# Patient Record
Sex: FEMALE | Race: WHITE | NOT HISPANIC OR LATINO
[De-identification: names, ages, dates, MRNs, and addresses within clinical notes are randomized per-mention and may not be internally consistent; named-entity substitution may affect disease eponyms.]

---

## 2017-01-27 ENCOUNTER — MEDICATION RENEWAL (OUTPATIENT)
Age: 62
End: 2017-01-27

## 2017-02-10 ENCOUNTER — OUTPATIENT (OUTPATIENT)
Dept: OUTPATIENT SERVICES | Facility: HOSPITAL | Age: 62
LOS: 1 days | End: 2017-02-10
Payer: COMMERCIAL

## 2017-02-10 PROCEDURE — 77067 SCR MAMMO BI INCL CAD: CPT

## 2017-02-10 PROCEDURE — G0202: CPT | Mod: 26

## 2017-02-15 ENCOUNTER — APPOINTMENT (OUTPATIENT)
Dept: ORTHOPEDIC SURGERY | Facility: CLINIC | Age: 62
End: 2017-02-15

## 2017-02-15 DIAGNOSIS — M16.11 UNILATERAL PRIMARY OSTEOARTHRITIS, RIGHT HIP: ICD-10-CM

## 2017-03-12 ENCOUNTER — FORM ENCOUNTER (OUTPATIENT)
Age: 62
End: 2017-03-12

## 2017-03-13 ENCOUNTER — OUTPATIENT (OUTPATIENT)
Dept: OUTPATIENT SERVICES | Facility: HOSPITAL | Age: 62
LOS: 1 days | End: 2017-03-13
Payer: COMMERCIAL

## 2017-03-13 DIAGNOSIS — Z22.321 CARRIER OR SUSPECTED CARRIER OF METHICILLIN SUSCEPTIBLE STAPHYLOCOCCUS AUREUS: ICD-10-CM

## 2017-03-13 DIAGNOSIS — M16.11 UNILATERAL PRIMARY OSTEOARTHRITIS, RIGHT HIP: ICD-10-CM

## 2017-03-13 LAB
ANION GAP SERPL CALC-SCNC: 7 MMOL/L — LOW (ref 9–16)
APPEARANCE UR: CLEAR — SIGNIFICANT CHANGE UP
APTT BLD: 34.5 SEC — SIGNIFICANT CHANGE UP (ref 27.5–37.4)
BILIRUB UR-MCNC: (no result)
BUN SERPL-MCNC: 17 MG/DL — SIGNIFICANT CHANGE UP (ref 7–23)
CALCIUM SERPL-MCNC: 9.5 MG/DL — SIGNIFICANT CHANGE UP (ref 8.5–10.5)
CHLORIDE SERPL-SCNC: 104 MMOL/L — SIGNIFICANT CHANGE UP (ref 96–108)
CO2 SERPL-SCNC: 30 MMOL/L — SIGNIFICANT CHANGE UP (ref 22–31)
COLOR SPEC: YELLOW — SIGNIFICANT CHANGE UP
CREAT SERPL-MCNC: 0.74 MG/DL — SIGNIFICANT CHANGE UP (ref 0.5–1.3)
DIFF PNL FLD: NEGATIVE — SIGNIFICANT CHANGE UP
GLUCOSE SERPL-MCNC: 96 MG/DL — SIGNIFICANT CHANGE UP (ref 70–99)
GLUCOSE UR QL: NEGATIVE — SIGNIFICANT CHANGE UP
HBA1C BLD-MCNC: 6 % — HIGH (ref 4.8–5.6)
HCT VFR BLD CALC: 45.1 % — HIGH (ref 34.5–45)
HGB BLD-MCNC: 15.8 G/DL — HIGH (ref 11.5–15.5)
INR BLD: 1.01 — SIGNIFICANT CHANGE UP (ref 0.88–1.16)
KETONES UR-MCNC: (no result) MG/DL
LEUKOCYTE ESTERASE UR-ACNC: NEGATIVE — SIGNIFICANT CHANGE UP
MCHC RBC-ENTMCNC: 32.6 PG — SIGNIFICANT CHANGE UP (ref 27–34)
MCHC RBC-ENTMCNC: 35 G/DL — SIGNIFICANT CHANGE UP (ref 32–36)
MCV RBC AUTO: 93.2 FL — SIGNIFICANT CHANGE UP (ref 80–100)
NITRITE UR-MCNC: NEGATIVE — SIGNIFICANT CHANGE UP
PH UR: 5 — SIGNIFICANT CHANGE UP (ref 4–8)
PLATELET # BLD AUTO: 245 K/UL — SIGNIFICANT CHANGE UP (ref 150–400)
POTASSIUM SERPL-MCNC: 4 MMOL/L — SIGNIFICANT CHANGE UP (ref 3.5–5.3)
POTASSIUM SERPL-SCNC: 4 MMOL/L — SIGNIFICANT CHANGE UP (ref 3.5–5.3)
PROT UR-MCNC: NEGATIVE MG/DL — SIGNIFICANT CHANGE UP
PROTHROM AB SERPL-ACNC: 11.2 SEC — SIGNIFICANT CHANGE UP (ref 10–13.1)
RBC # BLD: 4.84 M/UL — SIGNIFICANT CHANGE UP (ref 3.8–5.2)
RBC # FLD: 12.4 % — SIGNIFICANT CHANGE UP (ref 10.3–16.9)
SODIUM SERPL-SCNC: 141 MMOL/L — SIGNIFICANT CHANGE UP (ref 135–145)
SP GR SPEC: >=1.03 — SIGNIFICANT CHANGE UP (ref 1–1.03)
UROBILINOGEN FLD QL: 0.2 E.U./DL — SIGNIFICANT CHANGE UP
WBC # BLD: 7.5 K/UL — SIGNIFICANT CHANGE UP (ref 3.8–10.5)
WBC # FLD AUTO: 7.5 K/UL — SIGNIFICANT CHANGE UP (ref 3.8–10.5)

## 2017-03-13 PROCEDURE — 73700 CT LOWER EXTREMITY W/O DYE: CPT

## 2017-03-13 PROCEDURE — 85730 THROMBOPLASTIN TIME PARTIAL: CPT

## 2017-03-13 PROCEDURE — 73700 CT LOWER EXTREMITY W/O DYE: CPT | Mod: 26,RT

## 2017-03-13 PROCEDURE — 83036 HEMOGLOBIN GLYCOSYLATED A1C: CPT

## 2017-03-13 PROCEDURE — 85027 COMPLETE CBC AUTOMATED: CPT

## 2017-03-13 PROCEDURE — 93005 ELECTROCARDIOGRAM TRACING: CPT

## 2017-03-13 PROCEDURE — 71046 X-RAY EXAM CHEST 2 VIEWS: CPT

## 2017-03-13 PROCEDURE — 71020: CPT | Mod: 26

## 2017-03-13 PROCEDURE — 87641 MR-STAPH DNA AMP PROBE: CPT

## 2017-03-13 PROCEDURE — 81003 URINALYSIS AUTO W/O SCOPE: CPT

## 2017-03-13 PROCEDURE — 80048 BASIC METABOLIC PNL TOTAL CA: CPT

## 2017-03-13 PROCEDURE — 87086 URINE CULTURE/COLONY COUNT: CPT

## 2017-03-13 PROCEDURE — 93010 ELECTROCARDIOGRAM REPORT: CPT

## 2017-03-13 PROCEDURE — 85610 PROTHROMBIN TIME: CPT

## 2017-03-14 LAB
CULTURE RESULTS: SIGNIFICANT CHANGE UP
SPECIMEN SOURCE: SIGNIFICANT CHANGE UP

## 2017-03-15 LAB
MRSA PCR RESULT.: NEGATIVE — SIGNIFICANT CHANGE UP
S AUREUS DNA NOSE QL NAA+PROBE: NEGATIVE — SIGNIFICANT CHANGE UP

## 2017-04-04 ENCOUNTER — MEDICATION RENEWAL (OUTPATIENT)
Age: 62
End: 2017-04-04

## 2017-04-05 ENCOUNTER — RESULT REVIEW (OUTPATIENT)
Age: 62
End: 2017-04-05

## 2017-04-05 VITALS
OXYGEN SATURATION: 95 % | DIASTOLIC BLOOD PRESSURE: 70 MMHG | WEIGHT: 204.81 LBS | HEIGHT: 64 IN | TEMPERATURE: 97 F | RESPIRATION RATE: 16 BRPM | SYSTOLIC BLOOD PRESSURE: 156 MMHG | HEART RATE: 84 BPM

## 2017-04-06 ENCOUNTER — INPATIENT (INPATIENT)
Facility: HOSPITAL | Age: 62
LOS: 1 days | Discharge: HOME | DRG: 470 | End: 2017-04-08
Attending: ORTHOPAEDIC SURGERY | Admitting: ORTHOPAEDIC SURGERY
Payer: COMMERCIAL

## 2017-04-06 ENCOUNTER — APPOINTMENT (OUTPATIENT)
Dept: ORTHOPEDIC SURGERY | Facility: HOSPITAL | Age: 62
End: 2017-04-06

## 2017-04-06 DIAGNOSIS — Z98.890 OTHER SPECIFIED POSTPROCEDURAL STATES: Chronic | ICD-10-CM

## 2017-04-06 DIAGNOSIS — M16.11 UNILATERAL PRIMARY OSTEOARTHRITIS, RIGHT HIP: ICD-10-CM

## 2017-04-06 DIAGNOSIS — Z41.9 ENCOUNTER FOR PROCEDURE FOR PURPOSES OTHER THAN REMEDYING HEALTH STATE, UNSPECIFIED: Chronic | ICD-10-CM

## 2017-04-06 PROCEDURE — 72170 X-RAY EXAM OF PELVIS: CPT | Mod: 26

## 2017-04-06 PROCEDURE — 27130 TOTAL HIP ARTHROPLASTY: CPT | Mod: RT

## 2017-04-06 RX ORDER — CALCIUM CARBONATE 500(1250)
1 TABLET ORAL DAILY
Qty: 0 | Refills: 0 | Status: DISCONTINUED | OUTPATIENT
Start: 2017-04-06 | End: 2017-04-08

## 2017-04-06 RX ORDER — MORPHINE SULFATE 50 MG/1
4 CAPSULE, EXTENDED RELEASE ORAL EVERY 4 HOURS
Qty: 0 | Refills: 0 | Status: DISCONTINUED | OUTPATIENT
Start: 2017-04-06 | End: 2017-04-08

## 2017-04-06 RX ORDER — ACETAMINOPHEN 500 MG
650 TABLET ORAL EVERY 6 HOURS
Qty: 0 | Refills: 0 | Status: DISCONTINUED | OUTPATIENT
Start: 2017-04-06 | End: 2017-04-08

## 2017-04-06 RX ORDER — HYDROMORPHONE HYDROCHLORIDE 2 MG/ML
0.5 INJECTION INTRAMUSCULAR; INTRAVENOUS; SUBCUTANEOUS
Qty: 0 | Refills: 0 | Status: DISCONTINUED | OUTPATIENT
Start: 2017-04-06 | End: 2017-04-08

## 2017-04-06 RX ORDER — ONDANSETRON 8 MG/1
4 TABLET, FILM COATED ORAL EVERY 4 HOURS
Qty: 0 | Refills: 0 | Status: DISCONTINUED | OUTPATIENT
Start: 2017-04-06 | End: 2017-04-08

## 2017-04-06 RX ORDER — SENNA PLUS 8.6 MG/1
2 TABLET ORAL AT BEDTIME
Qty: 0 | Refills: 0 | Status: DISCONTINUED | OUTPATIENT
Start: 2017-04-06 | End: 2017-04-08

## 2017-04-06 RX ORDER — ONDANSETRON 8 MG/1
4 TABLET, FILM COATED ORAL EVERY 6 HOURS
Qty: 0 | Refills: 0 | Status: DISCONTINUED | OUTPATIENT
Start: 2017-04-06 | End: 2017-04-08

## 2017-04-06 RX ORDER — ASPIRIN/CALCIUM CARB/MAGNESIUM 324 MG
325 TABLET ORAL DAILY
Qty: 0 | Refills: 0 | Status: DISCONTINUED | OUTPATIENT
Start: 2017-04-06 | End: 2017-04-08

## 2017-04-06 RX ORDER — POLYETHYLENE GLYCOL 3350 17 G/17G
17 POWDER, FOR SOLUTION ORAL DAILY
Qty: 0 | Refills: 0 | Status: DISCONTINUED | OUTPATIENT
Start: 2017-04-06 | End: 2017-04-08

## 2017-04-06 RX ORDER — CELECOXIB 200 MG/1
200 CAPSULE ORAL ONCE
Qty: 0 | Refills: 0 | Status: COMPLETED | OUTPATIENT
Start: 2017-04-06 | End: 2017-04-06

## 2017-04-06 RX ORDER — OXYCODONE HYDROCHLORIDE 5 MG/1
10 TABLET ORAL EVERY 4 HOURS
Qty: 0 | Refills: 0 | Status: DISCONTINUED | OUTPATIENT
Start: 2017-04-06 | End: 2017-04-08

## 2017-04-06 RX ORDER — OXYCODONE HYDROCHLORIDE 5 MG/1
5 TABLET ORAL EVERY 4 HOURS
Qty: 0 | Refills: 0 | Status: DISCONTINUED | OUTPATIENT
Start: 2017-04-06 | End: 2017-04-08

## 2017-04-06 RX ORDER — BUPIVACAINE 13.3 MG/ML
20 INJECTION, SUSPENSION, LIPOSOMAL INFILTRATION ONCE
Qty: 0 | Refills: 0 | Status: DISCONTINUED | OUTPATIENT
Start: 2017-04-06 | End: 2017-04-06

## 2017-04-06 RX ORDER — OXYCODONE HYDROCHLORIDE 5 MG/1
20 TABLET ORAL ONCE
Qty: 0 | Refills: 0 | Status: DISCONTINUED | OUTPATIENT
Start: 2017-04-06 | End: 2017-04-06

## 2017-04-06 RX ORDER — MAGNESIUM HYDROXIDE 400 MG/1
30 TABLET, CHEWABLE ORAL DAILY
Qty: 0 | Refills: 0 | Status: DISCONTINUED | OUTPATIENT
Start: 2017-04-06 | End: 2017-04-08

## 2017-04-06 RX ORDER — SIMVASTATIN 20 MG/1
20 TABLET, FILM COATED ORAL AT BEDTIME
Qty: 0 | Refills: 0 | Status: DISCONTINUED | OUTPATIENT
Start: 2017-04-06 | End: 2017-04-08

## 2017-04-06 RX ORDER — SODIUM CHLORIDE 9 MG/ML
1000 INJECTION, SOLUTION INTRAVENOUS
Qty: 0 | Refills: 0 | Status: DISCONTINUED | OUTPATIENT
Start: 2017-04-06 | End: 2017-04-08

## 2017-04-06 RX ORDER — CALCIUM CARBONATE 500(1250)
1 TABLET ORAL DAILY
Qty: 0 | Refills: 0 | Status: DISCONTINUED | OUTPATIENT
Start: 2017-04-06 | End: 2017-04-06

## 2017-04-06 RX ORDER — CELECOXIB 200 MG/1
200 CAPSULE ORAL
Qty: 0 | Refills: 0 | Status: DISCONTINUED | OUTPATIENT
Start: 2017-04-06 | End: 2017-04-08

## 2017-04-06 RX ORDER — DOCUSATE SODIUM 100 MG
100 CAPSULE ORAL THREE TIMES A DAY
Qty: 0 | Refills: 0 | Status: DISCONTINUED | OUTPATIENT
Start: 2017-04-06 | End: 2017-04-08

## 2017-04-06 RX ORDER — CEFAZOLIN SODIUM 1 G
2000 VIAL (EA) INJECTION EVERY 8 HOURS
Qty: 0 | Refills: 0 | Status: COMPLETED | OUTPATIENT
Start: 2017-04-06 | End: 2017-04-07

## 2017-04-06 RX ORDER — FERROUS SULFATE 325(65) MG
325 TABLET ORAL
Qty: 0 | Refills: 0 | Status: DISCONTINUED | OUTPATIENT
Start: 2017-04-06 | End: 2017-04-08

## 2017-04-06 RX ORDER — PANTOPRAZOLE SODIUM 20 MG/1
40 TABLET, DELAYED RELEASE ORAL DAILY
Qty: 0 | Refills: 0 | Status: DISCONTINUED | OUTPATIENT
Start: 2017-04-06 | End: 2017-04-08

## 2017-04-06 RX ORDER — ACETAMINOPHEN 500 MG
650 TABLET ORAL EVERY 8 HOURS
Qty: 0 | Refills: 0 | Status: DISCONTINUED | OUTPATIENT
Start: 2017-04-06 | End: 2017-04-08

## 2017-04-06 RX ORDER — KETOROLAC TROMETHAMINE 30 MG/ML
15 SYRINGE (ML) INJECTION EVERY 4 HOURS
Qty: 0 | Refills: 0 | Status: DISCONTINUED | OUTPATIENT
Start: 2017-04-06 | End: 2017-04-08

## 2017-04-06 RX ADMIN — Medication 100 MILLIGRAM(S): at 21:07

## 2017-04-06 RX ADMIN — OXYCODONE HYDROCHLORIDE 5 MILLIGRAM(S): 5 TABLET ORAL at 21:05

## 2017-04-06 RX ADMIN — MORPHINE SULFATE 4 MILLIGRAM(S): 50 CAPSULE, EXTENDED RELEASE ORAL at 18:07

## 2017-04-06 RX ADMIN — OXYCODONE HYDROCHLORIDE 5 MILLIGRAM(S): 5 TABLET ORAL at 21:50

## 2017-04-06 RX ADMIN — ONDANSETRON 4 MILLIGRAM(S): 8 TABLET, FILM COATED ORAL at 19:41

## 2017-04-06 RX ADMIN — Medication 650 MILLIGRAM(S): at 21:50

## 2017-04-06 RX ADMIN — CELECOXIB 200 MILLIGRAM(S): 200 CAPSULE ORAL at 11:45

## 2017-04-06 RX ADMIN — OXYCODONE HYDROCHLORIDE 20 MILLIGRAM(S): 5 TABLET ORAL at 11:45

## 2017-04-06 RX ADMIN — PANTOPRAZOLE SODIUM 40 MILLIGRAM(S): 20 TABLET, DELAYED RELEASE ORAL at 21:07

## 2017-04-06 RX ADMIN — Medication 1 TABLET(S): at 21:07

## 2017-04-06 RX ADMIN — SIMVASTATIN 20 MILLIGRAM(S): 20 TABLET, FILM COATED ORAL at 21:07

## 2017-04-06 RX ADMIN — MORPHINE SULFATE 4 MILLIGRAM(S): 50 CAPSULE, EXTENDED RELEASE ORAL at 18:33

## 2017-04-06 RX ADMIN — Medication 650 MILLIGRAM(S): at 21:05

## 2017-04-06 RX ADMIN — Medication 100 MILLIGRAM(S): at 19:32

## 2017-04-06 RX ADMIN — Medication 325 MILLIGRAM(S): at 19:32

## 2017-04-06 NOTE — PROGRESS NOTE ADULT - SUBJECTIVE AND OBJECTIVE BOX
Ortho Post Op Check    Procedure: R OWEN Sup Rosette  Surgeon: Sherrell  Laterality: R    Pt comfortable without complaints, pain controlled  Denies CP, SOB, N/V, numbness/tingling     Vital Signs Last 24 Hrs  T(C): 35.9, Max: 36.6 (04-06 @ 15:05)  T(F): 96.7, Max: 97.8 (04-06 @ 15:05)  HR: 77 (67 - 80)  BP: 116/63 (109/70 - 125/69)  BP(mean): --  RR: 12 (12 - 15)  SpO2: 98% (93% - 98%)  Wt(kg): --  AVSS    General: Pt Alert and oriented, NAD  DSG C/D/I  Pulses:  Sensation: SILT   Motor: EHL/FHL/TA/GS              Post-op X-Ray: Implant in adequate position    A/P: 61yFemale POD#0 s/p   - Stable  - Pain Control  - DVT ppx: ASA  - Post op abx: Ancef  - PT, WBS: WBAT    Ortho Pager [phone]

## 2017-04-06 NOTE — PHYSICAL THERAPY INITIAL EVALUATION ADULT - BALANCE DISTURBANCE, IDENTIFIED IMPAIRMENT CONTRIBUTE, REHAB EVAL
pain/impaired postural control/decreased ROM/impaired sensory feedback/impaired motor control/decreased strength

## 2017-04-06 NOTE — BRIEF OPERATIVE NOTE - PROCEDURE
Hip arthroplasty  04/06/2017    Active  [id number]
Hip arthroplasty  04/06/2017    Active  Dima More

## 2017-04-06 NOTE — PHYSICAL THERAPY INITIAL EVALUATION ADULT - IMPAIRMENTS CONTRIBUTING TO GAIT DEVIATIONS, PT EVAL
decreased ROM/impaired balance/decreased strength/impaired postural control/pain/impaired motor control/impaired sensory feedback

## 2017-04-06 NOTE — H&P ADULT - HISTORY OF PRESENT ILLNESS
62yo f c/o right hip pain x 5-6 months. Pt. denies any accident or injury. Pt. denies any numbness/tingling of b/l les. Denies any walker assistance but states she is currently limping. Presents today for elective RIGHT THR, ROBOTIC ASSISTED.

## 2017-04-06 NOTE — PHYSICAL THERAPY INITIAL EVALUATION ADULT - IMPAIRED TRANSFERS: SIT/STAND, REHAB EVAL
decreased strength/impaired balance/impaired sensory feedback/decreased ROM/impaired postural control/pain

## 2017-04-06 NOTE — H&P ADULT - NSHPLABSRESULTS_GEN_ALL_CORE
preop cbc/bmp/coags/ua wnl per medical clearance   CXR- wnl per medical clearance  EKG- wnl per medical clearance

## 2017-04-07 ENCOUNTER — TRANSCRIPTION ENCOUNTER (OUTPATIENT)
Age: 62
End: 2017-04-07

## 2017-04-07 LAB
ANION GAP SERPL CALC-SCNC: 10 MMOL/L — SIGNIFICANT CHANGE UP (ref 9–16)
BUN SERPL-MCNC: 11 MG/DL — SIGNIFICANT CHANGE UP (ref 7–23)
CALCIUM SERPL-MCNC: 8.1 MG/DL — LOW (ref 8.5–10.5)
CHLORIDE SERPL-SCNC: 104 MMOL/L — SIGNIFICANT CHANGE UP (ref 96–108)
CO2 SERPL-SCNC: 26 MMOL/L — SIGNIFICANT CHANGE UP (ref 22–31)
CREAT SERPL-MCNC: 0.7 MG/DL — SIGNIFICANT CHANGE UP (ref 0.5–1.3)
GLUCOSE SERPL-MCNC: 123 MG/DL — HIGH (ref 70–99)
HCT VFR BLD CALC: 32.1 % — LOW (ref 34.5–45)
HGB BLD-MCNC: 11.1 G/DL — LOW (ref 11.5–15.5)
MCHC RBC-ENTMCNC: 32.4 PG — SIGNIFICANT CHANGE UP (ref 27–34)
MCHC RBC-ENTMCNC: 34.6 G/DL — SIGNIFICANT CHANGE UP (ref 32–36)
MCV RBC AUTO: 93.6 FL — SIGNIFICANT CHANGE UP (ref 80–100)
PLATELET # BLD AUTO: 186 K/UL — SIGNIFICANT CHANGE UP (ref 150–400)
POTASSIUM SERPL-MCNC: 3.9 MMOL/L — SIGNIFICANT CHANGE UP (ref 3.5–5.3)
POTASSIUM SERPL-SCNC: 3.9 MMOL/L — SIGNIFICANT CHANGE UP (ref 3.5–5.3)
RBC # BLD: 3.43 M/UL — LOW (ref 3.8–5.2)
RBC # FLD: 12.6 % — SIGNIFICANT CHANGE UP (ref 10.3–16.9)
SODIUM SERPL-SCNC: 140 MMOL/L — SIGNIFICANT CHANGE UP (ref 135–145)
WBC # BLD: 13.5 K/UL — HIGH (ref 3.8–10.5)
WBC # FLD AUTO: 13.5 K/UL — HIGH (ref 3.8–10.5)

## 2017-04-07 RX ORDER — ONDANSETRON 8 MG/1
4 TABLET, FILM COATED ORAL EVERY 6 HOURS
Qty: 0 | Refills: 0 | Status: DISCONTINUED | OUTPATIENT
Start: 2017-04-07 | End: 2017-04-08

## 2017-04-07 RX ADMIN — OXYCODONE HYDROCHLORIDE 5 MILLIGRAM(S): 5 TABLET ORAL at 02:20

## 2017-04-07 RX ADMIN — POLYETHYLENE GLYCOL 3350 17 GRAM(S): 17 POWDER, FOR SOLUTION ORAL at 12:59

## 2017-04-07 RX ADMIN — OXYCODONE HYDROCHLORIDE 5 MILLIGRAM(S): 5 TABLET ORAL at 13:28

## 2017-04-07 RX ADMIN — Medication 325 MILLIGRAM(S): at 17:01

## 2017-04-07 RX ADMIN — Medication 650 MILLIGRAM(S): at 13:00

## 2017-04-07 RX ADMIN — Medication 650 MILLIGRAM(S): at 06:15

## 2017-04-07 RX ADMIN — ONDANSETRON 4 MILLIGRAM(S): 8 TABLET, FILM COATED ORAL at 22:44

## 2017-04-07 RX ADMIN — CELECOXIB 200 MILLIGRAM(S): 200 CAPSULE ORAL at 17:03

## 2017-04-07 RX ADMIN — Medication 100 MILLIGRAM(S): at 03:37

## 2017-04-07 RX ADMIN — Medication 325 MILLIGRAM(S): at 07:25

## 2017-04-07 RX ADMIN — OXYCODONE HYDROCHLORIDE 10 MILLIGRAM(S): 5 TABLET ORAL at 17:02

## 2017-04-07 RX ADMIN — Medication 325 MILLIGRAM(S): at 12:58

## 2017-04-07 RX ADMIN — Medication 1 TABLET(S): at 12:58

## 2017-04-07 RX ADMIN — Medication 650 MILLIGRAM(S): at 05:21

## 2017-04-07 RX ADMIN — CELECOXIB 200 MILLIGRAM(S): 200 CAPSULE ORAL at 09:26

## 2017-04-07 RX ADMIN — SIMVASTATIN 20 MILLIGRAM(S): 20 TABLET, FILM COATED ORAL at 20:51

## 2017-04-07 RX ADMIN — OXYCODONE HYDROCHLORIDE 5 MILLIGRAM(S): 5 TABLET ORAL at 01:31

## 2017-04-07 RX ADMIN — OXYCODONE HYDROCHLORIDE 10 MILLIGRAM(S): 5 TABLET ORAL at 17:32

## 2017-04-07 RX ADMIN — ONDANSETRON 4 MILLIGRAM(S): 8 TABLET, FILM COATED ORAL at 09:26

## 2017-04-07 RX ADMIN — Medication 100 MILLIGRAM(S): at 05:28

## 2017-04-07 RX ADMIN — Medication 650 MILLIGRAM(S): at 13:30

## 2017-04-07 RX ADMIN — Medication 100 MILLIGRAM(S): at 13:00

## 2017-04-07 RX ADMIN — CELECOXIB 200 MILLIGRAM(S): 200 CAPSULE ORAL at 09:56

## 2017-04-07 RX ADMIN — Medication 650 MILLIGRAM(S): at 20:51

## 2017-04-07 RX ADMIN — CELECOXIB 200 MILLIGRAM(S): 200 CAPSULE ORAL at 17:33

## 2017-04-07 RX ADMIN — Medication 650 MILLIGRAM(S): at 22:02

## 2017-04-07 RX ADMIN — OXYCODONE HYDROCHLORIDE 10 MILLIGRAM(S): 5 TABLET ORAL at 06:15

## 2017-04-07 RX ADMIN — OXYCODONE HYDROCHLORIDE 5 MILLIGRAM(S): 5 TABLET ORAL at 12:58

## 2017-04-07 RX ADMIN — Medication 100 MILLIGRAM(S): at 20:51

## 2017-04-07 RX ADMIN — OXYCODONE HYDROCHLORIDE 10 MILLIGRAM(S): 5 TABLET ORAL at 05:21

## 2017-04-07 RX ADMIN — PANTOPRAZOLE SODIUM 40 MILLIGRAM(S): 20 TABLET, DELAYED RELEASE ORAL at 12:59

## 2017-04-07 NOTE — DISCHARGE NOTE ADULT - CARE PLAN
Principal Discharge DX:	Osteoarthritis  Goal:	Pain Management, improve mobility  Instructions for follow-up, activity and diet:	see below

## 2017-04-07 NOTE — DISCHARGE NOTE ADULT - PATIENT PORTAL LINK FT
“You can access the [facility] Patient Portal, offered by [facility], by registering with the following website: [email or web address]”

## 2017-04-07 NOTE — OCCUPATIONAL THERAPY INITIAL EVALUATION ADULT - PERTINENT HX OF CURRENT PROBLEM, REHAB EVAL
62yo f c/o right hip pain x 5-6 months. Pt. denies any accident or injury. Pt. denies any numbness/tingling of b/l les. Denies any walker assistance but states she is currently limping. Presents today for elective RIGHT THR, ROBOTIC ASSISTED. Underwent right Hip arthroplasty on 04/06/2017. 62yo f c/o right hip pain x 5-6 months. Pt. denies any accident or injury. Pt. denies any numbness/tingling of b/l les. Denies any walker assistance but states she is currently limping. Presents today for elective RIGHT THR, ROBOTIC ASSISTED. Underwent superior right Hip arthroplasty on 04/06/2017.

## 2017-04-07 NOTE — DISCHARGE NOTE ADULT - MEDICATION SUMMARY - MEDICATIONS TO TAKE
I will START or STAY ON the medications listed below when I get home from the hospital:    aspirin 81 mg oral tablet  -- 1 tab(s) by mouth once a day  -- Indication: For dvt ppx     calcium (as carbonate) 600 mg oral tablet  -- 1  by mouth once a day  -- Indication: For Home    ferrous sulfate 325 mg (65 mg elemental iron) oral tablet  -- 1 tab(s) by mouth 2 times a day  -- Indication: For Home I will START or STAY ON the medications listed below when I get home from the hospital:    aspirin 325 mg oral tablet  -- 2 tab(s) by mouth once a day  -- Indication: For DVT PPX    celecoxib 200 mg oral capsule  -- 1 cap(s) by mouth once a day  -- Indication: For Pain    Percocet 5/325 oral tablet  -- 1-2 tab(s) by mouth every 4-6 hours as needed  -- Indication: For Pain    calcium (as carbonate) 600 mg oral tablet  -- 1  by mouth once a day  -- Indication: For Home    simvastatin 20 mg oral tablet  -- 1 tab(s) by mouth once a day (at bedtime)  -- Indication: For HCL    ferrous sulfate 325 mg (65 mg elemental iron) oral tablet  -- 1 tab(s) by mouth 2 times a day  -- Indication: For Home    docusate sodium 100 mg oral capsule  -- 1 cap(s) by mouth 3 times a day  -- Indication: For Constipation    polyethylene glycol 3350 oral powder for reconstitution  -- 17 gram(s) by mouth once a day  -- Indication: For Constipation    senna oral tablet  -- 2 tab(s) by mouth once a day (at bedtime), As needed, Constipation  -- Indication: For Constipation    omeprazole 20 mg oral delayed release tablet  -- 1 tab(s) by mouth once a day  -- Indication: For PPI I will START or STAY ON the medications listed below when I get home from the hospital:    aspirin 325 mg oral tablet  -- 2 tab(s) by mouth once a day  -- Indication: For DVT PPX    celecoxib 200 mg oral capsule  -- 1 cap(s) by mouth once a day  -- Indication: For Pain    Percocet 5/325 oral tablet  -- 1-2 tab(s) by mouth every 4-6 hours as needed  -- Indication: For Pain    calcium (as carbonate) 600 mg oral tablet  -- 1  by mouth once a day  -- Indication: For Home    Zofran ODT 4 mg oral tablet, disintegrating  -- 1 tab(s) by mouth every 4 hours, As Needed -for nausea MDD:6  -- Indication: For Nausea    simvastatin 20 mg oral tablet  -- 1 tab(s) by mouth once a day (at bedtime)  -- Indication: For HCL    ferrous sulfate 325 mg (65 mg elemental iron) oral tablet  -- 1 tab(s) by mouth 2 times a day  -- Indication: For Home    docusate sodium 100 mg oral capsule  -- 1 cap(s) by mouth 3 times a day  -- Indication: For Constipation    polyethylene glycol 3350 oral powder for reconstitution  -- 17 gram(s) by mouth once a day  -- Indication: For Constipation    senna oral tablet  -- 2 tab(s) by mouth once a day (at bedtime), As needed, Constipation  -- Indication: For Constipation    omeprazole 20 mg oral delayed release tablet  -- 1 tab(s) by mouth once a day  -- Indication: For PPI

## 2017-04-07 NOTE — PROGRESS NOTE ADULT - SUBJECTIVE AND OBJECTIVE BOX
ORTHO NOTE    [x ] Pt seen/examined.9AM  [ ] Pt without any complaints/in NAD.    [x ] Pt complains of: Incisional pain with movements     [ ] ROS  otherwise negative    .    PHYSICAL EXAM:    Vital Signs Last 24 Hrs  T(C): 36.6, Max: 37 (04-06 @ 20:35)  T(F): 97.8, Max: 98.6 (04-06 @ 20:35)  HR: 77 (67 - 93)  BP: 117/68 (94/60 - 125/69)  BP(mean): --  RR: 16 (12 - 21)  SpO2: 98% (91% - 99%)    I&O's Detail  I & Os for 24h ending 07 Apr 2017 07:00  =============================================  IN:    lactated ringers.: 980 ml    Total IN: 980 ml  ---------------------------------------------  OUT:    Voided: 900 ml    Total OUT: 900 ml  ---------------------------------------------  Total NET: 80 ml    I & Os for current day (as of 07 Apr 2017 13:20)  =============================================  IN:    Oral Fluid: 600 ml    Total IN: 600 ml  ---------------------------------------------  OUT:    Voided: 700 ml    Total OUT: 700 ml  ---------------------------------------------  Total NET: -100 ml       CAPILLARY BLOOD GLUCOSE                  Neuro: A&0x3    Lungs: Denies SOB    CV: Denies chest pain    ABD: NON distended positive bowel sounds      Ext: NVID Dressing clean dry and intact.      LABS                        11.1   13.5  )-----------( 186      ( 07 Apr 2017 06:22 )             32.1                                04-07    140  |  104  |  11  ----------------------------<  123<H>  3.9   |  26  |  0.70    Ca    8.1<L>      07 Apr 2017 06:23       ASSESSMENT/PLAN:      STAT  POST:  Right OWEN ( Superior)    CONTINUE:          [x ] PT WBAT    [x ] DVT PPX- ASA    [x ] Pain MgtMEDICATIONS  (PRN):    oxyCODONE IR 5milliGRAM(s) Oral every 4 hours PRN Mild Pain  oxyCODONE IR 10milliGRAM(s) Oral every 4 hours PRN Moderate Pain  morphine  - Injectable 4milliGRAM(s) IV Push every 4 hours PRN Severe Pain  ketorolac   Injectable 15milliGRAM(s) IV Push every 4 hours PRN Moderate Pain (4 - 6)  HYDROmorphone  Injectable 0.5milliGRAM(s) IV Push every 1 hour PRN Severe Pain (7 - 10)    [ x] Dispo plan-Home

## 2017-04-07 NOTE — DISCHARGE NOTE ADULT - HOSPITAL COURSE
Admit 4/6/17  Surgery S/P Right OWEN ( superior)  Pre/post-op Antibiotics  DVT prophylaxis  Physical Therapy  Pain Management

## 2017-04-07 NOTE — DISCHARGE NOTE ADULT - CARE PROVIDER_API CALL
Marcelino Wynne), Orthopaedic Surgery  [address] [place] [address]  Phone: [phone]  Fax: [phone]

## 2017-04-07 NOTE — DISCHARGE NOTE ADULT - CARE PROVIDERS DIRECT ADDRESSES
[email or web address]

## 2017-04-07 NOTE — DISCHARGE NOTE ADULT - MEDICATION SUMMARY - MEDICATIONS TO CHANGE
I will SWITCH the dose or number of times a day I take the medications listed below when I get home from the hospital:  None I will SWITCH the dose or number of times a day I take the medications listed below when I get home from the hospital:    aspirin 81 mg oral tablet  -- 1 tab(s) by mouth once a day

## 2017-04-07 NOTE — DISCHARGE NOTE ADULT - HOME CARE AGENCY
[facility] tel [phone]  start of care day after discharge RN evaluation and Home Phyiscal therapy

## 2017-04-07 NOTE — PROGRESS NOTE ADULT - SUBJECTIVE AND OBJECTIVE BOX
SUBJECTIVE: Patient seen and examined. Pt doing well o/n.  No f/c/n/v/cp/sob.     OBJECTIVE:  Vital Signs Last 24 Hrs  T(C): 36.4, Max: 37 (04-06 @ 20:35)  T(F): 97.6, Max: 98.6 (04-06 @ 20:35)  HR: 77 (67 - 93)  BP: 113/60 (100/63 - 125/69)  BP(mean): --  RR: 16 (12 - 21)  SpO2: 99% (91% - 99%)    Affected extremity:          Dressing: clean/dry/intact            Sensation: SILT         Motor exam: 5/5 TA/GS/EHL         warm well perfused; capillary refill <3 seconds     LABS:                        11.1   13.5  )-----------( 186      ( 07 Apr 2017 06:22 )             32.1     04-07    140  |  104  |  11  ----------------------------<  123<H>  3.9   |  26  |  0.70    Ca    8.1<L>      07 Apr 2017 06:23          MEDICATIONS:lactated ringers. 1000milliLiter(s) IV Continuous <Continuous>  acetaminophen   Tablet 650milliGRAM(s) Oral every 6 hours PRN  oxyCODONE IR 5milliGRAM(s) Oral every 4 hours PRN  oxyCODONE IR 10milliGRAM(s) Oral every 4 hours PRN  morphine  - Injectable 4milliGRAM(s) IV Push every 4 hours PRN  aluminum hydroxide/magnesium hydroxide/simethicone Suspension 30milliLiter(s) Oral four times a day PRN  ondansetron Injectable 4milliGRAM(s) IV Push every 6 hours PRN  pantoprazole    Tablet 40milliGRAM(s) Oral daily  polyethylene glycol 3350 17Gram(s) Oral daily  senna 2Tablet(s) Oral at bedtime PRN  magnesium hydroxide Suspension 30milliLiter(s) Oral daily PRN  docusate sodium 100milliGRAM(s) Oral three times a day  simvastatin 20milliGRAM(s) Oral at bedtime  ferrous    sulfate 325milliGRAM(s) Oral two times a day with meals  acetaminophen   Tablet. 650milliGRAM(s) Oral every 8 hours  ketorolac   Injectable 15milliGRAM(s) IV Push every 4 hours PRN  celecoxib 200milliGRAM(s) Oral two times a day after meals  HYDROmorphone  Injectable 0.5milliGRAM(s) IV Push every 1 hour PRN  ondansetron Injectable 4milliGRAM(s) IV Push every 4 hours PRN  aspirin 325milliGRAM(s) Oral daily  calcium carbonate 1250 mG (OsCal) 1Tablet(s) Oral daily    Anticoagulation:    Antibiotics:     Pain medications:   acetaminophen   Tablet 650milliGRAM(s) Oral every 6 hours PRN  oxyCODONE IR 5milliGRAM(s) Oral every 4 hours PRN  oxyCODONE IR 10milliGRAM(s) Oral every 4 hours PRN  morphine  - Injectable 4milliGRAM(s) IV Push every 4 hours PRN  ondansetron Injectable 4milliGRAM(s) IV Push every 6 hours PRN  acetaminophen   Tablet. 650milliGRAM(s) Oral every 8 hours  ketorolac   Injectable 15milliGRAM(s) IV Push every 4 hours PRN  celecoxib 200milliGRAM(s) Oral two times a day after meals  HYDROmorphone  Injectable 0.5milliGRAM(s) IV Push every 1 hour PRN  ondansetron Injectable 4milliGRAM(s) IV Push every 4 hours PRN  aspirin 325milliGRAM(s) Oral daily    A/P :  Pt is a 60yo Female s/p R OWEN Sup Rosette Jose Ass 4/6/17  POD # 1  -    Pain control  -    DVT ppx: ASA     -    Weight bearing status: WBAT   -    Physical Therapy  -    Dispo: Home

## 2017-04-07 NOTE — DISCHARGE NOTE ADULT - ADDITIONAL INSTRUCTIONS
No strenuous activity, heavy lifting, driving, tub bathing, or returning to work until cleared by MD.  You may shower--dressing is waterproof.  Remove dressing after post op day 7, then leave incision open to air.  Follow up with Dr. Wynne call  to schedule an appt within 10-14 days.  If you don't have a bowel movement by post op day 3, then take Milk of Magnesia (over the counter).  If no bowel movement by at least post op day 5, then use a Dulcolax suppository (over the counter) and/or a Fleets enema--if still no bowel movement, call your MD.  Contact your doctor if you experience: fever greater than 101.5, chills, chest pain, difficulty breathing, bleeding, redness or heat around the incision.   Follow up with your primary care provider.  Please take pain meds as prescribed by Dr Wynne. Celecoxib 200mg 1 tablet twice daily for 6 weeks  Percocet 5mg/325mg take 1-2 tablets by mouth every 4 to 6 hours as needed, maximum 10 tablets per day  Omeprazole 20 mg 1 tablet daily for 4 weeks  Ecotrin (enteric-coated aspirin) 325mg 1 tablet by mouth every 12 hours until 4 weeks after surgery      No strenuous activity, heavy lifting, driving, tub bathing, or returning to work until cleared by MD.  You may shower--dressing is waterproof.  Remove dressing after post op day 7, then leave incision open to air.  Follow up with Dr. Wynne call  to schedule an appt within 10-14 days.  If you don't have a bowel movement by post op day 3, then take Milk of Magnesia (over the counter).  If no bowel movement by at least post op day 5, then use a Dulcolax suppository (over the counter) and/or a Fleets enema--if still no bowel movement, call your MD.  Contact your doctor if you experience: fever greater than 101.5, chills, chest pain, difficulty breathing, bleeding, redness or heat around the incision.   Follow up with your primary care provider.  Please take pain meds as prescribed by Dr Wynne.

## 2017-04-08 VITALS
OXYGEN SATURATION: 97 % | HEART RATE: 90 BPM | DIASTOLIC BLOOD PRESSURE: 73 MMHG | TEMPERATURE: 99 F | SYSTOLIC BLOOD PRESSURE: 124 MMHG | RESPIRATION RATE: 16 BRPM

## 2017-04-08 LAB
ANION GAP SERPL CALC-SCNC: 5 MMOL/L — LOW (ref 9–16)
BUN SERPL-MCNC: 13 MG/DL — SIGNIFICANT CHANGE UP (ref 7–23)
CALCIUM SERPL-MCNC: 8.3 MG/DL — LOW (ref 8.5–10.5)
CHLORIDE SERPL-SCNC: 106 MMOL/L — SIGNIFICANT CHANGE UP (ref 96–108)
CO2 SERPL-SCNC: 30 MMOL/L — SIGNIFICANT CHANGE UP (ref 22–31)
CREAT SERPL-MCNC: 0.73 MG/DL — SIGNIFICANT CHANGE UP (ref 0.5–1.3)
GLUCOSE SERPL-MCNC: 114 MG/DL — HIGH (ref 70–99)
HCT VFR BLD CALC: 31.4 % — LOW (ref 34.5–45)
HGB BLD-MCNC: 10.5 G/DL — LOW (ref 11.5–15.5)
MCHC RBC-ENTMCNC: 32.2 PG — SIGNIFICANT CHANGE UP (ref 27–34)
MCHC RBC-ENTMCNC: 33.4 G/DL — SIGNIFICANT CHANGE UP (ref 32–36)
MCV RBC AUTO: 96.3 FL — SIGNIFICANT CHANGE UP (ref 80–100)
PLATELET # BLD AUTO: 151 K/UL — SIGNIFICANT CHANGE UP (ref 150–400)
POTASSIUM SERPL-MCNC: 4.1 MMOL/L — SIGNIFICANT CHANGE UP (ref 3.5–5.3)
POTASSIUM SERPL-SCNC: 4.1 MMOL/L — SIGNIFICANT CHANGE UP (ref 3.5–5.3)
RBC # BLD: 3.26 M/UL — LOW (ref 3.8–5.2)
RBC # FLD: 13.1 % — SIGNIFICANT CHANGE UP (ref 10.3–16.9)
SODIUM SERPL-SCNC: 141 MMOL/L — SIGNIFICANT CHANGE UP (ref 135–145)
WBC # BLD: 8.8 K/UL — SIGNIFICANT CHANGE UP (ref 3.8–10.5)
WBC # FLD AUTO: 8.8 K/UL — SIGNIFICANT CHANGE UP (ref 3.8–10.5)

## 2017-04-08 PROCEDURE — 72170 X-RAY EXAM OF PELVIS: CPT

## 2017-04-08 PROCEDURE — C1713: CPT

## 2017-04-08 PROCEDURE — 86901 BLOOD TYPING SEROLOGIC RH(D): CPT

## 2017-04-08 PROCEDURE — 80048 BASIC METABOLIC PNL TOTAL CA: CPT

## 2017-04-08 PROCEDURE — 36415 COLL VENOUS BLD VENIPUNCTURE: CPT

## 2017-04-08 PROCEDURE — 86850 RBC ANTIBODY SCREEN: CPT

## 2017-04-08 PROCEDURE — 85027 COMPLETE CBC AUTOMATED: CPT

## 2017-04-08 PROCEDURE — C1889: CPT

## 2017-04-08 PROCEDURE — 86900 BLOOD TYPING SEROLOGIC ABO: CPT

## 2017-04-08 PROCEDURE — 97161 PT EVAL LOW COMPLEX 20 MIN: CPT

## 2017-04-08 PROCEDURE — 88300 SURGICAL PATH GROSS: CPT

## 2017-04-08 PROCEDURE — 97116 GAIT TRAINING THERAPY: CPT

## 2017-04-08 PROCEDURE — C1776: CPT

## 2017-04-08 RX ORDER — SIMVASTATIN 20 MG/1
1 TABLET, FILM COATED ORAL
Qty: 0 | Refills: 0 | COMMUNITY

## 2017-04-08 RX ORDER — SIMVASTATIN 20 MG/1
1 TABLET, FILM COATED ORAL
Qty: 0 | Refills: 0 | COMMUNITY
Start: 2017-04-08

## 2017-04-08 RX ORDER — ASPIRIN/CALCIUM CARB/MAGNESIUM 324 MG
2 TABLET ORAL
Qty: 0 | Refills: 0 | COMMUNITY
Start: 2017-04-08

## 2017-04-08 RX ORDER — POLYETHYLENE GLYCOL 3350 17 G/17G
17 POWDER, FOR SOLUTION ORAL
Qty: 0 | Refills: 0 | COMMUNITY
Start: 2017-04-08

## 2017-04-08 RX ORDER — ONDANSETRON 8 MG/1
1 TABLET, FILM COATED ORAL
Qty: 42 | Refills: 0 | OUTPATIENT
Start: 2017-04-08 | End: 2017-04-15

## 2017-04-08 RX ORDER — ASPIRIN/CALCIUM CARB/MAGNESIUM 324 MG
1 TABLET ORAL
Qty: 0 | Refills: 0 | COMMUNITY

## 2017-04-08 RX ORDER — CELECOXIB 200 MG/1
1 CAPSULE ORAL
Qty: 0 | Refills: 0 | COMMUNITY
Start: 2017-04-08

## 2017-04-08 RX ORDER — DOCUSATE SODIUM 100 MG
1 CAPSULE ORAL
Qty: 0 | Refills: 0 | COMMUNITY
Start: 2017-04-08

## 2017-04-08 RX ORDER — SENNA PLUS 8.6 MG/1
2 TABLET ORAL
Qty: 0 | Refills: 0 | COMMUNITY
Start: 2017-04-08

## 2017-04-08 RX ADMIN — OXYCODONE HYDROCHLORIDE 10 MILLIGRAM(S): 5 TABLET ORAL at 05:13

## 2017-04-08 RX ADMIN — OXYCODONE HYDROCHLORIDE 10 MILLIGRAM(S): 5 TABLET ORAL at 04:15

## 2017-04-08 RX ADMIN — ONDANSETRON 4 MILLIGRAM(S): 8 TABLET, FILM COATED ORAL at 09:08

## 2017-04-08 RX ADMIN — Medication 325 MILLIGRAM(S): at 09:08

## 2017-04-08 RX ADMIN — CELECOXIB 200 MILLIGRAM(S): 200 CAPSULE ORAL at 17:42

## 2017-04-08 RX ADMIN — Medication 650 MILLIGRAM(S): at 17:28

## 2017-04-08 RX ADMIN — OXYCODONE HYDROCHLORIDE 10 MILLIGRAM(S): 5 TABLET ORAL at 17:43

## 2017-04-08 RX ADMIN — Medication 325 MILLIGRAM(S): at 14:49

## 2017-04-08 RX ADMIN — Medication 650 MILLIGRAM(S): at 14:49

## 2017-04-08 RX ADMIN — OXYCODONE HYDROCHLORIDE 10 MILLIGRAM(S): 5 TABLET ORAL at 10:51

## 2017-04-08 RX ADMIN — CELECOXIB 200 MILLIGRAM(S): 200 CAPSULE ORAL at 10:15

## 2017-04-08 RX ADMIN — POLYETHYLENE GLYCOL 3350 17 GRAM(S): 17 POWDER, FOR SOLUTION ORAL at 14:48

## 2017-04-08 RX ADMIN — Medication 325 MILLIGRAM(S): at 17:42

## 2017-04-08 RX ADMIN — Medication 650 MILLIGRAM(S): at 05:45

## 2017-04-08 RX ADMIN — OXYCODONE HYDROCHLORIDE 10 MILLIGRAM(S): 5 TABLET ORAL at 11:30

## 2017-04-08 RX ADMIN — CELECOXIB 200 MILLIGRAM(S): 200 CAPSULE ORAL at 09:08

## 2017-04-08 RX ADMIN — OXYCODONE HYDROCHLORIDE 10 MILLIGRAM(S): 5 TABLET ORAL at 18:11

## 2017-04-08 RX ADMIN — Medication 100 MILLIGRAM(S): at 14:49

## 2017-04-08 RX ADMIN — Medication 650 MILLIGRAM(S): at 05:14

## 2017-04-08 RX ADMIN — PANTOPRAZOLE SODIUM 40 MILLIGRAM(S): 20 TABLET, DELAYED RELEASE ORAL at 14:48

## 2017-04-08 RX ADMIN — Medication 1 TABLET(S): at 14:48

## 2017-04-08 RX ADMIN — Medication 100 MILLIGRAM(S): at 05:13

## 2017-04-08 NOTE — PROGRESS NOTE ADULT - SUBJECTIVE AND OBJECTIVE BOX
SUBJECTIVE: Patient seen and examined.  No issues overnight. Pain well controlled. Denies CP/SOB    OBJECTIVE:     Vital Signs Last 24 Hrs  T(C): 36.8, Max: 36.9 (04-07 @ 15:39)  T(F): 98.3, Max: 98.4 (04-07 @ 15:39)  HR: 91 (77 - 91)  BP: 120/69 (94/60 - 120/69)  BP(mean): --  RR: 15 (15 - 17)  SpO2: 96% (96% - 98%)    RLE           Dressing: clean/dry/intact            Motor exam:  TA 5/5 EHL 5/5 GSC 5/5          Sensation:   T SILT SPH SILT DP SILT         Vascular:  Warm/pink/well perfused             LABS:                        11.1   13.5  )-----------( 186      ( 07 Apr 2017 06:22 )             32.1     04-07    140  |  104  |  11  ----------------------------<  123<H>  3.9   |  26  |  0.70    Ca    8.1<L>      07 Apr 2017 06:23            MEDICATIONS:  MEDICATIONS  (STANDING):  lactated ringers. 1000milliLiter(s) IV Continuous <Continuous>  pantoprazole    Tablet 40milliGRAM(s) Oral daily  polyethylene glycol 3350 17Gram(s) Oral daily  docusate sodium 100milliGRAM(s) Oral three times a day  simvastatin 20milliGRAM(s) Oral at bedtime  ferrous    sulfate 325milliGRAM(s) Oral two times a day with meals  acetaminophen   Tablet. 650milliGRAM(s) Oral every 8 hours  celecoxib 200milliGRAM(s) Oral two times a day after meals  aspirin 325milliGRAM(s) Oral daily  calcium carbonate 1250 mG (OsCal) 1Tablet(s) Oral daily    MEDICATIONS  (PRN):  acetaminophen   Tablet 650milliGRAM(s) Oral every 6 hours PRN For Temp over 38.3 C (100.94 F)  oxyCODONE IR 5milliGRAM(s) Oral every 4 hours PRN Mild Pain  oxyCODONE IR 10milliGRAM(s) Oral every 4 hours PRN Moderate Pain  morphine  - Injectable 4milliGRAM(s) IV Push every 4 hours PRN Severe Pain  aluminum hydroxide/magnesium hydroxide/simethicone Suspension 30milliLiter(s) Oral four times a day PRN Indigestion  ondansetron Injectable 4milliGRAM(s) IV Push every 6 hours PRN Nausea and/or Vomiting  bisacodyl Suppository 10milliGRAM(s) Rectal daily PRN If no bowel movement by POD#2  senna 2Tablet(s) Oral at bedtime PRN Constipation  magnesium hydroxide Suspension 30milliLiter(s) Oral daily PRN Constipation  ketorolac   Injectable 15milliGRAM(s) IV Push every 4 hours PRN Moderate Pain (4 - 6)  HYDROmorphone  Injectable 0.5milliGRAM(s) IV Push every 1 hour PRN Severe Pain (7 - 10)  ondansetron Injectable 4milliGRAM(s) IV Push every 4 hours PRN Nausea and/or Vomiting  ondansetron    Tablet 4milliGRAM(s) Oral every 6 hours PRN Nausea and/or Vomiting - PO option first if can tolerate PO      Anticoagulation:      Antibiotics:       Pain medications:   acetaminophen   Tablet 650milliGRAM(s) Oral every 6 hours PRN  oxyCODONE IR 5milliGRAM(s) Oral every 4 hours PRN  oxyCODONE IR 10milliGRAM(s) Oral every 4 hours PRN  morphine  - Injectable 4milliGRAM(s) IV Push every 4 hours PRN  ondansetron Injectable 4milliGRAM(s) IV Push every 6 hours PRN  acetaminophen   Tablet. 650milliGRAM(s) Oral every 8 hours  ketorolac   Injectable 15milliGRAM(s) IV Push every 4 hours PRN  celecoxib 200milliGRAM(s) Oral two times a day after meals  HYDROmorphone  Injectable 0.5milliGRAM(s) IV Push every 1 hour PRN  ondansetron Injectable 4milliGRAM(s) IV Push every 4 hours PRN  aspirin 325milliGRAM(s) Oral daily  ondansetron    Tablet 4milliGRAM(s) Oral every 6 hours PRN        A/P :   s/p   R OWEN POD #2  -    Pain control  -    DVT ppx: ASA BID  -    Weight bearing status:  WBAT  -    Resume home meds  -    Physical Therapy  -    Dispo: Home today

## 2017-04-11 LAB — SURGICAL PATHOLOGY STUDY: SIGNIFICANT CHANGE UP

## 2017-04-12 DIAGNOSIS — M25.551 PAIN IN RIGHT HIP: ICD-10-CM

## 2017-04-12 DIAGNOSIS — E66.9 OBESITY, UNSPECIFIED: ICD-10-CM

## 2017-04-12 DIAGNOSIS — E78.5 HYPERLIPIDEMIA, UNSPECIFIED: ICD-10-CM

## 2017-04-12 DIAGNOSIS — M16.11 UNILATERAL PRIMARY OSTEOARTHRITIS, RIGHT HIP: ICD-10-CM

## 2017-04-12 DIAGNOSIS — M54.10 RADICULOPATHY, SITE UNSPECIFIED: ICD-10-CM

## 2017-04-19 ENCOUNTER — MEDICATION RENEWAL (OUTPATIENT)
Age: 62
End: 2017-04-19

## 2017-04-30 ENCOUNTER — FORM ENCOUNTER (OUTPATIENT)
Age: 62
End: 2017-04-30

## 2017-05-01 ENCOUNTER — APPOINTMENT (OUTPATIENT)
Dept: ORTHOPEDIC SURGERY | Facility: CLINIC | Age: 62
End: 2017-05-01

## 2017-05-01 ENCOUNTER — OUTPATIENT (OUTPATIENT)
Dept: OUTPATIENT SERVICES | Facility: HOSPITAL | Age: 62
LOS: 1 days | End: 2017-05-01
Payer: COMMERCIAL

## 2017-05-01 DIAGNOSIS — Z41.9 ENCOUNTER FOR PROCEDURE FOR PURPOSES OTHER THAN REMEDYING HEALTH STATE, UNSPECIFIED: Chronic | ICD-10-CM

## 2017-05-01 DIAGNOSIS — Z98.890 OTHER SPECIFIED POSTPROCEDURAL STATES: Chronic | ICD-10-CM

## 2017-05-01 PROBLEM — M19.90 UNSPECIFIED OSTEOARTHRITIS, UNSPECIFIED SITE: Chronic | Status: ACTIVE | Noted: 2017-04-05

## 2017-05-01 PROBLEM — E78.5 HYPERLIPIDEMIA, UNSPECIFIED: Chronic | Status: ACTIVE | Noted: 2017-04-05

## 2017-05-01 PROCEDURE — 73501 X-RAY EXAM HIP UNI 1 VIEW: CPT | Mod: 26,RT

## 2017-05-01 PROCEDURE — 73501 X-RAY EXAM HIP UNI 1 VIEW: CPT

## 2017-05-01 RX ORDER — ETODOLAC 400 MG/1
400 TABLET, FILM COATED ORAL TWICE DAILY
Qty: 60 | Refills: 1 | Status: DISCONTINUED | COMMUNITY
Start: 2017-02-15 | End: 2017-05-01

## 2017-05-26 ENCOUNTER — APPOINTMENT (OUTPATIENT)
Dept: ORTHOPEDIC SURGERY | Facility: CLINIC | Age: 62
End: 2017-05-26

## 2017-05-26 DIAGNOSIS — Z96.641 AFTERCARE FOLLOWING JOINT REPLACEMENT SURGERY: ICD-10-CM

## 2017-05-26 DIAGNOSIS — Z47.1 AFTERCARE FOLLOWING JOINT REPLACEMENT SURGERY: ICD-10-CM

## 2017-05-26 RX ORDER — OXYCODONE AND ACETAMINOPHEN 5; 325 MG/1; MG/1
5-325 TABLET ORAL
Qty: 70 | Refills: 0 | Status: DISCONTINUED | COMMUNITY
Start: 2017-04-04 | End: 2017-05-26

## 2017-05-26 RX ORDER — CELECOXIB 200 MG/1
200 CAPSULE ORAL TWICE DAILY
Qty: 84 | Refills: 0 | Status: DISCONTINUED | COMMUNITY
Start: 2017-04-04 | End: 2017-05-26

## 2017-05-26 RX ORDER — ASPIRIN/ACETAMINOPHEN/CAFFEINE 500-325-65
325 POWDER IN PACKET (EA) ORAL
Qty: 60 | Refills: 0 | Status: DISCONTINUED | COMMUNITY
Start: 2017-04-04 | End: 2017-05-26

## 2017-05-26 RX ORDER — OXYCODONE AND ACETAMINOPHEN 5; 325 MG/1; MG/1
5-325 TABLET ORAL
Qty: 60 | Refills: 0 | Status: DISCONTINUED | COMMUNITY
Start: 2017-04-19 | End: 2017-05-26

## 2017-05-26 RX ORDER — PANTOPRAZOLE 40 MG/1
40 TABLET, DELAYED RELEASE ORAL DAILY
Qty: 30 | Refills: 0 | Status: DISCONTINUED | COMMUNITY
Start: 2017-04-04 | End: 2017-05-26

## 2017-09-27 ENCOUNTER — RESULT REVIEW (OUTPATIENT)
Age: 62
End: 2017-09-27

## 2017-12-01 ENCOUNTER — RESULT REVIEW (OUTPATIENT)
Age: 62
End: 2017-12-01

## 2018-04-12 ENCOUNTER — APPOINTMENT (OUTPATIENT)
Dept: MAMMOGRAPHY | Facility: HOSPITAL | Age: 63
End: 2018-04-12

## 2018-04-12 ENCOUNTER — OUTPATIENT (OUTPATIENT)
Dept: OUTPATIENT SERVICES | Facility: HOSPITAL | Age: 63
LOS: 1 days | End: 2018-04-12
Payer: COMMERCIAL

## 2018-04-12 DIAGNOSIS — Z98.890 OTHER SPECIFIED POSTPROCEDURAL STATES: Chronic | ICD-10-CM

## 2018-04-12 DIAGNOSIS — Z41.9 ENCOUNTER FOR PROCEDURE FOR PURPOSES OTHER THAN REMEDYING HEALTH STATE, UNSPECIFIED: Chronic | ICD-10-CM

## 2018-04-12 PROCEDURE — 76642 ULTRASOUND BREAST LIMITED: CPT | Mod: 26,LT

## 2018-04-12 PROCEDURE — 77065 DX MAMMO INCL CAD UNI: CPT | Mod: 26,GG

## 2018-04-12 PROCEDURE — 77063 BREAST TOMOSYNTHESIS BI: CPT | Mod: 26

## 2018-04-12 PROCEDURE — 77067 SCR MAMMO BI INCL CAD: CPT | Mod: 26,59

## 2018-04-12 PROCEDURE — 77065 DX MAMMO INCL CAD UNI: CPT

## 2018-04-12 PROCEDURE — 76642 ULTRASOUND BREAST LIMITED: CPT

## 2018-04-12 PROCEDURE — 77063 BREAST TOMOSYNTHESIS BI: CPT

## 2018-04-12 PROCEDURE — 77067 SCR MAMMO BI INCL CAD: CPT

## 2018-04-23 ENCOUNTER — RESULT REVIEW (OUTPATIENT)
Age: 63
End: 2018-04-23

## 2018-04-23 ENCOUNTER — APPOINTMENT (OUTPATIENT)
Dept: ULTRASOUND IMAGING | Facility: HOSPITAL | Age: 63
End: 2018-04-23
Payer: COMMERCIAL

## 2018-04-23 ENCOUNTER — OUTPATIENT (OUTPATIENT)
Dept: OUTPATIENT SERVICES | Facility: HOSPITAL | Age: 63
LOS: 1 days | End: 2018-04-23
Payer: COMMERCIAL

## 2018-04-23 DIAGNOSIS — Z98.890 OTHER SPECIFIED POSTPROCEDURAL STATES: Chronic | ICD-10-CM

## 2018-04-23 DIAGNOSIS — Z41.9 ENCOUNTER FOR PROCEDURE FOR PURPOSES OTHER THAN REMEDYING HEALTH STATE, UNSPECIFIED: Chronic | ICD-10-CM

## 2018-04-23 PROCEDURE — 19084 BX BREAST ADD LESION US IMAG: CPT

## 2018-04-23 PROCEDURE — 77065 DX MAMMO INCL CAD UNI: CPT

## 2018-04-23 PROCEDURE — 88305 TISSUE EXAM BY PATHOLOGIST: CPT

## 2018-04-23 PROCEDURE — 19084 BX BREAST ADD LESION US IMAG: CPT | Mod: LT

## 2018-04-23 PROCEDURE — 88360 TUMOR IMMUNOHISTOCHEM/MANUAL: CPT

## 2018-04-23 PROCEDURE — 19083 BX BREAST 1ST LESION US IMAG: CPT

## 2018-04-23 PROCEDURE — 19083 BX BREAST 1ST LESION US IMAG: CPT | Mod: LT

## 2018-04-23 PROCEDURE — A4648: CPT

## 2018-04-23 PROCEDURE — 77065 DX MAMMO INCL CAD UNI: CPT | Mod: 26,LT

## 2018-04-25 LAB — SURGICAL PATHOLOGY STUDY: SIGNIFICANT CHANGE UP

## 2018-04-30 ENCOUNTER — APPOINTMENT (OUTPATIENT)
Dept: BREAST CENTER | Facility: CLINIC | Age: 63
End: 2018-04-30
Payer: COMMERCIAL

## 2018-04-30 VITALS
HEIGHT: 64 IN | SYSTOLIC BLOOD PRESSURE: 140 MMHG | DIASTOLIC BLOOD PRESSURE: 89 MMHG | BODY MASS INDEX: 34.15 KG/M2 | HEART RATE: 78 BPM | WEIGHT: 200 LBS

## 2018-04-30 DIAGNOSIS — R92.8 OTHER ABNORMAL AND INCONCLUSIVE FINDINGS ON DIAGNOSTIC IMAGING OF BREAST: ICD-10-CM

## 2018-04-30 DIAGNOSIS — Z86.39 PERSONAL HISTORY OF OTHER ENDOCRINE, NUTRITIONAL AND METABOLIC DISEASE: ICD-10-CM

## 2018-04-30 DIAGNOSIS — Z87.891 PERSONAL HISTORY OF NICOTINE DEPENDENCE: ICD-10-CM

## 2018-04-30 DIAGNOSIS — Z80.3 FAMILY HISTORY OF MALIGNANT NEOPLASM OF BREAST: ICD-10-CM

## 2018-04-30 PROCEDURE — 99245 OFF/OP CONSLTJ NEW/EST HI 55: CPT

## 2018-04-30 RX ORDER — AMOXICILLIN 500 MG/1
500 TABLET, FILM COATED ORAL
Qty: 20 | Refills: 0 | Status: COMPLETED | COMMUNITY
Start: 2017-02-14

## 2018-04-30 RX ORDER — HYDROCODONE BITARTRATE AND ACETAMINOPHEN 5; 325 MG/1; MG/1
5-325 TABLET ORAL
Qty: 50 | Refills: 0 | Status: COMPLETED | COMMUNITY
Start: 2017-03-21

## 2018-05-10 PROCEDURE — 71046 X-RAY EXAM CHEST 2 VIEWS: CPT | Mod: 26

## 2018-05-12 ENCOUNTER — OUTPATIENT (OUTPATIENT)
Dept: OUTPATIENT SERVICES | Facility: HOSPITAL | Age: 63
LOS: 1 days | End: 2018-05-12
Payer: COMMERCIAL

## 2018-05-12 DIAGNOSIS — Z01.818 ENCOUNTER FOR OTHER PREPROCEDURAL EXAMINATION: ICD-10-CM

## 2018-05-12 DIAGNOSIS — Z98.890 OTHER SPECIFIED POSTPROCEDURAL STATES: Chronic | ICD-10-CM

## 2018-05-12 DIAGNOSIS — Z41.9 ENCOUNTER FOR PROCEDURE FOR PURPOSES OTHER THAN REMEDYING HEALTH STATE, UNSPECIFIED: Chronic | ICD-10-CM

## 2018-05-12 LAB
ALBUMIN SERPL ELPH-MCNC: 4.2 G/DL — SIGNIFICANT CHANGE UP (ref 3.3–5)
ALP SERPL-CCNC: 75 U/L — SIGNIFICANT CHANGE UP (ref 40–120)
ALT FLD-CCNC: 22 U/L — SIGNIFICANT CHANGE UP (ref 10–45)
ANION GAP SERPL CALC-SCNC: 14 MMOL/L — SIGNIFICANT CHANGE UP (ref 5–17)
APTT BLD: 35 SEC — SIGNIFICANT CHANGE UP (ref 27.5–37.4)
AST SERPL-CCNC: 16 U/L — SIGNIFICANT CHANGE UP (ref 10–40)
BILIRUB SERPL-MCNC: 0.5 MG/DL — SIGNIFICANT CHANGE UP (ref 0.2–1.2)
BUN SERPL-MCNC: 14 MG/DL — SIGNIFICANT CHANGE UP (ref 7–23)
CALCIUM SERPL-MCNC: 9.4 MG/DL — SIGNIFICANT CHANGE UP (ref 8.4–10.5)
CHLORIDE SERPL-SCNC: 100 MMOL/L — SIGNIFICANT CHANGE UP (ref 96–108)
CO2 SERPL-SCNC: 26 MMOL/L — SIGNIFICANT CHANGE UP (ref 22–31)
CREAT SERPL-MCNC: 0.79 MG/DL — SIGNIFICANT CHANGE UP (ref 0.5–1.3)
GLUCOSE SERPL-MCNC: 99 MG/DL — SIGNIFICANT CHANGE UP (ref 70–99)
HCT VFR BLD CALC: 45.3 % — HIGH (ref 34.5–45)
HGB BLD-MCNC: 15.5 G/DL — SIGNIFICANT CHANGE UP (ref 11.5–15.5)
INR BLD: 1.01 — SIGNIFICANT CHANGE UP (ref 0.88–1.16)
MCHC RBC-ENTMCNC: 33.3 PG — SIGNIFICANT CHANGE UP (ref 27–34)
MCHC RBC-ENTMCNC: 34.2 G/DL — SIGNIFICANT CHANGE UP (ref 32–36)
MCV RBC AUTO: 97.2 FL — SIGNIFICANT CHANGE UP (ref 80–100)
PLATELET # BLD AUTO: 205 K/UL — SIGNIFICANT CHANGE UP (ref 150–400)
POTASSIUM SERPL-MCNC: 4.3 MMOL/L — SIGNIFICANT CHANGE UP (ref 3.5–5.3)
POTASSIUM SERPL-SCNC: 4.3 MMOL/L — SIGNIFICANT CHANGE UP (ref 3.5–5.3)
PROT SERPL-MCNC: 6.7 G/DL — SIGNIFICANT CHANGE UP (ref 6–8.3)
PROTHROM AB SERPL-ACNC: 11.2 SEC — SIGNIFICANT CHANGE UP (ref 9.8–12.7)
RBC # BLD: 4.66 M/UL — SIGNIFICANT CHANGE UP (ref 3.8–5.2)
RBC # FLD: 12.8 % — SIGNIFICANT CHANGE UP (ref 10.3–16.9)
SODIUM SERPL-SCNC: 140 MMOL/L — SIGNIFICANT CHANGE UP (ref 135–145)
WBC # BLD: 6.1 K/UL — SIGNIFICANT CHANGE UP (ref 3.8–10.5)
WBC # FLD AUTO: 6.1 K/UL — SIGNIFICANT CHANGE UP (ref 3.8–10.5)

## 2018-05-12 PROCEDURE — 85730 THROMBOPLASTIN TIME PARTIAL: CPT

## 2018-05-12 PROCEDURE — 85027 COMPLETE CBC AUTOMATED: CPT

## 2018-05-12 PROCEDURE — 85610 PROTHROMBIN TIME: CPT

## 2018-05-12 PROCEDURE — 80053 COMPREHEN METABOLIC PANEL: CPT

## 2018-05-14 ENCOUNTER — FORM ENCOUNTER (OUTPATIENT)
Age: 63
End: 2018-05-14

## 2018-05-15 ENCOUNTER — OUTPATIENT (OUTPATIENT)
Dept: OUTPATIENT SERVICES | Facility: HOSPITAL | Age: 63
LOS: 1 days | End: 2018-05-15
Payer: COMMERCIAL

## 2018-05-15 ENCOUNTER — APPOINTMENT (OUTPATIENT)
Dept: MRI IMAGING | Facility: HOSPITAL | Age: 63
End: 2018-05-15
Payer: COMMERCIAL

## 2018-05-15 DIAGNOSIS — Z41.9 ENCOUNTER FOR PROCEDURE FOR PURPOSES OTHER THAN REMEDYING HEALTH STATE, UNSPECIFIED: Chronic | ICD-10-CM

## 2018-05-15 DIAGNOSIS — Z98.890 OTHER SPECIFIED POSTPROCEDURAL STATES: Chronic | ICD-10-CM

## 2018-05-15 PROCEDURE — 0159T: CPT | Mod: 26

## 2018-05-15 PROCEDURE — 77059 MRI BREAST BILATERAL: CPT | Mod: 26

## 2018-05-15 PROCEDURE — C8937: CPT

## 2018-05-15 PROCEDURE — 77049 MRI BREAST C-+ W/CAD BI: CPT

## 2018-05-15 PROCEDURE — A9585: CPT

## 2018-05-15 PROCEDURE — 71046 X-RAY EXAM CHEST 2 VIEWS: CPT

## 2018-05-18 ENCOUNTER — APPOINTMENT (OUTPATIENT)
Dept: BREAST CENTER | Facility: CLINIC | Age: 63
End: 2018-05-18
Payer: COMMERCIAL

## 2018-05-18 VITALS
TEMPERATURE: 97.6 F | DIASTOLIC BLOOD PRESSURE: 85 MMHG | HEIGHT: 64 IN | WEIGHT: 200 LBS | BODY MASS INDEX: 34.15 KG/M2 | SYSTOLIC BLOOD PRESSURE: 139 MMHG | HEART RATE: 79 BPM

## 2018-05-18 PROCEDURE — 99215 OFFICE O/P EST HI 40 MIN: CPT

## 2018-05-29 ENCOUNTER — CHART COPY (OUTPATIENT)
Age: 63
End: 2018-05-29

## 2018-06-13 ENCOUNTER — APPOINTMENT (OUTPATIENT)
Dept: BREAST CENTER | Facility: CLINIC | Age: 63
End: 2018-06-13

## 2018-06-20 ENCOUNTER — OUTPATIENT (OUTPATIENT)
Dept: OUTPATIENT SERVICES | Facility: HOSPITAL | Age: 63
LOS: 1 days | End: 2018-06-20
Payer: COMMERCIAL

## 2018-06-20 ENCOUNTER — APPOINTMENT (OUTPATIENT)
Dept: INTERVENTIONAL RADIOLOGY/VASCULAR | Facility: HOSPITAL | Age: 63
End: 2018-06-20
Payer: COMMERCIAL

## 2018-06-20 DIAGNOSIS — C50.919 MALIGNANT NEOPLASM OF UNSPECIFIED SITE OF UNSPECIFIED FEMALE BREAST: ICD-10-CM

## 2018-06-20 DIAGNOSIS — Z98.890 OTHER SPECIFIED POSTPROCEDURAL STATES: Chronic | ICD-10-CM

## 2018-06-20 DIAGNOSIS — Z45.2 ENCOUNTER FOR ADJUSTMENT AND MANAGEMENT OF VASCULAR ACCESS DEVICE: ICD-10-CM

## 2018-06-20 DIAGNOSIS — Z41.9 ENCOUNTER FOR PROCEDURE FOR PURPOSES OTHER THAN REMEDYING HEALTH STATE, UNSPECIFIED: Chronic | ICD-10-CM

## 2018-06-20 PROCEDURE — 99152 MOD SED SAME PHYS/QHP 5/>YRS: CPT

## 2018-06-20 PROCEDURE — 78472 GATED HEART PLANAR SINGLE: CPT | Mod: 26

## 2018-06-20 PROCEDURE — 99153 MOD SED SAME PHYS/QHP EA: CPT

## 2018-06-20 PROCEDURE — 76937 US GUIDE VASCULAR ACCESS: CPT | Mod: 26

## 2018-06-20 PROCEDURE — 76937 US GUIDE VASCULAR ACCESS: CPT

## 2018-06-20 PROCEDURE — C1788: CPT

## 2018-06-20 PROCEDURE — 77001 FLUOROGUIDE FOR VEIN DEVICE: CPT

## 2018-06-20 PROCEDURE — 36561 INSERT TUNNELED CV CATH: CPT

## 2018-06-20 PROCEDURE — A9560: CPT

## 2018-06-20 PROCEDURE — 78472 GATED HEART PLANAR SINGLE: CPT

## 2018-06-20 PROCEDURE — 77001 FLUOROGUIDE FOR VEIN DEVICE: CPT | Mod: 26

## 2018-06-20 PROCEDURE — 36561 INSERT TUNNELED CV CATH: CPT | Mod: RT

## 2018-06-20 PROCEDURE — C1769: CPT

## 2018-06-25 ENCOUNTER — OUTPATIENT (OUTPATIENT)
Dept: OUTPATIENT SERVICES | Facility: HOSPITAL | Age: 63
LOS: 1 days | End: 2018-06-25
Payer: COMMERCIAL

## 2018-06-25 DIAGNOSIS — Z41.9 ENCOUNTER FOR PROCEDURE FOR PURPOSES OTHER THAN REMEDYING HEALTH STATE, UNSPECIFIED: Chronic | ICD-10-CM

## 2018-06-25 DIAGNOSIS — Z98.890 OTHER SPECIFIED POSTPROCEDURAL STATES: Chronic | ICD-10-CM

## 2018-06-25 PROCEDURE — 74177 CT ABD & PELVIS W/CONTRAST: CPT

## 2018-06-25 PROCEDURE — 71260 CT THORAX DX C+: CPT | Mod: 26

## 2018-06-25 PROCEDURE — 74177 CT ABD & PELVIS W/CONTRAST: CPT | Mod: 26

## 2018-06-25 PROCEDURE — 78306 BONE IMAGING WHOLE BODY: CPT | Mod: 26

## 2018-06-25 PROCEDURE — A9503: CPT

## 2018-06-25 PROCEDURE — 78306 BONE IMAGING WHOLE BODY: CPT

## 2018-06-25 PROCEDURE — 71260 CT THORAX DX C+: CPT

## 2018-06-28 ENCOUNTER — APPOINTMENT (OUTPATIENT)
Dept: PLASTIC SURGERY | Facility: CLINIC | Age: 63
End: 2018-06-28
Payer: COMMERCIAL

## 2018-06-28 PROCEDURE — 99244 OFF/OP CNSLTJ NEW/EST MOD 40: CPT

## 2018-07-11 ENCOUNTER — FORM ENCOUNTER (OUTPATIENT)
Age: 63
End: 2018-07-11

## 2018-07-12 ENCOUNTER — OUTPATIENT (OUTPATIENT)
Dept: OUTPATIENT SERVICES | Facility: HOSPITAL | Age: 63
LOS: 1 days | End: 2018-07-12
Payer: COMMERCIAL

## 2018-07-12 ENCOUNTER — APPOINTMENT (OUTPATIENT)
Dept: CT IMAGING | Facility: HOSPITAL | Age: 63
End: 2018-07-12
Payer: COMMERCIAL

## 2018-07-12 DIAGNOSIS — Z98.890 OTHER SPECIFIED POSTPROCEDURAL STATES: Chronic | ICD-10-CM

## 2018-07-12 DIAGNOSIS — Z41.9 ENCOUNTER FOR PROCEDURE FOR PURPOSES OTHER THAN REMEDYING HEALTH STATE, UNSPECIFIED: Chronic | ICD-10-CM

## 2018-07-12 PROCEDURE — 74174 CTA ABD&PLVS W/CONTRAST: CPT

## 2018-07-12 PROCEDURE — 74174 CTA ABD&PLVS W/CONTRAST: CPT | Mod: 26

## 2018-08-03 ENCOUNTER — APPOINTMENT (OUTPATIENT)
Dept: GYNECOLOGIC ONCOLOGY | Facility: CLINIC | Age: 63
End: 2018-08-03
Payer: COMMERCIAL

## 2018-08-03 VITALS
OXYGEN SATURATION: 96 % | DIASTOLIC BLOOD PRESSURE: 78 MMHG | SYSTOLIC BLOOD PRESSURE: 115 MMHG | HEIGHT: 64 IN | HEART RATE: 96 BPM | WEIGHT: 195 LBS | BODY MASS INDEX: 33.29 KG/M2

## 2018-08-03 PROCEDURE — 99205 OFFICE O/P NEW HI 60 MIN: CPT

## 2018-08-31 ENCOUNTER — APPOINTMENT (OUTPATIENT)
Dept: BREAST CENTER | Facility: CLINIC | Age: 63
End: 2018-08-31

## 2018-09-10 ENCOUNTER — APPOINTMENT (OUTPATIENT)
Dept: ULTRASOUND IMAGING | Facility: HOSPITAL | Age: 63
End: 2018-09-10
Payer: COMMERCIAL

## 2018-09-10 ENCOUNTER — OUTPATIENT (OUTPATIENT)
Dept: OUTPATIENT SERVICES | Facility: HOSPITAL | Age: 63
LOS: 1 days | End: 2018-09-10
Payer: COMMERCIAL

## 2018-09-10 DIAGNOSIS — Z98.890 OTHER SPECIFIED POSTPROCEDURAL STATES: Chronic | ICD-10-CM

## 2018-09-10 DIAGNOSIS — Z41.9 ENCOUNTER FOR PROCEDURE FOR PURPOSES OTHER THAN REMEDYING HEALTH STATE, UNSPECIFIED: Chronic | ICD-10-CM

## 2018-09-10 PROCEDURE — 76856 US EXAM PELVIC COMPLETE: CPT | Mod: 26

## 2018-09-10 PROCEDURE — 76856 US EXAM PELVIC COMPLETE: CPT

## 2018-09-10 PROCEDURE — 76830 TRANSVAGINAL US NON-OB: CPT | Mod: 26

## 2018-09-10 PROCEDURE — 76830 TRANSVAGINAL US NON-OB: CPT

## 2018-10-01 ENCOUNTER — OTHER (OUTPATIENT)
Age: 63
End: 2018-10-01

## 2018-10-02 ENCOUNTER — OTHER (OUTPATIENT)
Age: 63
End: 2018-10-02

## 2018-10-10 ENCOUNTER — APPOINTMENT (OUTPATIENT)
Dept: BREAST CENTER | Facility: CLINIC | Age: 63
End: 2018-10-10
Payer: COMMERCIAL

## 2018-10-10 PROCEDURE — 99214 OFFICE O/P EST MOD 30 MIN: CPT

## 2018-10-11 ENCOUNTER — APPOINTMENT (OUTPATIENT)
Dept: PLASTIC SURGERY | Facility: CLINIC | Age: 63
End: 2018-10-11
Payer: COMMERCIAL

## 2018-10-11 VITALS — WEIGHT: 195 LBS | HEIGHT: 64 IN | BODY MASS INDEX: 33.29 KG/M2

## 2018-10-11 PROCEDURE — 99214 OFFICE O/P EST MOD 30 MIN: CPT

## 2018-10-16 ENCOUNTER — OTHER (OUTPATIENT)
Age: 63
End: 2018-10-16

## 2018-11-20 ENCOUNTER — CHART COPY (OUTPATIENT)
Age: 63
End: 2018-11-20

## 2018-11-20 DIAGNOSIS — Z01.818 ENCOUNTER FOR OTHER PREPROCEDURAL EXAMINATION: ICD-10-CM

## 2018-12-07 ENCOUNTER — OUTPATIENT (OUTPATIENT)
Dept: OUTPATIENT SERVICES | Facility: HOSPITAL | Age: 63
LOS: 1 days | End: 2018-12-07
Payer: COMMERCIAL

## 2018-12-07 ENCOUNTER — APPOINTMENT (OUTPATIENT)
Dept: CT IMAGING | Facility: HOSPITAL | Age: 63
End: 2018-12-07
Payer: COMMERCIAL

## 2018-12-07 DIAGNOSIS — Z98.890 OTHER SPECIFIED POSTPROCEDURAL STATES: Chronic | ICD-10-CM

## 2018-12-07 DIAGNOSIS — Z41.9 ENCOUNTER FOR PROCEDURE FOR PURPOSES OTHER THAN REMEDYING HEALTH STATE, UNSPECIFIED: Chronic | ICD-10-CM

## 2018-12-07 PROCEDURE — 71275 CT ANGIOGRAPHY CHEST: CPT | Mod: 26

## 2018-12-07 PROCEDURE — 71275 CT ANGIOGRAPHY CHEST: CPT

## 2018-12-11 ENCOUNTER — FORM ENCOUNTER (OUTPATIENT)
Age: 63
End: 2018-12-11

## 2018-12-12 ENCOUNTER — APPOINTMENT (OUTPATIENT)
Dept: ULTRASOUND IMAGING | Facility: HOSPITAL | Age: 63
End: 2018-12-12
Payer: COMMERCIAL

## 2018-12-12 ENCOUNTER — OUTPATIENT (OUTPATIENT)
Dept: OUTPATIENT SERVICES | Facility: HOSPITAL | Age: 63
LOS: 1 days | End: 2018-12-12
Payer: COMMERCIAL

## 2018-12-12 DIAGNOSIS — Z41.9 ENCOUNTER FOR PROCEDURE FOR PURPOSES OTHER THAN REMEDYING HEALTH STATE, UNSPECIFIED: Chronic | ICD-10-CM

## 2018-12-12 DIAGNOSIS — Z98.890 OTHER SPECIFIED POSTPROCEDURAL STATES: Chronic | ICD-10-CM

## 2018-12-12 DIAGNOSIS — Z01.818 ENCOUNTER FOR OTHER PREPROCEDURAL EXAMINATION: ICD-10-CM

## 2018-12-12 LAB
ALBUMIN SERPL ELPH-MCNC: 4.2 G/DL — SIGNIFICANT CHANGE UP (ref 3.3–5)
ALP SERPL-CCNC: 76 U/L — SIGNIFICANT CHANGE UP (ref 40–120)
ALT FLD-CCNC: 17 U/L — SIGNIFICANT CHANGE UP (ref 10–45)
ANION GAP SERPL CALC-SCNC: 11 MMOL/L — SIGNIFICANT CHANGE UP (ref 5–17)
AST SERPL-CCNC: 15 U/L — SIGNIFICANT CHANGE UP (ref 10–40)
BASOPHILS NFR BLD AUTO: 0.4 % — SIGNIFICANT CHANGE UP (ref 0–2)
BILIRUB SERPL-MCNC: 0.4 MG/DL — SIGNIFICANT CHANGE UP (ref 0.2–1.2)
BUN SERPL-MCNC: 15 MG/DL — SIGNIFICANT CHANGE UP (ref 7–23)
CALCIUM SERPL-MCNC: 9.2 MG/DL — SIGNIFICANT CHANGE UP (ref 8.4–10.5)
CHLORIDE SERPL-SCNC: 104 MMOL/L — SIGNIFICANT CHANGE UP (ref 96–108)
CO2 SERPL-SCNC: 27 MMOL/L — SIGNIFICANT CHANGE UP (ref 22–31)
CREAT SERPL-MCNC: 0.86 MG/DL — SIGNIFICANT CHANGE UP (ref 0.5–1.3)
EOSINOPHIL NFR BLD AUTO: 1.1 % — SIGNIFICANT CHANGE UP (ref 0–6)
GLUCOSE SERPL-MCNC: 103 MG/DL — HIGH (ref 70–99)
HCT VFR BLD CALC: 35 % — SIGNIFICANT CHANGE UP (ref 34.5–45)
HGB BLD-MCNC: 11.4 G/DL — LOW (ref 11.5–15.5)
INR BLD: 1.05 — SIGNIFICANT CHANGE UP (ref 0.88–1.16)
LYMPHOCYTES # BLD AUTO: 14.9 % — SIGNIFICANT CHANGE UP (ref 13–44)
MCHC RBC-ENTMCNC: 32.6 G/DL — SIGNIFICANT CHANGE UP (ref 32–36)
MCHC RBC-ENTMCNC: 33.1 PG — SIGNIFICANT CHANGE UP (ref 27–34)
MCV RBC AUTO: 101.7 FL — HIGH (ref 80–100)
MONOCYTES NFR BLD AUTO: 16 % — HIGH (ref 2–14)
NEUTROPHILS NFR BLD AUTO: 67.6 % — SIGNIFICANT CHANGE UP (ref 43–77)
PLATELET # BLD AUTO: 287 K/UL — SIGNIFICANT CHANGE UP (ref 150–400)
POTASSIUM SERPL-MCNC: 4.4 MMOL/L — SIGNIFICANT CHANGE UP (ref 3.5–5.3)
POTASSIUM SERPL-SCNC: 4.4 MMOL/L — SIGNIFICANT CHANGE UP (ref 3.5–5.3)
PROT SERPL-MCNC: 6.1 G/DL — SIGNIFICANT CHANGE UP (ref 6–8.3)
PROTHROM AB SERPL-ACNC: 11.9 SEC — SIGNIFICANT CHANGE UP (ref 10–12.9)
RBC # BLD: 3.44 M/UL — LOW (ref 3.8–5.2)
RBC # FLD: 16.2 % — SIGNIFICANT CHANGE UP (ref 10.3–16.9)
SODIUM SERPL-SCNC: 142 MMOL/L — SIGNIFICANT CHANGE UP (ref 135–145)
WBC # BLD: 5.4 K/UL — SIGNIFICANT CHANGE UP (ref 3.8–10.5)
WBC # FLD AUTO: 5.4 K/UL — SIGNIFICANT CHANGE UP (ref 3.8–10.5)

## 2018-12-12 PROCEDURE — 85025 COMPLETE CBC W/AUTO DIFF WBC: CPT

## 2018-12-12 PROCEDURE — 93005 ELECTROCARDIOGRAM TRACING: CPT

## 2018-12-12 PROCEDURE — 76830 TRANSVAGINAL US NON-OB: CPT | Mod: 26

## 2018-12-12 PROCEDURE — 71046 X-RAY EXAM CHEST 2 VIEWS: CPT | Mod: 26

## 2018-12-12 PROCEDURE — 80053 COMPREHEN METABOLIC PANEL: CPT

## 2018-12-12 PROCEDURE — 76642 ULTRASOUND BREAST LIMITED: CPT | Mod: 26,LT

## 2018-12-12 PROCEDURE — 93010 ELECTROCARDIOGRAM REPORT: CPT

## 2018-12-12 PROCEDURE — 76642 ULTRASOUND BREAST LIMITED: CPT

## 2018-12-12 PROCEDURE — 85610 PROTHROMBIN TIME: CPT

## 2018-12-12 PROCEDURE — 76856 US EXAM PELVIC COMPLETE: CPT | Mod: 26

## 2018-12-12 PROCEDURE — 76856 US EXAM PELVIC COMPLETE: CPT

## 2018-12-12 PROCEDURE — 76830 TRANSVAGINAL US NON-OB: CPT

## 2018-12-12 PROCEDURE — 71046 X-RAY EXAM CHEST 2 VIEWS: CPT

## 2018-12-17 ENCOUNTER — APPOINTMENT (OUTPATIENT)
Dept: GYNECOLOGIC ONCOLOGY | Facility: CLINIC | Age: 63
End: 2018-12-17
Payer: COMMERCIAL

## 2018-12-17 VITALS — BODY MASS INDEX: 33.29 KG/M2 | HEIGHT: 64 IN | WEIGHT: 195 LBS

## 2018-12-17 DIAGNOSIS — R19.00 INTRA-ABDOMINAL AND PELVIC SWELLING, MASS AND LUMP, UNSPECIFIED SITE: ICD-10-CM

## 2018-12-17 DIAGNOSIS — Z14.8 GENETIC CARRIER OF OTHER DISEASE: ICD-10-CM

## 2018-12-17 DIAGNOSIS — N94.9 UNSPECIFIED CONDITION ASSOCIATED WITH FEMALE GENITAL ORGANS AND MENSTRUAL CYCLE: ICD-10-CM

## 2018-12-17 PROCEDURE — 99214 OFFICE O/P EST MOD 30 MIN: CPT

## 2018-12-18 ENCOUNTER — APPOINTMENT (OUTPATIENT)
Dept: PLASTIC SURGERY | Facility: CLINIC | Age: 63
End: 2018-12-18
Payer: COMMERCIAL

## 2018-12-18 VITALS — HEIGHT: 64 IN | WEIGHT: 195 LBS | BODY MASS INDEX: 33.29 KG/M2

## 2018-12-18 PROCEDURE — 99213 OFFICE O/P EST LOW 20 MIN: CPT

## 2018-12-20 ENCOUNTER — OUTPATIENT (OUTPATIENT)
Dept: OUTPATIENT SERVICES | Facility: HOSPITAL | Age: 63
LOS: 1 days | End: 2018-12-20
Payer: COMMERCIAL

## 2018-12-20 DIAGNOSIS — Z41.9 ENCOUNTER FOR PROCEDURE FOR PURPOSES OTHER THAN REMEDYING HEALTH STATE, UNSPECIFIED: Chronic | ICD-10-CM

## 2018-12-20 DIAGNOSIS — Z98.890 OTHER SPECIFIED POSTPROCEDURAL STATES: Chronic | ICD-10-CM

## 2018-12-20 DIAGNOSIS — R06.09 OTHER FORMS OF DYSPNEA: ICD-10-CM

## 2018-12-20 PROCEDURE — 93306 TTE W/DOPPLER COMPLETE: CPT

## 2018-12-20 PROCEDURE — 93306 TTE W/DOPPLER COMPLETE: CPT | Mod: 26

## 2018-12-22 PROBLEM — N94.9 ADNEXAL MASS: Status: ACTIVE | Noted: 2018-07-16

## 2018-12-22 PROBLEM — Z14.8 GENETIC CARRIER: Status: ACTIVE | Noted: 2018-05-18

## 2019-01-03 ENCOUNTER — APPOINTMENT (OUTPATIENT)
Dept: GYNECOLOGIC ONCOLOGY | Facility: HOSPITAL | Age: 64
End: 2019-01-03

## 2019-01-07 ENCOUNTER — FORM ENCOUNTER (OUTPATIENT)
Age: 64
End: 2019-01-07

## 2019-01-08 ENCOUNTER — OUTPATIENT (OUTPATIENT)
Dept: OUTPATIENT SERVICES | Facility: HOSPITAL | Age: 64
LOS: 1 days | End: 2019-01-08
Payer: COMMERCIAL

## 2019-01-08 VITALS
TEMPERATURE: 97 F | HEIGHT: 64 IN | WEIGHT: 195.99 LBS | OXYGEN SATURATION: 100 % | RESPIRATION RATE: 18 BRPM | SYSTOLIC BLOOD PRESSURE: 130 MMHG | HEART RATE: 99 BPM | DIASTOLIC BLOOD PRESSURE: 64 MMHG

## 2019-01-08 DIAGNOSIS — Z98.890 OTHER SPECIFIED POSTPROCEDURAL STATES: Chronic | ICD-10-CM

## 2019-01-08 DIAGNOSIS — Z41.9 ENCOUNTER FOR PROCEDURE FOR PURPOSES OTHER THAN REMEDYING HEALTH STATE, UNSPECIFIED: Chronic | ICD-10-CM

## 2019-01-08 DIAGNOSIS — Z96.641 PRESENCE OF RIGHT ARTIFICIAL HIP JOINT: Chronic | ICD-10-CM

## 2019-01-08 PROCEDURE — 78195 LYMPH SYSTEM IMAGING: CPT | Mod: 26

## 2019-01-08 PROCEDURE — A9541: CPT

## 2019-01-08 PROCEDURE — 78195 LYMPH SYSTEM IMAGING: CPT

## 2019-01-08 RX ORDER — FERROUS SULFATE 325(65) MG
1 TABLET ORAL
Qty: 0 | Refills: 0 | COMMUNITY

## 2019-01-08 RX ORDER — OMEPRAZOLE 10 MG/1
1 CAPSULE, DELAYED RELEASE ORAL
Qty: 0 | Refills: 0 | COMMUNITY

## 2019-01-08 RX ORDER — CALCIUM CARBONATE 500(1250)
1 TABLET ORAL
Qty: 0 | Refills: 0 | COMMUNITY

## 2019-01-08 NOTE — PATIENT PROFILE ADULT - STATED REASON FOR ADMISSION
Mastectomy simple (Bilateral)  Breast Reconstruction NUBIA flaps   (bilateral) Mastectomy simple (Bilateral)  Breast Reconstruction DEEP flaps   (bilateral)

## 2019-01-09 ENCOUNTER — APPOINTMENT (OUTPATIENT)
Dept: ULTRASOUND IMAGING | Facility: HOSPITAL | Age: 64
End: 2019-01-09

## 2019-01-09 ENCOUNTER — RESULT REVIEW (OUTPATIENT)
Age: 64
End: 2019-01-09

## 2019-01-09 ENCOUNTER — INPATIENT (INPATIENT)
Facility: HOSPITAL | Age: 64
LOS: 3 days | Discharge: HOME HEALTH | DRG: 571 | End: 2019-01-13
Attending: PLASTIC SURGERY | Admitting: PLASTIC SURGERY
Payer: COMMERCIAL

## 2019-01-09 ENCOUNTER — APPOINTMENT (OUTPATIENT)
Dept: BREAST CENTER | Facility: HOSPITAL | Age: 64
End: 2019-01-09

## 2019-01-09 ENCOUNTER — APPOINTMENT (OUTPATIENT)
Dept: PLASTIC SURGERY | Facility: HOSPITAL | Age: 64
End: 2019-01-09

## 2019-01-09 DIAGNOSIS — Z98.890 OTHER SPECIFIED POSTPROCEDURAL STATES: Chronic | ICD-10-CM

## 2019-01-09 DIAGNOSIS — Z41.9 ENCOUNTER FOR PROCEDURE FOR PURPOSES OTHER THAN REMEDYING HEALTH STATE, UNSPECIFIED: Chronic | ICD-10-CM

## 2019-01-09 DIAGNOSIS — C77.9 SECONDARY AND UNSPECIFIED MALIGNANT NEOPLASM OF LYMPH NODE, UNSPECIFIED: ICD-10-CM

## 2019-01-09 DIAGNOSIS — Z96.641 PRESENCE OF RIGHT ARTIFICIAL HIP JOINT: Chronic | ICD-10-CM

## 2019-01-09 LAB
BASE EXCESS BLDA CALC-SCNC: -2.7 MMOL/L — LOW (ref -2–3)
BASE EXCESS BLDA CALC-SCNC: -3.9 MMOL/L — LOW (ref -2–3)
CA-I BLDA-SCNC: 0.91 MMOL/L — LOW (ref 1.12–1.3)
CA-I BLDA-SCNC: 1.04 MMOL/L — LOW (ref 1.12–1.3)
COHGB MFR BLDA: 0.2 % — SIGNIFICANT CHANGE UP
COHGB MFR BLDA: 1 % — SIGNIFICANT CHANGE UP
GLUCOSE BLDC GLUCOMTR-MCNC: 103 MG/DL — HIGH (ref 70–99)
GLUCOSE BLDC GLUCOMTR-MCNC: 148 MG/DL — HIGH (ref 70–99)
HCO3 BLDA-SCNC: 21 MMOL/L — SIGNIFICANT CHANGE UP (ref 21–28)
HCO3 BLDA-SCNC: 21 MMOL/L — SIGNIFICANT CHANGE UP (ref 21–28)
HGB BLDA-MCNC: 11.5 G/DL — SIGNIFICANT CHANGE UP (ref 11.5–15.5)
HGB BLDA-MCNC: 12.9 G/DL — SIGNIFICANT CHANGE UP (ref 11.5–15.5)
METHGB MFR BLDA: 0 % — SIGNIFICANT CHANGE UP
METHGB MFR BLDA: 0.6 % — SIGNIFICANT CHANGE UP
O2 CT VFR BLDA CALC: 17.2 ML/DL — SIGNIFICANT CHANGE UP (ref 15–23)
O2 CT VFR BLDA CALC: SIGNIFICANT CHANGE UP (ref 15–23)
OXYHGB MFR BLDA: 94 % — SIGNIFICANT CHANGE UP (ref 94–100)
OXYHGB MFR BLDA: 97 % — SIGNIFICANT CHANGE UP (ref 94–100)
PCO2 BLDA: 32 MMHG — SIGNIFICANT CHANGE UP (ref 32–45)
PCO2 BLDA: 40 MMHG — SIGNIFICANT CHANGE UP (ref 32–45)
PH BLDA: 7.35 — SIGNIFICANT CHANGE UP (ref 7.35–7.45)
PH BLDA: 7.43 — SIGNIFICANT CHANGE UP (ref 7.35–7.45)
PO2 BLDA: 89 MMHG — SIGNIFICANT CHANGE UP (ref 83–108)
PO2 BLDA: 92 MMHG — SIGNIFICANT CHANGE UP (ref 83–108)
POTASSIUM BLDA-SCNC: 3 MMOL/L — LOW (ref 3.5–4.9)
POTASSIUM BLDA-SCNC: 3.9 MMOL/L — SIGNIFICANT CHANGE UP (ref 3.5–4.9)
SAO2 % BLDA: 96 % — SIGNIFICANT CHANGE UP (ref 95–100)
SAO2 % BLDA: 97 % — SIGNIFICANT CHANGE UP (ref 95–100)
SODIUM BLDA-SCNC: 138 MMOL/L — SIGNIFICANT CHANGE UP (ref 138–146)
SODIUM BLDA-SCNC: 140 MMOL/L — SIGNIFICANT CHANGE UP (ref 138–146)

## 2019-01-09 PROCEDURE — 15777 ACELLULAR DERM MATRIX IMPLT: CPT | Mod: 50,59

## 2019-01-09 PROCEDURE — 64913 NRV RPR W/NRV ALGRFT EA ADDL: CPT

## 2019-01-09 PROCEDURE — 19303 MAST SIMPLE COMPLETE: CPT | Mod: 50,GC

## 2019-01-09 PROCEDURE — S2068: CPT | Mod: LT

## 2019-01-09 PROCEDURE — 38745 REMOVE ARMPIT LYMPH NODES: CPT | Mod: GC,LT

## 2019-01-09 PROCEDURE — 64912 NRV RPR W/NRV ALGRFT 1ST: CPT

## 2019-01-09 PROCEDURE — 38530 BIOPSY/REMOVAL LYMPH NODES: CPT | Mod: 50

## 2019-01-09 RX ORDER — CEFAZOLIN SODIUM 1 G
2000 VIAL (EA) INJECTION EVERY 8 HOURS
Qty: 0 | Refills: 0 | Status: DISCONTINUED | OUTPATIENT
Start: 2019-01-09 | End: 2019-01-09

## 2019-01-09 RX ORDER — SIMVASTATIN 20 MG/1
20 TABLET, FILM COATED ORAL AT BEDTIME
Qty: 0 | Refills: 0 | Status: DISCONTINUED | OUTPATIENT
Start: 2019-01-09 | End: 2019-01-13

## 2019-01-09 RX ORDER — ENOXAPARIN SODIUM 100 MG/ML
40 INJECTION SUBCUTANEOUS ONCE
Qty: 0 | Refills: 0 | Status: DISCONTINUED | OUTPATIENT
Start: 2019-01-09 | End: 2019-01-09

## 2019-01-09 RX ORDER — CEFAZOLIN SODIUM 1 G
2000 VIAL (EA) INJECTION EVERY 8 HOURS
Qty: 0 | Refills: 0 | Status: COMPLETED | OUTPATIENT
Start: 2019-01-09 | End: 2019-01-11

## 2019-01-09 RX ORDER — ONDANSETRON 8 MG/1
4 TABLET, FILM COATED ORAL EVERY 4 HOURS
Qty: 0 | Refills: 0 | Status: DISCONTINUED | OUTPATIENT
Start: 2019-01-09 | End: 2019-01-13

## 2019-01-09 RX ORDER — SENNA PLUS 8.6 MG/1
2 TABLET ORAL AT BEDTIME
Qty: 0 | Refills: 0 | Status: DISCONTINUED | OUTPATIENT
Start: 2019-01-09 | End: 2019-01-13

## 2019-01-09 RX ORDER — KETOROLAC TROMETHAMINE 30 MG/ML
30 SYRINGE (ML) INJECTION EVERY 6 HOURS
Qty: 0 | Refills: 0 | Status: DISCONTINUED | OUTPATIENT
Start: 2019-01-09 | End: 2019-01-12

## 2019-01-09 RX ORDER — SODIUM CHLORIDE 9 MG/ML
1000 INJECTION, SOLUTION INTRAVENOUS
Qty: 0 | Refills: 0 | Status: DISCONTINUED | OUTPATIENT
Start: 2019-01-09 | End: 2019-01-10

## 2019-01-09 RX ORDER — OXYCODONE HYDROCHLORIDE 5 MG/1
5 TABLET ORAL EVERY 4 HOURS
Qty: 0 | Refills: 0 | Status: DISCONTINUED | OUTPATIENT
Start: 2019-01-09 | End: 2019-01-13

## 2019-01-09 RX ORDER — ENOXAPARIN SODIUM 100 MG/ML
40 INJECTION SUBCUTANEOUS EVERY 24 HOURS
Qty: 0 | Refills: 0 | Status: DISCONTINUED | OUTPATIENT
Start: 2019-01-09 | End: 2019-01-13

## 2019-01-09 RX ORDER — DOCUSATE SODIUM 100 MG
100 CAPSULE ORAL THREE TIMES A DAY
Qty: 0 | Refills: 0 | Status: DISCONTINUED | OUTPATIENT
Start: 2019-01-09 | End: 2019-01-13

## 2019-01-09 RX ORDER — HYDROMORPHONE HYDROCHLORIDE 2 MG/ML
0.5 INJECTION INTRAMUSCULAR; INTRAVENOUS; SUBCUTANEOUS
Qty: 0 | Refills: 0 | Status: DISCONTINUED | OUTPATIENT
Start: 2019-01-09 | End: 2019-01-13

## 2019-01-09 RX ORDER — OXYCODONE HYDROCHLORIDE 5 MG/1
10 TABLET ORAL EVERY 4 HOURS
Qty: 0 | Refills: 0 | Status: DISCONTINUED | OUTPATIENT
Start: 2019-01-09 | End: 2019-01-13

## 2019-01-09 RX ORDER — METOCLOPRAMIDE HCL 10 MG
5 TABLET ORAL EVERY 6 HOURS
Qty: 0 | Refills: 0 | Status: DISCONTINUED | OUTPATIENT
Start: 2019-01-09 | End: 2019-01-13

## 2019-01-09 RX ORDER — BUPIVACAINE 13.3 MG/ML
20 INJECTION, SUSPENSION, LIPOSOMAL INFILTRATION ONCE
Qty: 0 | Refills: 0 | Status: DISCONTINUED | OUTPATIENT
Start: 2019-01-09 | End: 2019-01-13

## 2019-01-09 RX ORDER — ACETAMINOPHEN 500 MG
975 TABLET ORAL EVERY 6 HOURS
Qty: 0 | Refills: 0 | Status: DISCONTINUED | OUTPATIENT
Start: 2019-01-09 | End: 2019-01-13

## 2019-01-09 RX ORDER — BUDESONIDE AND FORMOTEROL FUMARATE DIHYDRATE 160; 4.5 UG/1; UG/1
2 AEROSOL RESPIRATORY (INHALATION)
Qty: 0 | Refills: 0 | COMMUNITY

## 2019-01-09 RX ORDER — DIAZEPAM 5 MG
5 TABLET ORAL EVERY 6 HOURS
Qty: 0 | Refills: 0 | Status: DISCONTINUED | OUTPATIENT
Start: 2019-01-09 | End: 2019-01-13

## 2019-01-09 RX ADMIN — OXYCODONE HYDROCHLORIDE 5 MILLIGRAM(S): 5 TABLET ORAL at 22:59

## 2019-01-09 RX ADMIN — HYDROMORPHONE HYDROCHLORIDE 0.5 MILLIGRAM(S): 2 INJECTION INTRAMUSCULAR; INTRAVENOUS; SUBCUTANEOUS at 20:00

## 2019-01-09 RX ADMIN — HYDROMORPHONE HYDROCHLORIDE 0.5 MILLIGRAM(S): 2 INJECTION INTRAMUSCULAR; INTRAVENOUS; SUBCUTANEOUS at 21:03

## 2019-01-09 RX ADMIN — HYDROMORPHONE HYDROCHLORIDE 0.5 MILLIGRAM(S): 2 INJECTION INTRAMUSCULAR; INTRAVENOUS; SUBCUTANEOUS at 22:21

## 2019-01-09 RX ADMIN — Medication 30 MILLIGRAM(S): at 23:42

## 2019-01-09 RX ADMIN — SENNA PLUS 2 TABLET(S): 8.6 TABLET ORAL at 22:59

## 2019-01-09 RX ADMIN — HYDROMORPHONE HYDROCHLORIDE 0.5 MILLIGRAM(S): 2 INJECTION INTRAMUSCULAR; INTRAVENOUS; SUBCUTANEOUS at 20:22

## 2019-01-09 RX ADMIN — Medication 975 MILLIGRAM(S): at 23:42

## 2019-01-09 RX ADMIN — SIMVASTATIN 20 MILLIGRAM(S): 20 TABLET, FILM COATED ORAL at 22:59

## 2019-01-09 RX ADMIN — Medication 2000 MILLIGRAM(S): at 22:03

## 2019-01-09 RX ADMIN — HYDROMORPHONE HYDROCHLORIDE 0.5 MILLIGRAM(S): 2 INJECTION INTRAMUSCULAR; INTRAVENOUS; SUBCUTANEOUS at 21:45

## 2019-01-09 RX ADMIN — Medication 100 MILLIGRAM(S): at 23:00

## 2019-01-09 RX ADMIN — HYDROMORPHONE HYDROCHLORIDE 0.5 MILLIGRAM(S): 2 INJECTION INTRAMUSCULAR; INTRAVENOUS; SUBCUTANEOUS at 21:47

## 2019-01-09 NOTE — BRIEF OPERATIVE NOTE - POST-OP DX
Adnexal mass  01/09/2019    Active  Sundar Nicole  BRCA gene mutation positive  01/09/2019    Active  Sundar Nicole

## 2019-01-09 NOTE — BRIEF OPERATIVE NOTE - PROCEDURE
<<-----Click on this checkbox to enter Procedure Bilateral salpingoophorectomy  01/09/2019    Active  [id number]

## 2019-01-09 NOTE — BRIEF OPERATIVE NOTE - OPERATION/FINDINGS
immediate b/l breast reconstruction with NUBIA flaps with b/l intercostal nerve neurotization
right complex ovarian adnexal mass with solid components, 4cm  normal left ovary and normal tubes bilaterally  8-10cm fibroid uterus

## 2019-01-10 ENCOUNTER — TRANSCRIPTION ENCOUNTER (OUTPATIENT)
Age: 64
End: 2019-01-10

## 2019-01-10 LAB
ANION GAP SERPL CALC-SCNC: 8 MMOL/L — SIGNIFICANT CHANGE UP (ref 5–17)
BUN SERPL-MCNC: 14 MG/DL — SIGNIFICANT CHANGE UP (ref 7–23)
CALCIUM SERPL-MCNC: 8.6 MG/DL — SIGNIFICANT CHANGE UP (ref 8.4–10.5)
CHLORIDE SERPL-SCNC: 105 MMOL/L — SIGNIFICANT CHANGE UP (ref 96–108)
CO2 SERPL-SCNC: 29 MMOL/L — SIGNIFICANT CHANGE UP (ref 22–31)
CREAT SERPL-MCNC: 0.61 MG/DL — SIGNIFICANT CHANGE UP (ref 0.5–1.3)
GLUCOSE SERPL-MCNC: 123 MG/DL — HIGH (ref 70–99)
HCT VFR BLD CALC: 31.5 % — LOW (ref 34.5–45)
HGB BLD-MCNC: 10.2 G/DL — LOW (ref 11.5–15.5)
MCHC RBC-ENTMCNC: 32.4 G/DL — SIGNIFICANT CHANGE UP (ref 32–36)
MCHC RBC-ENTMCNC: 33.4 PG — SIGNIFICANT CHANGE UP (ref 27–34)
MCV RBC AUTO: 103.3 FL — HIGH (ref 80–100)
PLATELET # BLD AUTO: 197 K/UL — SIGNIFICANT CHANGE UP (ref 150–400)
POTASSIUM SERPL-MCNC: 4.2 MMOL/L — SIGNIFICANT CHANGE UP (ref 3.5–5.3)
POTASSIUM SERPL-SCNC: 4.2 MMOL/L — SIGNIFICANT CHANGE UP (ref 3.5–5.3)
RBC # BLD: 3.05 M/UL — LOW (ref 3.8–5.2)
RBC # FLD: 13.7 % — SIGNIFICANT CHANGE UP (ref 10.3–16.9)
SODIUM SERPL-SCNC: 142 MMOL/L — SIGNIFICANT CHANGE UP (ref 135–145)
WBC # BLD: 9.9 K/UL — SIGNIFICANT CHANGE UP (ref 3.8–10.5)
WBC # FLD AUTO: 9.9 K/UL — SIGNIFICANT CHANGE UP (ref 3.8–10.5)

## 2019-01-10 RX ORDER — SODIUM CHLORIDE 9 MG/ML
1000 INJECTION, SOLUTION INTRAVENOUS
Qty: 0 | Refills: 0 | Status: DISCONTINUED | OUTPATIENT
Start: 2019-01-10 | End: 2019-01-11

## 2019-01-10 RX ORDER — DOCUSATE SODIUM 100 MG
1 CAPSULE ORAL
Qty: 0 | Refills: 0 | DISCHARGE
Start: 2019-01-10

## 2019-01-10 RX ORDER — SENNA PLUS 8.6 MG/1
2 TABLET ORAL
Qty: 0 | Refills: 0 | DISCHARGE
Start: 2019-01-10

## 2019-01-10 RX ORDER — SODIUM CHLORIDE 9 MG/ML
1000 INJECTION, SOLUTION INTRAVENOUS ONCE
Qty: 0 | Refills: 0 | Status: COMPLETED | OUTPATIENT
Start: 2019-01-10 | End: 2019-01-10

## 2019-01-10 RX ORDER — DIAZEPAM 5 MG
1 TABLET ORAL
Qty: 20 | Refills: 0 | OUTPATIENT
Start: 2019-01-10

## 2019-01-10 RX ADMIN — Medication 5 MILLIGRAM(S): at 22:19

## 2019-01-10 RX ADMIN — Medication 100 MILLIGRAM(S): at 06:12

## 2019-01-10 RX ADMIN — ENOXAPARIN SODIUM 40 MILLIGRAM(S): 100 INJECTION SUBCUTANEOUS at 06:12

## 2019-01-10 RX ADMIN — Medication 2000 MILLIGRAM(S): at 06:11

## 2019-01-10 RX ADMIN — Medication 975 MILLIGRAM(S): at 11:49

## 2019-01-10 RX ADMIN — Medication 975 MILLIGRAM(S): at 00:00

## 2019-01-10 RX ADMIN — Medication 2000 MILLIGRAM(S): at 15:41

## 2019-01-10 RX ADMIN — OXYCODONE HYDROCHLORIDE 5 MILLIGRAM(S): 5 TABLET ORAL at 01:16

## 2019-01-10 RX ADMIN — OXYCODONE HYDROCHLORIDE 10 MILLIGRAM(S): 5 TABLET ORAL at 01:20

## 2019-01-10 RX ADMIN — SODIUM CHLORIDE 1000 MILLILITER(S): 9 INJECTION, SOLUTION INTRAVENOUS at 20:51

## 2019-01-10 RX ADMIN — Medication 975 MILLIGRAM(S): at 19:25

## 2019-01-10 RX ADMIN — Medication 30 MILLIGRAM(S): at 00:02

## 2019-01-10 RX ADMIN — Medication 30 MILLIGRAM(S): at 11:56

## 2019-01-10 RX ADMIN — Medication 975 MILLIGRAM(S): at 06:12

## 2019-01-10 RX ADMIN — Medication 975 MILLIGRAM(S): at 07:44

## 2019-01-10 RX ADMIN — OXYCODONE HYDROCHLORIDE 10 MILLIGRAM(S): 5 TABLET ORAL at 07:04

## 2019-01-10 RX ADMIN — Medication 30 MILLIGRAM(S): at 19:25

## 2019-01-10 RX ADMIN — Medication 30 MILLIGRAM(S): at 11:49

## 2019-01-10 RX ADMIN — Medication 975 MILLIGRAM(S): at 12:56

## 2019-01-10 RX ADMIN — Medication 30 MILLILITER(S): at 22:19

## 2019-01-10 RX ADMIN — OXYCODONE HYDROCHLORIDE 10 MILLIGRAM(S): 5 TABLET ORAL at 11:49

## 2019-01-10 RX ADMIN — Medication 100 MILLIGRAM(S): at 15:41

## 2019-01-10 RX ADMIN — Medication 100 MILLIGRAM(S): at 22:18

## 2019-01-10 RX ADMIN — SENNA PLUS 2 TABLET(S): 8.6 TABLET ORAL at 22:19

## 2019-01-10 RX ADMIN — Medication 30 MILLIGRAM(S): at 06:12

## 2019-01-10 RX ADMIN — OXYCODONE HYDROCHLORIDE 10 MILLIGRAM(S): 5 TABLET ORAL at 05:16

## 2019-01-10 RX ADMIN — ONDANSETRON 4 MILLIGRAM(S): 8 TABLET, FILM COATED ORAL at 06:39

## 2019-01-10 RX ADMIN — Medication 30 MILLIGRAM(S): at 18:16

## 2019-01-10 RX ADMIN — Medication 5 MILLIGRAM(S): at 00:43

## 2019-01-10 RX ADMIN — Medication 2000 MILLIGRAM(S): at 22:34

## 2019-01-10 RX ADMIN — SIMVASTATIN 20 MILLIGRAM(S): 20 TABLET, FILM COATED ORAL at 22:19

## 2019-01-10 RX ADMIN — OXYCODONE HYDROCHLORIDE 10 MILLIGRAM(S): 5 TABLET ORAL at 02:30

## 2019-01-10 RX ADMIN — Medication 30 MILLIGRAM(S): at 07:44

## 2019-01-10 RX ADMIN — Medication 5 MILLIGRAM(S): at 06:40

## 2019-01-10 RX ADMIN — Medication 30 MILLILITER(S): at 06:31

## 2019-01-10 RX ADMIN — Medication 975 MILLIGRAM(S): at 18:16

## 2019-01-10 RX ADMIN — OXYCODONE HYDROCHLORIDE 10 MILLIGRAM(S): 5 TABLET ORAL at 12:15

## 2019-01-10 NOTE — DISCHARGE NOTE ADULT - PLAN OF CARE
Promotion of healing *Please refer to the post-operative care instruction provided from Dr. Lerman’s office.  -Follow up with Plastic & Reconstructive Surgeon, Dr. Lerman in 1 week in the office.   -Follow up with Breast Surgeon, in 1-2 weeks in the office.  -Continue SOSA drain care as instructed. (Empty and record the SOSA drainage twice daily after discharge. Also, strip/milk the drain tubing each time to minimize clogging. Bring the recorded drain amounts to the office so that it can be reviewed by the physician.)  -Take Aspirin 325mg once daily for 10days.   -Take Percocet & Valium as prescribed for pain control.  -Apply Bacitracin ointment to the belly button twice daily after discharge.  -Wear abdominal binder when out of bed, walking around.  -Diet: no restrictions.   -Showers are permitted the day of discharge. The drains and the incision lines can get wet in the shower. Do not take a bath. Pin the drains to a bathrobe belt or string or a small towel draped over your neck in order that the drains do not dangle from your skin while in the shower. Keep incision sites and SOSA drain sites clean & dry after showering.  -No heavy lifting >20 pounds or strenuous exercises.

## 2019-01-10 NOTE — DISCHARGE NOTE ADULT - MEDICATION SUMMARY - MEDICATIONS TO TAKE
I will START or STAY ON the medications listed below when I get home from the hospital:    aspirin 325 mg oral tablet  -- 1 tab(s) by mouth once a day  -- Indication: For Antiplatelet    oxyCODONE-acetaminophen 5 mg-325 mg oral tablet  -- 1-2 tab(s) by mouth every 4-6 hours, As Needed for moderate pain MDD:8  -- Caution federal law prohibits the transfer of this drug to any person other  than the person for whom it was prescribed.  May cause drowsiness.  Alcohol may intensify this effect.  Use care when operating dangerous machinery.  This prescription cannot be refilled.  This product contains acetaminophen.  Do not use  with any other product containing acetaminophen to prevent possible liver damage.  Using more of this medication than prescribed may cause serious breathing problems.    -- Indication: For Pain    diazePAM 5 mg oral tablet  -- 1 tab(s) by mouth every 6 hours, As needed, muscle spasms, anxiety MDD:4  -- Indication: For Muscle spasm    simvastatin 20 mg oral tablet  -- 1 tab(s) by mouth once a day (at bedtime)  -- Indication: For Home med    docusate sodium 100 mg oral capsule  -- 1 cap(s) by mouth 3 times a day  -- Indication: For Constipation    senna oral tablet  -- 2 tab(s) by mouth once a day (at bedtime)  -- Indication: For Constipation

## 2019-01-10 NOTE — DISCHARGE NOTE ADULT - CARE PLAN
Principal Discharge DX:	Breast cancer  Goal:	Promotion of healing  Assessment and plan of treatment:	*Please refer to the post-operative care instruction provided from Dr. Lerman’s office.  -Follow up with Plastic & Reconstructive Surgeon, Dr. Lerman in 1 week in the office.   -Follow up with Breast Surgeon, in 1-2 weeks in the office.  -Continue SOSA drain care as instructed. (Empty and record the SOSA drainage twice daily after discharge. Also, strip/milk the drain tubing each time to minimize clogging. Bring the recorded drain amounts to the office so that it can be reviewed by the physician.)  -Take Aspirin 325mg once daily for 10days.   -Take Percocet & Valium as prescribed for pain control.  -Apply Bacitracin ointment to the belly button twice daily after discharge.  -Wear abdominal binder when out of bed, walking around.  -Diet: no restrictions.   -Showers are permitted the day of discharge. The drains and the incision lines can get wet in the shower. Do not take a bath. Pin the drains to a bathrobe belt or string or a small towel draped over your neck in order that the drains do not dangle from your skin while in the shower. Keep incision sites and SOSA drain sites clean & dry after showering.  -No heavy lifting >20 pounds or strenuous exercises.

## 2019-01-10 NOTE — DISCHARGE NOTE ADULT - CARE PROVIDER_API CALL
Lerman, Oren Z (MD), Plastic Surgery  [address]  [address]  [place], [place] [address]  Phone: [phone]  Fax: [phone]

## 2019-01-10 NOTE — DISCHARGE NOTE ADULT - PATIENT PORTAL LINK FT
You can access the [facility] Patient Portal, offered by [facility], by registering with the following website: [email or web address]

## 2019-01-10 NOTE — DISCHARGE NOTE ADULT - HOSPITAL COURSE
63y Female underwent bilateral simple mastectomies and bilateral breast reconstruction with NUBIA flaps in the OR. The patient tolerated the procedure well. Postoperatively the patient was sent to the PACU. The patient's flaps were monitored by doppler and by clinical examination. On POD 1, the patient was hemodynamically stable; was advanced to a regular diet; was placed on her home medications; and was out of bed to a chair, esteban was removed and patient ambulated in the evening. On POD 2 the patient ambulated. During the patient's hospital course, the patient's pain was controlled by IV pain medications and then by PO pain medications.    At the time of discharge, the patient was hemodynamically stable, was tolerating PO diet, was voiding urine, was ambulating, and was comfortable with adequate pain control. The patient was instructed to follow up with Drs O. Lerman and  within x1 week(s) after discharge from the hospital. The patient/family felt comfortable with discharge.  The patient was discharged with a prescription for oral pain medication. The patient had no other issues.

## 2019-01-11 RX ORDER — BACITRACIN ZINC 500 UNIT/G
1 OINTMENT IN PACKET (EA) TOPICAL
Qty: 0 | Refills: 0 | Status: DISCONTINUED | OUTPATIENT
Start: 2019-01-11 | End: 2019-01-13

## 2019-01-11 RX ADMIN — ENOXAPARIN SODIUM 40 MILLIGRAM(S): 100 INJECTION SUBCUTANEOUS at 06:49

## 2019-01-11 RX ADMIN — Medication 30 MILLIGRAM(S): at 20:21

## 2019-01-11 RX ADMIN — Medication 975 MILLIGRAM(S): at 01:28

## 2019-01-11 RX ADMIN — Medication 975 MILLIGRAM(S): at 14:43

## 2019-01-11 RX ADMIN — Medication 30 MILLIGRAM(S): at 01:27

## 2019-01-11 RX ADMIN — Medication 30 MILLIGRAM(S): at 14:43

## 2019-01-11 RX ADMIN — Medication 975 MILLIGRAM(S): at 07:19

## 2019-01-11 RX ADMIN — Medication 30 MILLIGRAM(S): at 07:19

## 2019-01-11 RX ADMIN — Medication 975 MILLIGRAM(S): at 08:19

## 2019-01-11 RX ADMIN — Medication 975 MILLIGRAM(S): at 20:55

## 2019-01-11 RX ADMIN — Medication 100 MILLIGRAM(S): at 20:57

## 2019-01-11 RX ADMIN — Medication 30 MILLIGRAM(S): at 20:06

## 2019-01-11 RX ADMIN — Medication 30 MILLIGRAM(S): at 02:28

## 2019-01-11 RX ADMIN — Medication 2000 MILLIGRAM(S): at 14:43

## 2019-01-11 RX ADMIN — SIMVASTATIN 20 MILLIGRAM(S): 20 TABLET, FILM COATED ORAL at 20:56

## 2019-01-11 RX ADMIN — Medication 30 MILLIGRAM(S): at 07:50

## 2019-01-11 RX ADMIN — Medication 975 MILLIGRAM(S): at 21:50

## 2019-01-11 RX ADMIN — Medication 5 MILLIGRAM(S): at 20:56

## 2019-01-11 RX ADMIN — SENNA PLUS 2 TABLET(S): 8.6 TABLET ORAL at 20:57

## 2019-01-11 RX ADMIN — Medication 100 MILLIGRAM(S): at 06:49

## 2019-01-11 RX ADMIN — Medication 975 MILLIGRAM(S): at 06:15

## 2019-01-11 RX ADMIN — Medication 2000 MILLIGRAM(S): at 06:48

## 2019-01-11 RX ADMIN — Medication 100 MILLIGRAM(S): at 14:43

## 2019-01-12 RX ADMIN — Medication 30 MILLIGRAM(S): at 01:20

## 2019-01-12 RX ADMIN — Medication 975 MILLIGRAM(S): at 21:05

## 2019-01-12 RX ADMIN — Medication 975 MILLIGRAM(S): at 18:00

## 2019-01-12 RX ADMIN — Medication 975 MILLIGRAM(S): at 01:56

## 2019-01-12 RX ADMIN — SIMVASTATIN 20 MILLIGRAM(S): 20 TABLET, FILM COATED ORAL at 21:05

## 2019-01-12 RX ADMIN — Medication 30 MILLIGRAM(S): at 18:53

## 2019-01-12 RX ADMIN — Medication 30 MILLIGRAM(S): at 01:00

## 2019-01-12 RX ADMIN — Medication 1 APPLICATION(S): at 01:03

## 2019-01-12 RX ADMIN — SENNA PLUS 2 TABLET(S): 8.6 TABLET ORAL at 21:05

## 2019-01-12 RX ADMIN — Medication 100 MILLIGRAM(S): at 21:05

## 2019-01-12 RX ADMIN — Medication 100 MILLIGRAM(S): at 05:54

## 2019-01-12 RX ADMIN — Medication 100 MILLIGRAM(S): at 13:04

## 2019-01-12 RX ADMIN — Medication 975 MILLIGRAM(S): at 10:20

## 2019-01-12 RX ADMIN — Medication 30 MILLIGRAM(S): at 13:30

## 2019-01-12 RX ADMIN — ENOXAPARIN SODIUM 40 MILLIGRAM(S): 100 INJECTION SUBCUTANEOUS at 05:54

## 2019-01-12 RX ADMIN — Medication 975 MILLIGRAM(S): at 01:01

## 2019-01-12 RX ADMIN — Medication 975 MILLIGRAM(S): at 10:07

## 2019-01-12 RX ADMIN — Medication 975 MILLIGRAM(S): at 22:05

## 2019-01-12 RX ADMIN — Medication 975 MILLIGRAM(S): at 17:19

## 2019-01-12 RX ADMIN — OXYCODONE HYDROCHLORIDE 10 MILLIGRAM(S): 5 TABLET ORAL at 01:56

## 2019-01-12 RX ADMIN — Medication 1 APPLICATION(S): at 21:06

## 2019-01-12 RX ADMIN — Medication 1 APPLICATION(S): at 10:07

## 2019-01-12 RX ADMIN — Medication 30 MILLIGRAM(S): at 13:04

## 2019-01-12 RX ADMIN — Medication 30 MILLIGRAM(S): at 07:26

## 2019-01-12 RX ADMIN — OXYCODONE HYDROCHLORIDE 10 MILLIGRAM(S): 5 TABLET ORAL at 01:01

## 2019-01-12 RX ADMIN — Medication 30 MILLIGRAM(S): at 07:56

## 2019-01-12 RX ADMIN — Medication 30 MILLIGRAM(S): at 19:04

## 2019-01-13 VITALS
DIASTOLIC BLOOD PRESSURE: 77 MMHG | TEMPERATURE: 98 F | SYSTOLIC BLOOD PRESSURE: 125 MMHG | OXYGEN SATURATION: 93 % | HEART RATE: 103 BPM | RESPIRATION RATE: 16 BRPM

## 2019-01-13 PROCEDURE — 88311 DECALCIFY TISSUE: CPT

## 2019-01-13 PROCEDURE — 88360 TUMOR IMMUNOHISTOCHEM/MANUAL: CPT

## 2019-01-13 PROCEDURE — 88341 IMHCHEM/IMCYTCHM EA ADD ANTB: CPT

## 2019-01-13 PROCEDURE — C1889: CPT

## 2019-01-13 PROCEDURE — 88313 SPECIAL STAINS GROUP 2: CPT

## 2019-01-13 PROCEDURE — 82330 ASSAY OF CALCIUM: CPT

## 2019-01-13 PROCEDURE — 86900 BLOOD TYPING SEROLOGIC ABO: CPT

## 2019-01-13 PROCEDURE — 84132 ASSAY OF SERUM POTASSIUM: CPT

## 2019-01-13 PROCEDURE — 88331 PATH CONSLTJ SURG 1 BLK 1SPC: CPT

## 2019-01-13 PROCEDURE — 82962 GLUCOSE BLOOD TEST: CPT

## 2019-01-13 PROCEDURE — 88305 TISSUE EXAM BY PATHOLOGIST: CPT

## 2019-01-13 PROCEDURE — 36415 COLL VENOUS BLD VENIPUNCTURE: CPT

## 2019-01-13 PROCEDURE — C1781: CPT

## 2019-01-13 PROCEDURE — 80048 BASIC METABOLIC PNL TOTAL CA: CPT

## 2019-01-13 PROCEDURE — 86850 RBC ANTIBODY SCREEN: CPT

## 2019-01-13 PROCEDURE — 88307 TISSUE EXAM BY PATHOLOGIST: CPT

## 2019-01-13 PROCEDURE — 84295 ASSAY OF SERUM SODIUM: CPT

## 2019-01-13 PROCEDURE — 85018 HEMOGLOBIN: CPT

## 2019-01-13 PROCEDURE — 85027 COMPLETE CBC AUTOMATED: CPT

## 2019-01-13 PROCEDURE — 86901 BLOOD TYPING SEROLOGIC RH(D): CPT

## 2019-01-13 PROCEDURE — 88332 PATH CONSLTJ SURG EA ADD BLK: CPT

## 2019-01-13 RX ADMIN — Medication 975 MILLIGRAM(S): at 10:50

## 2019-01-13 RX ADMIN — OXYCODONE HYDROCHLORIDE 10 MILLIGRAM(S): 5 TABLET ORAL at 09:14

## 2019-01-13 RX ADMIN — Medication 975 MILLIGRAM(S): at 09:52

## 2019-01-13 RX ADMIN — Medication 1 APPLICATION(S): at 09:52

## 2019-01-13 RX ADMIN — Medication 975 MILLIGRAM(S): at 04:20

## 2019-01-13 RX ADMIN — Medication 100 MILLIGRAM(S): at 14:45

## 2019-01-13 RX ADMIN — OXYCODONE HYDROCHLORIDE 10 MILLIGRAM(S): 5 TABLET ORAL at 15:10

## 2019-01-13 RX ADMIN — Medication 975 MILLIGRAM(S): at 17:15

## 2019-01-13 RX ADMIN — Medication 100 MILLIGRAM(S): at 04:20

## 2019-01-13 RX ADMIN — Medication 5 MILLIGRAM(S): at 03:29

## 2019-01-13 RX ADMIN — ENOXAPARIN SODIUM 40 MILLIGRAM(S): 100 INJECTION SUBCUTANEOUS at 05:00

## 2019-01-13 RX ADMIN — Medication 975 MILLIGRAM(S): at 16:32

## 2019-01-13 RX ADMIN — Medication 975 MILLIGRAM(S): at 05:20

## 2019-01-13 RX ADMIN — OXYCODONE HYDROCHLORIDE 10 MILLIGRAM(S): 5 TABLET ORAL at 16:00

## 2019-01-13 RX ADMIN — OXYCODONE HYDROCHLORIDE 10 MILLIGRAM(S): 5 TABLET ORAL at 08:18

## 2019-01-13 NOTE — PROGRESS NOTE ADULT - ASSESSMENT
63F with invasive left breast cancer, s/p bilateral mastectomy with L axillary node dissection, NUBIA flap reconstruction and BSO.    care per plastics primary  Team 1C will continue to follow
63 P2 with invasive left breast cancer, POD1 s/p bilateral mastectomy with L axillary node dissection, NUBIA flap reconstruction, BSO, stable  - management per primary team  - intraop findings regarding right adnexal mass discussed with patient, will f/u pathology  - please call if any additional questions  d/w Dr. Cisneros
63 YR f S/P BILATERAL DIEPS POD 1    - cont q1 doppler checks  -drain care  - reg diet  - pain control- may need iv pain meds  - ambulate  - lovenox, scds
63F with invasive left breast cancer, s/p bilateral mastectomy with L axillary node dissection, NUBIA flap reconstruction and BSO.    care per plastics primary  Team 1C will continue to follow
Assessment/Plan:  Patient is a 63y old  Female who presents with a chief complaint of dieps (10 Darci 2019 08:49). POD 2 from bilateral breast reconstruction with NUBIA flaps. Doing well  - Encourage ambulation  - Continue tylenol/toradol pain control  - Dispo planning  - SCDs  - Transfer to 9 uris in PM  - Continue flap checks

## 2019-01-13 NOTE — PROGRESS NOTE ADULT - PROVIDER SPECIALTY LIST ADULT
Gyn Onc
Plastic Surgery
Surgery

## 2019-01-13 NOTE — PROGRESS NOTE ADULT - SUBJECTIVE AND OBJECTIVE BOX
SUBJECTIVE: Patient seen and examined bedside. pt states that pain is well controlled. tolerating diet, passing flatus, had a bowel movement this afternoon. is going home today    enoxaparin Injectable 40 milliGRAM(s) SubCutaneous every 24 hours      Vital Signs Last 24 Hrs  T(C): 36.8 (13 Jan 2019 14:41), Max: 37.3 (12 Jan 2019 16:51)  T(F): 98.2 (13 Jan 2019 14:41), Max: 99.1 (12 Jan 2019 16:51)  HR: 87 (13 Jan 2019 14:41) (87 - 98)  BP: 120/70 (13 Jan 2019 14:41) (118/78 - 144/83)  BP(mean): --  RR: 16 (13 Jan 2019 14:41) (16 - 17)  SpO2: 95% (13 Jan 2019 14:41) (95% - 99%)  I&O's Detail    12 Jan 2019 07:01  -  13 Jan 2019 07:00  --------------------------------------------------------  IN:    Oral Fluid: 1860 mL  Total IN: 1860 mL    OUT:    Bulb: 70 mL    Bulb: 170 mL    Bulb: 25 mL    Bulb: 50 mL    Bulb: 45 mL    Voided: 1350 mL  Total OUT: 1710 mL    Total NET: 150 mL      13 Jan 2019 07:01  -  13 Jan 2019 15:02  --------------------------------------------------------  IN:    Oral Fluid: 360 mL  Total IN: 360 mL    OUT:    Voided: 450 mL  Total OUT: 450 mL    Total NET: -90 mL          General: NAD, resting comfortably in bed  C/V: NSR  Pulm: Nonlabored breathing, no respiratory distress  breast: Incisions c/d/i, minimal bruising, no induration, no erythema, no exudates, no signs of bleeding. PING drains in place b/l, draining to suction with SS fluid  Abd: soft, NT/ND. ping drains in place to suction with serosang fluid  Extrem: WWP, no edema, SCDs in place        LABS:                RADIOLOGY & ADDITIONAL STUDIES:
POST-OPERATIVE NOTE    Procedure: Bilateral mastectomy, left sentinel node biopsy    Diagnosis/Indication:  BRCA gene mutation positive     Surgeon: Dr. Hooper    S: Pt has no complaints. Denies CP, SOB, SHRESTHA, calf tenderness. Pain controlled with medication. Denies nausea, vomiting.    O:  T(C): 35.9 (01-09-19 @ 19:30), Max: 35.9 (01-09-19 @ 19:30)  T(F): 96.7 (01-09-19 @ 19:30), Max: 96.7 (01-09-19 @ 19:30)  HR: 92 (01-09-19 @ 22:00) (90 - 112)  BP: 113/69 (01-09-19 @ 22:00) (93/69 - 122/66)  RR: 10 (01-09-19 @ 22:00) (10 - 15)  SpO2: 97% (01-09-19 @ 22:00) (96% - 97%)  Wt(kg): --    Gen: NAD, resting comfortably in bed  Breast: incisions are clean, dry and intact with binder and 5 JPs to suction with serosang output  C/V: NSR  Pulm: Nonlabored breathing, no respiratory distress  Abd: soft, NT/ND  Extrem: WWP, no calf edema or tenderness, SCDs in place    A/P: 63y Female s/p above procedure  Diet: NPO  IVF: LR @ 125  Pain/nausea control  SQH/SCDs/OOBA/IS  Dispo pending pain control, PO tolerance, clinical improvement
Progress Note:    Pt was seen & examined.  Ambulating and voiding w/o issues.  Donny PO.  Pain is well-controlled.    AVSS  Awake, Alert  No resp distress  B/l breasts: flaps soft, warm, viable, + doppler signal b/l (R arterial, L venous), inferior mastectomy skin ecchymosis b/l, no collection  Abd: soft, NT, ND, closure intact, umbo viable  JPs serosang    	  64yo F s/p b/l mastectomies, b/l NUBIA flaps    - doing well  - cont flap checks q4hrs  - reg diet  - pain control  - OOB/ambulation  - D/C home tomorrow with VNS
Progress Note:    Pt was seen & examined.  Ambulating and voiding w/o issues.  Donny PO.  Pain is well-controlled.    AVSS  Awake, Alert  No resp distress  B/l breasts: flaps soft, warm, viable, + doppler signal b/l (R arterial, L venous), inferior mastectomy skin ecchymosis b/l, no collection  Abd: soft, NT, ND, closure intact, umbo viable  JPs serosang    	  64yo F s/p b/l mastectomies, b/l NUBIA flaps now POD#4    - doing well  - cont flap checks q4hrs  - reg diet  - pain control  - OOB/ambulation  - D/C home today with VNS
Pt evaluated at bedside. She reports pain is well controlled w/ tylenol, toradol, oxycodone. She has ambulated out of bed to bathroom and has not passed gas. She denies fever/chills, HA, dizziness, CP, palpitations, SOB, n/v.    T(C): 36.4 (01-10-19 @ 18:53), Max: 37.6 (01-10-19 @ 09:57)  HR: 88 (01-10-19 @ 15:52) (88 - 89)  BP: 105/56 (01-10-19 @ 15:52) (102/57 - 105/56)  RR: 17 (01-10-19 @ 15:52) (16 - 17)  SpO2: 94% (01-10-19 @ 15:52) (94% - 94%)  GA: NAD, A+Ox3  CV: RRR, no MRG  Pulm: CTAB  Abd: +BS, soft, mild tenderness on diffuse palpation, nondistended, no rebound or guarding, dressing applied c/d/i, 5x SOSA drains w/ sanguinous fluid  Extrem: SCDs in place, no calf tenderness    01-10 @ 07:01  -  01-10 @ 20:21  --------------------------------------------------------  IN: 0 mL / OUT: 485 mL / NET: -485 mL                 10.2   9.9   )-----------( 197      ( 10 Darci 2019 06:17 )             31.5     01-10    142  |  105  |  14  ----------------------------<  123<H>  4.2   |  29  |  0.61    Ca    8.6      10 Darci 2019 06:17      MEDICATIONS  (STANDING):  acetaminophen   Tablet .. 975 milliGRAM(s) Oral every 6 hours  BUpivacaine liposome 1.3% Injectable (no eMAR) 20 milliLiter(s) Local Injection once  ceFAZolin  Injectable. 2000 milliGRAM(s) IV Push every 8 hours  dextrose 5% + sodium chloride 0.45%. 1000 milliLiter(s) (100 mL/Hr) IV Continuous <Continuous>  docusate sodium 100 milliGRAM(s) Oral three times a day  enoxaparin Injectable 40 milliGRAM(s) SubCutaneous every 24 hours  ketorolac   Injectable 30 milliGRAM(s) IV Push every 6 hours  lactated ringers Bolus 1000 milliLiter(s) IV Bolus once  senna 2 Tablet(s) Oral at bedtime  simvastatin 20 milliGRAM(s) Oral at bedtime    MEDICATIONS  (PRN):  aluminum hydroxide/magnesium hydroxide/simethicone Suspension 30 milliLiter(s) Oral every 4 hours PRN Dyspepsia  diazepam    Tablet 5 milliGRAM(s) Oral every 6 hours PRN muscle spasms, anxiety  HYDROmorphone  Injectable 0.5 milliGRAM(s) IV Push every 10 minutes PRN Moderate Pain (4 - 6)  metoclopramide 5 milliGRAM(s) Oral every 6 hours PRN nausea/vomiting  ondansetron Injectable 4 milliGRAM(s) IV Push every 4 hours PRN Nausea and/or Vomiting  oxyCODONE    IR 5 milliGRAM(s) Oral every 4 hours PRN Moderate Pain (4 - 6)  oxyCODONE    IR 10 milliGRAM(s) Oral every 4 hours PRN Severe Pain (7 - 10)
RADHA CRISTO  [id number]    Subjective:  Patient is a 63y old  Female who presents with a chief complaint of dieps (10 Darci 2019 08:49). No acute events overnight. Pain controlled       Objective:  T(C): 36.2 (01-11-19 @ 05:09), Max: 37.6 (01-10-19 @ 09:57)  HR: 95 (01-11-19 @ 05:58) (88 - 99)  BP: 136/68 (01-11-19 @ 05:58) (102/57 - 136/68)  RR: 18 (01-11-19 @ 05:58) (16 - 18)  SpO2: 94% (01-11-19 @ 05:58) (92% - 95%)  Wt(kg): --   01-10    142  |  105  |  14  ----------------------------<  123<H>  4.2   |  29  |  0.61    Ca    8.6      10 Darci 2019 06:17                          10.2   9.9   )-----------( 197      ( 10 Darci 2019 06:17 )             31.5       01-09 @ 07:01  -  01-10 @ 07:00  --------------------------------------------------------  IN: 1125 mL / OUT: 700 mL / NET: 425 mL    01-10 @ 07:01  -  01-11 @ 06:33  --------------------------------------------------------  IN: 1500 mL / OUT: 1660 mL / NET: -160 mL      PHYSICAL EXAM:  -- CONSTITUTIONAL: AOx3. NAD.   -- RIGHT BREAST / FLAP: No collections. Flap soft and pink with 2-3 second capillary refill. Doppler signal present Drain(s) serosanguinous.  -- LEFT BREAST / FLAP: No collections. Flap soft and pink with 2-3 second capillary refill. Doppler signal present Drain(s) serosanguinous.  -- CARDIOVASCULAR: Regular rate and rhythm. S1, S2.  -- RESPIRATORY: Bilateral breath sounds.   -- ABDOMEN: Soft. No collections. Incision intact. Umbilicus viable. Drains serosanguinous.          MEDICATIONS  (STANDING):  acetaminophen   Tablet .. 975 milliGRAM(s) Oral every 6 hours  BUpivacaine liposome 1.3% Injectable (no eMAR) 20 milliLiter(s) Local Injection once  ceFAZolin  Injectable. 2000 milliGRAM(s) IV Push every 8 hours  dextrose 5% + sodium chloride 0.45%. 1000 milliLiter(s) (100 mL/Hr) IV Continuous <Continuous>  docusate sodium 100 milliGRAM(s) Oral three times a day  enoxaparin Injectable 40 milliGRAM(s) SubCutaneous every 24 hours  ketorolac   Injectable 30 milliGRAM(s) IV Push every 6 hours  senna 2 Tablet(s) Oral at bedtime  simvastatin 20 milliGRAM(s) Oral at bedtime    MEDICATIONS  (PRN):  aluminum hydroxide/magnesium hydroxide/simethicone Suspension 30 milliLiter(s) Oral every 4 hours PRN Dyspepsia  diazepam    Tablet 5 milliGRAM(s) Oral every 6 hours PRN muscle spasms, anxiety  HYDROmorphone  Injectable 0.5 milliGRAM(s) IV Push every 10 minutes PRN Moderate Pain (4 - 6)  metoclopramide 5 milliGRAM(s) Oral every 6 hours PRN nausea/vomiting  ondansetron Injectable 4 milliGRAM(s) IV Push every 4 hours PRN Nausea and/or Vomiting  oxyCODONE    IR 5 milliGRAM(s) Oral every 4 hours PRN Moderate Pain (4 - 6)  oxyCODONE    IR 10 milliGRAM(s) Oral every 4 hours PRN Severe Pain (7 - 10)
STATUS POST:  1/9 bilateral mastectomy with L axillary node dissection, NUBIA flap reconstruction and BSO     SUBJECTIVE: This afternoon, she feels well; her pain is well-controlled. Has been out of bed.    enoxaparin Injectable 40 milliGRAM(s) SubCutaneous every 24 hours      Vital Signs Last 24 Hrs  T(C): 36.2 (11 Jan 2019 17:33), Max: 37.1 (11 Jan 2019 14:00)  T(F): 97.1 (11 Jan 2019 17:33), Max: 98.7 (11 Jan 2019 14:00)  HR: 103 (11 Jan 2019 17:33) (76 - 103)  BP: 116/60 (11 Jan 2019 17:33) (116/60 - 136/68)  BP(mean): 82 (11 Jan 2019 16:42) (81 - 94)  RR: 17 (11 Jan 2019 17:33) (16 - 18)  SpO2: 96% (11 Jan 2019 17:33) (92% - 96%)  I&O's Detail    10 Darci 2019 07:01  -  11 Jan 2019 07:00  --------------------------------------------------------  IN:    dextrose 5% + sodium chloride 0.45%.: 1000 mL    Lactated Ringers IV Bolus: 1000 mL  Total IN: 2000 mL    OUT:    Bulb: 160 mL    Bulb: 70 mL    Bulb: 125 mL    Bulb: 175 mL    Bulb: 30 mL    Voided: 1400 mL  Total OUT: 1960 mL    Total NET: 40 mL      11 Jan 2019 07:01  -  11 Jan 2019 18:37  --------------------------------------------------------  IN:    Oral Fluid: 360 mL  Total IN: 360 mL    OUT:    Bulb: 65 mL    Bulb: 60 mL    Bulb: 30 mL    Bulb: 40 mL    Bulb: 30 mL    Voided: 500 mL  Total OUT: 725 mL    Total NET: -365 mL          General: NAD, resting comfortably in bed  Chest: bilateral incisions soft, CDI, JPs serosanguinous  Pulm: Nonlabored breathing, no respiratory distress  Extrem: WWP, no edema        LABS:                        10.2   9.9   )-----------( 197      ( 10 Darci 2019 06:17 )             31.5     01-10    142  |  105  |  14  ----------------------------<  123<H>  4.2   |  29  |  0.61    Ca    8.6      10 Darci 2019 06:17
STATUS POST:  bilateral mastectomy, L axillary node dissection, NUBIA flap reconstruction, BSO     SUBJECTIVE: This morning, she feels well; her pain is well-controlled. No nausea or vomiting. No flatus or BMs. No acute complaints.    ceFAZolin  Injectable. 2000 milliGRAM(s) IV Push every 8 hours  enoxaparin Injectable 40 milliGRAM(s) SubCutaneous every 24 hours      Vital Signs Last 24 Hrs  T(C): 37.6 (10 Darci 2019 09:57), Max: 37.6 (10 Darci 2019 09:57)  T(F): 99.7 (10 Darci 2019 09:57), Max: 99.7 (10 Darci 2019 09:57)  HR: 89 (10 Darci 2019 12:00) (82 - 112)  BP: 102/57 (10 Darci 2019 12:00) (93/69 - 130/65)  BP(mean): 75 (10 Darci 2019 08:15) (74 - 97)  RR: 16 (10 Darci 2019 12:00) (10 - 18)  SpO2: 94% (10 Darci 2019 12:00) (94% - 97%)  I&O's Detail    09 Jan 2019 07:01  -  10 Darci 2019 07:00  --------------------------------------------------------  IN:    lactated ringers.: 1125 mL  Total IN: 1125 mL    OUT:    Bulb: 75 mL    Bulb: 40 mL    Bulb: 35 mL    Bulb: 35 mL    Bulb: 140 mL    Indwelling Catheter - Urethral: 375 mL  Total OUT: 700 mL    Total NET: 425 mL          General: NAD, resting comfortably in bed  Chest: bilateral incisions CDI, soft, JPs serosanguinous  Pulm: Nonlabored breathing, no respiratory distress  Abd: soft, NT/ND.  Extrem: WWP, no edema        LABS:                        10.2   9.9   )-----------( 197      ( 10 Darci 2019 06:17 )             31.5     01-10    142  |  105  |  14  ----------------------------<  123<H>  4.2   |  29  |  0.61    Ca    8.6      10 Darci 2019 06:17            RADIOLOGY & ADDITIONAL STUDIES:
SUBJECTIVE: Pt appears comfortable. Tolerating diet but states she doesn't have much appetite, denies N/V. Passing flatus, denies BM. Making appropriate UO. Ambulating well without issues.    enoxaparin Injectable 40 milliGRAM(s) SubCutaneous every 24 hours      Vital Signs Last 24 Hrs  T(C): 36.3 (12 Jan 2019 09:20), Max: 37.1 (11 Jan 2019 14:00)  T(F): 97.4 (12 Jan 2019 09:20), Max: 98.7 (11 Jan 2019 14:00)  HR: 101 (12 Jan 2019 09:20) (76 - 103)  BP: 124/82 (12 Jan 2019 09:20) (116/60 - 134/81)  BP(mean): 82 (11 Jan 2019 16:42) (82 - 85)  RR: 16 (12 Jan 2019 09:20) (16 - 18)  SpO2: 97% (12 Jan 2019 09:20) (94% - 97%)    General: NAD, resting comfortably in bed  Pulm: Nonlabored breathing, no respiratory distress  Chest; Incisions c/d/i, minimal bruising, no induration, no erythema, no exudates, no signs of bleeding. SOSA drains in place b/l, draining SS.  Abd: soft, appropriately tender, ND. Incisions C/D/I.   Extrem: WWP, no edema, SCDs in place
doing well, pain in abdomen    afvss    on exam    Bilateral NUBIA flaps warm well perfused, viable. 2+ signal on right 1+ on left. good cap refill    abdomen incision cdi    drains with SS output  left abdomen- 35cc  left axilla- 140cc  left breast 40cc  right abdomen 35cc  right breast-75cc

## 2019-01-16 LAB — SURGICAL PATHOLOGY STUDY: SIGNIFICANT CHANGE UP

## 2019-01-17 ENCOUNTER — CHART COPY (OUTPATIENT)
Age: 64
End: 2019-01-17

## 2019-01-17 ENCOUNTER — RX RENEWAL (OUTPATIENT)
Age: 64
End: 2019-01-17

## 2019-01-17 VITALS
TEMPERATURE: 100 F | RESPIRATION RATE: 24 BRPM | HEART RATE: 126 BPM | WEIGHT: 190.04 LBS | DIASTOLIC BLOOD PRESSURE: 63 MMHG | OXYGEN SATURATION: 97 % | SYSTOLIC BLOOD PRESSURE: 111 MMHG

## 2019-01-17 DIAGNOSIS — C50.919 MALIGNANT NEOPLASM OF UNSPECIFIED SITE OF UNSPECIFIED FEMALE BREAST: ICD-10-CM

## 2019-01-17 PROBLEM — R06.02 SHORTNESS OF BREATH: Chronic | Status: ACTIVE | Noted: 2019-01-08

## 2019-01-17 PROBLEM — G62.9 POLYNEUROPATHY, UNSPECIFIED: Chronic | Status: ACTIVE | Noted: 2019-01-09

## 2019-01-17 RX ORDER — SODIUM CHLORIDE 9 MG/ML
1000 INJECTION INTRAMUSCULAR; INTRAVENOUS; SUBCUTANEOUS ONCE
Qty: 0 | Refills: 0 | Status: COMPLETED | OUTPATIENT
Start: 2019-01-17 | End: 2019-01-17

## 2019-01-17 RX ORDER — ACETAMINOPHEN 500 MG
975 TABLET ORAL ONCE
Qty: 0 | Refills: 0 | Status: COMPLETED | OUTPATIENT
Start: 2019-01-17 | End: 2019-01-17

## 2019-01-17 RX ORDER — PIPERACILLIN AND TAZOBACTAM 4; .5 G/20ML; G/20ML
3.38 INJECTION, POWDER, LYOPHILIZED, FOR SOLUTION INTRAVENOUS ONCE
Qty: 0 | Refills: 0 | Status: COMPLETED | OUTPATIENT
Start: 2019-01-17 | End: 2019-01-17

## 2019-01-17 RX ORDER — VANCOMYCIN HCL 1 G
1000 VIAL (EA) INTRAVENOUS ONCE
Qty: 0 | Refills: 0 | Status: COMPLETED | OUTPATIENT
Start: 2019-01-17 | End: 2019-01-17

## 2019-01-17 RX ORDER — SODIUM CHLORIDE 9 MG/ML
600 INJECTION INTRAMUSCULAR; INTRAVENOUS; SUBCUTANEOUS ONCE
Qty: 0 | Refills: 0 | Status: COMPLETED | OUTPATIENT
Start: 2019-01-17 | End: 2019-01-17

## 2019-01-17 RX ADMIN — SODIUM CHLORIDE 2000 MILLILITER(S): 9 INJECTION INTRAMUSCULAR; INTRAVENOUS; SUBCUTANEOUS at 22:31

## 2019-01-17 RX ADMIN — Medication 250 MILLIGRAM(S): at 23:31

## 2019-01-17 RX ADMIN — PIPERACILLIN AND TAZOBACTAM 200 GRAM(S): 4; .5 INJECTION, POWDER, LYOPHILIZED, FOR SOLUTION INTRAVENOUS at 23:06

## 2019-01-17 RX ADMIN — SODIUM CHLORIDE 2000 MILLILITER(S): 9 INJECTION INTRAMUSCULAR; INTRAVENOUS; SUBCUTANEOUS at 23:12

## 2019-01-17 RX ADMIN — Medication 975 MILLIGRAM(S): at 23:04

## 2019-01-17 NOTE — ED ADULT NURSE NOTE - NSIMPLEMENTINTERV_GEN_ALL_ED
Implemented All Universal Safety Interventions:  [employer] to call system. Call bell, personal items and telephone within reach. Instruct patient to call for assistance. Room bathroom lighting operational. Non-slip footwear when patient is off stretcher. Physically safe environment: no spills, clutter or unnecessary equipment. Stretcher in lowest position, wheels locked, appropriate side rails in place.

## 2019-01-17 NOTE — ED ADULT TRIAGE NOTE - CHIEF COMPLAINT QUOTE
fever/chills  since 3-4 hours ago ( (103 this afternoon)  ; was D/C'd  Sunday  S/P lymph node removal - left breast CA

## 2019-01-17 NOTE — ED ADULT NURSE NOTE - PMH
Breast cancer  left  s/p chemotherapy  Hyperlipidemia    Neuropathy  both feet  Osteoarthritis    SOB (shortness of breath)

## 2019-01-17 NOTE — ED ADULT NURSE NOTE - OBJECTIVE STATEMENT
Patient presents to the ED with c/o fever s/p drain removal from right breast. patient had recent breastectomy for breast CA. patient is tachypneac, tachycardic and skin is flushed. States last tylenol was given at 2000. Work up in process, awaiting MD collado. Will continue to monitor. Patient presents to the ED with c/o fever s/p drain removal from right breast. Left breast drains remain intact to SOSA drainage with sanguinous drainage noted.  Patient had breast surgery on sunday for breast CA. Patient is tachypneac, tachycardic and skin is flushed. States last Tylenol was given at 2000. Work up in process, awaiting MD collado. Will continue to monitor.

## 2019-01-17 NOTE — ED ADULT NURSE NOTE - PSH
Elective surgery  left shoulder surgery  History of elbow surgery  left  S/P hip replacement, right    Surgery, elective  right chest port

## 2019-01-18 ENCOUNTER — INPATIENT (INPATIENT)
Facility: HOSPITAL | Age: 64
LOS: 3 days | Discharge: HOME | DRG: 862 | End: 2019-01-22
Attending: PLASTIC SURGERY | Admitting: PLASTIC SURGERY
Payer: COMMERCIAL

## 2019-01-18 DIAGNOSIS — Z98.890 OTHER SPECIFIED POSTPROCEDURAL STATES: Chronic | ICD-10-CM

## 2019-01-18 DIAGNOSIS — Z41.9 ENCOUNTER FOR PROCEDURE FOR PURPOSES OTHER THAN REMEDYING HEALTH STATE, UNSPECIFIED: Chronic | ICD-10-CM

## 2019-01-18 DIAGNOSIS — Z96.641 PRESENCE OF RIGHT ARTIFICIAL HIP JOINT: Chronic | ICD-10-CM

## 2019-01-18 LAB
ANION GAP SERPL CALC-SCNC: 14 MMOL/L — SIGNIFICANT CHANGE UP (ref 5–17)
BUN SERPL-MCNC: 8 MG/DL — SIGNIFICANT CHANGE UP (ref 7–23)
CALCIUM SERPL-MCNC: 8.1 MG/DL — LOW (ref 8.4–10.5)
CHLORIDE SERPL-SCNC: 98 MMOL/L — SIGNIFICANT CHANGE UP (ref 96–108)
CO2 SERPL-SCNC: 24 MMOL/L — SIGNIFICANT CHANGE UP (ref 22–31)
CREAT SERPL-MCNC: 0.7 MG/DL — SIGNIFICANT CHANGE UP (ref 0.5–1.3)
GLUCOSE SERPL-MCNC: 118 MG/DL — HIGH (ref 70–99)
HCT VFR BLD CALC: 29.6 % — LOW (ref 34.5–45)
HGB BLD-MCNC: 9.5 G/DL — LOW (ref 11.5–15.5)
LACTATE SERPL-SCNC: 1.7 MMOL/L — SIGNIFICANT CHANGE UP (ref 0.5–2)
MCHC RBC-ENTMCNC: 32.1 G/DL — SIGNIFICANT CHANGE UP (ref 32–36)
MCHC RBC-ENTMCNC: 32.3 PG — SIGNIFICANT CHANGE UP (ref 27–34)
MCV RBC AUTO: 100.7 FL — HIGH (ref 80–100)
PLATELET # BLD AUTO: 297 K/UL — SIGNIFICANT CHANGE UP (ref 150–400)
POTASSIUM SERPL-MCNC: 3.6 MMOL/L — SIGNIFICANT CHANGE UP (ref 3.5–5.3)
POTASSIUM SERPL-SCNC: 3.6 MMOL/L — SIGNIFICANT CHANGE UP (ref 3.5–5.3)
RAPID RVP RESULT: SIGNIFICANT CHANGE UP
RBC # BLD: 2.94 M/UL — LOW (ref 3.8–5.2)
RBC # FLD: 13.4 % — SIGNIFICANT CHANGE UP (ref 10.3–16.9)
SODIUM SERPL-SCNC: 136 MMOL/L — SIGNIFICANT CHANGE UP (ref 135–145)
WBC # BLD: 22.8 K/UL — HIGH (ref 3.8–10.5)
WBC # FLD AUTO: 22.8 K/UL — HIGH (ref 3.8–10.5)

## 2019-01-18 PROCEDURE — 99221 1ST HOSP IP/OBS SF/LOW 40: CPT

## 2019-01-18 PROCEDURE — 99291 CRITICAL CARE FIRST HOUR: CPT | Mod: 25

## 2019-01-18 PROCEDURE — 71260 CT THORAX DX C+: CPT | Mod: 26

## 2019-01-18 PROCEDURE — 99223 1ST HOSP IP/OBS HIGH 75: CPT | Mod: GC

## 2019-01-18 PROCEDURE — 74177 CT ABD & PELVIS W/CONTRAST: CPT | Mod: 26

## 2019-01-18 PROCEDURE — 93010 ELECTROCARDIOGRAM REPORT: CPT | Mod: 59

## 2019-01-18 RX ORDER — OXYCODONE HYDROCHLORIDE 5 MG/1
5 TABLET ORAL EVERY 4 HOURS
Qty: 0 | Refills: 0 | Status: DISCONTINUED | OUTPATIENT
Start: 2019-01-18 | End: 2019-01-22

## 2019-01-18 RX ORDER — PIPERACILLIN AND TAZOBACTAM 4; .5 G/20ML; G/20ML
3.38 INJECTION, POWDER, LYOPHILIZED, FOR SOLUTION INTRAVENOUS EVERY 6 HOURS
Qty: 0 | Refills: 0 | Status: DISCONTINUED | OUTPATIENT
Start: 2019-01-18 | End: 2019-01-21

## 2019-01-18 RX ORDER — SENNA PLUS 8.6 MG/1
2 TABLET ORAL AT BEDTIME
Qty: 0 | Refills: 0 | Status: DISCONTINUED | OUTPATIENT
Start: 2019-01-18 | End: 2019-01-22

## 2019-01-18 RX ORDER — DOCUSATE SODIUM 100 MG
100 CAPSULE ORAL THREE TIMES A DAY
Qty: 0 | Refills: 0 | Status: DISCONTINUED | OUTPATIENT
Start: 2019-01-18 | End: 2019-01-22

## 2019-01-18 RX ORDER — SODIUM CHLORIDE 9 MG/ML
1000 INJECTION INTRAMUSCULAR; INTRAVENOUS; SUBCUTANEOUS
Qty: 0 | Refills: 0 | Status: DISCONTINUED | OUTPATIENT
Start: 2019-01-18 | End: 2019-01-19

## 2019-01-18 RX ORDER — OXYCODONE AND ACETAMINOPHEN 5; 325 MG/1; MG/1
1 TABLET ORAL ONCE
Qty: 0 | Refills: 0 | Status: DISCONTINUED | OUTPATIENT
Start: 2019-01-18 | End: 2019-01-18

## 2019-01-18 RX ORDER — ENOXAPARIN SODIUM 100 MG/ML
40 INJECTION SUBCUTANEOUS DAILY
Qty: 0 | Refills: 0 | Status: DISCONTINUED | OUTPATIENT
Start: 2019-01-18 | End: 2019-01-22

## 2019-01-18 RX ORDER — ACETAMINOPHEN 500 MG
650 TABLET ORAL EVERY 6 HOURS
Qty: 0 | Refills: 0 | Status: DISCONTINUED | OUTPATIENT
Start: 2019-01-18 | End: 2019-01-22

## 2019-01-18 RX ORDER — VANCOMYCIN HCL 1 G
1000 VIAL (EA) INTRAVENOUS EVERY 12 HOURS
Qty: 0 | Refills: 0 | Status: DISCONTINUED | OUTPATIENT
Start: 2019-01-18 | End: 2019-01-19

## 2019-01-18 RX ORDER — SODIUM CHLORIDE 9 MG/ML
500 INJECTION INTRAMUSCULAR; INTRAVENOUS; SUBCUTANEOUS ONCE
Qty: 0 | Refills: 0 | Status: COMPLETED | OUTPATIENT
Start: 2019-01-18 | End: 2019-01-18

## 2019-01-18 RX ORDER — ASPIRIN/CALCIUM CARB/MAGNESIUM 324 MG
325 TABLET ORAL DAILY
Qty: 0 | Refills: 0 | Status: DISCONTINUED | OUTPATIENT
Start: 2019-01-18 | End: 2019-01-22

## 2019-01-18 RX ORDER — DIAZEPAM 5 MG
5 TABLET ORAL EVERY 6 HOURS
Qty: 0 | Refills: 0 | Status: DISCONTINUED | OUTPATIENT
Start: 2019-01-18 | End: 2019-01-22

## 2019-01-18 RX ORDER — SIMVASTATIN 20 MG/1
20 TABLET, FILM COATED ORAL AT BEDTIME
Qty: 0 | Refills: 0 | Status: DISCONTINUED | OUTPATIENT
Start: 2019-01-18 | End: 2019-01-22

## 2019-01-18 RX ADMIN — PIPERACILLIN AND TAZOBACTAM 200 GRAM(S): 4; .5 INJECTION, POWDER, LYOPHILIZED, FOR SOLUTION INTRAVENOUS at 16:08

## 2019-01-18 RX ADMIN — Medication 100 MILLIGRAM(S): at 11:13

## 2019-01-18 RX ADMIN — OXYCODONE HYDROCHLORIDE 5 MILLIGRAM(S): 5 TABLET ORAL at 23:00

## 2019-01-18 RX ADMIN — OXYCODONE HYDROCHLORIDE 5 MILLIGRAM(S): 5 TABLET ORAL at 12:54

## 2019-01-18 RX ADMIN — PIPERACILLIN AND TAZOBACTAM 3.38 GRAM(S): 4; .5 INJECTION, POWDER, LYOPHILIZED, FOR SOLUTION INTRAVENOUS at 01:18

## 2019-01-18 RX ADMIN — SODIUM CHLORIDE 1000 MILLILITER(S): 9 INJECTION INTRAMUSCULAR; INTRAVENOUS; SUBCUTANEOUS at 01:24

## 2019-01-18 RX ADMIN — PIPERACILLIN AND TAZOBACTAM 25 GRAM(S): 4; .5 INJECTION, POWDER, LYOPHILIZED, FOR SOLUTION INTRAVENOUS at 07:36

## 2019-01-18 RX ADMIN — Medication 100 MILLIGRAM(S): at 18:15

## 2019-01-18 RX ADMIN — Medication 250 MILLIGRAM(S): at 23:32

## 2019-01-18 RX ADMIN — ENOXAPARIN SODIUM 40 MILLIGRAM(S): 100 INJECTION SUBCUTANEOUS at 22:24

## 2019-01-18 RX ADMIN — OXYCODONE HYDROCHLORIDE 5 MILLIGRAM(S): 5 TABLET ORAL at 22:23

## 2019-01-18 RX ADMIN — Medication 250 MILLIGRAM(S): at 11:16

## 2019-01-18 RX ADMIN — SODIUM CHLORIDE 1200 MILLILITER(S): 9 INJECTION INTRAMUSCULAR; INTRAVENOUS; SUBCUTANEOUS at 02:52

## 2019-01-18 RX ADMIN — OXYCODONE HYDROCHLORIDE 5 MILLIGRAM(S): 5 TABLET ORAL at 12:30

## 2019-01-18 RX ADMIN — SODIUM CHLORIDE 100 MILLILITER(S): 9 INJECTION INTRAMUSCULAR; INTRAVENOUS; SUBCUTANEOUS at 06:39

## 2019-01-18 RX ADMIN — OXYCODONE AND ACETAMINOPHEN 1 TABLET(S): 5; 325 TABLET ORAL at 02:52

## 2019-01-18 RX ADMIN — PIPERACILLIN AND TAZOBACTAM 200 GRAM(S): 4; .5 INJECTION, POWDER, LYOPHILIZED, FOR SOLUTION INTRAVENOUS at 22:24

## 2019-01-18 RX ADMIN — Medication 650 MILLIGRAM(S): at 22:23

## 2019-01-18 RX ADMIN — OXYCODONE HYDROCHLORIDE 5 MILLIGRAM(S): 5 TABLET ORAL at 17:09

## 2019-01-18 RX ADMIN — SIMVASTATIN 20 MILLIGRAM(S): 20 TABLET, FILM COATED ORAL at 22:23

## 2019-01-18 RX ADMIN — OXYCODONE HYDROCHLORIDE 5 MILLIGRAM(S): 5 TABLET ORAL at 16:36

## 2019-01-18 RX ADMIN — OXYCODONE AND ACETAMINOPHEN 1 TABLET(S): 5; 325 TABLET ORAL at 02:51

## 2019-01-18 RX ADMIN — SODIUM CHLORIDE 750 MILLILITER(S): 9 INJECTION INTRAMUSCULAR; INTRAVENOUS; SUBCUTANEOUS at 02:33

## 2019-01-18 RX ADMIN — Medication 975 MILLIGRAM(S): at 01:18

## 2019-01-18 RX ADMIN — Medication 100 MILLIGRAM(S): at 22:23

## 2019-01-18 RX ADMIN — Medication 1000 MILLIGRAM(S): at 01:18

## 2019-01-18 NOTE — H&P ADULT - ASSESSMENT
63 yr F s/p B liv now with FUO      - if no IAA, then admit to Dr. Park  - VALDO abx  - IV fluids  - reg diet  - repeat labs    dw dr dec

## 2019-01-18 NOTE — ED ADULT NURSE REASSESSMENT NOTE - NS ED NURSE REASSESS COMMENT FT1
Tylenol 975 ordered per MD Henderson, notified MD Henderson Tylenol was administered at 2000. Md Henderson verbalized understanding and states 4000mg is the max in 24 hour period. Advised ibuprofen administration until 6 hours past 2000, MD Henderson insisted on Tylenol administration.
covering for RN on break. Antibiotics administered as per MD orders. In no acute distress.
patient at rest in stretcher, denies complaints at this time. Awaiting bed placement IVF infusing. NAD Noted

## 2019-01-18 NOTE — CONSULT NOTE ADULT - ASSESSMENT
62 yo F with BRCA gene positive s/p bilateral salpingooophorectomy for right ovarian mass, bilateral mastectomy with left axillary LN dissection and right sentinel LN biopsy and NUBIA flap reconstruction POD 11, readmitted for sepsis with +febrile to 101, +leukocytosis of 17 and lactate of 3.6, CT suggestive of 3.6cm right breast phlegmon collection.     Recommend: 62 yo F with BRCA gene positive s/p bilateral salpingooophorectomy for right ovarian mass, bilateral mastectomy with left axillary LN dissection and right sentinel LN biopsy and NUBIA flap reconstruction POD 11, readmitted for sepsis with +febrile to 101, +leukocytosis of 17 and lactate of 3.6, CT suggestive of 3.6cm right breast phlegmon collection.     Recommend:  -Continue zosyn 3.375g q6  -Continue vancomycin 1000mg q12 and obtain trough prior to dose this evening  -Follow up blood cultures  -Patient needs incision and drainage for both therapeutic and diagnostic purposes  -ID team 1 will follow 62 yo F with BRCA gene positive s/p bilateral salpingooophorectomy for right ovarian mass, bilateral mastectomy with left axillary LN dissection and right sentinel LN biopsy and NUBIA flap reconstruction POD 11, readmitted for sepsis with +febrile to 101, +leukocytosis of 17 and lactate of 3.6, CT suggestive of 3.6cm right breast abscess.     Recommend:  -Continue zosyn 3.375g q6  -Continue vancomycin 1000mg q12 and obtain trough prior to dose this evening  -Follow up blood cultures  -Patient needs incision and drainage for both therapeutic and diagnostic purposes  -ID team 1 will follow

## 2019-01-18 NOTE — ED PROVIDER NOTE - CARE PLAN
Principal Discharge DX:	Severe sepsis Principal Discharge DX:	Severe sepsis  Secondary Diagnosis:	Abscess

## 2019-01-18 NOTE — CONSULT NOTE ADULT - ASSESSMENT
62 yo F with BRCA gene positive s/p bilateral salpingooophorectomy for right ovarian mass, bilateral mastectomy with left axillary LN dissection and right sentinel LN biopsy and NUBIA flap reconstruction POD 11, readmitted for sepsis with +febrile to 101, +leukocytosis of 17 and lactate of 3.6, CT suggestive of 3.6cm right breast phlegmon collection.     Recommendations :  IV.Antibiotics, IV.Fluid  Not for any urgent surgical intervention tonight  Remaining care as per plastic team   Team 1C will follow     Plan d/w chief oncall

## 2019-01-18 NOTE — CONSULT NOTE ADULT - SUBJECTIVE AND OBJECTIVE BOX
HPI: 63 year old female s/p bilateral mastectomy L axillary node dissection, NUBIA flap reconstruction and BSO on 1/9 who presented with 1 day of fever up to 103.  On the day of presentation, she had her right SOSA drains removed and afterwards she felt fevers, chills and myalgias so she came to the ED.  Upon presentation she was febrile to 101.3 and WBC 17.6.  Blood cultures were sent.  CT scan with R sided post op phlegmon vs abscess as well as R sided cellulitis.    REVIEW OF SYSTEMS:    CONSTITUTIONAL: Endorses fevers and chills  EYES/ENT: No visual changes;  No vertigo or throat pain   NECK: No pain or stiffness  RESPIRATORY: Endorses dry cough, denies wheezing, hemoptysis; No shortness of breath  CARDIOVASCULAR: No chest pain or palpitations  GASTROINTESTINAL: No abdominal or epigastric pain. No nausea, vomiting, or hematemesis; No diarrhea or constipation. No melena or hematochezia.  GENITOURINARY: No dysuria, frequency or hematuria  NEUROLOGICAL: No numbness or weakness  SKIN: No itching, burning, rashes, or lesions   MSK: no joint pain, no joint swelling  All other review of systems is negative unless indicated above.      Allergies    dust (Sneezing)  No Known Drug Allergies    Intolerances        ANTIBIOTICS/RELEVANT:  antimicrobials  piperacillin/tazobactam IVPB. 3.375 Gram(s) IV Intermittent every 8 hours  vancomycin  IVPB 1000 milliGRAM(s) IV Intermittent every 12 hours    immunologic:    OTHER:  guaiFENesin    Syrup 100 milliGRAM(s) Oral every 6 hours PRN  oxyCODONE    IR 5 milliGRAM(s) Oral every 4 hours PRN  sodium chloride 0.9%. 1000 milliLiter(s) IV Continuous <Continuous>      Objective:  Vital Signs Last 24 Hrs  T(C): 37.6 (18 Jan 2019 10:54), Max: 38.5 (17 Jan 2019 22:32)  T(F): 99.7 (18 Jan 2019 10:54), Max: 101.3 (17 Jan 2019 22:32)  HR: 107 (18 Jan 2019 10:54) (96 - 126)  BP: 131/79 (18 Jan 2019 10:54) (102/58 - 135/63)  BP(mean): --  RR: 17 (18 Jan 2019 10:54) (16 - 24)  SpO2: 100% (18 Jan 2019 10:54) (95% - 100%)      PHYSICAL EXAM:    Constitutional: WDWN resting comfortably in bed; NAD  Head: NC/AT  Eyes: PERRLA, EOMI, clear conjunctiva  ENT: no nasal discharge; no oropharyngeal erythema or exudates; dry oral mucosa  Neck: supple; no JVD or thyromegaly  Respiratory: CTA B/L; no W/R/R, no retractions  Cardiac: +S1/S2; RRR; no M/R/G; PMI non-displaced  Gastrointestinal: soft, NT/ND; no rebound or guarding; +BS, no hepatosplenomegaly  Extremities: WWP, no clubbing or cyanosis; no peripheral edema  Vascular: 2+ radial, DP/PT pulses B/L  Skin: Chemo port in place in right upper chest, Postop changes of bilateral breasts, left breast incision site c/d/i with SOSA drains in place draining serosanguinous fluid, right breast with tenderness in upper portion, no fluctuance, Site of previous drain at R hip erythematous  Neurologic: AAOx3        LABS:                        9.5    22.8  )-----------( 297      ( 18 Jan 2019 07:02 )             29.6     01-18    136  |  98  |  8   ----------------------------<  118<H>  3.6   |  24  |  0.70    Ca    8.1<L>      18 Jan 2019 07:02    TPro  6.5  /  Alb  3.3  /  TBili  0.3  /  DBili  x   /  AST  21  /  ALT  16  /  AlkPhos  93  01-17    PT/INR - ( 17 Jan 2019 22:21 )   PT: 13.7 sec;   INR: 1.21          PTT - ( 17 Jan 2019 22:21 )  PTT:34.4 sec  Urinalysis Basic - ( 18 Jan 2019 01:52 )    Color: Yellow / Appearance: Clear / SG: <=1.005 / pH: x  Gluc: x / Ketone: NEGATIVE  / Bili: Negative / Urobili: 0.2 E.U./dL   Blood: x / Protein: NEGATIVE mg/dL / Nitrite: NEGATIVE   Leuk Esterase: NEGATIVE / RBC: x / WBC x   Sq Epi: x / Non Sq Epi: x / Bacteria: x        MICROBIOLOGY:            RECENT CULTURES:      RADIOLOGY & ADDITIONAL STUDIES:

## 2019-01-18 NOTE — PROGRESS NOTE ADULT - SUBJECTIVE AND OBJECTIVE BOX
GYN Progress   Patient seen at bedside this afternoon. Reports she is feeling less fevers and chills than on admission, but she is still bothered by her cough. States she has been taking Robitussin but will minimal relief and when she coughs it worsens pain in her left breast and abdomen. States her pain is mostly in her left lateral breast and on her lateral abdomen where the drains were placed bilaterally.    Tolerating regular diet.  Ambulating without difficulty and urinating without pain.       Vital Signs Last 24 Hrs  T(C): 37.4 (18 Jan 2019 14:24), Max: 38.5 (17 Jan 2019 22:32)  T(F): 99.4 (18 Jan 2019 14:24), Max: 101.3 (17 Jan 2019 22:32)  HR: 101 (18 Jan 2019 14:24) (96 - 126)  BP: 110/66 (18 Jan 2019 14:24) (102/58 - 135/63)  BP(mean): --  RR: 17 (18 Jan 2019 14:24) (16 - 24)  SpO2: 95% (18 Jan 2019 14:24) (95% - 100%)    Physical Exam:  Gen: No Acute Distress, comfortable in bed  Pulm: Clear to auscultation bilaterally  GI: skin taut, soft abdomen, appropriately tender, +distended, +BS, no rebound, no guarding.  incisions clean and healing, no erythema or drainage from abdominal incisions, SOSA drains in left lateral abdomen with sanguinous output  Ext:  no edema/erythema/tenderness    I&O's Summary    18 Jan 2019 07:01  -  18 Jan 2019 14:41  --------------------------------------------------------  IN: 850 mL / OUT: 367 mL / NET: 483 mL      MEDICATIONS  (STANDING):  piperacillin/tazobactam IVPB. 3.375 Gram(s) IV Intermittent every 8 hours  sodium chloride 0.9%. 1000 milliLiter(s) (100 mL/Hr) IV Continuous <Continuous>  vancomycin  IVPB 1000 milliGRAM(s) IV Intermittent every 12 hours    MEDICATIONS  (PRN):  guaiFENesin    Syrup 100 milliGRAM(s) Oral every 6 hours PRN Cough  oxyCODONE    IR 5 milliGRAM(s) Oral every 4 hours PRN Moderate Pain (4 - 6)    Allergies    dust (Sneezing)  No Known Drug Allergies    Intolerances        LABS:                        9.5    22.8  )-----------( 297      ( 18 Jan 2019 07:02 )             29.6     01-18    136  |  98  |  8   ----------------------------<  118<H>  3.6   |  24  |  0.70    Ca    8.1<L>      18 Jan 2019 07:02    TPro  6.5  /  Alb  3.3  /  TBili  0.3  /  DBili  x   /  AST  21  /  ALT  16  /  AlkPhos  93  01-17    PT/INR - ( 17 Jan 2019 22:21 )   PT: 13.7 sec;   INR: 1.21          PTT - ( 17 Jan 2019 22:21 )  PTT:34.4 sec  Urinalysis Basic - ( 18 Jan 2019 01:52 )    Color: Yellow / Appearance: Clear / SG: <=1.005 / pH: x  Gluc: x / Ketone: NEGATIVE  / Bili: Negative / Urobili: 0.2 E.U./dL   Blood: x / Protein: NEGATIVE mg/dL / Nitrite: NEGATIVE   Leuk Esterase: NEGATIVE / RBC: x / WBC x   Sq Epi: x / Non Sq Epi: x / Bacteria: x GYN Progress   Patient seen at bedside this afternoon. Reports she is feeling less fevers and chills than on admission, but she is still bothered by her cough. States she has been taking Robitussin but will minimal relief and when she coughs it worsens the pain in her left breast and abdomen. States she had a cough prior to the procedure, was cleared by Pulmonologist, but reports cough has persisted.  States her pain is mostly in her left lateral breast and on her lateral abdomen where the drains were placed bilaterally.    Tolerating regular diet.  Ambulating without difficulty and urinating without pain.       Vital Signs Last 24 Hrs  T(C): 37.4 (18 Jan 2019 14:24), Max: 38.5 (17 Jan 2019 22:32)  T(F): 99.4 (18 Jan 2019 14:24), Max: 101.3 (17 Jan 2019 22:32)  HR: 101 (18 Jan 2019 14:24) (96 - 126)  BP: 110/66 (18 Jan 2019 14:24) (102/58 - 135/63)  BP(mean): --  RR: 17 (18 Jan 2019 14:24) (16 - 24)  SpO2: 95% (18 Jan 2019 14:24) (95% - 100%)    Physical Exam:  Gen: No Acute Distress, comfortable in bed  Pulm: Clear to auscultation bilaterally  GI: skin taut, soft abdomen, appropriately tender, +distended, +BS, no rebound, no guarding.  incisions clean and healing, no erythema or drainage from abdominal incisions, SOSA drains in left lateral abdomen with sanguinous output, right lateral hip/lower abdomen warm with erythema, right SOSA drain site clean with no drainage  Ext:  no edema/erythema/tenderness    I&O's Summary    18 Jan 2019 07:01  -  18 Jan 2019 14:41  --------------------------------------------------------  IN: 850 mL / OUT: 367 mL / NET: 483 mL      MEDICATIONS  (STANDING):  piperacillin/tazobactam IVPB. 3.375 Gram(s) IV Intermittent every 8 hours  sodium chloride 0.9%. 1000 milliLiter(s) (100 mL/Hr) IV Continuous <Continuous>  vancomycin  IVPB 1000 milliGRAM(s) IV Intermittent every 12 hours    MEDICATIONS  (PRN):  guaiFENesin    Syrup 100 milliGRAM(s) Oral every 6 hours PRN Cough  oxyCODONE    IR 5 milliGRAM(s) Oral every 4 hours PRN Moderate Pain (4 - 6)    Allergies    dust (Sneezing)  No Known Drug Allergies    Intolerances        LABS:                        9.5    22.8  )-----------( 297      ( 18 Jan 2019 07:02 )             29.6     01-18    136  |  98  |  8   ----------------------------<  118<H>  3.6   |  24  |  0.70    Ca    8.1<L>      18 Jan 2019 07:02    TPro  6.5  /  Alb  3.3  /  TBili  0.3  /  DBili  x   /  AST  21  /  ALT  16  /  AlkPhos  93  01-17    PT/INR - ( 17 Jan 2019 22:21 )   PT: 13.7 sec;   INR: 1.21          PTT - ( 17 Jan 2019 22:21 )  PTT:34.4 sec  Urinalysis Basic - ( 18 Jan 2019 01:52 )    Color: Yellow / Appearance: Clear / SG: <=1.005 / pH: x  Gluc: x / Ketone: NEGATIVE  / Bili: Negative / Urobili: 0.2 E.U./dL   Blood: x / Protein: NEGATIVE mg/dL / Nitrite: NEGATIVE   Leuk Esterase: NEGATIVE / RBC: x / WBC x   Sq Epi: x / Non Sq Epi: x / Bacteria: x GYN Progress   Patient seen at bedside this afternoon. Reports she is feeling less fevers and chills than on admission, but she is still bothered by her cough. States she has been taking Robitussin but will minimal relief and when she coughs it worsens the pain in her left breast and abdomen. States she had a cough prior to the procedure, was cleared by Pulmonologist, but reports cough has persisted.  States her pain is mostly in her right breast and on her lateral abdomen where the drains were placed bilaterally.    Tolerating regular diet.  Ambulating without difficulty and urinating without pain.       Vital Signs Last 24 Hrs  T(C): 37.4 (18 Jan 2019 14:24), Max: 38.5 (17 Jan 2019 22:32)  T(F): 99.4 (18 Jan 2019 14:24), Max: 101.3 (17 Jan 2019 22:32)  HR: 101 (18 Jan 2019 14:24) (96 - 126)  BP: 110/66 (18 Jan 2019 14:24) (102/58 - 135/63)  BP(mean): --  RR: 17 (18 Jan 2019 14:24) (16 - 24)  SpO2: 95% (18 Jan 2019 14:24) (95% - 100%)    Physical Exam:  Gen: No Acute Distress, comfortable in bed  Pulm: Clear to auscultation bilaterally  GI: skin taut, soft abdomen, appropriately tender, +distended, +BS, no rebound, no guarding.  incisions clean and healing, no erythema or drainage from abdominal incisions, SOSA drains in left lateral abdomen with sanguinous output, right lateral hip/lower abdomen warm with erythema, right SOSA drain site clean with no drainage  Ext:  no edema/erythema/tenderness    I&O's Summary    18 Jan 2019 07:01  -  18 Jan 2019 14:41  --------------------------------------------------------  IN: 850 mL / OUT: 367 mL / NET: 483 mL      MEDICATIONS  (STANDING):  piperacillin/tazobactam IVPB. 3.375 Gram(s) IV Intermittent every 8 hours  sodium chloride 0.9%. 1000 milliLiter(s) (100 mL/Hr) IV Continuous <Continuous>  vancomycin  IVPB 1000 milliGRAM(s) IV Intermittent every 12 hours    MEDICATIONS  (PRN):  guaiFENesin    Syrup 100 milliGRAM(s) Oral every 6 hours PRN Cough  oxyCODONE    IR 5 milliGRAM(s) Oral every 4 hours PRN Moderate Pain (4 - 6)    Allergies    dust (Sneezing)  No Known Drug Allergies    Intolerances        LABS:                        9.5    22.8  )-----------( 297      ( 18 Jan 2019 07:02 )             29.6     01-18    136  |  98  |  8   ----------------------------<  118<H>  3.6   |  24  |  0.70    Ca    8.1<L>      18 Jan 2019 07:02    TPro  6.5  /  Alb  3.3  /  TBili  0.3  /  DBili  x   /  AST  21  /  ALT  16  /  AlkPhos  93  01-17    PT/INR - ( 17 Jan 2019 22:21 )   PT: 13.7 sec;   INR: 1.21          PTT - ( 17 Jan 2019 22:21 )  PTT:34.4 sec  Urinalysis Basic - ( 18 Jan 2019 01:52 )    Color: Yellow / Appearance: Clear / SG: <=1.005 / pH: x  Gluc: x / Ketone: NEGATIVE  / Bili: Negative / Urobili: 0.2 E.U./dL   Blood: x / Protein: NEGATIVE mg/dL / Nitrite: NEGATIVE   Leuk Esterase: NEGATIVE / RBC: x / WBC x   Sq Epi: x / Non Sq Epi: x / Bacteria: x

## 2019-01-18 NOTE — ED PROVIDER NOTE - PHYSICAL EXAMINATION
CONSTITUTIONAL: Well appearing, well nourished, awake, alert and in no apparent distress.  HEENT: Head is atraumatic. Eyes clear bilaterally, normal EOMI. Airway patent.  CARDIAC: Normal rate, regular rhythm.  Heart sounds S1, S2.   RESPIRATORY: Breath sounds clear and equal bilaterally. no tachypnea, respiratory distress.   GASTROINTESTINAL: Abdomen soft, non-tender, no guarding, distension. incision c/d/i. L axilla and L abdomen drain in place, drain serosanguinous material  MUSCULOSKELETAL: Spine appears normal, no midline spinal tenderness, range of motion is not limited, no muscle or joint tenderness. no bony tenderness.   NEUROLOGICAL: Alert and oriented, no focal deficits, no motor or sensory deficits.  SKIN: Skin normal color for race, warm, dry and intact. No evidence of rash.  PSYCHIATRIC: Alert and oriented to person, place, time/situation. normal mood and affect. no apparent risk to self or others.

## 2019-01-18 NOTE — H&P ADULT - NSHPPHYSICALEXAM_GEN_ALL_CORE
gen: nad  breasts: superficial epidermolysis of mastectomy flaps l>R  no palpable fluid. left drains serous. incisions cdi    abdomen: bloated/distended. incisions cdi. left drains serous

## 2019-01-18 NOTE — ED PROVIDER NOTE - CRITICAL CARE PROVIDED
documentation/consultation with other physicians/interpretation of diagnostic studies/direct patient care (not related to procedure)/additional history taking

## 2019-01-18 NOTE — CONSULT NOTE ADULT - ATTENDING COMMENTS
Deep SSI/R chest wall abscess. Does not appear to involve her chemo port. Needs I&D. Vancomycin and Zosyn as above.

## 2019-01-18 NOTE — PROGRESS NOTE ADULT - ASSESSMENT
63 yr F POD9 s/p b NUBIA, b/l mastectomy w/ L axillary LND, R sentinel LN biopsy, and BSO on 1/9/19 presented with fevers (103 F) and general malaise. Patient has been tolerating PO, passing flatus and having normal bowel movements, voiding spontaneously, and ambulating. Denies vaginal bleeding. Was seen in clinic today where she had two drains removed.   Patient tachycardic and febrile on admission, CT w/ R breast phlegmon, admitted to plastics for sepsis.   Gyn to follow. 63 yr F POD9 s/p b NUBIA, b/l mastectomy w/ L axillary LND, R sentinel LN biopsy, and BSO on 1/9/19 presented 1/18 w/ fevers and malaise after two SOSA drains were removed in the office.    Patient admitted to Plastic Surgery team for sepsis with CT showing  R sided post-operative phlegmon vs abscess and right sided possible cellulitis.  Management Per primary team   Gyn following.  - ID consulted   - d/w Surgery Resident plan to continue with IV antibiotics if patient does not responded will consider consulting IR for possible drainage. 63 yr F POD9 s/p b NUBIA, b/l mastectomy w/ L axillary LND, R sentinel LN biopsy, and BSO on 1/9/19 presented 1/18 w/ fevers and malaise after two SOSA drains were removed in the office.    Patient admitted to Plastic Surgery team for sepsis with CT showing  R breast post-operative phlegmon vs abscess and right sided possible cellulitis.  Management Per primary team   Gyn following.  - ID consulted   - d/w Surgery Resident plan to continue with IV antibiotics if patient does not responded will consider consulting IR for possible drainage.

## 2019-01-18 NOTE — ED PROVIDER NOTE - PROGRESS NOTE DETAILS
Discussed case with Dr. Park, will wait on CT reads prior to admission. Disc case with gyn and general surgery team to evaluate pt also. Signed out to Dr. Ramirez and ALEXIA Savage

## 2019-01-18 NOTE — ED PROVIDER NOTE - MEDICAL DECISION MAKING DETAILS
s/p  bilateral mastectomy, L axillary node dissection, NUBIA flap reconstruction, BSO here for fever for one day,  ivf, abx administered, will obtain imaging of c/a/p to eval post op fluid collection/?abscess vs other post op complication. s/p  bilateral mastectomy, L axillary node dissection, NUBIA flap reconstruction, BSO here for fever for one day,  ivf, abx administered, will obtain imaging of c/a/p to eval post op fluid collection/?abscess vs other post op complication.  Patient admitted to plastics. Gyn and surgery to follow as inpatient.

## 2019-01-18 NOTE — ED PROVIDER NOTE - OBJECTIVE STATEMENT
64 yo s/p  bilateral mastectomy, L axillary node dissection, NUBIA flap reconstruction, BSO on Jan 9 with complaints of fever for one day T max 103 this afternoon, took tylenol prior to arrival, currently on bactrim, stated had three drains in place, one removed today at [facility]'s office. no drainage from incisions, drains have been draining adequately per pt, no assoc headache, sore throat, URI sx, vomiting. Last passed flatus and BM this morning.* 64 yo s/p  bilateral mastectomy, L axillary node dissection, NUBIA flap reconstruction, BSO on Jan 9 with complaints of fever for one day T max 103 this afternoon, took tylenol prior to arrival, currently on bactrim, stated had three drains in place, one removed today at [facility]'s office. no drainage from incisions, drains have been draining adequately per pt, no assoc headache, sore throat, URI sx, vomiting.

## 2019-01-18 NOTE — CONSULT NOTE ADULT - SUBJECTIVE AND OBJECTIVE BOX
63 yr F POD9 s/p b NUBIA, b/l mastectomy w/ L axillary LND, R sentinel LN biopsy, and BSO on 1/9/19 presents to the ER for fevers (103 F at home) and general malaise. Patient has been tolerating PO, passing flatus and having normal bowel movements, voiding spontaneously, and ambulating. Denies vaginal bleeding. Was seen in clinic today where she had two drains removed.   Patient tachycardic and febrile on admission, CT w/ R breast phlegmon, admitted to plastics for sepsis.     Patient receiving IV fluids and antibiotics in the ED.         PAST MEDICAL & SURGICAL HISTORY:  Neuropathy: both feet  SOB (shortness of breath)  Breast cancer: left  s/p chemotherapy  Osteoarthritis  Hyperlipidemia  Surgery, elective: right chest port  S/P hip replacement, right  Elective surgery: left shoulder surgery  History of elbow surgery: left  s/p BSO 1/9      REVIEW OF SYSTEMS  neg except HPI    MEDICATIONS  (STANDING):    MEDICATIONS  (PRN):      Allergies    dust (Sneezing)  No Known Drug Allergies    Intolerances        FAMILY HISTORY:  No pertinent family history in first degree relatives      Vital Signs Last 24 Hrs  T(C): 36.6 (18 Jan 2019 03:09), Max: 38.5 (17 Jan 2019 22:32)  T(F): 97.8 (18 Jan 2019 03:09), Max: 101.3 (17 Jan 2019 22:32)  HR: 96 (18 Jan 2019 03:09) (96 - 126)  BP: 102/58 (18 Jan 2019 03:09) (102/58 - 124/68)  BP(mean): --  RR: 16 (18 Jan 2019 03:09) (16 - 24)  SpO2: 97% (18 Jan 2019 03:09) (96% - 97%)    Gen: NAD, AOx3  Chest: normal work of breathing  Abdomen: skin taut but abdomen soft, nontender, incisions well approximated, no drainage  Pelvic: deferred  Extremities: WWP, no calf tenderness or edema    LABS:                        10.6   17.6  )-----------( 378      ( 17 Jan 2019 22:21 )             32.0     01-17    135  |  93<L>  |  7   ----------------------------<  145<H>  3.2<L>   |  22  |  0.67    Ca    8.5      17 Jan 2019 22:21    TPro  6.5  /  Alb  3.3  /  TBili  0.3  /  DBili  x   /  AST  21  /  ALT  16  /  AlkPhos  93  01-17    PT/INR - ( 17 Jan 2019 22:21 )   PT: 13.7 sec;   INR: 1.21          PTT - ( 17 Jan 2019 22:21 )  PTT:34.4 sec  Urinalysis Basic - ( 18 Jan 2019 01:52 )    Color: Yellow / Appearance: Clear / SG: <=1.005 / pH: x  Gluc: x / Ketone: NEGATIVE  / Bili: Negative / Urobili: 0.2 E.U./dL   Blood: x / Protein: NEGATIVE mg/dL / Nitrite: NEGATIVE   Leuk Esterase: NEGATIVE / RBC: x / WBC x   Sq Epi: x / Non Sq Epi: x / Bacteria: x        RADIOLOGY & ADDITIONAL STUDIES:    < from: CT Abdomen and Pelvis w/ IV Cont (01.18.19 @ 00:13) >  EXAM:  CT ABDOMEN AND PELVIS IC                          EXAM:  CT CHEST IC                          PROCEDURE DATE:  01/18/2019          INTERPRETATION:  CT of the chest, abdomen and pelvis with contrast dated   1/18/2019 12:13 AM    INDICATION: Fever status post bilateral mastectomy, left axillary lymph   node dissection, NUBIA flap reconstruction, and bilateral   salpingo-oophorectomy on 1/9/2019.    TECHNIQUE: CT of the chest, abdomen and pelvis was performed using 100 cc   of intravenous Optiray 350.  Axial and coronal images were produced and   reviewed.    PRIOR STUDIES: Comparison is made to CTA chest, 12/7/2018 and CT of the   chest, abdomen, and pelvis from 6/25/2018.    FINDINGS:     Evaluation of the pulmonary parenchyma demonstrates a diffuse mosaic   attenuation pattern, decreased compared to 12/7/2018, probably   representing mild air trapping. There are a few scattered areas of   tree-in-bud nodularity consistent with mild small airways inflammation.   There is no focal airspace consolidation. There is no pleural effusion.   The central airways are patent.     The heart size is within normal limits. There is no pericardial effusion.   There are minimal calcifications of the thoracic aorta. There are   moderate coronary artery calcifications. There is a right chest wall   chemotherapy port with tip located in the right atrium.    There is no mediastinal, hilar, or axillary lymphadenopathy seen. There   have been bilateral axillary lymph node dissections.    There is again fatty infiltration near the ligamentum teres. The hepatic   and portal veins are patent. No radiopaque stones are seen in the   gallbladder, which is mildly distended without wall thickening or   surrounding inflammatory change.  The pancreas is normal in appearance.    No splenic abnormalities are seen.    The adrenal glands are unremarkable. The right kidney is unchanged in   ptotic lie. The left kidney is unremarkable. There is no hydronephrosis   or perinephric stranding. No abdominal aortic aneurysm is seen. No   lymphadenopathy is seen.     Evaluation of the bowel is somewhat limited without use of enteric   contrast. There is no evidence of obstruction. No areas of bowel wall   thickening are identified. There is no intraperitoneal free air. There is   no ascites.    Images of the pelvis are somewhat obscured by metallic streak artifact   from total right hip arthroplasty. There has been interval bilateral   salpingooophorectomy. There is no residual adnexal/pelvic mass. The  urinary bladder is collapsed.    Evaluation of the osseous structures demonstrates total hip arthroplasty.     Evaluation of the body wall demonstrates postsurgical changes consistent   with bilateral mastectomy with NUBIA flap reconstruction there are   scattered areas of mild subcutaneous soft tissue infiltration and gas,   within normal limits for the recent postoperative state. Surgical drains   are present with tips located in the subcutaneous soft tissues of the   ventral right hemipelvis,within the left axilla, and within the left   breast reconstruction.    Within the right breast reconstruction approximately 3.0 cm to the right   of midline, there is a 6.6 x 2.1 x 3.6 cm fluid and gas containing   collection without significant surrounding inflammatory change, probably   representing a seroma. Immediately superior to this collection is a 3.6 x   2.3 x 3.6 cm soft tissue mass/collection centered within and eroding the   costosternal cartilage of the right third rib, with mild surrounding   inflammatory stranding, likely representing a phlegmon or evolving   abscess. There is probably mild involvement of the distal osseous rib.   There is an ill-defined 6.1 x 3.4 cm area of somewhat more pronounced   soft tissue infiltration anterior to the right iliac crest which probably   represents mild cellulitis.      IMPRESSION:  Status post bilateral mastectomy with NUBIA flap reconstruction and   bilateral salpingo-oophorectomy.    A 3.6 x 2.3 x 3.6 cm soft tissue mass/dense fluidcollection centered   within and the eroding the costosternal cartilage of the right third rib.   Findings may represent a postoperative phlegmon vs evolving abscess,   other etiologies are not excluded. Anterior to this is a subadjacent 6.6   cm seroma. Recommend further clinical correlation.    Probable mild cellulitis anterior to the right iliac crest.              "Thank you for the opportunity to participate in the care of this   patient."    DK WATERS M.D., RADIOLOGY RESIDENT  This document has been electronically signed.  ESTEFANY SHELTON M.D., ATTENDING RADIOLOGIST  This document has been electronically signed. Jan 18 2019  1:34AM        < end of copied text > 63 yr F POD9 s/p b NUBIA, b/l mastectomy w/ L axillary LND, R sentinel LN biopsy, and BSO on 1/9/19 presents to the ER for fevers (89 F at home) and general malaise. Patient reports mild pain in R breast and axilla, as well as where her drains were removed today (RLQ). Patient has been tolerating PO, passing flatus and having normal bowel movements, voiding spontaneously, and ambulating. Denies vaginal bleeding. Was seen in clinic today where she had two drains removed.   Patient tachycardic and febrile on admission, CT w/ R breast phlegmon, admitted to plastics for sepsis.     Patient receiving IV fluids and antibiotics in the ED.         PAST MEDICAL & SURGICAL HISTORY:  Neuropathy: both feet  SOB (shortness of breath)  Breast cancer: left  s/p chemotherapy  Osteoarthritis  Hyperlipidemia  Surgery, elective: right chest port  S/P hip replacement, right  Elective surgery: left shoulder surgery  History of elbow surgery: left  s/p BSO 1/9      REVIEW OF SYSTEMS  neg except HPI    MEDICATIONS  (STANDING):    MEDICATIONS  (PRN):      Allergies    dust (Sneezing)  No Known Drug Allergies    Intolerances        FAMILY HISTORY:  No pertinent family history in first degree relatives      Vital Signs Last 24 Hrs  T(C): 36.6 (18 Jan 2019 03:09), Max: 38.5 (17 Jan 2019 22:32)  T(F): 97.8 (18 Jan 2019 03:09), Max: 101.3 (17 Jan 2019 22:32)  HR: 96 (18 Jan 2019 03:09) (96 - 126)  BP: 102/58 (18 Jan 2019 03:09) (102/58 - 124/68)  BP(mean): --  RR: 16 (18 Jan 2019 03:09) (16 - 24)  SpO2: 97% (18 Jan 2019 03:09) (96% - 97%)    Gen: NAD, AOx3  Chest: normal work of breathing  Abdomen: skin taut but abdomen soft, nontender, incisions well approximated, no drainage  Pelvic: deferred  Extremities: WWP, no calf tenderness or edema    LABS:                        10.6   17.6  )-----------( 378      ( 17 Jan 2019 22:21 )             32.0     01-17    135  |  93<L>  |  7   ----------------------------<  145<H>  3.2<L>   |  22  |  0.67    Ca    8.5      17 Jan 2019 22:21    TPro  6.5  /  Alb  3.3  /  TBili  0.3  /  DBili  x   /  AST  21  /  ALT  16  /  AlkPhos  93  01-17    PT/INR - ( 17 Jan 2019 22:21 )   PT: 13.7 sec;   INR: 1.21          PTT - ( 17 Jan 2019 22:21 )  PTT:34.4 sec  Urinalysis Basic - ( 18 Jan 2019 01:52 )    Color: Yellow / Appearance: Clear / SG: <=1.005 / pH: x  Gluc: x / Ketone: NEGATIVE  / Bili: Negative / Urobili: 0.2 E.U./dL   Blood: x / Protein: NEGATIVE mg/dL / Nitrite: NEGATIVE   Leuk Esterase: NEGATIVE / RBC: x / WBC x   Sq Epi: x / Non Sq Epi: x / Bacteria: x        RADIOLOGY & ADDITIONAL STUDIES:    < from: CT Abdomen and Pelvis w/ IV Cont (01.18.19 @ 00:13) >  EXAM:  CT ABDOMEN AND PELVIS IC                          EXAM:  CT CHEST IC                          PROCEDURE DATE:  01/18/2019          INTERPRETATION:  CT of the chest, abdomen and pelvis with contrast dated   1/18/2019 12:13 AM    INDICATION: Fever status post bilateral mastectomy, left axillary lymph   node dissection, NUBIA flap reconstruction, and bilateral   salpingo-oophorectomy on 1/9/2019.    TECHNIQUE: CT of the chest, abdomen and pelvis was performed using 100 cc   of intravenous Optiray 350.  Axial and coronal images were produced and   reviewed.    PRIOR STUDIES: Comparison is made to CTA chest, 12/7/2018 and CT of the   chest, abdomen, and pelvis from 6/25/2018.    FINDINGS:     Evaluation of the pulmonary parenchyma demonstrates a diffuse mosaic   attenuation pattern, decreased compared to 12/7/2018, probably   representing mild air trapping. There are a few scattered areas of   tree-in-bud nodularity consistent with mild small airways inflammation.   There is no focal airspace consolidation. There is no pleural effusion.   The central airways are patent.     The heart size is within normal limits. There is no pericardial effusion.   There are minimal calcifications of the thoracic aorta. There are   moderate coronary artery calcifications. There is a right chest wall   chemotherapy port with tip located in the right atrium.    There is no mediastinal, hilar, or axillary lymphadenopathy seen. There   have been bilateral axillary lymph node dissections.    There is again fatty infiltration near the ligamentum teres. The hepatic   and portal veins are patent. No radiopaque stones are seen in the   gallbladder, which is mildly distended without wall thickening or   surrounding inflammatory change.  The pancreas is normal in appearance.    No splenic abnormalities are seen.    The adrenal glands are unremarkable. The right kidney is unchanged in   ptotic lie. The left kidney is unremarkable. There is no hydronephrosis   or perinephric stranding. No abdominal aortic aneurysm is seen. No   lymphadenopathy is seen.     Evaluation of the bowel is somewhat limited without use of enteric   contrast. There is no evidence of obstruction. No areas of bowel wall   thickening are identified. There is no intraperitoneal free air. There is   no ascites.    Images of the pelvis are somewhat obscured by metallic streak artifact   from total right hip arthroplasty. There has been interval bilateral   salpingooophorectomy. There is no residual adnexal/pelvic mass. The  urinary bladder is collapsed.    Evaluation of the osseous structures demonstrates total hip arthroplasty.     Evaluation of the body wall demonstrates postsurgical changes consistent   with bilateral mastectomy with NUBIA flap reconstruction there are   scattered areas of mild subcutaneous soft tissue infiltration and gas,   within normal limits for the recent postoperative state. Surgical drains   are present with tips located in the subcutaneous soft tissues of the   ventral right hemipelvis,within the left axilla, and within the left   breast reconstruction.    Within the right breast reconstruction approximately 3.0 cm to the right   of midline, there is a 6.6 x 2.1 x 3.6 cm fluid and gas containing   collection without significant surrounding inflammatory change, probably   representing a seroma. Immediately superior to this collection is a 3.6 x   2.3 x 3.6 cm soft tissue mass/collection centered within and eroding the   costosternal cartilage of the right third rib, with mild surrounding   inflammatory stranding, likely representing a phlegmon or evolving   abscess. There is probably mild involvement of the distal osseous rib.   There is an ill-defined 6.1 x 3.4 cm area of somewhat more pronounced   soft tissue infiltration anterior to the right iliac crest which probably   represents mild cellulitis.      IMPRESSION:  Status post bilateral mastectomy with NUBIA flap reconstruction and   bilateral salpingo-oophorectomy.    A 3.6 x 2.3 x 3.6 cm soft tissue mass/dense fluidcollection centered   within and the eroding the costosternal cartilage of the right third rib.   Findings may represent a postoperative phlegmon vs evolving abscess,   other etiologies are not excluded. Anterior to this is a subadjacent 6.6   cm seroma. Recommend further clinical correlation.    Probable mild cellulitis anterior to the right iliac crest.              "Thank you for the opportunity to participate in the care of this   patient."    DK WATERS M.D., RADIOLOGY RESIDENT  This document has been electronically signed.  ESTEFANY SHELTON M.D., ATTENDING RADIOLOGIST  This document has been electronically signed. Jan 18 2019  1:34AM        < end of copied text >

## 2019-01-18 NOTE — H&P ADULT - HISTORY OF PRESENT ILLNESS
63 yr F s/p b liv discharged sunday returned to ER for fevers (103 F at home) and general malaise. she was tachycardic here to 115's and fever of 101F.   ct pending. wbc 17, lactate 3.  had right breast and abdomen drains removed in clinic today  3l bolus given in ER

## 2019-01-18 NOTE — CONSULT NOTE ADULT - SUBJECTIVE AND OBJECTIVE BOX
HPI ;    This is a 64 yo F with BRCA gene positive s/p bilateral salpingooophorectomy with [facility]yn group for right ovarian mass and bilateral mastectomy with left LN dissection and NUBIA flap (surgery+plastic) for left breast Ca on 1/9/2019 (POD11). She went to her follow up with her plastic surgeon and right SOSA drains were removed today. However she started feeling feverish, chills and myalgia at home and recorded temp was 103. Due to her high grade fever, she decided to come to ER for further management.     Otherwise, she has been having BMs (4 times yesterday), no nausea/vomiting, passing gas, no dysuria, has some cough but no phlegm. She noticed some pain over her right breast next to her chemo-port. no discharge from her breast surgical site, only mild pain but appropriate after post-op.       Vital Signs Last 24 Hrs  T(C): 38.5 (17 Jan 2019 22:32), Max: 38.5 (17 Jan 2019 22:32)  T(F): 101.3 (17 Jan 2019 22:32), Max: 101.3 (17 Jan 2019 22:32)  HR: 115 (17 Jan 2019 22:32) (115 - 126)  BP: 124/68 (17 Jan 2019 22:32) (111/63 - 124/68)  RR: 22 (17 Jan 2019 22:32) (22 - 24)  SpO2: 96% (17 Jan 2019 22:32) (96% - 97%)  I&O's Detail      General: NAD, resting comfortably in bed  C/V: NSR  Pulm: Nonlabored breathing, no respiratory distress  Abd: soft, mildly distended, abdominal incision C/D/I, no discharge.   Left breast : incision C/D/I, no discharges, SOSA drains serosanguinous, no pus, non tender.  Right breast : tender at 12 o-clock position, unable to appreciate any fluctuant/mass/abscess. surgical incision C/D/I, no discharges.    Extrem: WWP, no edema.        LABS:                        10.6   17.6  )-----------( 378      ( 17 Jan 2019 22:21 )             32.0     01-17    135  |  93<L>  |  7   ----------------------------<  145<H>  3.2<L>   |  22  |  0.67    Ca    8.5      17 Jan 2019 22:21    TPro  6.5  /  Alb  3.3  /  TBili  0.3  /  DBili  x   /  AST  21  /  ALT  16  /  AlkPhos  93  01-17    PT/INR - ( 17 Jan 2019 22:21 )   PT: 13.7 sec;   INR: 1.21          PTT - ( 17 Jan 2019 22:21 )  PTT:34.4 sec  Urinalysis Basic - ( 18 Jan 2019 01:52 )    Color: Yellow / Appearance: Clear / SG: <=1.005 / pH: x  Gluc: x / Ketone: NEGATIVE  / Bili: Negative / Urobili: 0.2 E.U./dL   Blood: x / Protein: NEGATIVE mg/dL / Nitrite: NEGATIVE   Leuk Esterase: NEGATIVE / RBC: x / WBC x   Sq Epi: x / Non Sq Epi: x / Bacteria: x        RADIOLOGY & ADDITIONAL STUDIES:  FINDINGS:     Evaluation of the pulmonary parenchyma demonstrates a diffuse mosaic   attenuation pattern, decreased compared to 12/7/2018, probably   representing mild air trapping. There are a few scattered areas of   tree-in-bud nodularity consistent with mild small airways inflammation.   There is no focal airspace consolidation. There is no pleural effusion.   The central airways are patent.     The heart size is within normal limits. There is no pericardial effusion.   There are minimal calcifications of the thoracic aorta. There are   moderate coronary artery calcifications. There is a right chest wall   chemotherapy port with tip located in the right atrium.    There is no mediastinal, hilar, or axillary lymphadenopathy seen. There   have been bilateral axillary lymph node dissections.    There is again fatty infiltration near the ligamentum teres. The hepatic   and portal veins are patent. No radiopaque stones are seen in the   gallbladder, which is mildly distended without wall thickening or   surrounding inflammatory change.  The pancreas is normal in appearance.    No splenic abnormalities are seen.    The adrenal glands are unremarkable. The right kidney is unchanged in   ptotic lie. The left kidney is unremarkable. There is no hydronephrosis   or perinephric stranding. No abdominal aortic aneurysm is seen. No   lymphadenopathy is seen.     Evaluation of the bowel is somewhat limited without use of enteric   contrast. There is no evidence of obstruction. No areas of bowel wall   thickening are identified. There is no intraperitoneal free air. There is   no ascites.    Images of the pelvis are somewhat obscured by metallic streak artifact   from total right hip arthroplasty. There has been interval bilateral   salpingooophorectomy. There is no residual adnexal/pelvic mass. The   urinary bladder is collapsed.    Evaluation of the osseous structures demonstrates total hip arthroplasty.     Evaluation of the body wall demonstrates postsurgical changes consistent   with bilateral mastectomy with NUBIA flap reconstruction there are   scattered areas of mild subcutaneous soft tissue infiltration and gas,   within normal limits for the recent postoperative state. Surgical drains   are present with tips located in the subcutaneous soft tissues of the   ventral right hemipelvis, within the left axilla, and within the left   breast reconstruction.    Within the right breast reconstruction approximately 3.0 cm to the right   of midline, there is a 6.6 x 2.1 x 3.6 cm fluid and gas containing   collection without significant surrounding inflammatory change, probably   representing a seroma. Immediately superior to this collection is a 3.6 x   2.3 x 3.6 cm soft tissue mass/collection centered within and eroding the   costosternal cartilage of the right third rib, with mild surrounding   inflammatory stranding, likely representing a phlegmon or evolving   abscess. There is probably mild involvement of the distal osseous rib.   There is an ill-defined 6.1 x 3.4 cm area of somewhat more pronounced   soft tissue infiltration anterior to the right iliac crest which probably   represents mild cellulitis.      IMPRESSION:  Status post bilateral mastectomy with NUBIA flap reconstruction and   bilateral salpingo-oophorectomy.    A 3.6 x 2.3 x 3.6 cm soft tissue mass/dense fluid collection centered   within and the eroding the costosternal cartilage of the right third rib.   Findings may represent a postoperative phlegmon vs evolving abscess,   other etiologies are not excluded. Anterior to this is a subadjacent 6.6   cm seroma. Recommend further clinical correlation.    Probable mild cellulitis anterior to the right iliac crest.

## 2019-01-18 NOTE — CONSULT NOTE ADULT - ASSESSMENT
63 yr F POD9 s/p b NUBIA, b/l mastectomy w/ L axillary LND, R sentinel LN biopsy, and BSO on 1/9/19 presents to the ER for fevers (103 F at home) and general malaise. Patient has been tolerating PO, passing flatus and having normal bowel movements, voiding spontaneously, and ambulating. Denies vaginal bleeding. Was seen in clinic today where she had two drains removed.   Patient tachycardic and febrile on admission, CT w/ R breast phlegmon, admitted to plastics for sepsis.   Gyn to follow.    Discussed with Sundar Nicole PGY4.

## 2019-01-19 LAB
ANION GAP SERPL CALC-SCNC: 11 MMOL/L — SIGNIFICANT CHANGE UP (ref 5–17)
BUN SERPL-MCNC: 8 MG/DL — SIGNIFICANT CHANGE UP (ref 7–23)
CALCIUM SERPL-MCNC: 7.8 MG/DL — LOW (ref 8.4–10.5)
CHLORIDE SERPL-SCNC: 102 MMOL/L — SIGNIFICANT CHANGE UP (ref 96–108)
CO2 SERPL-SCNC: 21 MMOL/L — LOW (ref 22–31)
CREAT SERPL-MCNC: 0.56 MG/DL — SIGNIFICANT CHANGE UP (ref 0.5–1.3)
GLUCOSE SERPL-MCNC: 121 MG/DL — HIGH (ref 70–99)
HCT VFR BLD CALC: 25 % — LOW (ref 34.5–45)
HGB BLD-MCNC: 8 G/DL — LOW (ref 11.5–15.5)
MCHC RBC-ENTMCNC: 32 G/DL — SIGNIFICANT CHANGE UP (ref 32–36)
MCHC RBC-ENTMCNC: 32.3 PG — SIGNIFICANT CHANGE UP (ref 27–34)
MCV RBC AUTO: 100.8 FL — HIGH (ref 80–100)
PLATELET # BLD AUTO: 248 K/UL — SIGNIFICANT CHANGE UP (ref 150–400)
POTASSIUM SERPL-MCNC: 3 MMOL/L — LOW (ref 3.5–5.3)
POTASSIUM SERPL-SCNC: 3 MMOL/L — LOW (ref 3.5–5.3)
RBC # BLD: 2.48 M/UL — LOW (ref 3.8–5.2)
RBC # FLD: 13.3 % — SIGNIFICANT CHANGE UP (ref 10.3–16.9)
SODIUM SERPL-SCNC: 134 MMOL/L — LOW (ref 135–145)
VANCOMYCIN TROUGH SERPL-MCNC: 10 UG/ML — SIGNIFICANT CHANGE UP (ref 10–20)
WBC # BLD: 17.6 K/UL — HIGH (ref 3.8–10.5)
WBC # FLD AUTO: 17.6 K/UL — HIGH (ref 3.8–10.5)

## 2019-01-19 PROCEDURE — 99232 SBSQ HOSP IP/OBS MODERATE 35: CPT

## 2019-01-19 RX ORDER — POTASSIUM CHLORIDE 20 MEQ
40 PACKET (EA) ORAL ONCE
Qty: 0 | Refills: 0 | Status: COMPLETED | OUTPATIENT
Start: 2019-01-19 | End: 2019-01-19

## 2019-01-19 RX ORDER — SODIUM CHLORIDE 9 MG/ML
1000 INJECTION, SOLUTION INTRAVENOUS
Qty: 0 | Refills: 0 | Status: DISCONTINUED | OUTPATIENT
Start: 2019-01-19 | End: 2019-01-20

## 2019-01-19 RX ORDER — VANCOMYCIN HCL 1 G
1250 VIAL (EA) INTRAVENOUS EVERY 12 HOURS
Qty: 0 | Refills: 0 | Status: DISCONTINUED | OUTPATIENT
Start: 2019-01-19 | End: 2019-01-21

## 2019-01-19 RX ADMIN — SIMVASTATIN 20 MILLIGRAM(S): 20 TABLET, FILM COATED ORAL at 21:59

## 2019-01-19 RX ADMIN — Medication 100 MILLIGRAM(S): at 21:59

## 2019-01-19 RX ADMIN — Medication 100 MILLIGRAM(S): at 11:28

## 2019-01-19 RX ADMIN — PIPERACILLIN AND TAZOBACTAM 200 GRAM(S): 4; .5 INJECTION, POWDER, LYOPHILIZED, FOR SOLUTION INTRAVENOUS at 05:19

## 2019-01-19 RX ADMIN — PIPERACILLIN AND TAZOBACTAM 200 GRAM(S): 4; .5 INJECTION, POWDER, LYOPHILIZED, FOR SOLUTION INTRAVENOUS at 15:50

## 2019-01-19 RX ADMIN — Medication 100 MILLIGRAM(S): at 14:37

## 2019-01-19 RX ADMIN — Medication 5 MILLIGRAM(S): at 21:59

## 2019-01-19 RX ADMIN — Medication 5 MILLIGRAM(S): at 12:52

## 2019-01-19 RX ADMIN — Medication 166.67 MILLIGRAM(S): at 13:38

## 2019-01-19 RX ADMIN — Medication 40 MILLIEQUIVALENT(S): at 14:37

## 2019-01-19 RX ADMIN — OXYCODONE HYDROCHLORIDE 5 MILLIGRAM(S): 5 TABLET ORAL at 09:55

## 2019-01-19 RX ADMIN — OXYCODONE HYDROCHLORIDE 5 MILLIGRAM(S): 5 TABLET ORAL at 10:57

## 2019-01-19 RX ADMIN — ENOXAPARIN SODIUM 40 MILLIGRAM(S): 100 INJECTION SUBCUTANEOUS at 12:45

## 2019-01-19 RX ADMIN — Medication 325 MILLIGRAM(S): at 12:46

## 2019-01-19 RX ADMIN — Medication 100 MILLIGRAM(S): at 02:18

## 2019-01-19 RX ADMIN — Medication 100 MILLIGRAM(S): at 05:19

## 2019-01-19 RX ADMIN — PIPERACILLIN AND TAZOBACTAM 200 GRAM(S): 4; .5 INJECTION, POWDER, LYOPHILIZED, FOR SOLUTION INTRAVENOUS at 21:59

## 2019-01-19 RX ADMIN — PIPERACILLIN AND TAZOBACTAM 200 GRAM(S): 4; .5 INJECTION, POWDER, LYOPHILIZED, FOR SOLUTION INTRAVENOUS at 10:04

## 2019-01-19 NOTE — PROGRESS NOTE ADULT - ASSESSMENT
63 yr F POD9 s/p b NUBIA, b/l mastectomy w/ L axillary LND, R sentinel LN biopsy, and BSO on 1/9/19 presented 1/18 w/ fevers and malaise after two SOSA drains were removed in the office.    Patient admitted to Plastic Surgery team for sepsis with CT showing R breast post-operative phlegmon vs abscess and right sided possible cellulitis.  Management Per primary team. Afebrile for more than 12hrs. Downtrending WBC.   Gyn following.  - ID consulted   - per Plastics Team and ID plan to continue with IV antibiotics, surgery team has no plans for intervention at this time

## 2019-01-19 NOTE — PROGRESS NOTE ADULT - SUBJECTIVE AND OBJECTIVE BOX
SUBJECTIVE: This afternoon, she feels well; her pain is well-controlled. No nausea or vomiting. No acute complaints.    enoxaparin Injectable 40 milliGRAM(s) SubCutaneous daily  piperacillin/tazobactam IVPB. 3.375 Gram(s) IV Intermittent every 6 hours  vancomycin  IVPB 1250 milliGRAM(s) IV Intermittent every 12 hours      Vital Signs Last 24 Hrs  T(C): 37.2 (19 Jan 2019 13:53), Max: 38.5 (18 Jan 2019 20:30)  T(F): 98.9 (19 Jan 2019 13:53), Max: 101.3 (18 Jan 2019 20:30)  HR: 103 (19 Jan 2019 13:53) (94 - 109)  BP: 121/66 (19 Jan 2019 13:53) (106/67 - 125/78)  BP(mean): --  RR: 17 (19 Jan 2019 13:53) (16 - 17)  SpO2: 96% (19 Jan 2019 13:53) (95% - 97%)  I&O's Detail    18 Jan 2019 07:01  -  19 Jan 2019 07:00  --------------------------------------------------------  IN:    Oral Fluid: 200 mL    sodium chloride 0.9%: 2100 mL    Solution: 250 mL    Solution: 100 mL  Total IN: 2650 mL    OUT:    Bulb: 77 mL    Bulb: 140 mL    Bulb: 47 mL    Voided: 2210 mL  Total OUT: 2474 mL    Total NET: 176 mL      19 Jan 2019 07:01  -  19 Jan 2019 14:16  --------------------------------------------------------  IN:    lactated ringers.: 120 mL    Oral Fluid: 780 mL    sodium chloride 0.9%: 400 mL    Solution: 100 mL    Solution: 250 mL  Total IN: 1650 mL    OUT:    Bulb: 20 mL    Bulb: 10 mL    Bulb: 20 mL    Stool: 1 mL    Voided: 1350 mL  Total OUT: 1401 mL    Total NET: 249 mL          General: NAD, resting comfortably in chair, seen ambulating in halls earlier  Chest: bilateral breasts soft, nontender, no asymmetry or obvious masses, JPs serosanguinous  Pulm: Nonlabored breathing, no respiratory distress  Extrem: WWP, no edema        LABS:                        8.0    17.6  )-----------( 248      ( 19 Jan 2019 06:22 )             25.0     01-19    134<L>  |  102  |  8   ----------------------------<  121<H>  3.0<L>   |  21<L>  |  0.56    Ca    7.8<L>      19 Jan 2019 06:22    TPro  6.5  /  Alb  3.3  /  TBili  0.3  /  DBili  x   /  AST  21  /  ALT  16  /  AlkPhos  93  01-17    PT/INR - ( 17 Jan 2019 22:21 )   PT: 13.7 sec;   INR: 1.21          PTT - ( 17 Jan 2019 22:21 )  PTT:34.4 sec  Urinalysis Basic - ( 18 Jan 2019 01:52 )    Color: Yellow / Appearance: Clear / SG: <=1.005 / pH: x  Gluc: x / Ketone: NEGATIVE  / Bili: Negative / Urobili: 0.2 E.U./dL   Blood: x / Protein: NEGATIVE mg/dL / Nitrite: NEGATIVE   Leuk Esterase: NEGATIVE / RBC: x / WBC x   Sq Epi: x / Non Sq Epi: x / Bacteria: x

## 2019-01-19 NOTE — PROGRESS NOTE ADULT - SUBJECTIVE AND OBJECTIVE BOX
INTERVAL HPI/OVERNIGHT EVENTS: Fever; cough.     CONSTITUTIONAL:  +fever or chills, feels well, good appetite  EYES:  Negative  blurry vision or double vision  CARDIOVASCULAR:  Negative for chest pain or palpitations  RESPIRATORY:  +cough, wheezing, or SOB   GASTROINTESTINAL:  Negative for nausea, vomiting, diarrhea, constipation, or abdominal pain  GENITOURINARY:  Negative frequency, urgency or dysuria  NEUROLOGIC:  No headache, confusion, dizziness, lightheadedness      ANTIBIOTICS/RELEVANT:    MEDICATIONS  (STANDING):  aspirin 325 milliGRAM(s) Oral daily  docusate sodium 100 milliGRAM(s) Oral three times a day  enoxaparin Injectable 40 milliGRAM(s) SubCutaneous daily  lactated ringers. 1000 milliLiter(s) (30 mL/Hr) IV Continuous <Continuous>  piperacillin/tazobactam IVPB. 3.375 Gram(s) IV Intermittent every 6 hours  senna 2 Tablet(s) Oral at bedtime  simvastatin 20 milliGRAM(s) Oral at bedtime  vancomycin  IVPB 1250 milliGRAM(s) IV Intermittent every 12 hours    MEDICATIONS  (PRN):  acetaminophen    Suspension .. 650 milliGRAM(s) Oral every 6 hours PRN Temp greater or equal to 38C (100.4F)  diazepam    Tablet 5 milliGRAM(s) Oral every 6 hours PRN muscle spasms, anxiety  guaiFENesin    Syrup 100 milliGRAM(s) Oral every 6 hours PRN Cough  oxyCODONE    IR 5 milliGRAM(s) Oral every 4 hours PRN Moderate Pain (4 - 6)        Vital Signs Last 24 Hrs  T(C): 37.2 (19 Jan 2019 13:53), Max: 38.5 (18 Jan 2019 20:30)  T(F): 98.9 (19 Jan 2019 13:53), Max: 101.3 (18 Jan 2019 20:30)  HR: 103 (19 Jan 2019 13:53) (94 - 109)  BP: 121/66 (19 Jan 2019 13:53) (106/67 - 125/78)  BP(mean): --  RR: 17 (19 Jan 2019 13:53) (16 - 17)  SpO2: 96% (19 Jan 2019 13:53) (95% - 97%)    PHYSICAL EXAM:  Constitutional:Well-developed, well nourished  Eyes:PATRICIA, EOMI  Ear/Nose/Throat: no oral lesion, no sinus tenderness on percussion	  Neck:no JVD, no lymphadenopathy, supple  Respiratory: CTA sonido  Cardiovascular: S1S2 RRR, no murmurs  Gastrointestinal:soft, (+) BS, no HSM  Extremities:no e/e/c  Skin: R chest port site c/d/i; R flank erythema improved  Vascular: DP Pulse:	right normal; left normal      LABS:                        8.0    17.6  )-----------( 248      ( 19 Jan 2019 06:22 )             25.0     01-19    134<L>  |  102  |  8   ----------------------------<  121<H>  3.0<L>   |  21<L>  |  0.56    Ca    7.8<L>      19 Jan 2019 06:22    TPro  6.5  /  Alb  3.3  /  TBili  0.3  /  DBili  x   /  AST  21  /  ALT  16  /  AlkPhos  93  01-17    PT/INR - ( 17 Jan 2019 22:21 )   PT: 13.7 sec;   INR: 1.21          PTT - ( 17 Jan 2019 22:21 )  PTT:34.4 sec  Urinalysis Basic - ( 18 Jan 2019 01:52 )    Color: Yellow / Appearance: Clear / SG: <=1.005 / pH: x  Gluc: x / Ketone: NEGATIVE  / Bili: Negative / Urobili: 0.2 E.U./dL   Blood: x / Protein: NEGATIVE mg/dL / Nitrite: NEGATIVE   Leuk Esterase: NEGATIVE / RBC: x / WBC x   Sq Epi: x / Non Sq Epi: x / Bacteria: x        MICROBIOLOGY: Culture - Blood (01.18.19 @ 05:27)    Specimen Source: .Blood Blood-Peripheral    Culture Results:   No growth at 1 day.        RADIOLOGY & ADDITIONAL STUDIES: < from: CT Chest w/ IV Cont (01.18.19 @ 00:13) >  A 3.6 x 2.3 x 3.6 cm soft tissue mass/dense fluidcollection centered   within and the eroding the costosternal cartilage of the right third rib.   Findings may represent a postoperative phlegmon vs evolving abscess,   other etiologies are not excluded. Anterior to this is a subadjacent 6.6   cm seroma. Recommend further clinical correlation.    Probable mild cellulitis anterior to the right iliac crest.    < end of copied text >

## 2019-01-19 NOTE — PROGRESS NOTE ADULT - ASSESSMENT
NUBIA SSI/abscess. Recommend I&D of abscess for diagnosis and therapy. Continue vancomycin--increase to 1.25g IV q12h and repeat trough prior to Sunday AM dose. Continue empiric Zosyn.

## 2019-01-19 NOTE — PROGRESS NOTE ADULT - ASSESSMENT
63 yr F s/p B liv now with post op cellulitis    - cont IV abx  - vanc trough  - replete K  - will follow  appreciate ID recs

## 2019-01-19 NOTE — PROGRESS NOTE ADULT - SUBJECTIVE AND OBJECTIVE BOX
GYN Progress Note    Patient seen at bedside this afternoon.   Pain is well controlled and states she has no had fevers or chills since last night. Overall, feels improved from admission.  Ambulating without difficulty.  Urinating without discomfort.   Denies CP, palpitations, SOB, fever, chills, nausea, vomiting.    Vital Signs Last 24 Hrs  T(C): 37.2 (19 Jan 2019 13:53), Max: 38.5 (18 Jan 2019 20:30)  T(F): 98.9 (19 Jan 2019 13:53), Max: 101.3 (18 Jan 2019 20:30)  HR: 103 (19 Jan 2019 13:53) (94 - 109)  BP: 121/66 (19 Jan 2019 13:53) (106/67 - 125/78)  BP(mean): --  RR: 17 (19 Jan 2019 13:53) (16 - 17)  SpO2: 96% (19 Jan 2019 13:53) (95% - 97%)    Physical Exam:  Gen: No Acute Distress, resting comfortable in bed  Pulm: Normal work of breathing, no wheezes/rhonchi/rales   GI: skin taut, soft abdomen, appropriately tender, +distended, +BS, no rebound, no guarding.  incisions clean and healing, no erythema or drainage from abdominal incisions, SOSA drains in left lateral abdomen with sanguinous output, right lateral hip/lower abdomen warm with erythema  Ext: no edema/erythema/tenderness     I&O's Summary    18 Jan 2019 07:01  -  19 Jan 2019 07:00  --------------------------------------------------------  IN: 2650 mL / OUT: 2474 mL / NET: 176 mL    19 Jan 2019 07:01  -  19 Jan 2019 14:28  --------------------------------------------------------  IN: 1650 mL / OUT: 1401 mL / NET: 249 mL      MEDICATIONS  (STANDING):  aspirin 325 milliGRAM(s) Oral daily  docusate sodium 100 milliGRAM(s) Oral three times a day  enoxaparin Injectable 40 milliGRAM(s) SubCutaneous daily  lactated ringers. 1000 milliLiter(s) (30 mL/Hr) IV Continuous <Continuous>  piperacillin/tazobactam IVPB. 3.375 Gram(s) IV Intermittent every 6 hours  potassium chloride    Tablet ER 40 milliEquivalent(s) Oral once  senna 2 Tablet(s) Oral at bedtime  simvastatin 20 milliGRAM(s) Oral at bedtime  vancomycin  IVPB 1250 milliGRAM(s) IV Intermittent every 12 hours    MEDICATIONS  (PRN):  acetaminophen    Suspension .. 650 milliGRAM(s) Oral every 6 hours PRN Temp greater or equal to 38C (100.4F)  diazepam    Tablet 5 milliGRAM(s) Oral every 6 hours PRN muscle spasms, anxiety  guaiFENesin    Syrup 100 milliGRAM(s) Oral every 6 hours PRN Cough  oxyCODONE    IR 5 milliGRAM(s) Oral every 4 hours PRN Moderate Pain (4 - 6)      LABS:                        8.0    17.6  )-----------( 248      ( 19 Jan 2019 06:22 )             25.0     01-19    134<L>  |  102  |  8   ----------------------------<  121<H>  3.0<L>   |  21<L>  |  0.56    Ca    7.8<L>      19 Jan 2019 06:22    TPro  6.5  /  Alb  3.3  /  TBili  0.3  /  DBili  x   /  AST  21  /  ALT  16  /  AlkPhos  93  01-17    PT/INR - ( 17 Jan 2019 22:21 )   PT: 13.7 sec;   INR: 1.21          PTT - ( 17 Jan 2019 22:21 )  PTT:34.4 sec  Urinalysis Basic - ( 18 Jan 2019 01:52 )    Color: Yellow / Appearance: Clear / SG: <=1.005 / pH: x  Gluc: x / Ketone: NEGATIVE  / Bili: Negative / Urobili: 0.2 E.U./dL   Blood: x / Protein: NEGATIVE mg/dL / Nitrite: NEGATIVE   Leuk Esterase: NEGATIVE / RBC: x / WBC x   Sq Epi: x / Non Sq Epi: x / Bacteria: x GYN Progress Note    Patient seen at bedside this afternoon. Pain is well controlled and states she has no had fevers or chills since last night. Overall, feels improved from admission.  Ambulating without difficulty.  Urinating without discomfort. Reports she continues to have cough, improves slightly with Robitussin,   Denies CP, palpitations, SOB, fever, chills, nausea, vomiting.    Vital Signs Last 24 Hrs  T(C): 37.2 (19 Jan 2019 13:53), Max: 38.5 (18 Jan 2019 20:30)  T(F): 98.9 (19 Jan 2019 13:53), Max: 101.3 (18 Jan 2019 20:30)  HR: 103 (19 Jan 2019 13:53) (94 - 109)  BP: 121/66 (19 Jan 2019 13:53) (106/67 - 125/78)  BP(mean): --  RR: 17 (19 Jan 2019 13:53) (16 - 17)  SpO2: 96% (19 Jan 2019 13:53) (95% - 97%)    Physical Exam:  Gen: No Acute Distress, resting comfortable in bed  Pulm: Normal work of breathing, no wheezes/rhonchi/rales   GI: skin taut, soft abdomen, appropriately tender, +distended, +BS, no rebound, no guarding.  incisions clean and healing, no erythema or drainage from abdominal incisions, SOSA drains in left lateral abdomen with sanguinous output, right lateral hip/lower abdomen warm with erythema  Ext: no edema/erythema/tenderness     I&O's Summary    18 Jan 2019 07:01  -  19 Jan 2019 07:00  --------------------------------------------------------  IN: 2650 mL / OUT: 2474 mL / NET: 176 mL    19 Jan 2019 07:01  -  19 Jan 2019 14:28  --------------------------------------------------------  IN: 1650 mL / OUT: 1401 mL / NET: 249 mL      MEDICATIONS  (STANDING):  aspirin 325 milliGRAM(s) Oral daily  docusate sodium 100 milliGRAM(s) Oral three times a day  enoxaparin Injectable 40 milliGRAM(s) SubCutaneous daily  lactated ringers. 1000 milliLiter(s) (30 mL/Hr) IV Continuous <Continuous>  piperacillin/tazobactam IVPB. 3.375 Gram(s) IV Intermittent every 6 hours  potassium chloride    Tablet ER 40 milliEquivalent(s) Oral once  senna 2 Tablet(s) Oral at bedtime  simvastatin 20 milliGRAM(s) Oral at bedtime  vancomycin  IVPB 1250 milliGRAM(s) IV Intermittent every 12 hours    MEDICATIONS  (PRN):  acetaminophen    Suspension .. 650 milliGRAM(s) Oral every 6 hours PRN Temp greater or equal to 38C (100.4F)  diazepam    Tablet 5 milliGRAM(s) Oral every 6 hours PRN muscle spasms, anxiety  guaiFENesin    Syrup 100 milliGRAM(s) Oral every 6 hours PRN Cough  oxyCODONE    IR 5 milliGRAM(s) Oral every 4 hours PRN Moderate Pain (4 - 6)      LABS:                        8.0    17.6  )-----------( 248      ( 19 Jan 2019 06:22 )             25.0     01-19    134<L>  |  102  |  8   ----------------------------<  121<H>  3.0<L>   |  21<L>  |  0.56    Ca    7.8<L>      19 Jan 2019 06:22    TPro  6.5  /  Alb  3.3  /  TBili  0.3  /  DBili  x   /  AST  21  /  ALT  16  /  AlkPhos  93  01-17    PT/INR - ( 17 Jan 2019 22:21 )   PT: 13.7 sec;   INR: 1.21          PTT - ( 17 Jan 2019 22:21 )  PTT:34.4 sec  Urinalysis Basic - ( 18 Jan 2019 01:52 )    Color: Yellow / Appearance: Clear / SG: <=1.005 / pH: x  Gluc: x / Ketone: NEGATIVE  / Bili: Negative / Urobili: 0.2 E.U./dL   Blood: x / Protein: NEGATIVE mg/dL / Nitrite: NEGATIVE   Leuk Esterase: NEGATIVE / RBC: x / WBC x   Sq Epi: x / Non Sq Epi: x / Bacteria: x

## 2019-01-19 NOTE — PROGRESS NOTE ADULT - SUBJECTIVE AND OBJECTIVE BOX
doing better, but has a chronic dry cough that is bothering her    low grade fevers last night, but now afebrile  VSS    wbc down to 17  k at 3.0      on exam:    incisions of abdomen cdi    thighs bilaterally near incision are lightly erythematous  left breast with lateral erythema  drains with serous output no purulence    superficial epidermolysis of mastectomy flaps on left stable

## 2019-01-19 NOTE — PROGRESS NOTE ADULT - ASSESSMENT
63F BRCA+ s/p bilateral salpingooophorectomy for right ovarian mass, bilateral mastectomy with left axillary LN dissection and right sentinel LN biopsy and NUBIA flap reconstruction, readmitted for right breast phlegmon, improving on antibiotics.     Care per primary team.  No acute general surgery intervention.  Team 1C will continue to follow.

## 2019-01-20 LAB
ANION GAP SERPL CALC-SCNC: 11 MMOL/L — SIGNIFICANT CHANGE UP (ref 5–17)
BUN SERPL-MCNC: 4 MG/DL — LOW (ref 7–23)
CALCIUM SERPL-MCNC: 8.4 MG/DL — SIGNIFICANT CHANGE UP (ref 8.4–10.5)
CHLORIDE SERPL-SCNC: 102 MMOL/L — SIGNIFICANT CHANGE UP (ref 96–108)
CO2 SERPL-SCNC: 25 MMOL/L — SIGNIFICANT CHANGE UP (ref 22–31)
CREAT SERPL-MCNC: 0.58 MG/DL — SIGNIFICANT CHANGE UP (ref 0.5–1.3)
GLUCOSE SERPL-MCNC: 114 MG/DL — HIGH (ref 70–99)
HCT VFR BLD CALC: 28 % — LOW (ref 34.5–45)
HGB BLD-MCNC: 8.9 G/DL — LOW (ref 11.5–15.5)
MCHC RBC-ENTMCNC: 31.8 G/DL — LOW (ref 32–36)
MCHC RBC-ENTMCNC: 31.9 PG — SIGNIFICANT CHANGE UP (ref 27–34)
MCV RBC AUTO: 100.4 FL — HIGH (ref 80–100)
PLATELET # BLD AUTO: 295 K/UL — SIGNIFICANT CHANGE UP (ref 150–400)
POTASSIUM SERPL-MCNC: 3.3 MMOL/L — LOW (ref 3.5–5.3)
POTASSIUM SERPL-SCNC: 3.3 MMOL/L — LOW (ref 3.5–5.3)
RBC # BLD: 2.79 M/UL — LOW (ref 3.8–5.2)
RBC # FLD: 13 % — SIGNIFICANT CHANGE UP (ref 10.3–16.9)
SODIUM SERPL-SCNC: 138 MMOL/L — SIGNIFICANT CHANGE UP (ref 135–145)
WBC # BLD: 11.2 K/UL — HIGH (ref 3.8–10.5)
WBC # FLD AUTO: 11.2 K/UL — HIGH (ref 3.8–10.5)

## 2019-01-20 RX ORDER — SODIUM CHLORIDE 0.65 %
1 AEROSOL, SPRAY (ML) NASAL THREE TIMES A DAY
Qty: 0 | Refills: 0 | Status: DISCONTINUED | OUTPATIENT
Start: 2019-01-20 | End: 2019-01-22

## 2019-01-20 RX ORDER — ALBUTEROL 90 UG/1
2.5 AEROSOL, METERED ORAL EVERY 6 HOURS
Qty: 0 | Refills: 0 | Status: DISCONTINUED | OUTPATIENT
Start: 2019-01-20 | End: 2019-01-22

## 2019-01-20 RX ADMIN — OXYCODONE HYDROCHLORIDE 5 MILLIGRAM(S): 5 TABLET ORAL at 05:23

## 2019-01-20 RX ADMIN — Medication 325 MILLIGRAM(S): at 12:48

## 2019-01-20 RX ADMIN — ENOXAPARIN SODIUM 40 MILLIGRAM(S): 100 INJECTION SUBCUTANEOUS at 12:47

## 2019-01-20 RX ADMIN — Medication 1 SPRAY(S): at 16:32

## 2019-01-20 RX ADMIN — ALBUTEROL 2.5 MILLIGRAM(S): 90 AEROSOL, METERED ORAL at 22:28

## 2019-01-20 RX ADMIN — Medication 100 MILLIGRAM(S): at 22:14

## 2019-01-20 RX ADMIN — Medication 100 MILLIGRAM(S): at 05:00

## 2019-01-20 RX ADMIN — PIPERACILLIN AND TAZOBACTAM 200 GRAM(S): 4; .5 INJECTION, POWDER, LYOPHILIZED, FOR SOLUTION INTRAVENOUS at 10:19

## 2019-01-20 RX ADMIN — OXYCODONE HYDROCHLORIDE 5 MILLIGRAM(S): 5 TABLET ORAL at 01:30

## 2019-01-20 RX ADMIN — Medication 166.67 MILLIGRAM(S): at 01:00

## 2019-01-20 RX ADMIN — PIPERACILLIN AND TAZOBACTAM 200 GRAM(S): 4; .5 INJECTION, POWDER, LYOPHILIZED, FOR SOLUTION INTRAVENOUS at 22:14

## 2019-01-20 RX ADMIN — Medication 5 MILLIGRAM(S): at 13:41

## 2019-01-20 RX ADMIN — SIMVASTATIN 20 MILLIGRAM(S): 20 TABLET, FILM COATED ORAL at 22:14

## 2019-01-20 RX ADMIN — OXYCODONE HYDROCHLORIDE 5 MILLIGRAM(S): 5 TABLET ORAL at 05:35

## 2019-01-20 RX ADMIN — Medication 5 MILLIGRAM(S): at 19:44

## 2019-01-20 RX ADMIN — Medication 166.67 MILLIGRAM(S): at 12:48

## 2019-01-20 RX ADMIN — Medication 100 MILLIGRAM(S): at 22:31

## 2019-01-20 RX ADMIN — PIPERACILLIN AND TAZOBACTAM 200 GRAM(S): 4; .5 INJECTION, POWDER, LYOPHILIZED, FOR SOLUTION INTRAVENOUS at 04:55

## 2019-01-20 RX ADMIN — ALBUTEROL 2.5 MILLIGRAM(S): 90 AEROSOL, METERED ORAL at 16:27

## 2019-01-20 RX ADMIN — SENNA PLUS 2 TABLET(S): 8.6 TABLET ORAL at 22:13

## 2019-01-20 RX ADMIN — OXYCODONE HYDROCHLORIDE 5 MILLIGRAM(S): 5 TABLET ORAL at 01:00

## 2019-01-20 RX ADMIN — Medication 1 SPRAY(S): at 22:15

## 2019-01-20 RX ADMIN — Medication 100 MILLIGRAM(S): at 13:41

## 2019-01-20 RX ADMIN — PIPERACILLIN AND TAZOBACTAM 200 GRAM(S): 4; .5 INJECTION, POWDER, LYOPHILIZED, FOR SOLUTION INTRAVENOUS at 16:27

## 2019-01-20 NOTE — PROGRESS NOTE ADULT - SUBJECTIVE AND OBJECTIVE BOX
GYN Progress Note    Patient seen at bedside.  Pain controlled on tylenol and oxycodone, but does have breakthrough pain between doses. Reports most pain is around her L JPs. She is also complaining of a continued cough with mild SOB  Tolerating regular diet  OOB  Voiding    Denies CP, palpitations, fever, chills, nausea, vomiting.    Vital Signs Last 24 Hrs  T(C): 36.7 (20 Jan 2019 16:13), Max: 37.1 (19 Jan 2019 17:23)  T(F): 98.1 (20 Jan 2019 16:13), Max: 98.7 (19 Jan 2019 17:23)  HR: 89 (20 Jan 2019 16:13) (89 - 99)  BP: 111/75 (20 Jan 2019 16:13) (111/75 - 131/73)  BP(mean): --  RR: 17 (20 Jan 2019 16:13) (16 - 17)  SpO2: 96% (20 Jan 2019 16:13) (93% - 99%)    Physical Exam:  Gen: No Acute Distress  Pulm: Normal work of breathing  GI: soft, appropriately tender, nondistended, +BS, no rebound, no guarding.  Incision C/D/I  Ext: SCDs in place, [facility]    I&O's Summary    19 Jan 2019 07:01  -  20 Jan 2019 07:00  --------------------------------------------------------  IN: 2650 mL / OUT: 3588 mL / NET: -938 mL    20 Jan 2019 07:01  -  20 Jan 2019 16:16  --------------------------------------------------------  IN: 730 mL / OUT: 1730 mL / NET: -1000 mL      MEDICATIONS  (STANDING):  aspirin 325 milliGRAM(s) Oral daily  docusate sodium 100 milliGRAM(s) Oral three times a day  enoxaparin Injectable 40 milliGRAM(s) SubCutaneous daily  piperacillin/tazobactam IVPB. 3.375 Gram(s) IV Intermittent every 6 hours  senna 2 Tablet(s) Oral at bedtime  simvastatin 20 milliGRAM(s) Oral at bedtime  vancomycin  IVPB 1250 milliGRAM(s) IV Intermittent every 12 hours    MEDICATIONS  (PRN):  acetaminophen    Suspension .. 650 milliGRAM(s) Oral every 6 hours PRN Temp greater or equal to 38C (100.4F)  ALBUTerol   0.5% 2.5 milliGRAM(s) Nebulizer every 6 hours PRN Shortness of Breath and/or Wheezing  diazepam    Tablet 5 milliGRAM(s) Oral every 6 hours PRN muscle spasms, anxiety  guaiFENesin    Syrup 100 milliGRAM(s) Oral every 6 hours PRN Cough  oxyCODONE    IR 5 milliGRAM(s) Oral every 4 hours PRN Moderate Pain (4 - 6)  sodium chloride 0.65% Nasal 1 Spray(s) Both Nostrils three times a day PRN Nasal Congestion      LABS:                        8.9    11.2  )-----------( 295      ( 20 Jan 2019 06:24 )             28.0     01-20    138  |  102  |  4<L>  ----------------------------<  114<H>  3.3<L>   |  25  |  0.58    Ca    8.4      20 Jan 2019 06:24

## 2019-01-20 NOTE — PROGRESS NOTE ADULT - SUBJECTIVE AND OBJECTIVE BOX
doing well but + dry cough  also hates her drains    afvss    on exam:    gen: nad    abd: incisions cdi, light erythema along lateral incisions/thigh and now on central abdomen/infraumbilical    breasts: slight left lateral erythema, mastectomy skin flap epidermolysis stable.    all drains serous, but output >30cc so unable to remove

## 2019-01-20 NOTE — PROGRESS NOTE ADULT - ASSESSMENT
63y Female POD#10 s/p B/L mastectomy with L axillary LND, R LN bx, NUBIA flap reconstruction, BSO with fibrothecoma resection, readmitted, now HD#4 for cellulitis of breast incision. Primary management per Plastics.    1. Neuro/Pain:  OPM  2  CV:   VS per routine  3. Pulm: Encourage ISS; albuterol nebs PRN  4. GI: Reg  5. :  Voiding  6. Heme: Stable  7. ID: ID following, continue vanc/zosyn; 3 L JPs with low outputs this shift (30/20/10cc)  8. DVT ppx: SCDs, lovenox  9. Dispo: per primary team    GYN to follow.

## 2019-01-20 NOTE — PROGRESS NOTE ADULT - ASSESSMENT
63 yr f s/ b liv now with post op cellulitis    - cont vanc/zosyn  - vanc trough tonight  - cont drains  - scds, lovenox  - appreciate id recs

## 2019-01-20 NOTE — PROGRESS NOTE ADULT - SUBJECTIVE AND OBJECTIVE BOX
STATUS POST:          SUBJECTIVE:        enoxaparin Injectable 40 milliGRAM(s) SubCutaneous daily  piperacillin/tazobactam IVPB. 3.375 Gram(s) IV Intermittent every 6 hours  vancomycin  IVPB 1250 milliGRAM(s) IV Intermittent every 12 hours      Vital Signs Last 24 Hrs  T(C): 36.7 (20 Jan 2019 16:13), Max: 37.1 (19 Jan 2019 20:32)  T(F): 98.1 (20 Jan 2019 16:13), Max: 98.7 (19 Jan 2019 20:32)  HR: 89 (20 Jan 2019 16:13) (89 - 99)  BP: 111/75 (20 Jan 2019 16:13) (111/75 - 131/73)  BP(mean): --  RR: 17 (20 Jan 2019 16:13) (16 - 17)  SpO2: 96% (20 Jan 2019 16:13) (93% - 99%)  I&O's Detail    19 Jan 2019 07:01  -  20 Jan 2019 07:00  --------------------------------------------------------  IN:    lactated ringers.: 600 mL    Oral Fluid: 1200 mL    sodium chloride 0.9%: 400 mL    Solution: 250 mL    Solution: 200 mL  Total IN: 2650 mL    OUT:    Bulb: 80 mL    Bulb: 110 mL    Bulb: 47 mL    Stool: 1 mL    Voided: 3350 mL  Total OUT: 3588 mL    Total NET: -938 mL      20 Jan 2019 07:01  -  20 Jan 2019 18:48  --------------------------------------------------------  IN:    lactated ringers.: 150 mL    Oral Fluid: 430 mL    Solution: 250 mL    Solution: 200 mL  Total IN: 1030 mL    OUT:    Bulb: 20 mL    Bulb: 30 mL    Bulb: 10 mL    Voided: 1970 mL  Total OUT: 2030 mL    Total NET: -1000 mL          General: NAD, resting comfortably in bed  C/V: NSR  Pulm: Nonlabored breathing, no respiratory distress  Abd: soft, NT/ND.  Extrem: WWP, no edema, SCDs in place  Drains:  Ranjeet:      LABS:                        8.9    11.2  )-----------( 295      ( 20 Jan 2019 06:24 )             28.0     01-20    138  |  102  |  4<L>  ----------------------------<  114<H>  3.3<L>   |  25  |  0.58    Ca    8.4      20 Jan 2019 06:24            RADIOLOGY & ADDITIONAL STUDIES: SUBJECTIVE: Patient examined bedside.  Patient reports that she is having pain at the left SOSA site. She reports the pain is well controlled with medication . No nausea or vomiting. No acute complaints        enoxaparin Injectable 40 milliGRAM(s) SubCutaneous daily  piperacillin/tazobactam IVPB. 3.375 Gram(s) IV Intermittent every 6 hours  vancomycin  IVPB 1250 milliGRAM(s) IV Intermittent every 12 hours      Vital Signs Last 24 Hrs  T(C): 36.7 (20 Jan 2019 16:13), Max: 37.1 (19 Jan 2019 20:32)  T(F): 98.1 (20 Jan 2019 16:13), Max: 98.7 (19 Jan 2019 20:32)  HR: 89 (20 Jan 2019 16:13) (89 - 99)  BP: 111/75 (20 Jan 2019 16:13) (111/75 - 131/73)  BP(mean): --  RR: 17 (20 Jan 2019 16:13) (16 - 17)  SpO2: 96% (20 Jan 2019 16:13) (93% - 99%)  I&O's Detail    19 Jan 2019 07:01  -  20 Jan 2019 07:00  --------------------------------------------------------  IN:    lactated ringers.: 600 mL    Oral Fluid: 1200 mL    sodium chloride 0.9%: 400 mL    Solution: 250 mL    Solution: 200 mL  Total IN: 2650 mL    OUT:    Bulb: 80 mL    Bulb: 110 mL    Bulb: 47 mL    Stool: 1 mL    Voided: 3350 mL  Total OUT: 3588 mL    Total NET: -938 mL      20 Jan 2019 07:01  -  20 Jan 2019 18:48  --------------------------------------------------------  IN:    lactated ringers.: 150 mL    Oral Fluid: 430 mL    Solution: 250 mL    Solution: 200 mL  Total IN: 1030 mL    OUT:    Bulb: 20 mL    Bulb: 30 mL    Bulb: 10 mL    Voided: 1970 mL  Total OUT: 2030 mL    Total NET: -1000 mL            General: NAD, resting comfortably in bed  Chest: bilateral breasts soft, nontender, no asymmetry or obvious masses, JPs serosanguinous  Pulm: Nonlabored breathing, no respiratory distress  Extrem: WWP, no edema    LABS:                        8.9    11.2  )-----------( 295      ( 20 Jan 2019 06:24 )             28.0     01-20    138  |  102  |  4<L>  ----------------------------<  114<H>  3.3<L>   |  25  |  0.58    Ca    8.4      20 Jan 2019 06:24            RADIOLOGY & ADDITIONAL STUDIES:

## 2019-01-21 LAB
ERYTHROCYTE [SEDIMENTATION RATE] IN BLOOD: 111 MM/HR — HIGH
HCT VFR BLD CALC: 26.6 % — LOW (ref 34.5–45)
HGB BLD-MCNC: 8.3 G/DL — LOW (ref 11.5–15.5)
MCHC RBC-ENTMCNC: 30.5 PG — SIGNIFICANT CHANGE UP (ref 27–34)
MCHC RBC-ENTMCNC: 31.2 G/DL — LOW (ref 32–36)
MCV RBC AUTO: 97.8 FL — SIGNIFICANT CHANGE UP (ref 80–100)
PLATELET # BLD AUTO: 344 K/UL — SIGNIFICANT CHANGE UP (ref 150–400)
RBC # BLD: 2.72 M/UL — LOW (ref 3.8–5.2)
RBC # FLD: 13.5 % — SIGNIFICANT CHANGE UP (ref 10.3–16.9)
VANCOMYCIN TROUGH SERPL-MCNC: 11.7 UG/ML — SIGNIFICANT CHANGE UP (ref 10–20)
WBC # BLD: 6.8 K/UL — SIGNIFICANT CHANGE UP (ref 3.8–10.5)
WBC # FLD AUTO: 6.8 K/UL — SIGNIFICANT CHANGE UP (ref 3.8–10.5)

## 2019-01-21 PROCEDURE — 99232 SBSQ HOSP IP/OBS MODERATE 35: CPT

## 2019-01-21 RX ORDER — CEFPODOXIME PROXETIL 100 MG
200 TABLET ORAL EVERY 12 HOURS
Qty: 0 | Refills: 0 | Status: DISCONTINUED | OUTPATIENT
Start: 2019-01-21 | End: 2019-01-22

## 2019-01-21 RX ADMIN — Medication 1 SPRAY(S): at 22:18

## 2019-01-21 RX ADMIN — PIPERACILLIN AND TAZOBACTAM 200 GRAM(S): 4; .5 INJECTION, POWDER, LYOPHILIZED, FOR SOLUTION INTRAVENOUS at 03:09

## 2019-01-21 RX ADMIN — Medication 100 MILLIGRAM(S): at 20:14

## 2019-01-21 RX ADMIN — Medication 100 MILLIGRAM(S): at 05:43

## 2019-01-21 RX ADMIN — Medication 166.67 MILLIGRAM(S): at 00:23

## 2019-01-21 RX ADMIN — SIMVASTATIN 20 MILLIGRAM(S): 20 TABLET, FILM COATED ORAL at 22:17

## 2019-01-21 RX ADMIN — ALBUTEROL 2.5 MILLIGRAM(S): 90 AEROSOL, METERED ORAL at 09:27

## 2019-01-21 RX ADMIN — PIPERACILLIN AND TAZOBACTAM 200 GRAM(S): 4; .5 INJECTION, POWDER, LYOPHILIZED, FOR SOLUTION INTRAVENOUS at 16:25

## 2019-01-21 RX ADMIN — Medication 166.67 MILLIGRAM(S): at 13:48

## 2019-01-21 RX ADMIN — Medication 5 MILLIGRAM(S): at 22:17

## 2019-01-21 RX ADMIN — OXYCODONE HYDROCHLORIDE 5 MILLIGRAM(S): 5 TABLET ORAL at 01:14

## 2019-01-21 RX ADMIN — ALBUTEROL 2.5 MILLIGRAM(S): 90 AEROSOL, METERED ORAL at 20:14

## 2019-01-21 RX ADMIN — PIPERACILLIN AND TAZOBACTAM 200 GRAM(S): 4; .5 INJECTION, POWDER, LYOPHILIZED, FOR SOLUTION INTRAVENOUS at 11:31

## 2019-01-21 RX ADMIN — OXYCODONE HYDROCHLORIDE 5 MILLIGRAM(S): 5 TABLET ORAL at 02:00

## 2019-01-21 RX ADMIN — Medication 325 MILLIGRAM(S): at 11:31

## 2019-01-21 RX ADMIN — ENOXAPARIN SODIUM 40 MILLIGRAM(S): 100 INJECTION SUBCUTANEOUS at 11:32

## 2019-01-21 NOTE — PROGRESS NOTE ADULT - ASSESSMENT
64 yo POD#11 s/p B/L mastectomy with L axillary LND, R LN bx, NUBIA flap reconstruction, BSO with fibrothecoma resection, readmitted, now HD#5 for cellulitis of breast incision, clinically improving and hemodynamically stable  - management per primary team, appreciated  - pathology results discussed w/ patient, benign fibrothecoma  - recommended outpatient follow-up w/ Dr. Cisneros  - please reconsult if needed

## 2019-01-21 NOTE — PROGRESS NOTE ADULT - SUBJECTIVE AND OBJECTIVE BOX
Pt evaluated at bedside, reports feeling well, reports mild cough that started during this hospitalization and minimal abdominal pain, denies fever/chills, HA, dizziness, CP, palpitations, SOB, n/v, vaginal bleeding. She is ambulating w/o assistance, tolerating diet and passing gas.    T(C): 36.9 (01-21-19 @ 14:17), Max: 36.9 (01-21-19 @ 14:17)  HR: 85 (01-21-19 @ 14:17) (85 - 95)  BP: 122/70 (01-21-19 @ 14:17) (103/64 - 122/70)  RR: 16 (01-21-19 @ 14:17) (16 - 18)  SpO2: 96% (01-21-19 @ 14:17) (95% - 98%)  GA: NAD, A+OX3  CV: RRR, no MRG  Pulm: CTAB, no wheezes or crackles  Abd: +BS, soft, NTND, no rebound or guarding, SOSA drains w/ minimal serosanguinous output  Extrem: no calf tenderness    01-20 @ 07:01  -  01-21 @ 07:00  --------------------------------------------------------  IN: 1840 mL / OUT: 3630 mL / NET: -1790 mL    01-21 @ 07:01  -  01-21 @ 14:37  --------------------------------------------------------  IN: 480 mL / OUT: 1400 mL / NET: -920 mL                        8.3    6.8   )-----------( 344      ( 21 Jan 2019 06:34 )             26.6     01-20    138  |  102  |  4<L>  ----------------------------<  114<H>  3.3<L>   |  25  |  0.58    Ca    8.4      20 Jan 2019 06:24    MEDICATIONS  (STANDING):  aspirin 325 milliGRAM(s) Oral daily  docusate sodium 100 milliGRAM(s) Oral three times a day  enoxaparin Injectable 40 milliGRAM(s) SubCutaneous daily  piperacillin/tazobactam IVPB. 3.375 Gram(s) IV Intermittent every 6 hours  senna 2 Tablet(s) Oral at bedtime  simvastatin 20 milliGRAM(s) Oral at bedtime  vancomycin  IVPB 1250 milliGRAM(s) IV Intermittent every 12 hours    MEDICATIONS  (PRN):  acetaminophen    Suspension .. 650 milliGRAM(s) Oral every 6 hours PRN Temp greater or equal to 38C (100.4F)  ALBUTerol   0.5% 2.5 milliGRAM(s) Nebulizer every 6 hours PRN Shortness of Breath and/or Wheezing  diazepam    Tablet 5 milliGRAM(s) Oral every 6 hours PRN muscle spasms, anxiety  guaiFENesin    Syrup 100 milliGRAM(s) Oral every 6 hours PRN Cough  oxyCODONE    IR 5 milliGRAM(s) Oral every 4 hours PRN Moderate Pain (4 - 6)  sodium chloride 0.65% Nasal 1 Spray(s) Both Nostrils three times a day PRN Nasal Congestion

## 2019-01-21 NOTE — PROGRESS NOTE ADULT - SUBJECTIVE AND OBJECTIVE BOX
SUBJECTIVE:  pt seen sitting at bedside, complains of some pain around left SOSA site. tolerating diet.    MEDICATIONS  (STANDING):  aspirin 325 milliGRAM(s) Oral daily  docusate sodium 100 milliGRAM(s) Oral three times a day  enoxaparin Injectable 40 milliGRAM(s) SubCutaneous daily  piperacillin/tazobactam IVPB. 3.375 Gram(s) IV Intermittent every 6 hours  senna 2 Tablet(s) Oral at bedtime  simvastatin 20 milliGRAM(s) Oral at bedtime  vancomycin  IVPB 1250 milliGRAM(s) IV Intermittent every 12 hours    MEDICATIONS  (PRN):  acetaminophen    Suspension .. 650 milliGRAM(s) Oral every 6 hours PRN Temp greater or equal to 38C (100.4F)  ALBUTerol   0.5% 2.5 milliGRAM(s) Nebulizer every 6 hours PRN Shortness of Breath and/or Wheezing  diazepam    Tablet 5 milliGRAM(s) Oral every 6 hours PRN muscle spasms, anxiety  guaiFENesin    Syrup 100 milliGRAM(s) Oral every 6 hours PRN Cough  oxyCODONE    IR 5 milliGRAM(s) Oral every 4 hours PRN Moderate Pain (4 - 6)  sodium chloride 0.65% Nasal 1 Spray(s) Both Nostrils three times a day PRN Nasal Congestion      Vital Signs Last 24 Hrs  T(C): 36.4 (21 Jan 2019 05:40), Max: 37 (20 Jan 2019 14:57)  T(F): 97.5 (21 Jan 2019 05:40), Max: 98.6 (20 Jan 2019 14:57)  HR: 88 (21 Jan 2019 05:40) (88 - 97)  BP: 117/74 (21 Jan 2019 05:40) (111/75 - 128/81)  BP(mean): --  RR: 18 (21 Jan 2019 05:40) (17 - 18)  SpO2: 95% (21 Jan 2019 05:40) (95% - 96%)    PHYSICAL EXAM:      Constitutional: A&Ox3    Breasts: bilateral incisions c/d/i, soft, some mild erythema at left breast incision that appears stable    Respiratory: non labored breathing, no respiratory distress    Cardiovascular: NSR, RRR    Gastrointestinal: soft, nontender, nondistended    Extremities: (-) edema                  I&O's Detail    20 Jan 2019 07:01  -  21 Jan 2019 07:00  --------------------------------------------------------  IN:    lactated ringers.: 150 mL    Oral Fluid: 790 mL    Solution: 500 mL    Solution: 400 mL  Total IN: 1840 mL    OUT:    Bulb: 85 mL    Bulb: 25 mL    Bulb: 50 mL    Voided: 3470 mL  Total OUT: 3630 mL    Total NET: -1790 mL          LABS:                        8.3    6.8   )-----------( 344      ( 21 Jan 2019 06:34 )             26.6     01-20    138  |  102  |  4<L>  ----------------------------<  114<H>  3.3<L>   |  25  |  0.58    Ca    8.4      20 Jan 2019 06:24            RADIOLOGY & ADDITIONAL STUDIES:

## 2019-01-21 NOTE — PROGRESS NOTE ADULT - ASSESSMENT
Deep SSI/R 3.5cm chest wall abscess. Does not appear to involve her chemo port .Abdominal wall SSTI resolved. Significant clinical improvement. Drainage of collection not being pursued given improvement. Patient strongly prefers an oral antibiotic option. May transition to empiric TMP/SMX 1DS q12h + cefpodoxime 200mg PO q12h (anticipated end date 1/31 to complete 2 week course). Patient to notify her plastic surgeon should she develop fever or worsening redness at surgical sites.  Please reconsult with ?  ID Team 1

## 2019-01-21 NOTE — PROGRESS NOTE ADULT - ASSESSMENT
63 yr f s/ b liv now with post op cellulitis    - cont vanc/zosyn  - vanc trough tomorrow PM  - cont drains  - scds, lovenox  - appreciate id recs

## 2019-01-21 NOTE — PROGRESS NOTE ADULT - SUBJECTIVE AND OBJECTIVE BOX
INTERVAL HPI/OVERNIGHT EVENTS: Fevers and abdominal wall redness resolved. Cough improved.     CONSTITUTIONAL:  Negative fever or chills, feels well, good appetite  EYES:  Negative  blurry vision or double vision  CARDIOVASCULAR:  Negative for chest pain or palpitations  RESPIRATORY:  Negative for cough, wheezing, or SOB   GASTROINTESTINAL:  Negative for nausea, vomiting, diarrhea, constipation, or abdominal pain  GENITOURINARY:  Negative frequency, urgency or dysuria  NEUROLOGIC:  No headache, confusion, dizziness, lightheadedness      ANTIBIOTICS/RELEVANT:    MEDICATIONS  (STANDING):  aspirin 325 milliGRAM(s) Oral daily  docusate sodium 100 milliGRAM(s) Oral three times a day  enoxaparin Injectable 40 milliGRAM(s) SubCutaneous daily  piperacillin/tazobactam IVPB. 3.375 Gram(s) IV Intermittent every 6 hours  senna 2 Tablet(s) Oral at bedtime  simvastatin 20 milliGRAM(s) Oral at bedtime  vancomycin  IVPB 1250 milliGRAM(s) IV Intermittent every 12 hours    MEDICATIONS  (PRN):  acetaminophen    Suspension .. 650 milliGRAM(s) Oral every 6 hours PRN Temp greater or equal to 38C (100.4F)  ALBUTerol   0.5% 2.5 milliGRAM(s) Nebulizer every 6 hours PRN Shortness of Breath and/or Wheezing  diazepam    Tablet 5 milliGRAM(s) Oral every 6 hours PRN muscle spasms, anxiety  guaiFENesin    Syrup 100 milliGRAM(s) Oral every 6 hours PRN Cough  oxyCODONE    IR 5 milliGRAM(s) Oral every 4 hours PRN Moderate Pain (4 - 6)  sodium chloride 0.65% Nasal 1 Spray(s) Both Nostrils three times a day PRN Nasal Congestion        Vital Signs Last 24 Hrs  T(C): 36.8 (21 Jan 2019 17:21), Max: 36.9 (21 Jan 2019 14:17)  T(F): 98.2 (21 Jan 2019 17:21), Max: 98.5 (21 Jan 2019 14:17)  HR: 88 (21 Jan 2019 17:21) (85 - 97)  BP: 133/79 (21 Jan 2019 17:21) (103/64 - 133/79)  BP(mean): --  RR: 17 (21 Jan 2019 17:21) (16 - 18)  SpO2: 98% (21 Jan 2019 17:21) (95% - 98%)    PHYSICAL EXAM:  Constitutional:Well-developed, well nourished  Eyes:PATRICIA, EOMI  Ear/Nose/Throat: no oral lesion, no sinus tenderness on percussion	  Neck:no JVD, no lymphadenopathy, supple  Respiratory: CTA sonido  Cardiovascular: S1S2 RRR, no murmurs  Gastrointestinal:soft, (+) BS, no HSM  Extremities:no e/e/c  Skin: R flank erythema resolved   Vascular: DP Pulse:	right normal; left normal      LABS:                        8.3    6.8   )-----------( 344      ( 21 Jan 2019 06:34 )             26.6     01-20    138  |  102  |  4<L>  ----------------------------<  114<H>  3.3<L>   |  25  |  0.58    Ca    8.4      20 Jan 2019 06:24            MICROBIOLOGY: Culture - Blood (01.18.19 @ 05:27)    Specimen Source: .Blood Blood-Peripheral    Culture Results:   No growth at 3 days.        RADIOLOGY & ADDITIONAL STUDIES: reviewed

## 2019-01-21 NOTE — PROGRESS NOTE ADULT - SUBJECTIVE AND OBJECTIVE BOX
doing well but + dry cough, improved with albuterol    afvss    on exam:    gen: nad    abd: incisions cdi, light erythema along lateral incisions/thigh and now on central abdomen/infraumbilical    breasts: slight left lateral erythema, mastectomy skin flap epidermolysis stable.    drains with ss output                            8.3    6.8   )-----------( 344      ( 21 Jan 2019 06:34 )             26.6     I&O's Detail    20 Jan 2019 07:01  -  21 Jan 2019 07:00  --------------------------------------------------------  IN:    lactated ringers.: 150 mL    Oral Fluid: 790 mL    Solution: 500 mL    Solution: 400 mL  Total IN: 1840 mL    OUT:    Bulb: 85 mL    Bulb: 25 mL    Bulb: 50 mL    Voided: 3470 mL  Total OUT: 3630 mL    Total NET: -1790 mL doing well but + dry cough, improved with albuterol  WBC downtrending  afebrile    afvss    on exam:    gen: nad    abd: incisions cdi, light erythema along lateral incisions/thigh and now on central abdomen/infraumbilical    breasts: slight left lateral erythema, mastectomy skin flap epidermolysis stable.    drains with ss output                            8.3    6.8   )-----------( 344      ( 21 Jan 2019 06:34 )             26.6     I&O's Detail    20 Jan 2019 07:01  -  21 Jan 2019 07:00  --------------------------------------------------------  IN:    lactated ringers.: 150 mL    Oral Fluid: 790 mL    Solution: 500 mL    Solution: 400 mL  Total IN: 1840 mL    OUT:    Bulb: 85 mL    Bulb: 25 mL    Bulb: 50 mL    Voided: 3470 mL  Total OUT: 3630 mL    Total NET: -1790 mL

## 2019-01-22 ENCOUNTER — TRANSCRIPTION ENCOUNTER (OUTPATIENT)
Age: 64
End: 2019-01-22

## 2019-01-22 ENCOUNTER — APPOINTMENT (OUTPATIENT)
Dept: PLASTIC SURGERY | Facility: CLINIC | Age: 64
End: 2019-01-22

## 2019-01-22 VITALS
DIASTOLIC BLOOD PRESSURE: 64 MMHG | TEMPERATURE: 98 F | RESPIRATION RATE: 17 BRPM | HEART RATE: 56 BPM | SYSTOLIC BLOOD PRESSURE: 99 MMHG | OXYGEN SATURATION: 98 %

## 2019-01-22 PROCEDURE — 99285 EMERGENCY DEPT VISIT HI MDM: CPT | Mod: 25

## 2019-01-22 PROCEDURE — 85652 RBC SED RATE AUTOMATED: CPT

## 2019-01-22 PROCEDURE — 36415 COLL VENOUS BLD VENIPUNCTURE: CPT

## 2019-01-22 PROCEDURE — 83605 ASSAY OF LACTIC ACID: CPT

## 2019-01-22 PROCEDURE — 85610 PROTHROMBIN TIME: CPT

## 2019-01-22 PROCEDURE — 96366 THER/PROPH/DIAG IV INF ADDON: CPT | Mod: XU

## 2019-01-22 PROCEDURE — 87040 BLOOD CULTURE FOR BACTERIA: CPT

## 2019-01-22 PROCEDURE — 81003 URINALYSIS AUTO W/O SCOPE: CPT

## 2019-01-22 PROCEDURE — 80202 ASSAY OF VANCOMYCIN: CPT

## 2019-01-22 PROCEDURE — 80053 COMPREHEN METABOLIC PANEL: CPT

## 2019-01-22 PROCEDURE — 71260 CT THORAX DX C+: CPT

## 2019-01-22 PROCEDURE — 96368 THER/DIAG CONCURRENT INF: CPT | Mod: XU

## 2019-01-22 PROCEDURE — 80048 BASIC METABOLIC PNL TOTAL CA: CPT

## 2019-01-22 PROCEDURE — 74177 CT ABD & PELVIS W/CONTRAST: CPT

## 2019-01-22 PROCEDURE — 96365 THER/PROPH/DIAG IV INF INIT: CPT | Mod: XU

## 2019-01-22 PROCEDURE — 85027 COMPLETE CBC AUTOMATED: CPT

## 2019-01-22 PROCEDURE — 87581 M.PNEUMON DNA AMP PROBE: CPT

## 2019-01-22 PROCEDURE — 93005 ELECTROCARDIOGRAM TRACING: CPT

## 2019-01-22 PROCEDURE — 94640 AIRWAY INHALATION TREATMENT: CPT

## 2019-01-22 PROCEDURE — 87633 RESP VIRUS 12-25 TARGETS: CPT

## 2019-01-22 PROCEDURE — 85025 COMPLETE CBC W/AUTO DIFF WBC: CPT

## 2019-01-22 PROCEDURE — 87486 CHLMYD PNEUM DNA AMP PROBE: CPT

## 2019-01-22 PROCEDURE — 87086 URINE CULTURE/COLONY COUNT: CPT

## 2019-01-22 PROCEDURE — 85730 THROMBOPLASTIN TIME PARTIAL: CPT

## 2019-01-22 PROCEDURE — 87798 DETECT AGENT NOS DNA AMP: CPT

## 2019-01-22 PROCEDURE — 82803 BLOOD GASES ANY COMBINATION: CPT

## 2019-01-22 PROCEDURE — 96361 HYDRATE IV INFUSION ADD-ON: CPT | Mod: XU

## 2019-01-22 RX ORDER — ASPIRIN/CALCIUM CARB/MAGNESIUM 324 MG
1 TABLET ORAL
Qty: 0 | Refills: 0 | COMMUNITY

## 2019-01-22 RX ORDER — CEFPODOXIME PROXETIL 100 MG
1 TABLET ORAL
Qty: 28 | Refills: 0
Start: 2019-01-22 | End: 2019-02-04

## 2019-01-22 RX ORDER — OXYCODONE AND ACETAMINOPHEN 5; 325 MG/1; MG/1
1 TABLET ORAL
Qty: 10 | Refills: 0
Start: 2019-01-22

## 2019-01-22 RX ADMIN — OXYCODONE HYDROCHLORIDE 5 MILLIGRAM(S): 5 TABLET ORAL at 12:00

## 2019-01-22 RX ADMIN — Medication 1 TABLET(S): at 05:13

## 2019-01-22 RX ADMIN — OXYCODONE HYDROCHLORIDE 5 MILLIGRAM(S): 5 TABLET ORAL at 11:34

## 2019-01-22 RX ADMIN — ENOXAPARIN SODIUM 40 MILLIGRAM(S): 100 INJECTION SUBCUTANEOUS at 11:34

## 2019-01-22 RX ADMIN — Medication 200 MILLIGRAM(S): at 05:13

## 2019-01-22 RX ADMIN — Medication 325 MILLIGRAM(S): at 11:34

## 2019-01-22 NOTE — PROGRESS NOTE ADULT - ASSESSMENT
63 yr f s/ b liv now with post op cellulitis    - cont cefpodoxime/bactrim PO  - cont drains  - scds, lovenox  - appreciate id recs  - discharge home today on PO abx

## 2019-01-22 NOTE — DISCHARGE NOTE ADULT - MEDICATION SUMMARY - MEDICATIONS TO TAKE
I will START or STAY ON the medications listed below when I get home from the hospital:    oxyCODONE-acetaminophen 5 mg-325 mg oral tablet  -- 1-2 tab(s) by mouth every 4-6 hours, As Needed for moderate pain MDD:6  -- Caution federal law prohibits the transfer of this drug to any person other  than the person for whom it was prescribed.  May cause drowsiness.  Alcohol may intensify this effect.  Use care when operating dangerous machinery.  This prescription cannot be refilled.  This product contains acetaminophen.  Do not use  with any other product containing acetaminophen to prevent possible liver damage.  Using more of this medication than prescribed may cause serious breathing problems.    -- Indication: For Pain    cefpodoxime 200 mg oral tablet  -- 1 tab(s) by mouth every 12 hours  -- Indication: For Cellulitis    docusate sodium 100 mg oral capsule  -- 1 cap(s) by mouth 3 times a day  -- Indication: For Constipation    senna oral tablet  -- 2 tab(s) by mouth once a day (at bedtime)  -- Indication: For COnstipation    Bactrim  mg-160 mg oral tablet  -- 1 tab(s) by mouth 2 times a day  -- Indication: For Cellulitis

## 2019-01-22 NOTE — DISCHARGE NOTE ADULT - PLAN OF CARE
Improvement in pain and cellulitis infection Patient was admitted under Plastic Surgery service with elevated WBC and fevers. CT abdomen showed mild cellulitis anterior to the right iliac crest. Patient was placed on IV vancomycin/zosyn for 3 days, after which she began to improve with normalized WBC counts and no febrile episodes. Patient was deemed to be in stable condition and discharged on oral antibiotics consisting of Bactrim/Cefpodoxime per infectious disease recommendations twice daily until 1/31/19.

## 2019-01-22 NOTE — PROGRESS NOTE ADULT - ASSESSMENT
63F BRCA+ s/p bilateral salpingooophorectomy for right ovarian mass, bilateral mastectomy with left axillary LN dissection and right sentinel LN biopsy and NUBIA flap reconstruction, readmitted for right breast phlegmon, improving on antibiotics.     Care per primary team.  No acute general surgery intervention.  On oral abx  Follow up with Dr. Hooper as scheduled  Team 1C will continue to follow.

## 2019-01-22 NOTE — DISCHARGE NOTE ADULT - CARE PROVIDER_API CALL
Lerman, Oren Z (MD), Plastic Surgery  [address]  [address]  [place], [place] [address]  Phone: [phone]  Fax: [phone]

## 2019-01-22 NOTE — DISCHARGE NOTE ADULT - ADDITIONAL INSTRUCTIONS
Continue to take antibiotics as prescribed until 1/31/19.  *Please refer to the post-operative care instruction provided from Dr. Lerman’s office.  -Follow up with Plastic &amp; Reconstructive Surgeon, Dr. Lerman in 1 week in the office.    -Continue SOSA drain care as instructed. (Empty and record the SOSA drainage twice daily after discharge.  Also, strip/milk the drain tubing each time to minimize clogging. Bring the recorded drain amounts to  the office so that it can be reviewed by the physician.)  -Wear abdominal binder when out of bed, walking around.    -Diet: no restrictions.    -Showers are permitted the day of discharge. The drains and the incision lines can get wet in the  shower. Do not take a bath. Pin the drains to a bathrobe belt or string or a small towel draped over your  neck in order that the drains do not dangle from your skin while in the shower. Keep incision sites and SOSA  drain sites clean &amp; dry after showering.    -No heavy lifting &gt;20 pounds or strenuous exercises.    -Call Doctor’s office or return to ER if: fever (temperature &gt;101.4F), chills, chest pain, shortness of  breath, uncontrolled/severe pain, persistent nausea/vomiting.

## 2019-01-22 NOTE — PROGRESS NOTE ADULT - REASON FOR ADMISSION
post op fevers

## 2019-01-22 NOTE — DISCHARGE NOTE ADULT - PATIENT PORTAL LINK FT
You can access the [facility] Portal, offered by [facility], by registering with the following website: [email or web address]

## 2019-01-22 NOTE — PROGRESS NOTE ADULT - SUBJECTIVE AND OBJECTIVE BOX
doing well, transitioned to PO abx yesterday  sitting upright in chair eating breakfast, energetic  states she "wants to go home"  R breast drain discontinued    afvss    on exam:    gen: nad    abd: incisions cdi, light erythema along lateral incisions/thigh and now on central abdomen/infraumbilical improved    breasts: slight left lateral erythema, mastectomy skin flap epidermolysis stable.    drains with ss output    Vital Signs Last 24 Hrs  T(C): 36.3 (22 Jan 2019 08:50), Max: 37.1 (21 Jan 2019 21:15)  T(F): 97.4 (22 Jan 2019 08:50), Max: 98.7 (21 Jan 2019 21:15)  HR: 94 (22 Jan 2019 08:50) (85 - 94)  BP: 109/65 (22 Jan 2019 08:50) (109/65 - 148/79)  BP(mean): --  RR: 16 (22 Jan 2019 08:50) (16 - 17)  SpO2: 96% (22 Jan 2019 08:50) (95% - 98%)      I&O's Detail    21 Jan 2019 07:01  -  22 Jan 2019 07:00  --------------------------------------------------------  IN:    Oral Fluid: 920 mL  Total IN: 920 mL    OUT:    Bulb: 27.5 mL    Bulb: 60 mL    Bulb: 25 mL    Voided: 3500 mL  Total OUT: 3612.5 mL    Total NET: -2692.5 mL      22 Jan 2019 07:01  -  22 Jan 2019 10:14  --------------------------------------------------------  IN:    Oral Fluid: 420 mL  Total IN: 420 mL    OUT:  Total OUT: 0 mL    Total NET: 420 mL

## 2019-01-22 NOTE — PROGRESS NOTE ADULT - SUBJECTIVE AND OBJECTIVE BOX
SUBJECTIVE:  pt seen sitting at bedside, without complaints at this time.    MEDICATIONS  (STANDING):  aspirin 325 milliGRAM(s) Oral daily  cefpodoxime 200 milliGRAM(s) Oral every 12 hours  docusate sodium 100 milliGRAM(s) Oral three times a day  enoxaparin Injectable 40 milliGRAM(s) SubCutaneous daily  senna 2 Tablet(s) Oral at bedtime  simvastatin 20 milliGRAM(s) Oral at bedtime  trimethoprim  160 mG/sulfamethoxazole 800 mG 1 Tablet(s) Oral two times a day    MEDICATIONS  (PRN):  acetaminophen    Suspension .. 650 milliGRAM(s) Oral every 6 hours PRN Temp greater or equal to 38C (100.4F)  ALBUTerol   0.5% 2.5 milliGRAM(s) Nebulizer every 6 hours PRN Shortness of Breath and/or Wheezing  diazepam    Tablet 5 milliGRAM(s) Oral every 6 hours PRN muscle spasms, anxiety  guaiFENesin    Syrup 100 milliGRAM(s) Oral every 6 hours PRN Cough  oxyCODONE    IR 5 milliGRAM(s) Oral every 4 hours PRN Moderate Pain (4 - 6)  sodium chloride 0.65% Nasal 1 Spray(s) Both Nostrils three times a day PRN Nasal Congestion      Vital Signs Last 24 Hrs  T(C): 36.6 (22 Jan 2019 05:32), Max: 37.1 (21 Jan 2019 21:15)  T(F): 97.9 (22 Jan 2019 05:32), Max: 98.7 (21 Jan 2019 21:15)  HR: 91 (22 Jan 2019 05:32) (85 - 91)  BP: 133/83 (22 Jan 2019 05:32) (122/70 - 148/79)  BP(mean): --  RR: 16 (22 Jan 2019 05:32) (16 - 17)  SpO2: 95% (22 Jan 2019 05:32) (95% - 98%)    PHYSICAL EXAM:      Constitutional: A&Ox3    Breasts: incisions c/d/i, wound at left incision with minimal serous discharge, erythema at left breast incision improving    Respiratory: non labored breathing, no respiratory distress    Cardiovascular: NSR, RRR    Gastrointestinal: soft, nontender, nondistended    Extremities: (-) edema                  I&O's Detail    21 Jan 2019 07:01  -  22 Jan 2019 07:00  --------------------------------------------------------  IN:    Oral Fluid: 920 mL  Total IN: 920 mL    OUT:    Bulb: 27.5 mL    Bulb: 60 mL    Bulb: 25 mL    Voided: 3500 mL  Total OUT: 3612.5 mL    Total NET: -2692.5 mL      22 Jan 2019 07:01  -  22 Jan 2019 09:25  --------------------------------------------------------  IN:    Oral Fluid: 420 mL  Total IN: 420 mL    OUT:  Total OUT: 0 mL    Total NET: 420 mL          LABS:                        8.3    6.8   )-----------( 344      ( 21 Jan 2019 06:34 )             26.6                 RADIOLOGY & ADDITIONAL STUDIES:

## 2019-01-22 NOTE — DISCHARGE NOTE ADULT - CARE PLAN
Principal Discharge DX:	Cellulitis of abdominal wall  Goal:	Improvement in pain and cellulitis infection  Assessment and plan of treatment:	Patient was admitted under Plastic Surgery service with elevated WBC and fevers. CT abdomen showed mild cellulitis anterior to the right iliac crest. Patient was placed on IV vancomycin/zosyn for 3 days, after which she began to improve with normalized WBC counts and no febrile episodes. Patient was deemed to be in stable condition and discharged on oral antibiotics consisting of Bactrim/Cefpodoxime per infectious disease recommendations twice daily until 1/31/19.

## 2019-01-24 DIAGNOSIS — Z92.21 PERSONAL HISTORY OF ANTINEOPLASTIC CHEMOTHERAPY: ICD-10-CM

## 2019-01-24 DIAGNOSIS — E78.5 HYPERLIPIDEMIA, UNSPECIFIED: ICD-10-CM

## 2019-01-24 DIAGNOSIS — Z85.3 PERSONAL HISTORY OF MALIGNANT NEOPLASM OF BREAST: ICD-10-CM

## 2019-01-24 DIAGNOSIS — Z96.641 PRESENCE OF RIGHT ARTIFICIAL HIP JOINT: ICD-10-CM

## 2019-01-24 DIAGNOSIS — T81.44XA SEPSIS FOLLOWING A PROCEDURE, INITIAL ENCOUNTER: ICD-10-CM

## 2019-01-24 DIAGNOSIS — Z79.82 LONG TERM (CURRENT) USE OF ASPIRIN: ICD-10-CM

## 2019-01-24 DIAGNOSIS — L03.313 CELLULITIS OF CHEST WALL: ICD-10-CM

## 2019-01-24 DIAGNOSIS — T81.42XA INFECTION FOLLOWING A PROCEDURE, DEEP INCISIONAL SURGICAL SITE, INITIAL ENCOUNTER: ICD-10-CM

## 2019-01-24 DIAGNOSIS — L02.213 CUTANEOUS ABSCESS OF CHEST WALL: ICD-10-CM

## 2019-01-24 DIAGNOSIS — L03.311 CELLULITIS OF ABDOMINAL WALL: ICD-10-CM

## 2019-01-24 DIAGNOSIS — A41.9 SEPSIS, UNSPECIFIED ORGANISM: ICD-10-CM

## 2019-01-25 ENCOUNTER — APPOINTMENT (OUTPATIENT)
Dept: BREAST CENTER | Facility: CLINIC | Age: 64
End: 2019-01-25
Payer: COMMERCIAL

## 2019-01-25 VITALS
WEIGHT: 195 LBS | HEIGHT: 64 IN | HEART RATE: 97 BPM | SYSTOLIC BLOOD PRESSURE: 136 MMHG | BODY MASS INDEX: 33.29 KG/M2 | DIASTOLIC BLOOD PRESSURE: 89 MMHG

## 2019-01-25 PROCEDURE — 99024 POSTOP FOLLOW-UP VISIT: CPT

## 2019-01-25 NOTE — HISTORY OF PRESENT ILLNESS
[FreeTextEntry1] : Patient presents for post op she is BRCA 1+ s/p B/L mastectomy, Left ALND (1/5 sentinels positive for macromets) with NUBIA flap reconstruction on 1/9/18 for invasive left breast cancer with lymphovascular involvement. Final surgical pathology revealed 1/5 SLN positive for macrometastatic disease, 1.0cm tumor,  posterior margin with close 1mm single focus, lymphovascular invasion not seen, surrounding breast tissue with flat epithelial atypia, mucocele like lesion and fibrocystic changes, benign nipple and skin. Left axillary content 22 LN negative for disease. Right mastectomy, benign breast, nipple and skin. \par \par She completed neoadjuvant chemotherapy (with Dr. Carlin) on 11/30/18 (withheld one tx d/t side effects) with Dr. Carlin. \par

## 2019-01-25 NOTE — CONSULT LETTER
[Dear  ___] : Dear  [unfilled], [Courtesy Letter:] : I had the pleasure of seeing your patient, [unfilled], in my office today. [Please see my note below.] : Please see my note below. [Sincerely,] : Sincerely, [FreeTextEntry2] : Qasim Pepper MD [FreeTextEntry3] : Emely Hooper MD FACS [DrLesa  ___] : Dr. RODRIGUEZ

## 2019-01-25 NOTE — ASSESSMENT
[FreeTextEntry1] : s/p b/l mastectomy liv recon for left breast cancer and brca  with persistent single left axillary metastasis after neoadjuvant chemo\par  1/26 nodes positive\par close DCIS margin, but negative\par \par RTC 3months\par  follow up with oncologist re adjuvant therapy\par radiation consultation\par \par f/u with Dr. Lerman re; wound care and nipple reconstruction \par PT once able\par continue antibiotic course for now\par \par \par

## 2019-01-29 ENCOUNTER — APPOINTMENT (OUTPATIENT)
Dept: PLASTIC SURGERY | Facility: CLINIC | Age: 64
End: 2019-01-29
Payer: COMMERCIAL

## 2019-01-29 PROCEDURE — 99024 POSTOP FOLLOW-UP VISIT: CPT

## 2019-01-29 NOTE — ASSESSMENT
[FreeTextEntry1] : The patient is significantly improved there is no sign of any active cellulitis we removed a J-P drains I reviewed postoperative wound care structures with her. She does have some mastectomy skin edge necrosis that will likely need to be debrided she should continue to use bacitracin and Aquaphor moisturizer and keep it covered with dry gauze and shower regularly with soap and water and follow up in one week so that we can reevaluate this area.

## 2019-01-29 NOTE — HISTORY OF PRESENT ILLNESS
[FreeTextEntry1] : 62 yo female presents for postop. Hx of invasive left breast cancer with lymphovascular involvement, subsequently underwent genetic testing and found to be BRCA positive. She completed neoadjuvant chemotherapy (with dr. Carlin) on 11/30/18 (withheld one tx d/t side effects) with Dr. Carlin. and is now s/p BL mastectomy with NUBIA reconstruction as well as BSO with Adolph on 1/7/19.\par \par Operatively she presented with erythema and fevers and was admitted to the hospital with cellulitis of the left breast and right abdomen she was treated with IV antibiotics and sent home with p.o. antibiotics she now presents for followup she has one drain remaining in the left breast and one in the left abdominal donor sites she denies any fevers chills nausea or vomiting she says she is feeling much better she continues to take the antibiotics as per the infectious disease doctor\par

## 2019-01-29 NOTE — PHYSICAL EXAM
[NI] : Normal [Bra Size: _______] : Bra Size: [unfilled] [de-identified] : The reconstructed breast wounds are soft the skin islands of the flaps are pink and warm there is no evidence of any ischemia there is no evidence of any collection the incision lines are closed however there is some skin edge necrosis of the mastectomy flaps left greater than right on the left side there is a 2 cm x 2 cm of ischemic demarcation along the vertical axis incision on the medial side. On the right there is 2-3 mm skin edge necrosis along the vertical axis incision. There is no sign of any infection there is no sign of any cellulitis. SOSA drain removed [de-identified] : Soft, NT, ND, no masses, no hernias. Umbilicus viable, no cellulitis, SOSA drain removed

## 2019-02-05 ENCOUNTER — APPOINTMENT (OUTPATIENT)
Dept: PLASTIC SURGERY | Facility: CLINIC | Age: 64
End: 2019-02-05
Payer: COMMERCIAL

## 2019-02-05 VITALS
HEART RATE: 106 BPM | DIASTOLIC BLOOD PRESSURE: 79 MMHG | HEIGHT: 64 IN | SYSTOLIC BLOOD PRESSURE: 120 MMHG | BODY MASS INDEX: 33.29 KG/M2 | WEIGHT: 195 LBS

## 2019-02-05 PROCEDURE — 11042 DBRDMT SUBQ TIS 1ST 20SQCM/<: CPT | Mod: 58

## 2019-02-05 PROCEDURE — 99024 POSTOP FOLLOW-UP VISIT: CPT

## 2019-02-07 ENCOUNTER — APPOINTMENT (OUTPATIENT)
Dept: RADIATION ONCOLOGY | Facility: CLINIC | Age: 64
End: 2019-02-07
Payer: COMMERCIAL

## 2019-02-07 VITALS
HEART RATE: 90 BPM | DIASTOLIC BLOOD PRESSURE: 89 MMHG | BODY MASS INDEX: 31.24 KG/M2 | SYSTOLIC BLOOD PRESSURE: 133 MMHG | OXYGEN SATURATION: 96 % | WEIGHT: 182 LBS | RESPIRATION RATE: 15 BRPM

## 2019-02-07 DIAGNOSIS — H91.91 UNSPECIFIED HEARING LOSS, RIGHT EAR: ICD-10-CM

## 2019-02-07 PROCEDURE — 99205 OFFICE O/P NEW HI 60 MIN: CPT | Mod: 25

## 2019-02-07 RX ORDER — BENZONATATE 100 MG/1
100 CAPSULE ORAL
Qty: 30 | Refills: 0 | Status: DISCONTINUED | COMMUNITY
Start: 2017-02-14 | End: 2019-02-07

## 2019-02-07 RX ORDER — LORAZEPAM 0.5 MG/1
0.5 TABLET ORAL
Qty: 2 | Refills: 0 | Status: COMPLETED | COMMUNITY
Start: 2018-04-30 | End: 2019-02-07

## 2019-02-07 RX ORDER — ONDANSETRON 8 MG/1
8 TABLET ORAL
Refills: 0 | Status: DISCONTINUED | COMMUNITY
End: 2019-02-07

## 2019-02-08 ENCOUNTER — CHART COPY (OUTPATIENT)
Age: 64
End: 2019-02-08

## 2019-02-08 NOTE — PROCEDURE
[Nl] : None [FreeTextEntry1] : mastectomy flap necrosis [FreeTextEntry2] : excisional debridement [FreeTextEntry6] : after prepping with betadine I used a #15 blade and excised the necrotic skin full thickness 2.5cm x 2cm on the left and 3-4mm on the right down to bleeding healthy tissue. the underlying flap was pink and viable. a wet--> dry dressing was then placed.

## 2019-02-08 NOTE — PHYSICAL EXAM
[Bra Size: _______] : Bra Size: [unfilled] [NI] : Normal [de-identified] : The reconstructed breast wounds are soft the skin islands of the flaps are pink and warm and viable. no evidence of any collection. there is full thickness skin edge necrosis of the mastectomy flaps left = 2.5cm x 2cm, right - periincisional along the vertical axis incision 2-3 mm skin edge necrosis along the vertical axis incision. There is no sign of any infection there is no sign of any cellulitis. [de-identified] : Soft, NT, ND, no masses, no hernias. Umbilicus viable, no cellulitis, SOSA drain removed

## 2019-02-08 NOTE — ASSESSMENT
[FreeTextEntry1] : Small area of mastectomy flap necrosis debrided\par Wet-->dry dressing changes\par F/U on Friday for wound check.

## 2019-02-08 NOTE — HISTORY OF PRESENT ILLNESS
[FreeTextEntry1] : 62 yo female presents for postop now s/p BL mastectomy with NUBIA reconstruction as well as BSO with Ciro on 1/7/19.\par \par Postoperatively she presented with erythema and fevers and was admitted to the hospital with cellulitis of the left breast and right abdomen she was treated with IV antibiotics and sent home with p.o. antibiotics she now presents for 4 week post op. \par \par Hx of invasive left breast cancer with lymphovascular involvement, subsequently underwent genetic testing and found to be BRCA positive. She completed neoadjuvant chemotherapy (with dr. Carlin) on 11/30/18 (withheld one tx d/t side effects) with Dr. Carlin. and is

## 2019-02-14 ENCOUNTER — CHART COPY (OUTPATIENT)
Age: 64
End: 2019-02-14

## 2019-02-19 ENCOUNTER — APPOINTMENT (OUTPATIENT)
Dept: PLASTIC SURGERY | Facility: CLINIC | Age: 64
End: 2019-02-19
Payer: COMMERCIAL

## 2019-02-19 ENCOUNTER — CHART COPY (OUTPATIENT)
Age: 64
End: 2019-02-19

## 2019-02-19 PROCEDURE — 99024 POSTOP FOLLOW-UP VISIT: CPT

## 2019-02-19 PROCEDURE — 12021 TX SUPFC WND DEHSN W/PACKING: CPT | Mod: 79,58

## 2019-02-19 NOTE — PHYSICAL EXAM
[NI] : Normal [de-identified] : The reconstructed breast wounds are soft the skin islands of the flaps are pink and warm and viable. no evidence of any collection. there is a = 3.5cm x 3cm, wound on both sides with healthy granulation tissue at the base. I gently debrided the small area of fat necrosis and replaced the Wet --> dry dressing on both sides. There is no sign of any infection there is no sign of any cellulitis. [de-identified] : Soft, NT, ND, no masses, no hernias. Umbilicus viable, no cellulitis, SOSA drain removed

## 2019-02-19 NOTE — ASSESSMENT
[FreeTextEntry1] : breast wounds healing well\par Will follow up with Dr. rivera re: radiation\par If needs XRT soon will need to schedule operative debridement and closure \par Continue Wet-->dry dressing changes\par F/U in 1 week

## 2019-02-19 NOTE — HISTORY OF PRESENT ILLNESS
[FreeTextEntry1] : 62 yo female presents for postop now s/p BL mastectomy with NUBIA reconstruction as well as BSO with Ciro on 1/7/19. breast wounds debrided last week - she is performing wet--> dry dressing changes. \par \par Postoperatively she presented with erythema and fevers and was admitted to the hospital with cellulitis of the left breast and right abdomen she was treated with IV antibiotics and sent home with p.o. antibiotics she now presents for 6 weeks post op. She continues wet to dry dressings to b/l breast wounds. \par \par Hx of invasive left breast cancer with lymphovascular involvement, subsequently underwent genetic testing and found to be BRCA positive. She completed neoadjuvant chemotherapy (with dr. Carlin) on 11/30/18 (withheld one tx d/t side effects) with Dr. Carlin.

## 2019-02-26 ENCOUNTER — OUTPATIENT (OUTPATIENT)
Dept: OUTPATIENT SERVICES | Facility: HOSPITAL | Age: 64
LOS: 1 days | End: 2019-02-26
Payer: COMMERCIAL

## 2019-02-26 ENCOUNTER — APPOINTMENT (OUTPATIENT)
Dept: PLASTIC SURGERY | Facility: CLINIC | Age: 64
End: 2019-02-26
Payer: COMMERCIAL

## 2019-02-26 DIAGNOSIS — Z01.818 ENCOUNTER FOR OTHER PREPROCEDURAL EXAMINATION: ICD-10-CM

## 2019-02-26 DIAGNOSIS — Z96.641 PRESENCE OF RIGHT ARTIFICIAL HIP JOINT: Chronic | ICD-10-CM

## 2019-02-26 DIAGNOSIS — Z41.9 ENCOUNTER FOR PROCEDURE FOR PURPOSES OTHER THAN REMEDYING HEALTH STATE, UNSPECIFIED: Chronic | ICD-10-CM

## 2019-02-26 DIAGNOSIS — Z98.890 OTHER SPECIFIED POSTPROCEDURAL STATES: Chronic | ICD-10-CM

## 2019-02-26 LAB
ALBUMIN SERPL ELPH-MCNC: 4.2 G/DL — SIGNIFICANT CHANGE UP (ref 3.3–5)
ALP SERPL-CCNC: 90 U/L — SIGNIFICANT CHANGE UP (ref 40–120)
ALT FLD-CCNC: 17 U/L — SIGNIFICANT CHANGE UP (ref 10–45)
ANION GAP SERPL CALC-SCNC: 12 MMOL/L — SIGNIFICANT CHANGE UP (ref 5–17)
APTT BLD: 36.3 SEC — SIGNIFICANT CHANGE UP (ref 27.5–36.3)
AST SERPL-CCNC: 18 U/L — SIGNIFICANT CHANGE UP (ref 10–40)
BASOPHILS # BLD AUTO: 0.03 K/UL — SIGNIFICANT CHANGE UP (ref 0–0.2)
BASOPHILS NFR BLD AUTO: 0.5 % — SIGNIFICANT CHANGE UP (ref 0–2)
BILIRUB SERPL-MCNC: 0.3 MG/DL — SIGNIFICANT CHANGE UP (ref 0.2–1.2)
BUN SERPL-MCNC: 22 MG/DL — SIGNIFICANT CHANGE UP (ref 7–23)
CALCIUM SERPL-MCNC: 10 MG/DL — SIGNIFICANT CHANGE UP (ref 8.4–10.5)
CHLORIDE SERPL-SCNC: 103 MMOL/L — SIGNIFICANT CHANGE UP (ref 96–108)
CO2 SERPL-SCNC: 27 MMOL/L — SIGNIFICANT CHANGE UP (ref 22–31)
CREAT SERPL-MCNC: 0.64 MG/DL — SIGNIFICANT CHANGE UP (ref 0.5–1.3)
EOSINOPHIL # BLD AUTO: 0.23 K/UL — SIGNIFICANT CHANGE UP (ref 0–0.5)
EOSINOPHIL NFR BLD AUTO: 4.1 % — SIGNIFICANT CHANGE UP (ref 0–6)
GLUCOSE SERPL-MCNC: 109 MG/DL — HIGH (ref 70–99)
HCT VFR BLD CALC: 40.4 % — SIGNIFICANT CHANGE UP (ref 34.5–45)
HGB BLD-MCNC: 12.6 G/DL — SIGNIFICANT CHANGE UP (ref 11.5–15.5)
IMM GRANULOCYTES NFR BLD AUTO: 0.4 % — SIGNIFICANT CHANGE UP (ref 0–1.5)
INR BLD: 1.05 — SIGNIFICANT CHANGE UP (ref 0.88–1.16)
LYMPHOCYTES # BLD AUTO: 1.69 K/UL — SIGNIFICANT CHANGE UP (ref 1–3.3)
LYMPHOCYTES # BLD AUTO: 29.9 % — SIGNIFICANT CHANGE UP (ref 13–44)
MCHC RBC-ENTMCNC: 30.2 PG — SIGNIFICANT CHANGE UP (ref 27–34)
MCHC RBC-ENTMCNC: 31.2 GM/DL — LOW (ref 32–36)
MCV RBC AUTO: 96.9 FL — SIGNIFICANT CHANGE UP (ref 80–100)
MONOCYTES # BLD AUTO: 0.69 K/UL — SIGNIFICANT CHANGE UP (ref 0–0.9)
MONOCYTES NFR BLD AUTO: 12.2 % — SIGNIFICANT CHANGE UP (ref 2–14)
NEUTROPHILS # BLD AUTO: 3 K/UL — SIGNIFICANT CHANGE UP (ref 1.8–7.4)
NEUTROPHILS NFR BLD AUTO: 52.9 % — SIGNIFICANT CHANGE UP (ref 43–77)
NRBC # BLD: 0 /100 WBCS — SIGNIFICANT CHANGE UP (ref 0–0)
PLATELET # BLD AUTO: 251 K/UL — SIGNIFICANT CHANGE UP (ref 150–400)
POTASSIUM SERPL-MCNC: 4.3 MMOL/L — SIGNIFICANT CHANGE UP (ref 3.5–5.3)
POTASSIUM SERPL-SCNC: 4.3 MMOL/L — SIGNIFICANT CHANGE UP (ref 3.5–5.3)
PROT SERPL-MCNC: 6.7 G/DL — SIGNIFICANT CHANGE UP (ref 6–8.3)
PROTHROM AB SERPL-ACNC: 11.9 SEC — SIGNIFICANT CHANGE UP (ref 10–12.9)
RBC # BLD: 4.17 M/UL — SIGNIFICANT CHANGE UP (ref 3.8–5.2)
RBC # FLD: 13.4 % — SIGNIFICANT CHANGE UP (ref 10.3–14.5)
SODIUM SERPL-SCNC: 142 MMOL/L — SIGNIFICANT CHANGE UP (ref 135–145)
WBC # BLD: 5.66 K/UL — SIGNIFICANT CHANGE UP (ref 3.8–10.5)
WBC # FLD AUTO: 5.66 K/UL — SIGNIFICANT CHANGE UP (ref 3.8–10.5)

## 2019-02-26 PROCEDURE — 93010 ELECTROCARDIOGRAM REPORT: CPT

## 2019-02-26 PROCEDURE — 99024 POSTOP FOLLOW-UP VISIT: CPT

## 2019-02-26 PROCEDURE — 93005 ELECTROCARDIOGRAM TRACING: CPT

## 2019-02-26 PROCEDURE — 80053 COMPREHEN METABOLIC PANEL: CPT

## 2019-02-26 PROCEDURE — 85025 COMPLETE CBC W/AUTO DIFF WBC: CPT

## 2019-02-26 PROCEDURE — 85730 THROMBOPLASTIN TIME PARTIAL: CPT

## 2019-02-26 PROCEDURE — 85610 PROTHROMBIN TIME: CPT

## 2019-02-26 NOTE — ASSESSMENT
[FreeTextEntry1] : breast wounds healing with secondary granulation \par Will arrange for debridement and closure as outpatient\par Continue Wet-->dry dressing changes with dakins\par F/U in 2 weeks

## 2019-02-26 NOTE — PHYSICAL EXAM
[NI] : Normal [de-identified] : The reconstructed breast wounds are soft the skin islands of the flaps are pink and warm and viable. no evidence of any collection. there is a = 3.5cm x 3cm, wound on both sides with healthy granulation tissue at the base. I gently debrided the small area of fat necrosis and replaced the Wet --> dry dressing on both sides. There is no sign of any infection there is no sign of any cellulitis. [de-identified] : Soft, NT, ND, no masses, no hernias. Umbilicus viable, no cellulitis

## 2019-02-26 NOTE — HISTORY OF PRESENT ILLNESS
[FreeTextEntry1] : 64 yo female presents for postop now s/p BL mastectomy with NUBIA reconstruction as well as BSO with Ciro on 1/7/19. breast wounds debrided last week - she is performing wet--> dry dressing changes. Now wants to schedule debridement and closure of the wounds so that she can progress to radiation without delay. \par \par Postoperatively she presented with erythema and fevers and was admitted to the hospital with cellulitis of the left breast and right abdomen she was treated with IV antibiotics and sent home with p.o. antibiotics she now presents for 6 weeks post op. She continues wet to dry dressings to b/l breast wounds. \par \par Hx of invasive left breast cancer with lymphovascular involvement, subsequently underwent genetic testing and found to be BRCA positive. She completed neoadjuvant chemotherapy (with dr. Carlin) on 11/30/18 (withheld one tx d/t side effects) with Dr. Carlin.

## 2019-03-12 ENCOUNTER — APPOINTMENT (OUTPATIENT)
Dept: PLASTIC SURGERY | Facility: CLINIC | Age: 64
End: 2019-03-12
Payer: COMMERCIAL

## 2019-03-12 PROCEDURE — 12021 TX SUPFC WND DEHSN W/PACKING: CPT | Mod: 58

## 2019-03-12 PROCEDURE — 99024 POSTOP FOLLOW-UP VISIT: CPT

## 2019-03-12 NOTE — REASON FOR VISIT
[Post Op: _________] : a [unfilled] post op visit [FreeTextEntry1] : Follow up: pre operative before surgery

## 2019-03-12 NOTE — ASSESSMENT
[FreeTextEntry1] : breast wounds healing with secondary granulation \par set for debridement and closure as outpatient on 3/15/19\par Continue Wet --> dry with NS\par

## 2019-03-12 NOTE — HISTORY OF PRESENT ILLNESS
[FreeTextEntry1] : 62 y/o F here to discuss debridement and closure of b/l breast wounds which is scheduled for 3/15/19.\par \par s/p BL mastectomy with NUBIA reconstruction as well as BSO with Ciro on 1/7/19. breast wounds debrided last week - she is performing wet--> dry dressing changes. Now wants to schedule debridement and closure of the wounds so that she can progress to radiation without delay. \par \par Postoperatively she presented with erythema and fevers and was admitted to the hospital with cellulitis of the left breast and right abdomen she was treated with IV antibiotics and sent home with p.o. antibiotics she now presents for 6 weeks post op. She continues wet to dry dressings to b/l breast wounds. \par \par Hx of invasive left breast cancer with lymphovascular involvement, subsequently underwent genetic testing and found to be BRCA positive. She completed neoadjuvant chemotherapy (with dr. Carlin) on 11/30/18 (withheld one tx d/t side effects) with Dr. Carlin.

## 2019-03-12 NOTE — PHYSICAL EXAM
[NI] : Normal [de-identified] : The reconstructed breasts are soft the skin islands of the flaps are pink and warm and viable. no evidence of any collection. the wounds have healthy granulation tissue at the base - no purulence or fluctuance. There is no sign of any cellulitis. [de-identified] : Soft, NT, ND, no masses, no hernias. Umbilicus viable, no cellulitis

## 2019-03-15 ENCOUNTER — RESULT REVIEW (OUTPATIENT)
Age: 64
End: 2019-03-15

## 2019-03-15 ENCOUNTER — OUTPATIENT (OUTPATIENT)
Dept: OUTPATIENT SERVICES | Facility: HOSPITAL | Age: 64
LOS: 1 days | Discharge: HOME | End: 2019-03-15
Payer: COMMERCIAL

## 2019-03-15 DIAGNOSIS — Z41.9 ENCOUNTER FOR PROCEDURE FOR PURPOSES OTHER THAN REMEDYING HEALTH STATE, UNSPECIFIED: Chronic | ICD-10-CM

## 2019-03-15 DIAGNOSIS — Z96.641 PRESENCE OF RIGHT ARTIFICIAL HIP JOINT: Chronic | ICD-10-CM

## 2019-03-15 DIAGNOSIS — Z98.890 OTHER SPECIFIED POSTPROCEDURAL STATES: Chronic | ICD-10-CM

## 2019-03-15 PROCEDURE — 14301 TIS TRNFR ANY 30.1-60 SQ CM: CPT | Mod: 58

## 2019-03-15 PROCEDURE — 14302 TIS TRNFR ADDL 30 SQ CM: CPT | Mod: 58

## 2019-03-15 PROCEDURE — 11042 DBRDMT SUBQ TIS 1ST 20SQCM/<: CPT | Mod: 58,59

## 2019-03-15 PROCEDURE — 11045 DBRDMT SUBQ TISS EACH ADDL: CPT | Mod: 59

## 2019-03-19 ENCOUNTER — APPOINTMENT (OUTPATIENT)
Dept: PLASTIC SURGERY | Facility: CLINIC | Age: 64
End: 2019-03-19
Payer: COMMERCIAL

## 2019-03-19 VITALS — TEMPERATURE: 96.6 F

## 2019-03-19 LAB — SURGICAL PATHOLOGY STUDY: SIGNIFICANT CHANGE UP

## 2019-03-19 PROCEDURE — 99024 POSTOP FOLLOW-UP VISIT: CPT

## 2019-03-19 NOTE — SURGICAL HISTORY
[de-identified] : 1-7-19 BL mastectomy (Sandie) and BSO (Adelaida) with NUBIA reconstruction [de-identified] : 3/15/19-LOCAL TISSUE REARRANGEMENT AND WOUND CLOSURE

## 2019-03-19 NOTE — HISTORY OF PRESENT ILLNESS
[FreeTextEntry1] : 62 y/o F presents 4 days s/p local tissue rearrangement with wound closure of b/l breasts. Doing well, pain controlled with ES Tylenol.  Has appointment with Rad Onc - Dr. De Leon mid april\par \par s/p BL mastectomy with NUBIA reconstruction as well as BSO with Ciro on 1/7/19. \par Hx of invasive left breast cancer with lymphovascular involvement, subsequently underwent genetic testing and found to be BRCA positive. She completed neoadjuvant chemotherapy (with Dr. Carlin) on 11/30/18 (withheld one tx d/t side effects) with Dr. Carlin.

## 2019-03-19 NOTE — ASSESSMENT
[FreeTextEntry1] : 4 days PO from b/l local tissue rearrangement and wound closure of breast mounds\par Doing well. \par Reviewed PO instructions, Aquaphor with dry gauze \par Follow up next week with ALEXIA Lynch for suture removal\par RTC in 3 weeks

## 2019-03-19 NOTE — PHYSICAL EXAM
[NI] : Normal [de-identified] : The reconstructed breasts are soft and viable. Normal PO ecchymosis to medial left breast and lateral right breast. Vertical b/l sutures intact. B/L incision lines C/D/I. There is no sign of any cellulitis. [de-identified] : Soft, NT, ND, no masses, no hernias. Umbilicus viable, no cellulitis

## 2019-03-26 ENCOUNTER — OUTPATIENT (OUTPATIENT)
Dept: OUTPATIENT SERVICES | Facility: HOSPITAL | Age: 64
LOS: 1 days | End: 2019-03-26
Payer: COMMERCIAL

## 2019-03-26 ENCOUNTER — APPOINTMENT (OUTPATIENT)
Dept: PLASTIC SURGERY | Facility: CLINIC | Age: 64
End: 2019-03-26
Payer: COMMERCIAL

## 2019-03-26 VITALS
BODY MASS INDEX: 30.56 KG/M2 | SYSTOLIC BLOOD PRESSURE: 133 MMHG | DIASTOLIC BLOOD PRESSURE: 85 MMHG | TEMPERATURE: 97.4 F | WEIGHT: 179 LBS | HEART RATE: 80 BPM | HEIGHT: 64 IN

## 2019-03-26 DIAGNOSIS — Z41.9 ENCOUNTER FOR PROCEDURE FOR PURPOSES OTHER THAN REMEDYING HEALTH STATE, UNSPECIFIED: Chronic | ICD-10-CM

## 2019-03-26 DIAGNOSIS — Z42.1 ENCOUNTER FOR BREAST RECONSTRUCTION FOLLOWING MASTECTOMY: ICD-10-CM

## 2019-03-26 DIAGNOSIS — Z98.890 OTHER SPECIFIED POSTPROCEDURAL STATES: Chronic | ICD-10-CM

## 2019-03-26 DIAGNOSIS — Z96.641 PRESENCE OF RIGHT ARTIFICIAL HIP JOINT: Chronic | ICD-10-CM

## 2019-03-26 LAB
GRAM STN FLD: SIGNIFICANT CHANGE UP
SPECIMEN SOURCE: SIGNIFICANT CHANGE UP

## 2019-03-26 PROCEDURE — 87070 CULTURE OTHR SPECIMN AEROBIC: CPT

## 2019-03-26 PROCEDURE — 99024 POSTOP FOLLOW-UP VISIT: CPT

## 2019-03-26 PROCEDURE — 87075 CULTR BACTERIA EXCEPT BLOOD: CPT

## 2019-03-29 LAB
CULTURE RESULTS: NO GROWTH — SIGNIFICANT CHANGE UP
SPECIMEN SOURCE: SIGNIFICANT CHANGE UP

## 2019-04-01 NOTE — HISTORY OF PRESENT ILLNESS
[FreeTextEntry1] : Here for f/u left breast infection. Has been on bactrim for 1 week\par pt thinks breast is improved but not resolved. states yellow drainage. no f/c/n/v/d\par left breast is still erythematous and indurated but no obvious collection, no purulence\par plan to switch abx from bactrim -> Cipro and send for targeted L breast sono w/ possible aspiration\par monitor for signs of worsening infection, fever, chills \par otherwise f/u in 1 wk

## 2019-04-04 ENCOUNTER — FORM ENCOUNTER (OUTPATIENT)
Age: 64
End: 2019-04-04

## 2019-04-05 ENCOUNTER — APPOINTMENT (OUTPATIENT)
Dept: ULTRASOUND IMAGING | Facility: HOSPITAL | Age: 64
End: 2019-04-05
Payer: COMMERCIAL

## 2019-04-05 ENCOUNTER — OUTPATIENT (OUTPATIENT)
Dept: OUTPATIENT SERVICES | Facility: HOSPITAL | Age: 64
LOS: 1 days | End: 2019-04-05
Payer: COMMERCIAL

## 2019-04-05 DIAGNOSIS — Z41.9 ENCOUNTER FOR PROCEDURE FOR PURPOSES OTHER THAN REMEDYING HEALTH STATE, UNSPECIFIED: Chronic | ICD-10-CM

## 2019-04-05 DIAGNOSIS — Z98.890 OTHER SPECIFIED POSTPROCEDURAL STATES: Chronic | ICD-10-CM

## 2019-04-05 DIAGNOSIS — Z96.641 PRESENCE OF RIGHT ARTIFICIAL HIP JOINT: Chronic | ICD-10-CM

## 2019-04-05 LAB
GRAM STN FLD: SIGNIFICANT CHANGE UP
SPECIMEN SOURCE: SIGNIFICANT CHANGE UP

## 2019-04-05 PROCEDURE — 10160 PNXR ASPIR ABSC HMTMA BULLA: CPT

## 2019-04-05 PROCEDURE — 19000 PUNCTURE ASPIR CYST BREAST: CPT | Mod: LT

## 2019-04-05 PROCEDURE — 76942 ECHO GUIDE FOR BIOPSY: CPT

## 2019-04-05 PROCEDURE — 87070 CULTURE OTHR SPECIMN AEROBIC: CPT

## 2019-04-05 PROCEDURE — 76942 ECHO GUIDE FOR BIOPSY: CPT | Mod: 26,59

## 2019-04-05 PROCEDURE — 19000 PUNCTURE ASPIR CYST BREAST: CPT

## 2019-04-05 PROCEDURE — 76641 ULTRASOUND BREAST COMPLETE: CPT | Mod: 26,LT

## 2019-04-05 PROCEDURE — 87075 CULTR BACTERIA EXCEPT BLOOD: CPT

## 2019-04-05 PROCEDURE — 87205 SMEAR GRAM STAIN: CPT

## 2019-04-05 PROCEDURE — 76641 ULTRASOUND BREAST COMPLETE: CPT

## 2019-04-09 ENCOUNTER — APPOINTMENT (OUTPATIENT)
Dept: PLASTIC SURGERY | Facility: CLINIC | Age: 64
End: 2019-04-09
Payer: COMMERCIAL

## 2019-04-09 VITALS
WEIGHT: 179 LBS | BODY MASS INDEX: 30.56 KG/M2 | HEART RATE: 106 BPM | TEMPERATURE: 96.9 F | DIASTOLIC BLOOD PRESSURE: 82 MMHG | SYSTOLIC BLOOD PRESSURE: 128 MMHG | HEIGHT: 64 IN

## 2019-04-09 PROCEDURE — 99024 POSTOP FOLLOW-UP VISIT: CPT

## 2019-04-10 LAB
CULTURE RESULTS: NO GROWTH — SIGNIFICANT CHANGE UP
SPECIMEN SOURCE: SIGNIFICANT CHANGE UP

## 2019-04-11 ENCOUNTER — APPOINTMENT (OUTPATIENT)
Dept: RADIATION ONCOLOGY | Facility: CLINIC | Age: 64
End: 2019-04-11

## 2019-04-11 NOTE — SURGICAL HISTORY
[de-identified] : 1-7-19 BL mastectomy (Sandie) and BSO (Adelaida) with NUBIA reconstruction [de-identified] : 3/15/19 - LOCAL TISSUE REARRANGEMENT AND WOUND CLOSURE

## 2019-04-11 NOTE — PHYSICAL EXAM
[NI] : Normal [de-identified] : The reconstructed breasts are soft and viable. left breast with blanching erythema no fluctuance, purulence or collection. erythema improved from last week.  [de-identified] : left arm stage II lymphedema

## 2019-04-11 NOTE — HISTORY OF PRESENT ILLNESS
[FreeTextEntry1] : Here for infection follow up. Has been on Ciprofloxacin for 1 week. s/p 30ccs US guided serous drainage from left breast by Dr. Tello (cultures: no growth). No fever/chills/nausea/vomiting. No discharge/drainage from the breast. Experienced left wrist swelling after starting Anastrazole.\par Doing PT at [facility] with Dr. Freedman ([phone])\par \par PMHx: s/p BL mastectomy with NUBIA reconstruction as well as BSO with Ciro on 1/7/19. \par Hx of invasive left breast cancer with lymphovascular involvement, subsequently underwent genetic testing and found to be BRCA positive. She completed neoadjuvant chemotherapy (with Dr. Carlin) on 11/30/18 (withheld one tx d/t side effects) with Dr. Carlin. Adjuvant radiation therapy with Dr. De Leon is recommended (mid April).

## 2019-04-11 NOTE — ASSESSMENT
[FreeTextEntry1] : 3 weeks PO from b/l local tissue rearrangement and wound closure of breast mounds\par Redness improving on Ciprofloxacin. Continue until complete.\par compression, elevation, lymphedema physical therapy recommended \par continue PT\par F/U in 2 weeks\par

## 2019-04-23 NOTE — ASSESSMENT
[FreeTextEntry1] : 2 weeks PO from b/l local tissue rearrangement and wound closure of breast mounds\par Sutures removed in office. Left breast cellulitis\par Start Bactrim DS BID\par start compression, elevation, physical therapy for left arm lymphedema\par F/U in 1 week \par

## 2019-04-23 NOTE — HISTORY OF PRESENT ILLNESS
[FreeTextEntry1] : 62 y/o F presents 2 weeks s/p local tissue rearrangement with wound closure of b/l breasts. Here for suture removal. Reports onset of left breast erythema 2-3 days ago. Denies f/c/n/v/d. Has left arm lymphedema.\par \par Has appointment with Rad Onc - Dr. De Leon mid april. \par \par s/p BL mastectomy with NUBIA reconstruction as well as BSO with Ciro on 1/7/19. \par Hx of invasive left breast cancer with lymphovascular involvement, subsequently underwent genetic testing and found to be BRCA positive. She completed neoadjuvant chemotherapy (with Dr. Carlin) on 11/30/18 (withheld one tx d/t side effects) with Dr. Carlin.

## 2019-04-23 NOTE — PHYSICAL EXAM
[NI] : Normal [de-identified] : stage II left arm lymphedema [de-identified] : The reconstructed breasts are soft and viable. left breast with blanching erythema no fluctuance, purulence or collection. attempted aspiration without success, took culture of the suture line where there was minimal drainage.

## 2019-04-23 NOTE — SURGICAL HISTORY
[de-identified] : 1-7-19 BL mastectomy (Sandie) and BSO (Adelaida) with NUBIA reconstruction [de-identified] : 3/15/19 - LOCAL TISSUE REARRANGEMENT AND WOUND CLOSURE

## 2019-04-23 NOTE — SURGICAL HISTORY
[de-identified] : 3/15/19 - LOCAL TISSUE REARRANGEMENT AND WOUND CLOSURE [de-identified] : 1-7-19 BL mastectomy (Sandie) and BSO (Adelaida) with NUBIA reconstruction

## 2019-04-23 NOTE — PHYSICAL EXAM
[NI] : Normal [de-identified] : The reconstructed breasts are soft and viable. left breast erythema has resolved, no infection [de-identified] : left arm stage II lymphedema

## 2019-04-23 NOTE — HISTORY OF PRESENT ILLNESS
[FreeTextEntry1] : Here c/o persistent lymphedema, not improving with conservative therapy including PT or compression.\par Doing PT at [facility] with Dr. Freedman ([phone]). Is going to f.u with Dr. De Leon re: radiation.\par \par PMHx: s/p BL mastectomy with NUBIA reconstruction as well as BSO with Ciro on 1/7/19. \par Hx of invasive left breast cancer with lymphovascular involvement, subsequently underwent genetic testing and found to be BRCA positive. She completed neoadjuvant chemotherapy (with Dr. Carlin) on 11/30/18 (withheld one tx d/t side effects) with Dr. Carlin. Adjuvant radiation therapy with Dr. De Leon is recommended (mid April).

## 2019-05-07 ENCOUNTER — APPOINTMENT (OUTPATIENT)
Dept: PLASTIC SURGERY | Facility: CLINIC | Age: 64
End: 2019-05-07
Payer: COMMERCIAL

## 2019-05-07 PROCEDURE — 99024 POSTOP FOLLOW-UP VISIT: CPT

## 2019-05-17 ENCOUNTER — APPOINTMENT (OUTPATIENT)
Dept: RADIATION ONCOLOGY | Facility: CLINIC | Age: 64
End: 2019-05-17
Payer: COMMERCIAL

## 2019-05-17 VITALS
WEIGHT: 179 LBS | HEART RATE: 75 BPM | SYSTOLIC BLOOD PRESSURE: 110 MMHG | RESPIRATION RATE: 16 BRPM | DIASTOLIC BLOOD PRESSURE: 77 MMHG | BODY MASS INDEX: 30.73 KG/M2 | OXYGEN SATURATION: 97 %

## 2019-05-17 PROCEDURE — 99215 OFFICE O/P EST HI 40 MIN: CPT | Mod: 25

## 2019-05-17 RX ORDER — SULFAMETHOXAZOLE AND TRIMETHOPRIM 800; 160 MG/1; MG/1
800-160 TABLET ORAL TWICE DAILY
Qty: 20 | Refills: 0 | Status: COMPLETED | COMMUNITY
Start: 2019-03-26 | End: 2019-05-17

## 2019-05-17 RX ORDER — CIPROFLOXACIN HYDROCHLORIDE 500 MG/1
500 TABLET, FILM COATED ORAL TWICE DAILY
Qty: 28 | Refills: 0 | Status: COMPLETED | COMMUNITY
Start: 2019-05-07 | End: 2019-05-17

## 2019-05-17 RX ORDER — OXYCODONE AND ACETAMINOPHEN 5; 325 MG/1; MG/1
5-325 TABLET ORAL
Qty: 40 | Refills: 0 | Status: DISCONTINUED | COMMUNITY
Start: 2019-01-17 | End: 2019-05-17

## 2019-05-17 RX ORDER — SULFAMETHOXAZOLE AND TRIMETHOPRIM 800; 160 MG/1; MG/1
800-160 TABLET ORAL TWICE DAILY
Qty: 20 | Refills: 0 | Status: COMPLETED | COMMUNITY
Start: 2019-01-17 | End: 2019-05-17

## 2019-05-17 NOTE — REVIEW OF SYSTEMS
[Patient Intake Form Reviewed] : Patient intake form was reviewed [Fatigue] : fatigue [Recent Change In Weight] : ~T recent weight change [Loss of Hearing] : loss of hearing [Anxiety] : anxiety [Depression] : depression [Negative] : Allergic/Immunologic [Fever] : no fever [Chills] : no chills [Dysphagia] : no dysphagia [Night Sweats] : no night sweats [Nosebleeds] : no nosebleeds [Joint Pain] : no joint pain [Muscle Weakness] : no muscle weakness [Confused] : no confusion [Muscle Pain] : no muscle pain [Dizziness] : no dizziness [Fainting] : no fainting [Insomnia] : no insomnia [Suicidal] : not suicidal [FreeTextEntry2] : 13 pound weight loss since mastectomy and BSO [FreeTextEntry4] : 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  [FreeTextEntry9] : uses cane to ambulate 2/2 neuropathy [de-identified] : poor sleep initiation and maintenance, anxious and overwhelmed.  [de-identified] : unsteady gait 2/2 neuropathy of bilateral feet

## 2019-05-17 NOTE — PHYSICAL EXAM
[] : no respiratory distress [Respiration, Rhythm And Depth] : normal respiratory rhythm and effort [Exaggerated Use Of Accessory Muscles For Inspiration] : no accessory muscle use [Auscultation Breath Sounds / Voice Sounds] : lungs were clear to auscultation bilaterally [Abdomen Tenderness] : non-tender [Normal] : normal spine exam without palpable tenderness, no kyphosis or scoliosis [Range of Motion to Joints] : range of motion to joints [Musculoskeletal - Swelling] : no joint swelling [Motor Tone] : muscle strength and tone were normal [Oriented To Time, Place, And Person] : oriented to person, place, and time [No Focal Deficits] : no focal deficits [de-identified] : Port-a-cath Right chest.  [de-identified] : Right ear hearing loss (congenital). Congenital right ear deformity.  [de-identified] : Periareolar incisions, healing. Ecchymosis and erythema noted to left breast. Area of firmness to mid inner area of left breast. Mild edema to LUE. [de-identified] : abdominal incisions, well healed. prior SOSA drain sites to lateral abdomen, well healed.  [de-identified] : Uses cane to ambulate [de-identified] : as above in breast section

## 2019-05-17 NOTE — VITALS
[Pain Description/Quality: ___] : Pain description/quality: [unfilled] [Least Pain Intensity: 0/10] : 0/10 [OTC] : OTC [70: Cares for self; unalbe to carry on normal activity or do active work.] : 70: Cares for self; unable to carry on normal activity or do active work. [Date: ____________] : Patient's last distress assessment performed on [unfilled]. [5 - Distress Level] : Distress Level: 5 [FreeTextEntry7] : Patient is anxious and overwhelmed. States she has a support system, declined speaking to therapist or .  [Maximal Pain Intensity: 2/10] : 2/10 [Pain Location: ___] : Pain Location: [unfilled] [Pain Interferes with ADLs] : Pain does not interfere with activities of daily living

## 2019-05-17 NOTE — REVIEW OF SYSTEMS
[Patient Intake Form Reviewed] : Patient intake form was reviewed [Recent Change In Weight] : ~T recent weight change [Fatigue] : fatigue [Loss of Hearing] : loss of hearing [Anxiety] : anxiety [Depression] : depression [Negative] : Allergic/Immunologic [Fever] : no fever [Chills] : no chills [Night Sweats] : no night sweats [Dysphagia] : no dysphagia [Nosebleeds] : no nosebleeds [Joint Pain] : no joint pain [Muscle Pain] : no muscle pain [Muscle Weakness] : no muscle weakness [Confused] : no confusion [Dizziness] : no dizziness [Fainting] : no fainting [Suicidal] : not suicidal [Insomnia] : no insomnia [FreeTextEntry2] : 13 pound weight loss since mastectomy and BSO [FreeTextEntry4] : 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  [de-identified] : unsteady gait 2/2 neuropathy of bilateral feet [de-identified] : poor sleep initiation and maintenance, anxious and overwhelmed.

## 2019-05-17 NOTE — PHYSICAL EXAM
[] : no respiratory distress [Respiration, Rhythm And Depth] : normal respiratory rhythm and effort [Exaggerated Use Of Accessory Muscles For Inspiration] : no accessory muscle use [Auscultation Breath Sounds / Voice Sounds] : lungs were clear to auscultation bilaterally [Abdomen Tenderness] : non-tender [Normal] : normal spine exam without palpable tenderness, no kyphosis or scoliosis [Musculoskeletal - Swelling] : no joint swelling [Range of Motion to Joints] : range of motion to joints [Motor Tone] : muscle strength and tone were normal [No Focal Deficits] : no focal deficits [Oriented To Time, Place, And Person] : oriented to person, place, and time [de-identified] : Right ear hearing loss (congenital). Congenital right ear deformity.  [de-identified] : Port-a-cath Right chest.  [de-identified] : Periareolar incisions, healing. Unable to fully visualize breasts as wound on each breast is packed with wet gauze, covered with gauze, and patient declined removing the gauze/ packing.  [de-identified] : abdominal incisions, healing well. Prior SOSA drain sites on either side of abdomen, healing well.  [de-identified] : as above in breast section [de-identified] : Uses cane to ambulate [de-identified] : tearful at times throughout exam.

## 2019-05-17 NOTE — VITALS
[Pain Description/Quality: ___] : Pain description/quality: [unfilled] [Pain Location: ___] : Pain Location: [unfilled] [OTC] : OTC [70: Cares for self; unalbe to carry on normal activity or do active work.] : 70: Cares for self; unable to carry on normal activity or do active work. [Date: ____________] : Patient's last distress assessment performed on [unfilled]. [5 - Distress Level] : Distress Level: 5 [Maximal Pain Intensity: 4/10] : 4/10 [Least Pain Intensity: 0/10] : 0/10 [FreeTextEntry7] : Patient is anxious and overwhelmed. States she has a support system, declined speaking to therapist or .

## 2019-05-17 NOTE — HISTORY OF PRESENT ILLNESS
[FreeTextEntry1] : SNA- 5/16/19\par Ms. Bernstein returns for further consideration of radiation therapy for LEFT breast cancer. When she was seen on 2/7/19, plan was to tentatively proceed with PMRT after wound healing is complete, and return in 6- 8 weeks. In the interim, she has been following up with Dr. Lerman. On 3/15/19, she had debridement, local tissue arrangement, and wound closure to bilateral breasts. She developed left breast cellulitis; started on a course of Bactrim on 3/26/19, but was then changed to Ciprofloxacin (14 day course) on 4/1/19.\par \par On 4/5/19, she underwent U/S guided FNA of fluid collection 6- 8 o'clock left breast adjacent to lumpectomy scar. \par Noted was 6.0 x 1.5 cm complex fluid collection 6-8 o'clock axis left reconstructed breast most consistent with a postoperative seroma; 30 mL drained. Culture and gram stain were negative. \par \par She last saw Dr. Lerman on 5/7/19; noted to have erythema to left breast, possible early cellulitis. She was started on a 2 week course of Ciprofloxacin. Also advised to continue lymphedema therapy/ PT, and RTC 2 weeks. She last saw Dr. Carlin beginning of April, was started on Anastrozole then. \par \par Today, she reports occasional soreness to left breast upon palpation, more so in an area of firmness at mid inner quadrant. She reports LUE edema and ROM has been improving since working with PT. She states energy level is good and she is working. She will complete course of Ciprofloxacin on 5/21. She notes she will be having mediport removed, likely next week, as per Dr. Carlin. 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 She denies any other c/o to include fever, chills, CP, SOB, N/V/D. \par \par ____________________________________________________\par ONCOLOGIC HISTORY (2/7/19)\par Ms. Julian Bernstein is a 63F, former smoker (10 pack years, quit 1993) referred by Dr. Hooper for consideration of radiation therapy for LEFT breast cancer. \par \par On 4/12/18, she underwent routine mammo-elvin/ sono which showed the following:\par -1.2 cm oval shaped mass with slightly spiculated margins, and an associated group of fine pleomorphic microcalcifications in a linear distribution, in the upper outer LEFT breast, new from prior study and corresponding to a 1.2 x 0.5 x 0.8 cm irregular shaped hypoechoic lesion at 2:00 6cmFN on ultrasound. U/S guided biopsy recommended. \par -Cortically thickened left axillary lymph node with cortical thickening measuring up to 0.8 cm. U/S guided core biopsy advised.\par -Benign findings in right breast. \par \par On 4/23/18, she underwent U/S core biopsy of left breast 2:00 and left prominent axillary lymph node. Pathology revealed the following:\par 1. Left breast, 2 o'clock, 6 cm from nipple; ultrasound guided core biopsy:\par - Infiltrating ductal carcinoma, moderately to poorly differentiated, with focal micropapillary features and scanty mucin production, at\par least 1.1 cm.  - Positive for lymphovascular invasion\par 2. Left axillary lymph node, core biopsy: - Metastatic carcinoma, 0.4 cm focus\par ER positive, HR equivocal, HER-2 negative via FISH. \par \par She initially saw Dr. Wray on 4/30/18, and at the time denied any skin changes, palpable masses, or nipple discharge. Genetic testing was also done at this time, and she was found to be BRCA1 positive with a VUS in the Chek 2 gene.  \par \par MRI breast on 5/15/18 showed the following:\par Right breast- benign findings.\par Left breast- 0.7 x 2.1 x 1.5 cm enhancing mass with an associated microclip at 2:00 6.5cmFN, consistent with patient's known malignancy. A few anterior foci of non-mass enhancement, the most anterior of which is a 0.4 foci of non-mass enhancement, 3 cm anterior to the mass. MRI guided biopsy recommended.   BI-RADS 4. \par \par She saw Dr. Carlin for initial consultation in June 2018 for neoadjuvant chemotherapy. She received ACT from 6/2018 to 11/2018 (one treatment withheld due to side effects- neuropathy). She developed SHRESTHA a couple weeks after completion of systemic therapy; chest CT results consistent with air trapping and and she saw a pulmonologist on 12/21/18.  SHRESTHA resolved on its own. Of note, she was found to have a 4.6 cm RIGHT adnexal mass. She saw Dr. Burris/ Blayne for this; plan was for BSO at time of breast surgery. \par \par On 1/9/19, she underwent bilateral mastectomy, left ALND with bilateral NUBIA flap reconstruction and BSO (Geraldo Hooper, Lerman, Blayne). Pathology showed the following: \par \par 1. [place] lymph nodes x 3, left; biopsy: \par - Macrometastatic carcinoma to one of five lymph nodes (1/5)  - Metastatic focus measures 6 mm\par Note: Sections of a lymph node with metallic clip show mucin pools with scattered scanty residual tumor cells. Negative for\par extracapsular extension.\par 2. [place] lymph node, left; biopsy: 0/1\par 3. Breast, right; mastectomy:- Benign breast tissue, nipple, and skin\par 4. Upper outer flap, right, clip at final margin; excision: - Benign fibroadipose tissue\par 5. Breast, left; mastectomy:\par - Invasive and in situ ductal carcinoma status post neoadjuvant therapy\par - Invasive ductal carcinoma with mucinous features, present predominantly as mucin pools with scattered foci of residual\par tumor cells, spanning up to 9 mm in greatest dimension, location UOQ. \par - Significant response to chemotherapy with tumor cellularity\par - Rare foci of ductal carcinoma in situ (DCIS), solid and cribriform types with treatment related changes, within tumor bed\par area.    - Tumor bed area measures: 1.0 x 0.8 x 0.8 cm\par - Margins:\par Invasive carcinoma:  Posterior: 1 mm (scattered foci < 1mm).   All remaining: >10 mm\par DCIS:   Posterior: 1 mm (single focus).   All remaining: > 10 mm\par - Two biopsy site changes\par - Lymphovascular invasion is not identified\par - The surrounding breast tissue with flat epithelial atypia (FEA), mucocele-like lesion and fibrocystic changes\par - Benign nipple and skin\par \par 6. Axillary content, left; biopsy: 0/22\par 7. Internal mammary lymph node, right; biopsy: 0/1 \par 8. Internal mammary lymph node, left; biopsy: 0/1\par 9. Fallopian tube and ovary, left; salpingo-oophorectomy:\par - Ovary with benign inclusion cysts   - Fallopian tube within normal limits\par 10. 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 She last saw Dr. Carlin last week, no change in plan at this time and she will see her again first week of March. She last saw Dr. Hooper on 1/25/19, at which time she was referred here and advised to follow up with Dr. Carlin.  She should follow up with breast surgeon in six months.  She last saw Dr. Lerman on 2/5/19; SOSA drain removed at the time. She has also started packing breast wounds (one on each breast, left wound larger than right) with wet gauze, changes dressing twice a day. \par \par Today, she reports she has soreness to both breasts, L>R. Discomfort is well controlled with Advil. She denies pain or discomfort to axilla or UE. She continues to have numbness in bilateral feet as a result of chemotherapy, but states it is improving. She uses a cane to ambulate due to feeling unsteady on her feet. She further reports fatigue and a 13 pound weight loss since the mastectomy. She will be starting Physical Therapy soon. She reports being overwhelmed and anxious regarding her health and having to pack the breast wounds. She is currently not working (she is a ), but plans to return in four weeks. She lives on her own, but one her sons lives a few blocks away. 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 \par \par Family history notable for paternal grandmother dx in her 40's with breast cancer, mother dx with uterine vs ovarian cancer at age 48 and passed away from cancer.

## 2019-05-21 ENCOUNTER — APPOINTMENT (OUTPATIENT)
Dept: PLASTIC SURGERY | Facility: CLINIC | Age: 64
End: 2019-05-21
Payer: COMMERCIAL

## 2019-05-21 DIAGNOSIS — Z42.1 ENCOUNTER FOR BREAST RECONSTRUCTION FOLLOWING MASTECTOMY: ICD-10-CM

## 2019-05-21 PROCEDURE — 99024 POSTOP FOLLOW-UP VISIT: CPT

## 2019-05-21 NOTE — SURGICAL HISTORY
[de-identified] : 1-7-19 BL mastectomy (Sandie) and BSO (Adelaida) with NUBIA reconstruction [de-identified] : 3/15/19 - LOCAL TISSUE REARRANGEMENT AND WOUND CLOSURE

## 2019-05-21 NOTE — ASSESSMENT
[FreeTextEntry1] : Continue OT/PT\par Start XRT\par No infection --> Has lymphedema\par Will need revision surgery and excision of fat necrosis after radiation will have to wait at least 6 months after XRT before any surgery\par F/U in 3 months

## 2019-05-21 NOTE — PHYSICAL EXAM
[NI] : Normal [de-identified] : Right breast soft and well healed. Left breast still has hyperemia, telangiectasias and erythema no fluctuance, purulence or collection - most likely related to lymphedema and not infection. Has 3cm x 4cm fat necrosis lower inner quadrant.  [de-identified] : left arm stage II lymphedema

## 2019-05-21 NOTE — HISTORY OF PRESENT ILLNESS
[FreeTextEntry1] : 64 y/o F presents 10 weeks s/p local tissue rearrangement with wound closure of b/l breasts. Possible left breast early cellulitis she completed a 2 week course of Cipro today, 5/21/19. To note, she is undergoing chemo port removal on 5/23/19. No f/c/n/v. Left arm lymphedema improving with PT and is getting set up with a lymphatic pump for home. She followed up with Dr. De Leon (Rad Onc) and plans proceed with post mastectomy irradiation to the chest wall and regional lymph nodes (LEFT). She is going for simulation on 6/3/19. Doing PT at [facility] with Dr. Freedman ([phone])

## 2019-05-23 ENCOUNTER — OUTPATIENT (OUTPATIENT)
Dept: OUTPATIENT SERVICES | Facility: HOSPITAL | Age: 64
LOS: 1 days | End: 2019-05-23
Payer: COMMERCIAL

## 2019-05-23 ENCOUNTER — APPOINTMENT (OUTPATIENT)
Dept: INTERVENTIONAL RADIOLOGY/VASCULAR | Facility: HOSPITAL | Age: 64
End: 2019-05-23

## 2019-05-23 DIAGNOSIS — Z41.9 ENCOUNTER FOR PROCEDURE FOR PURPOSES OTHER THAN REMEDYING HEALTH STATE, UNSPECIFIED: Chronic | ICD-10-CM

## 2019-05-23 DIAGNOSIS — Z98.890 OTHER SPECIFIED POSTPROCEDURAL STATES: Chronic | ICD-10-CM

## 2019-05-23 DIAGNOSIS — Z96.641 PRESENCE OF RIGHT ARTIFICIAL HIP JOINT: Chronic | ICD-10-CM

## 2019-05-23 PROCEDURE — 36590 REMOVAL TUNNELED CV CATH: CPT

## 2019-06-07 NOTE — PHYSICAL EXAM
[NI] : Normal [de-identified] : The reconstructed breasts are soft and viable. left breast with blanching erythema no fluctuance, purulence or collection [de-identified] : left arm stage II lymphedema

## 2019-06-07 NOTE — HISTORY OF PRESENT ILLNESS
[FreeTextEntry1] : 62 y/o F presents 7 wks PO s/p local tissue rearrangement with wound closure of b/l breasts. REports onset of left breast erythema 2-3 days ago. No f/c/n/v. Left arm lymphedema improving with PT.\par Radiation oncology appointment was deferred due to scheduling issues. Next appt is 5/17/19.\par Doing PT at [facility] with Dr. Freedman ([phone])\par \par PMHx: s/p BL mastectomy with NUBIA reconstruction as well as BSO with Ciro on 1/7/19. \par Hx of invasive left breast cancer with lymphovascular involvement, subsequently underwent genetic testing and found to be BRCA positive. She completed neoadjuvant chemotherapy (with Dr. Carlin) on 11/30/18 (withheld one tx d/t side effects) with Dr. Carlin. Adjuvant radiation therapy with Dr. De Leon is recommended (mid April).

## 2019-06-07 NOTE — SURGICAL HISTORY
[de-identified] : 1-7-19 BL mastectomy (Sandie) and BSO (Adelaida) with NUBIA reconstruction [de-identified] : 3/15/19 - LOCAL TISSUE REARRANGEMENT AND WOUND CLOSURE

## 2019-06-07 NOTE — ASSESSMENT
[FreeTextEntry1] : 7 weeks PO from b/l local tissue rearrangement and wound closure of breast mounds on 3/15/19 \par ?Left breast early cellulitis\par start 2 week course of Cipro\par continue PT & lymphatic massage \par F/U in 2 weeks\par

## 2019-06-26 NOTE — HISTORY OF PRESENT ILLNESS
[FreeTextEntry1] : 6/26/19- OTV- Ms. Bernstein has completed 1600 cGy/ 5000 cGy to the LEFT chest wall and nodes.  Today, she reports she is feeling well. Her PCP refilled her Valium and she is taking 1/2 tab prior to RT. Has mild LUE lymphedema that is improving. She is working with PT and also doing daily home exercises. She otherwise feels well; denies breast or CW pain/ soreness, dysphagia, CP, SOB, skin irritation. Using Aquaphor daily.\par \par 6/19/19 - OTV - Ms. Bernstein has completed 600 cGy/ 5000 cGy to the LEFT chest wall and nodes.  Today, she reports she is anxious about treatment. She has Valium that was prescribed by her PCP, which she took prior to her first treatment. She states this was helpful, but she is almost out of Valium and will call her PCP for a refill. She otherwise feels well; denies breast or CW pain/ soreness, edema, dysphagia, CP, SOB, skin irritation. Using Aquaphor daily. \par \par \par ____________________________________________________\par ONCOLOGIC HISTORY (2/7/19)\par Ms. Julian Bernstein is a 63F, former smoker (10 pack years, quit 1993) referred by Dr. Hooper for consideration of radiation therapy for LEFT breast cancer. \par \par On 4/12/18, she underwent routine mammo-elvin/ sono which showed the following:\par -1.2 cm oval shaped mass with slightly spiculated margins, and an associated group of fine pleomorphic microcalcifications in a linear distribution, in the upper outer LEFT breast, new from prior study and corresponding to a 1.2 x 0.5 x 0.8 cm irregular shaped hypoechoic lesion at 2:00 6cmFN on ultrasound. U/S guided biopsy recommended. \par -Cortically thickened left axillary lymph node with cortical thickening measuring up to 0.8 cm. U/S guided core biopsy advised.\par -Benign findings in right breast. \par \par On 4/23/18, she underwent U/S core biopsy of left breast 2:00 and left prominent axillary lymph node. Pathology revealed the following:\par 1. Left breast, 2 o'clock, 6 cm from nipple; ultrasound guided core biopsy:\par - Infiltrating ductal carcinoma, moderately to poorly differentiated, with focal micropapillary features and scanty mucin production, at\par least 1.1 cm.  - Positive for lymphovascular invasion\par 2. Left axillary lymph node, core biopsy: - Metastatic carcinoma, 0.4 cm focus\par ER positive, HR equivocal, HER-2 negative via FISH. \par \par She initially saw Dr. Wray on 4/30/18, and at the time denied any skin changes, palpable masses, or nipple discharge. Genetic testing was also done at this time, and she was found to be BRCA1 positive with a VUS in the Chek 2 gene.  \par \par MRI breast on 5/15/18 showed the following:\par Right breast- benign findings.\par Left breast- 0.7 x 2.1 x 1.5 cm enhancing mass with an associated microclip at 2:00 6.5cmFN, consistent with patient's known malignancy. A few anterior foci of non-mass enhancement, the most anterior of which is a 0.4 foci of non-mass enhancement, 3 cm anterior to the mass. MRI guided biopsy recommended.   BI-RADS 4. \par \par She saw Dr. Carlin for initial consultation in June 2018 for neoadjuvant chemotherapy. She received ACT from 6/2018 to 11/2018 (one treatment withheld due to side effects- neuropathy). She developed SHRESTHA a couple weeks after completion of systemic therapy; chest CT results consistent with air trapping and and she saw a pulmonologist on 12/21/18.  SHRESTHA resolved on its own. Of note, she was found to have a 4.6 cm RIGHT adnexal mass. She saw Dr. Burris/ Blayne for this; plan was for BSO at time of breast surgery. \par \par On 1/9/19, she underwent bilateral mastectomy, left ALND with bilateral NUBIA flap reconstruction and BSO (Geraldo Hooper, Lerman, Blayne). Pathology showed the following: \par \par 1. [place] lymph nodes x 3, left; biopsy: \par - Macrometastatic carcinoma to one of five lymph nodes (1/5)  - Metastatic focus measures 6 mm\par Note: Sections of a lymph node with metallic clip show mucin pools with scattered scanty residual tumor cells. Negative for\par extracapsular extension.\par 2. [place] lymph node, left; biopsy: 0/1\par 3. Breast, right; mastectomy:- Benign breast tissue, nipple, and skin\par 4. Upper outer flap, right, clip at final margin; excision: - Benign fibroadipose tissue\par 5. Breast, left; mastectomy:\par - Invasive and in situ ductal carcinoma status post neoadjuvant therapy\par - Invasive ductal carcinoma with mucinous features, present predominantly as mucin pools with scattered foci of residual\par tumor cells, spanning up to 9 mm in greatest dimension, location UOQ. \par - Significant response to chemotherapy with tumor cellularity\par - Rare foci of ductal carcinoma in situ (DCIS), solid and cribriform types with treatment related changes, within tumor bed\par area.    - Tumor bed area measures: 1.0 x 0.8 x 0.8 cm\par - Margins:\par Invasive carcinoma:  Posterior: 1 mm (scattered foci < 1mm).   All remaining: >10 mm\par DCIS:   Posterior: 1 mm (single focus).   All remaining: > 10 mm\par - Two biopsy site changes\par - Lymphovascular invasion is not identified\par - The surrounding breast tissue with flat epithelial atypia (FEA), mucocele-like lesion and fibrocystic changes\par - Benign nipple and skin\par \par 6. Axillary content, left; biopsy: 0/22\par 7. Internal mammary lymph node, right; biopsy: 0/1 \par 8. Internal mammary lymph node, left; biopsy: 0/1\par 9. Fallopian tube and ovary, left; salpingo-oophorectomy:\par - Ovary with benign inclusion cysts   - Fallopian tube within normal limits\par 10. 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 She last saw Dr. Carlin last week, no change in plan at this time and she will see her again first week of March. She last saw Dr. Hooper on 1/25/19, at which time she was referred here and advised to follow up with Dr. Carlin.  She should follow up with breast surgeon in six months.  She last saw Dr. Lerman on 2/5/19; SOSA drain removed at the time. She has also started packing breast wounds (one on each breast, left wound larger than right) with wet gauze, changes dressing twice a day. \par \par Today, she reports she has soreness to both breasts, L>R. Discomfort is well controlled with Advil. She denies pain or discomfort to axilla or UE. She continues to have numbness in bilateral feet as a result of chemotherapy, but states it is improving. She uses a cane to ambulate due to feeling unsteady on her feet. She further reports fatigue and a 13 pound weight loss since the mastectomy. She will be starting Physical Therapy soon. She reports being overwhelmed and anxious regarding her health and having to pack the breast wounds. She is currently not working (she is a ), but plans to return in four weeks. She lives on her own, but one her sons lives a few blocks away. 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 \par \par Family history notable for paternal grandmother dx in her 40's with breast cancer, mother dx with uterine vs ovarian cancer at age 48 and passed away from cancer. \par \par [facility]- 5/16/19\par Ms. Bernstein returns for further consideration of radiation therapy for LEFT breast cancer. When she was seen on 2/7/19, plan was to tentatively proceed with PMRT after wound healing is complete, and return in 6- 8 weeks. In the interim, she has been following up with Dr. Lerman. On 3/15/19, she had debridement, local tissue arrangement, and wound closure to bilateral breasts. She developed left breast cellulitis; started on a course of Bactrim on 3/26/19, but was then changed to Ciprofloxacin (14 day course) on 4/1/19.\par \par On 4/5/19, she underwent U/S guided FNA of fluid collection 6- 8 o'clock left breast adjacent to lumpectomy scar. \par Noted was 6.0 x 1.5 cm complex fluid collection 6-8 o'clock axis left reconstructed breast most consistent with a postoperative seroma; 30 mL drained. Culture and gram stain were negative. \par \par She last saw Dr. Lerman on 5/7/19; noted to have erythema to left breast, possible early cellulitis. She was started on a 2 week course of Ciprofloxacin. Also advised to continue lymphedema therapy/ PT, and RTC 2 weeks. She last saw Dr. Carlin beginning of April, was started on Anastrozole then. \par \par Today, she reports occasional soreness to left breast upon palpation, more so in an area of firmness at mid inner quadrant. She reports LUE edema and ROM has been improving since working with PT. She states energy level is good and she is working. She will complete course of Ciprofloxacin on 5/21. She notes she will be having mediport removed, likely next week, as per Dr. Carlin. 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 She denies any other c/o to include fever, chills, CP, SOB, N/V/D. \par

## 2019-06-26 NOTE — VITALS
[Maximal Pain Intensity: 0/10] : 0/10 [Least Pain Intensity: 0/10] : 0/10 [OTC] : OTC [70: Cares for self; unalbe to carry on normal activity or do active work.] : 70: Cares for self; unable to carry on normal activity or do active work. [Pain Interferes with ADLs] : Pain does not interfere with activities of daily living

## 2019-06-26 NOTE — DISEASE MANAGEMENT
[Pathological] : TNM Stage: p [IIB] : IIB [TTNM] : 1 [NTNM] : 1 [MTNM] : 0 [de-identified] : 1600 cGy [de-identified] : 5000 cGy [de-identified] : left chest wall and nodes

## 2019-06-26 NOTE — REVIEW OF SYSTEMS
[Patient Intake Form Reviewed] : Patient intake form was reviewed [Fatigue] : fatigue [Recent Change In Weight] : ~T recent weight change [Loss of Hearing] : loss of hearing [Anxiety] : anxiety [Depression] : depression [Negative] : Allergic/Immunologic [Fatigue: Grade 0] : Fatigue: Grade 0 [Breast Pain: Grade 0] : Breast Pain: Grade 0 [Skin Hyperpigmentation: Grade 0] : Skin Hyperpigmentation: Grade 0 [Dermatitis Radiation: Grade 0] : Dermatitis Radiation: Grade 0 [Fever] : no fever [Chills] : no chills [Night Sweats] : no night sweats [Dysphagia] : no dysphagia [Nosebleeds] : no nosebleeds [Joint Pain] : no joint pain [Muscle Pain] : no muscle pain [Muscle Weakness] : no muscle weakness [Confused] : no confusion [Dizziness] : no dizziness [Fainting] : no fainting [Suicidal] : not suicidal [Insomnia] : no insomnia [FreeTextEntry2] : 13 pound weight loss since mastectomy and BSO [FreeTextEntry4] : 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  [de-identified] : unsteady gait 2/2 neuropathy of bilateral feet [de-identified] : poor sleep initiation and maintenance, anxious and overwhelmed.

## 2019-06-26 NOTE — PHYSICAL EXAM
[Sclera] : the sclera and conjunctiva were normal [Outer Ear] : the ears and nose were normal in appearance [Extraocular Movements] : extraocular movements were intact [Examination Of The Oral Cavity] : the lips and gums were normal [] : no respiratory distress [Respiration, Rhythm And Depth] : normal respiratory rhythm and effort [Exaggerated Use Of Accessory Muscles For Inspiration] : no accessory muscle use [Auscultation Breath Sounds / Voice Sounds] : lungs were clear to auscultation bilaterally [Abdomen Tenderness] : non-tender [Normal] : no palpable adenopathy [Range of Motion to Joints] : range of motion to joints [Musculoskeletal - Swelling] : no joint swelling [Motor Tone] : muscle strength and tone were normal [No Focal Deficits] : no focal deficits [Oriented To Time, Place, And Person] : oriented to person, place, and time [de-identified] : Right ear hearing loss (congenital). Congenital right ear deformity.  [de-identified] : Port-a-cath Right chest.  [de-identified] : Periareolar incisions, well healed.  Area of firmness to mid inner area of left breast. Mild edema to LUE, imporving. No erythema or hyperpigmentation noted. [de-identified] : abdominal incisions, well healed. prior SOSA drain sites to lateral abdomen, well healed.  [de-identified] : Uses cane to ambulate.  [de-identified] : as above in breast section [de-identified] : Anxious.

## 2019-06-28 NOTE — VITALS
[Least Pain Intensity: 0/10] : 0/10 [OTC] : OTC [70: Cares for self; unalbe to carry on normal activity or do active work.] : 70: Cares for self; unable to carry on normal activity or do active work. [Maximal Pain Intensity: 0/10] : 0/10 [Pain Interferes with ADLs] : Pain does not interfere with activities of daily living

## 2019-06-28 NOTE — DISEASE MANAGEMENT
[Pathological] : TNM Stage: p [IIB] : IIB [NTNM] : 1 [TTNM] : 1 [MTNM] : 0 [de-identified] : 5000 cGy [de-identified] : left chest wall and nodes [de-identified] : 600 cGy

## 2019-06-28 NOTE — PHYSICAL EXAM
[] : no respiratory distress [Respiration, Rhythm And Depth] : normal respiratory rhythm and effort [Exaggerated Use Of Accessory Muscles For Inspiration] : no accessory muscle use [Auscultation Breath Sounds / Voice Sounds] : lungs were clear to auscultation bilaterally [Abdomen Tenderness] : non-tender [Normal] : no palpable adenopathy [Musculoskeletal - Swelling] : no joint swelling [Range of Motion to Joints] : range of motion to joints [Motor Tone] : muscle strength and tone were normal [No Focal Deficits] : no focal deficits [Oriented To Time, Place, And Person] : oriented to person, place, and time [Sclera] : the sclera and conjunctiva were normal [Examination Of The Oral Cavity] : the lips and gums were normal [Extraocular Movements] : extraocular movements were intact [Outer Ear] : the ears and nose were normal in appearance [de-identified] : Right ear hearing loss (congenital). Congenital right ear deformity.  [de-identified] : Port-a-cath Right chest.  [de-identified] : Uses cane to ambulate.  [de-identified] : abdominal incisions, well healed. prior SOSA drain sites to lateral abdomen, well healed.  [de-identified] : Periareolar incisions, well healed.  Area of firmness to mid inner area of left breast. Mild edema to LUE. No erythema or hyperpigmentation noted. [de-identified] : Anxious. [de-identified] : as above in breast section

## 2019-06-28 NOTE — HISTORY OF PRESENT ILLNESS
[FreeTextEntry1] : 6/19/19 - OTV - Ms. Bernstein has completed 600 cGy/ 5000 cGy to the LEFT chest wall and nodes.  Today, she reports she is anxious about treatment. She has Valium that was prescribed by her PCP, which she took prior to her first treatment. She states this was helpful, but she is almost out of Valium and will call her PCP for a refill. She otherwise feels well; denies breast or CW pain/ soreness, edema, dysphagia, CP, SOB, skin irritation. Using Aquaphor daily. \par \par \par ____________________________________________________\par ONCOLOGIC HISTORY (2/7/19)\par Ms. Julian Bernstein is a 63F, former smoker (10 pack years, quit 1993) referred by Dr. Hooper for consideration of radiation therapy for LEFT breast cancer. \par \par On 4/12/18, she underwent routine mammo-elvin/ sono which showed the following:\par -1.2 cm oval shaped mass with slightly spiculated margins, and an associated group of fine pleomorphic microcalcifications in a linear distribution, in the upper outer LEFT breast, new from prior study and corresponding to a 1.2 x 0.5 x 0.8 cm irregular shaped hypoechoic lesion at 2:00 6cmFN on ultrasound. U/S guided biopsy recommended. \par -Cortically thickened left axillary lymph node with cortical thickening measuring up to 0.8 cm. U/S guided core biopsy advised.\par -Benign findings in right breast. \par \par On 4/23/18, she underwent U/S core biopsy of left breast 2:00 and left prominent axillary lymph node. Pathology revealed the following:\par 1. Left breast, 2 o'clock, 6 cm from nipple; ultrasound guided core biopsy:\par - Infiltrating ductal carcinoma, moderately to poorly differentiated, with focal micropapillary features and scanty mucin production, at\par least 1.1 cm.  - Positive for lymphovascular invasion\par 2. Left axillary lymph node, core biopsy: - Metastatic carcinoma, 0.4 cm focus\par ER positive, HR equivocal, HER-2 negative via FISH. \par \par She initially saw Dr. Wray on 4/30/18, and at the time denied any skin changes, palpable masses, or nipple discharge. Genetic testing was also done at this time, and she was found to be BRCA1 positive with a VUS in the Chek 2 gene.  \par \par MRI breast on 5/15/18 showed the following:\par Right breast- benign findings.\par Left breast- 0.7 x 2.1 x 1.5 cm enhancing mass with an associated microclip at 2:00 6.5cmFN, consistent with patient's known malignancy. A few anterior foci of non-mass enhancement, the most anterior of which is a 0.4 foci of non-mass enhancement, 3 cm anterior to the mass. MRI guided biopsy recommended.   BI-RADS 4. \par \par She saw Dr. Carlin for initial consultation in June 2018 for neoadjuvant chemotherapy. She received ACT from 6/2018 to 11/2018 (one treatment withheld due to side effects- neuropathy). She developed SHRESTHA a couple weeks after completion of systemic therapy; chest CT results consistent with air trapping and and she saw a pulmonologist on 12/21/18.  SHRESTHA resolved on its own. Of note, she was found to have a 4.6 cm RIGHT adnexal mass. She saw Dr. Burris/ Blayne for this; plan was for BSO at time of breast surgery. \par \par On 1/9/19, she underwent bilateral mastectomy, left ALND with bilateral NUBIA flap reconstruction and BSO (Geraldo Hooper, Lerman, Blayne). Pathology showed the following: \par \par 1. [place] lymph nodes x 3, left; biopsy: \par - Macrometastatic carcinoma to one of five lymph nodes (1/5)  - Metastatic focus measures 6 mm\par Note: Sections of a lymph node with metallic clip show mucin pools with scattered scanty residual tumor cells. Negative for\par extracapsular extension.\par 2. [place] lymph node, left; biopsy: 0/1\par 3. Breast, right; mastectomy:- Benign breast tissue, nipple, and skin\par 4. Upper outer flap, right, clip at final margin; excision: - Benign fibroadipose tissue\par 5. Breast, left; mastectomy:\par - Invasive and in situ ductal carcinoma status post neoadjuvant therapy\par - Invasive ductal carcinoma with mucinous features, present predominantly as mucin pools with scattered foci of residual\par tumor cells, spanning up to 9 mm in greatest dimension, location UOQ. \par - Significant response to chemotherapy with tumor cellularity\par - Rare foci of ductal carcinoma in situ (DCIS), solid and cribriform types with treatment related changes, within tumor bed\par area.    - Tumor bed area measures: 1.0 x 0.8 x 0.8 cm\par - Margins:\par Invasive carcinoma:  Posterior: 1 mm (scattered foci < 1mm).   All remaining: >10 mm\par DCIS:   Posterior: 1 mm (single focus).   All remaining: > 10 mm\par - Two biopsy site changes\par - Lymphovascular invasion is not identified\par - The surrounding breast tissue with flat epithelial atypia (FEA), mucocele-like lesion and fibrocystic changes\par - Benign nipple and skin\par \par 6. Axillary content, left; biopsy: 0/22\par 7. Internal mammary lymph node, right; biopsy: 0/1 \par 8. Internal mammary lymph node, left; biopsy: 0/1\par 9. Fallopian tube and ovary, left; salpingo-oophorectomy:\par - Ovary with benign inclusion cysts   - Fallopian tube within normal limits\par 10. 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 She last saw Dr. Carlin last week, no change in plan at this time and she will see her again first week of March. She last saw Dr. Hooper on 1/25/19, at which time she was referred here and advised to follow up with Dr. Carlin.  She should follow up with breast surgeon in six months.  She last saw Dr. Lerman on 2/5/19; SOSA drain removed at the time. She has also started packing breast wounds (one on each breast, left wound larger than right) with wet gauze, changes dressing twice a day. \par \par Today, she reports she has soreness to both breasts, L>R. Discomfort is well controlled with Advil. She denies pain or discomfort to axilla or UE. She continues to have numbness in bilateral feet as a result of chemotherapy, but states it is improving. She uses a cane to ambulate due to feeling unsteady on her feet. She further reports fatigue and a 13 pound weight loss since the mastectomy. She will be starting Physical Therapy soon. She reports being overwhelmed and anxious regarding her health and having to pack the breast wounds. She is currently not working (she is a ), but plans to return in four weeks. She lives on her own, but one her sons lives a few blocks away. 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 \par \par Family history notable for paternal grandmother dx in her 40's with breast cancer, mother dx with uterine vs ovarian cancer at age 48 and passed away from cancer. \par \par [facility]- 5/16/19\par Ms. Bernstein returns for further consideration of radiation therapy for LEFT breast cancer. When she was seen on 2/7/19, plan was to tentatively proceed with PMRT after wound healing is complete, and return in 6- 8 weeks. In the interim, she has been following up with Dr. Lerman. On 3/15/19, she had debridement, local tissue arrangement, and wound closure to bilateral breasts. She developed left breast cellulitis; started on a course of Bactrim on 3/26/19, but was then changed to Ciprofloxacin (14 day course) on 4/1/19.\par \par On 4/5/19, she underwent U/S guided FNA of fluid collection 6- 8 o'clock left breast adjacent to lumpectomy scar. \par Noted was 6.0 x 1.5 cm complex fluid collection 6-8 o'clock axis left reconstructed breast most consistent with a postoperative seroma; 30 mL drained. Culture and gram stain were negative. \par \par She last saw Dr. Lerman on 5/7/19; noted to have erythema to left breast, possible early cellulitis. She was started on a 2 week course of Ciprofloxacin. Also advised to continue lymphedema therapy/ PT, and RTC 2 weeks. She last saw Dr. Carlin beginning of April, was started on Anastrozole then. \par \par Today, she reports occasional soreness to left breast upon palpation, more so in an area of firmness at mid inner quadrant. She reports LUE edema and ROM has been improving since working with PT. She states energy level is good and she is working. She will complete course of Ciprofloxacin on 5/21. She notes she will be having mediport removed, likely next week, as per Dr. Carlin. 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 She denies any other c/o to include fever, chills, CP, SOB, N/V/D. \par

## 2019-06-28 NOTE — REVIEW OF SYSTEMS
[Patient Intake Form Reviewed] : Patient intake form was reviewed [Fatigue] : fatigue [Recent Change In Weight] : ~T recent weight change [Loss of Hearing] : loss of hearing [Anxiety] : anxiety [Depression] : depression [Negative] : Allergic/Immunologic [Fever] : no fever [Night Sweats] : no night sweats [Chills] : no chills [Joint Pain] : no joint pain [Nosebleeds] : no nosebleeds [Dysphagia] : no dysphagia [Muscle Weakness] : no muscle weakness [Muscle Pain] : no muscle pain [Dizziness] : no dizziness [Confused] : no confusion [Fainting] : no fainting [FreeTextEntry2] : 13 pound weight loss since mastectomy and BSO [Suicidal] : not suicidal [Insomnia] : no insomnia [FreeTextEntry4] : congenital right ear deformity and hearing loss [FreeTextEntry9] : uses cane to ambulate 2/2 neuropathy [FreeTextEntry7] : Abdominal soreness, abdominal incisions [de-identified] : poor sleep initiation and maintenance, anxious and overwhelmed.  [de-identified] : unsteady gait 2/2 neuropathy of bilateral feet [de-identified] : s/p bilateral mastectomy, NUBIA flap reconstruction and BSO- incisions to abdomen and bilateral breasts.

## 2019-07-05 NOTE — REVIEW OF SYSTEMS
[Fatigue: Grade 0] : Fatigue: Grade 0 [Breast Pain: Grade 0] : Breast Pain: Grade 0 [Skin Hyperpigmentation: Grade 0] : Skin Hyperpigmentation: Grade 0 [Patient Intake Form Reviewed] : Patient intake form was reviewed [Fatigue] : fatigue [Recent Change In Weight] : ~T recent weight change [Loss of Hearing] : loss of hearing [Anxiety] : anxiety [Depression] : depression [Negative] : Allergic/Immunologic [Dermatitis Radiation: Grade 1 - Faint erythema or dry desquamation] : Dermatitis Radiation: Grade 1 - Faint erythema or dry desquamation [Fever] : no fever [Chills] : no chills [Night Sweats] : no night sweats [Dysphagia] : no dysphagia [Nosebleeds] : no nosebleeds [Joint Pain] : no joint pain [Muscle Pain] : no muscle pain [Muscle Weakness] : no muscle weakness [Confused] : no confusion [Dizziness] : no dizziness [Fainting] : no fainting [Suicidal] : not suicidal [Insomnia] : no insomnia [FreeTextEntry2] : 13 pound weight loss since mastectomy and BSO [FreeTextEntry4] : 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  [de-identified] : unsteady gait 2/2 neuropathy of bilateral feet [de-identified] : poor sleep initiation and maintenance, anxious and overwhelmed.

## 2019-07-05 NOTE — PHYSICAL EXAM
[Sclera] : the sclera and conjunctiva were normal [Extraocular Movements] : extraocular movements were intact [Outer Ear] : the ears and nose were normal in appearance [Examination Of The Oral Cavity] : the lips and gums were normal [] : no respiratory distress [Respiration, Rhythm And Depth] : normal respiratory rhythm and effort [Exaggerated Use Of Accessory Muscles For Inspiration] : no accessory muscle use [Auscultation Breath Sounds / Voice Sounds] : lungs were clear to auscultation bilaterally [Abdomen Tenderness] : non-tender [Normal] : no palpable adenopathy [Musculoskeletal - Swelling] : no joint swelling [Range of Motion to Joints] : range of motion to joints [Motor Tone] : muscle strength and tone were normal [No Focal Deficits] : no focal deficits [Oriented To Time, Place, And Person] : oriented to person, place, and time [de-identified] : Right ear hearing loss (congenital). Congenital right ear deformity.  [de-identified] : Port-a-cath Right chest.  [de-identified] : Periareolar incisions, well healed.  Area of firmness to mid inner area of left breast. Mild edema to LUE, improving. Faint erythema to superior aspect of left chest, no desquamation noted.  [de-identified] : abdominal incisions, well healed. prior SOSA drain sites to lateral abdomen, well healed.  [de-identified] : Uses cane to ambulate.  [de-identified] : as above in breast section [de-identified] : Anxious.

## 2019-07-05 NOTE — HISTORY OF PRESENT ILLNESS
[FreeTextEntry1] : 7/3/19- OTV- Ms. Bernstein has completed 2600 cGy/ 5000 cGy to the LEFT chest wall and nodes.  Today, she reports she is feeling well. She is working with PT for mild LUE lymphedema, which is improving.  She denies breast or CW pain/ soreness, dysphagia, CP, SOB. She notes mild erythema to chest, but no irritation. Using Aquaphor daily.\par \par \par 6/26/19- OTV- Ms. Bernstein has completed 1600 cGy/ 5000 cGy to the LEFT chest wall and nodes.  Today, she reports she is feeling well. Her PCP refilled her Valium and she is taking 1/2 tab prior to RT. Has mild LUE lymphedema that is improving. She is working with PT and also doing daily home exercises. She otherwise feels well; denies breast or CW pain/ soreness, dysphagia, CP, SOB, skin irritation. Using Aquaphor daily.\par \par 6/19/19 - OTV - Ms. Bernstein has completed 600 cGy/ 5000 cGy to the LEFT chest wall and nodes.  Today, she reports she is anxious about treatment. She has Valium that was prescribed by her PCP, which she took prior to her first treatment. She states this was helpful, but she is almost out of Valium and will call her PCP for a refill. She otherwise feels well; denies breast or CW pain/ soreness, edema, dysphagia, CP, SOB, skin irritation. Using Aquaphor daily. \par \par \par ____________________________________________________\par ONCOLOGIC HISTORY (2/7/19)\par Ms. Julian Bernstein is a 63F, former smoker (10 pack years, quit 1993) referred by Dr. Hooper for consideration of radiation therapy for LEFT breast cancer. \par \par On 4/12/18, she underwent routine mammo-elvin/ sono which showed the following:\par -1.2 cm oval shaped mass with slightly spiculated margins, and an associated group of fine pleomorphic microcalcifications in a linear distribution, in the upper outer LEFT breast, new from prior study and corresponding to a 1.2 x 0.5 x 0.8 cm irregular shaped hypoechoic lesion at 2:00 6cmFN on ultrasound. U/S guided biopsy recommended. \par -Cortically thickened left axillary lymph node with cortical thickening measuring up to 0.8 cm. U/S guided core biopsy advised.\par -Benign findings in right breast. \par \par On 4/23/18, she underwent U/S core biopsy of left breast 2:00 and left prominent axillary lymph node. Pathology revealed the following:\par 1. Left breast, 2 o'clock, 6 cm from nipple; ultrasound guided core biopsy:\par - Infiltrating ductal carcinoma, moderately to poorly differentiated, with focal micropapillary features and scanty mucin production, at\par least 1.1 cm.  - Positive for lymphovascular invasion\par 2. Left axillary lymph node, core biopsy: - Metastatic carcinoma, 0.4 cm focus\par ER positive, HR equivocal, HER-2 negative via FISH. \par \par She initially saw Dr. Wray on 4/30/18, and at the time denied any skin changes, palpable masses, or nipple discharge. Genetic testing was also done at this time, and she was found to be BRCA1 positive with a VUS in the Chek 2 gene.  \par \par MRI breast on 5/15/18 showed the following:\par Right breast- benign findings.\par Left breast- 0.7 x 2.1 x 1.5 cm enhancing mass with an associated microclip at 2:00 6.5cmFN, consistent with patient's known malignancy. A few anterior foci of non-mass enhancement, the most anterior of which is a 0.4 foci of non-mass enhancement, 3 cm anterior to the mass. MRI guided biopsy recommended.   BI-RADS 4. \par \par She saw Dr. Carlin for initial consultation in June 2018 for neoadjuvant chemotherapy. She received ACT from 6/2018 to 11/2018 (one treatment withheld due to side effects- neuropathy). She developed SHRESTHA a couple weeks after completion of systemic therapy; chest CT results consistent with air trapping and and she saw a pulmonologist on 12/21/18.  SHRESTHA resolved on its own. Of note, she was found to have a 4.6 cm RIGHT adnexal mass. She saw Dr. Burris/ Blayne for this; plan was for BSO at time of breast surgery. \par \par On 1/9/19, she underwent bilateral mastectomy, left ALND with bilateral NUBIA flap reconstruction and BSO (Geraldo Hooper, Lerman, Blayne). Pathology showed the following: \par \par 1. [place] lymph nodes x 3, left; biopsy: \par - Macrometastatic carcinoma to one of five lymph nodes (1/5)  - Metastatic focus measures 6 mm\par Note: Sections of a lymph node with metallic clip show mucin pools with scattered scanty residual tumor cells. Negative for\par extracapsular extension.\par 2. [place] lymph node, left; biopsy: 0/1\par 3. Breast, right; mastectomy:- Benign breast tissue, nipple, and skin\par 4. Upper outer flap, right, clip at final margin; excision: - Benign fibroadipose tissue\par 5. Breast, left; mastectomy:\par - Invasive and in situ ductal carcinoma status post neoadjuvant therapy\par - Invasive ductal carcinoma with mucinous features, present predominantly as mucin pools with scattered foci of residual\par tumor cells, spanning up to 9 mm in greatest dimension, location UOQ. \par - Significant response to chemotherapy with tumor cellularity\par - Rare foci of ductal carcinoma in situ (DCIS), solid and cribriform types with treatment related changes, within tumor bed\par area.    - Tumor bed area measures: 1.0 x 0.8 x 0.8 cm\par - Margins:\par Invasive carcinoma:  Posterior: 1 mm (scattered foci < 1mm).   All remaining: >10 mm\par DCIS:   Posterior: 1 mm (single focus).   All remaining: > 10 mm\par - Two biopsy site changes\par - Lymphovascular invasion is not identified\par - The surrounding breast tissue with flat epithelial atypia (FEA), mucocele-like lesion and fibrocystic changes\par - Benign nipple and skin\par \par 6. Axillary content, left; biopsy: 0/22\par 7. Internal mammary lymph node, right; biopsy: 0/1 \par 8. Internal mammary lymph node, left; biopsy: 0/1\par 9. Fallopian tube and ovary, left; salpingo-oophorectomy:\par - Ovary with benign inclusion cysts   - Fallopian tube within normal limits\par 10. 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 She last saw Dr. Carlin last week, no change in plan at this time and she will see her again first week of March. She last saw Dr. Hooper on 1/25/19, at which time she was referred here and advised to follow up with Dr. Carlin.  She should follow up with breast surgeon in six months.  She last saw Dr. Lerman on 2/5/19; SOSA drain removed at the time. She has also started packing breast wounds (one on each breast, left wound larger than right) with wet gauze, changes dressing twice a day. \par \par Today, she reports she has soreness to both breasts, L>R. Discomfort is well controlled with Advil. She denies pain or discomfort to axilla or UE. She continues to have numbness in bilateral feet as a result of chemotherapy, but states it is improving. She uses a cane to ambulate due to feeling unsteady on her feet. She further reports fatigue and a 13 pound weight loss since the mastectomy. She will be starting Physical Therapy soon. She reports being overwhelmed and anxious regarding her health and having to pack the breast wounds. She is currently not working (she is a ), but plans to return in four weeks. She lives on her own, but one her sons lives a few blocks away. 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 \par \par Family history notable for paternal grandmother dx in her 40's with breast cancer, mother dx with uterine vs ovarian cancer at age 48 and passed away from cancer. \par \par [facility]- 5/16/19\par Ms. Bernstein returns for further consideration of radiation therapy for LEFT breast cancer. When she was seen on 2/7/19, plan was to tentatively proceed with PMRT after wound healing is complete, and return in 6- 8 weeks. In the interim, she has been following up with Dr. Lerman. On 3/15/19, she had debridement, local tissue arrangement, and wound closure to bilateral breasts. She developed left breast cellulitis; started on a course of Bactrim on 3/26/19, but was then changed to Ciprofloxacin (14 day course) on 4/1/19.\par \par On 4/5/19, she underwent U/S guided FNA of fluid collection 6- 8 o'clock left breast adjacent to lumpectomy scar. \par Noted was 6.0 x 1.5 cm complex fluid collection 6-8 o'clock axis left reconstructed breast most consistent with a postoperative seroma; 30 mL drained. Culture and gram stain were negative. \par \par She last saw Dr. Lerman on 5/7/19; noted to have erythema to left breast, possible early cellulitis. She was started on a 2 week course of Ciprofloxacin. Also advised to continue lymphedema therapy/ PT, and RTC 2 weeks. She last saw Dr. Carlin beginning of April, was started on Anastrozole then. \par \par Today, she reports occasional soreness to left breast upon palpation, more so in an area of firmness at mid inner quadrant. She reports LUE edema and ROM has been improving since working with PT. She states energy level is good and she is working. She will complete course of Ciprofloxacin on 5/21. She notes she will be having mediport removed, likely next week, as per Dr. Carlin. 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 She denies any other c/o to include fever, chills, CP, SOB, N/V/D. \par

## 2019-07-05 NOTE — DISEASE MANAGEMENT
[Pathological] : TNM Stage: p [IIB] : IIB [TTNM] : 1 [NTNM] : 1 [MTNM] : 0 [de-identified] : 2600 cGy [de-identified] : 5000 cGy [de-identified] : left chest wall and nodes

## 2019-07-11 NOTE — DISEASE MANAGEMENT
[Pathological] : TNM Stage: p [IIB] : IIB [TTNM] : 1 [NTNM] : 1 [MTNM] : 0 [de-identified] : 5000 cGy [de-identified] : 3400 cGy [de-identified] : left chest wall and nodes

## 2019-07-11 NOTE — VITALS
[Least Pain Intensity: 0/10] : 0/10 [OTC] : OTC [70: Cares for self; unalbe to carry on normal activity or do active work.] : 70: Cares for self; unable to carry on normal activity or do active work. [Maximal Pain Intensity: 2/10] : 2/10 [Pain Location: ___] : Pain Location: [unfilled] [Pain Interferes with ADLs] : Pain does not interfere with activities of daily living

## 2019-07-11 NOTE — REVIEW OF SYSTEMS
[Skin Hyperpigmentation: Grade 0] : Skin Hyperpigmentation: Grade 0 [Dermatitis Radiation: Grade 1 - Faint erythema or dry desquamation] : Dermatitis Radiation: Grade 1 - Faint erythema or dry desquamation [Patient Intake Form Reviewed] : Patient intake form was reviewed [Fatigue] : fatigue [Recent Change In Weight] : ~T recent weight change [Loss of Hearing] : loss of hearing [Anxiety] : anxiety [Depression] : depression [Negative] : Allergic/Immunologic [Fatigue: Grade 1 - Fatigue relieved by rest] : Fatigue: Grade 1 - Fatigue relieved by rest [Breast Pain: Grade 1 - Mild pain] : Breast Pain: Grade 1 - Mild pain [Fever] : no fever [Night Sweats] : no night sweats [Chills] : no chills [Dysphagia] : no dysphagia [Nosebleeds] : no nosebleeds [Joint Pain] : no joint pain [Muscle Pain] : no muscle pain [Muscle Weakness] : no muscle weakness [Confused] : no confusion [Fainting] : no fainting [Suicidal] : not suicidal [Dizziness] : no dizziness [Insomnia] : no insomnia [FreeTextEntry2] : 13 pound weight loss since mastectomy and BSO [FreeTextEntry7] : Abdominal soreness, abdominal incisions [FreeTextEntry4] : 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  [de-identified] : s/p bilateral mastectomy, NUBIA flap reconstruction and BSO- incisions to abdomen and bilateral breasts.  [de-identified] : unsteady gait 2/2 neuropathy of bilateral feet

## 2019-07-11 NOTE — HISTORY OF PRESENT ILLNESS
[FreeTextEntry1] : 7/10/19- OTV- Ms. Bernstein has completed 3400 cGy/ 5000 cGy to the LEFT chest wall and nodes.  Today, she reports grade 1 fatigue, continues to work full time. Also having mild left CW soreness. She takes Aleve prn once daily, generally before RT so that arm positioning is more comfortable. She inquired about taking a break from RT due to balancing work and RT appointments. She continues to report mild LUE lymphedema, has put PT on hold due to her schedule.  She denies dysphagia, CP, SOB. She notes mild erythema to chest, but no irritation. Using Aquaphor daily.\par \par 7/3/19- OTV- Ms. Bernstein has completed 2600 cGy/ 5000 cGy to the LEFT chest wall and nodes.  Today, she reports she is feeling well. She is working with PT for mild LUE lymphedema, which is improving.  She denies breast or CW pain/ soreness, dysphagia, CP, SOB. She notes mild erythema to chest, but no irritation. Using Aquaphor daily.\par \par \par 6/26/19- OTV- Ms. Bernstein has completed 1600 cGy/ 5000 cGy to the LEFT chest wall and nodes.  Today, she reports she is feeling well. Her PCP refilled her Valium and she is taking 1/2 tab prior to RT. Has mild LUE lymphedema that is improving. She is working with PT and also doing daily home exercises. She otherwise feels well; denies breast or CW pain/ soreness, dysphagia, CP, SOB, skin irritation. Using Aquaphor daily.\par \par 6/19/19 - OTV - Ms. Bernstein has completed 600 cGy/ 5000 cGy to the LEFT chest wall and nodes.  Today, she reports she is anxious about treatment. She has Valium that was prescribed by her PCP, which she took prior to her first treatment. She states this was helpful, but she is almost out of Valium and will call her PCP for a refill. She otherwise feels well; denies breast or CW pain/ soreness, edema, dysphagia, CP, SOB, skin irritation. Using Aquaphor daily. \par \par \par ____________________________________________________\par ONCOLOGIC HISTORY (2/7/19)\par Ms. Julian Bernstein is a 63F, former smoker (10 pack years, quit 1993) referred by Dr. Hooper for consideration of radiation therapy for LEFT breast cancer. \par \par On 4/12/18, she underwent routine mammo-elvin/ sono which showed the following:\par -1.2 cm oval shaped mass with slightly spiculated margins, and an associated group of fine pleomorphic microcalcifications in a linear distribution, in the upper outer LEFT breast, new from prior study and corresponding to a 1.2 x 0.5 x 0.8 cm irregular shaped hypoechoic lesion at 2:00 6cmFN on ultrasound. U/S guided biopsy recommended. \par -Cortically thickened left axillary lymph node with cortical thickening measuring up to 0.8 cm. U/S guided core biopsy advised.\par -Benign findings in right breast. \par \par On 4/23/18, she underwent U/S core biopsy of left breast 2:00 and left prominent axillary lymph node. Pathology revealed the following:\par 1. Left breast, 2 o'clock, 6 cm from nipple; ultrasound guided core biopsy:\par - Infiltrating ductal carcinoma, moderately to poorly differentiated, with focal micropapillary features and scanty mucin production, at\par least 1.1 cm.  - Positive for lymphovascular invasion\par 2. Left axillary lymph node, core biopsy: - Metastatic carcinoma, 0.4 cm focus\par ER positive, HR equivocal, HER-2 negative via FISH. \par \par She initially saw Dr. Wray on 4/30/18, and at the time denied any skin changes, palpable masses, or nipple discharge. Genetic testing was also done at this time, and she was found to be BRCA1 positive with a VUS in the Chek 2 gene.  \par \par MRI breast on 5/15/18 showed the following:\par Right breast- benign findings.\par Left breast- 0.7 x 2.1 x 1.5 cm enhancing mass with an associated microclip at 2:00 6.5cmFN, consistent with patient's known malignancy. A few anterior foci of non-mass enhancement, the most anterior of which is a 0.4 foci of non-mass enhancement, 3 cm anterior to the mass. MRI guided biopsy recommended.   BI-RADS 4. \par \par She saw Dr. Carlin for initial consultation in June 2018 for neoadjuvant chemotherapy. She received ACT from 6/2018 to 11/2018 (one treatment withheld due to side effects- neuropathy). She developed SHRESTHA a couple weeks after completion of systemic therapy; chest CT results consistent with air trapping and and she saw a pulmonologist on 12/21/18.  SHRESTHA resolved on its own. Of note, she was found to have a 4.6 cm RIGHT adnexal mass. She saw Dr. Burris/ Blayne for this; plan was for BSO at time of breast surgery. \par \par On 1/9/19, she underwent bilateral mastectomy, left ALND with bilateral NUBIA flap reconstruction and BSO (Geraldo Hooper, Lerman, Blayne). Pathology showed the following: \par \par 1. [place] lymph nodes x 3, left; biopsy: \par - Macrometastatic carcinoma to one of five lymph nodes (1/5)  - Metastatic focus measures 6 mm\par Note: Sections of a lymph node with metallic clip show mucin pools with scattered scanty residual tumor cells. Negative for\par extracapsular extension.\par 2. [place] lymph node, left; biopsy: 0/1\par 3. Breast, right; mastectomy:- Benign breast tissue, nipple, and skin\par 4. Upper outer flap, right, clip at final margin; excision: - Benign fibroadipose tissue\par 5. Breast, left; mastectomy:\par - Invasive and in situ ductal carcinoma status post neoadjuvant therapy\par - Invasive ductal carcinoma with mucinous features, present predominantly as mucin pools with scattered foci of residual\par tumor cells, spanning up to 9 mm in greatest dimension, location UOQ. \par - Significant response to chemotherapy with tumor cellularity\par - Rare foci of ductal carcinoma in situ (DCIS), solid and cribriform types with treatment related changes, within tumor bed\par area.    - Tumor bed area measures: 1.0 x 0.8 x 0.8 cm\par - Margins:\par Invasive carcinoma:  Posterior: 1 mm (scattered foci < 1mm).   All remaining: >10 mm\par DCIS:   Posterior: 1 mm (single focus).   All remaining: > 10 mm\par - Two biopsy site changes\par - Lymphovascular invasion is not identified\par - The surrounding breast tissue with flat epithelial atypia (FEA), mucocele-like lesion and fibrocystic changes\par - Benign nipple and skin\par \par 6. Axillary content, left; biopsy: 0/22\par 7. Internal mammary lymph node, right; biopsy: 0/1 \par 8. Internal mammary lymph node, left; biopsy: 0/1\par 9. Fallopian tube and ovary, left; salpingo-oophorectomy:\par - Ovary with benign inclusion cysts   - Fallopian tube within normal limits\par 10. 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 She last saw Dr. Carlin last week, no change in plan at this time and she will see her again first week of March. She last saw Dr. Hooper on 1/25/19, at which time she was referred here and advised to follow up with Dr. Carlin.  She should follow up with breast surgeon in six months.  She last saw Dr. Lerman on 2/5/19; SOSA drain removed at the time. She has also started packing breast wounds (one on each breast, left wound larger than right) with wet gauze, changes dressing twice a day. \par \par Today, she reports she has soreness to both breasts, L>R. Discomfort is well controlled with Advil. She denies pain or discomfort to axilla or UE. She continues to have numbness in bilateral feet as a result of chemotherapy, but states it is improving. She uses a cane to ambulate due to feeling unsteady on her feet. She further reports fatigue and a 13 pound weight loss since the mastectomy. She will be starting Physical Therapy soon. She reports being overwhelmed and anxious regarding her health and having to pack the breast wounds. She is currently not working (she is a ), but plans to return in four weeks. She lives on her own, but one her sons lives a few blocks away. 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 \par \par Family history notable for paternal grandmother dx in her 40's with breast cancer, mother dx with uterine vs ovarian cancer at age 48 and passed away from cancer. \par \par [facility]- 5/16/19\par Ms. Bernstein returns for further consideration of radiation therapy for LEFT breast cancer. When she was seen on 2/7/19, plan was to tentatively proceed with PMRT after wound healing is complete, and return in 6- 8 weeks. In the interim, she has been following up with Dr. Lerman. On 3/15/19, she had debridement, local tissue arrangement, and wound closure to bilateral breasts. She developed left breast cellulitis; started on a course of Bactrim on 3/26/19, but was then changed to Ciprofloxacin (14 day course) on 4/1/19.\par \par On 4/5/19, she underwent U/S guided FNA of fluid collection 6- 8 o'clock left breast adjacent to lumpectomy scar. \par Noted was 6.0 x 1.5 cm complex fluid collection 6-8 o'clock axis left reconstructed breast most consistent with a postoperative seroma; 30 mL drained. Culture and gram stain were negative. \par \par She last saw Dr. Lerman on 5/7/19; noted to have erythema to left breast, possible early cellulitis. She was started on a 2 week course of Ciprofloxacin. Also advised to continue lymphedema therapy/ PT, and RTC 2 weeks. She last saw Dr. Carlin beginning of April, was started on Anastrozole then. \par \par Today, she reports occasional soreness to left breast upon palpation, more so in an area of firmness at mid inner quadrant. She reports LUE edema and ROM has been improving since working with PT. She states energy level is good and she is working. She will complete course of Ciprofloxacin on 5/21. She notes she will be having mediport removed, likely next week, as per Dr. Carlin. 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 She denies any other c/o to include fever, chills, CP, SOB, N/V/D. \par

## 2019-07-11 NOTE — PHYSICAL EXAM
[Sclera] : the sclera and conjunctiva were normal [Extraocular Movements] : extraocular movements were intact [Outer Ear] : the ears and nose were normal in appearance [Examination Of The Oral Cavity] : the lips and gums were normal [Exaggerated Use Of Accessory Muscles For Inspiration] : no accessory muscle use [] : no respiratory distress [Respiration, Rhythm And Depth] : normal respiratory rhythm and effort [Auscultation Breath Sounds / Voice Sounds] : lungs were clear to auscultation bilaterally [Abdomen Tenderness] : non-tender [Normal] : no palpable adenopathy [Range of Motion to Joints] : range of motion to joints [Musculoskeletal - Swelling] : no joint swelling [No Focal Deficits] : no focal deficits [Oriented To Time, Place, And Person] : oriented to person, place, and time [Motor Tone] : muscle strength and tone were normal [de-identified] : Port-a-cath Right chest.  [de-identified] : Right ear hearing loss (congenital). Congenital right ear deformity.  [de-identified] : abdominal incisions, well healed. prior SOSA drain sites to lateral abdomen, well healed.  [de-identified] : Periareolar incisions, well healed.  Area of firmness to mid inner area of left breast. Mild edema to LUE. Faint erythema to superior aspect of left chest, no desquamation noted.  [de-identified] : Uses cane to ambulate.  [de-identified] : Anxious. [de-identified] : as above in breast section

## 2019-07-18 NOTE — DISEASE MANAGEMENT
[Pathological] : TNM Stage: p [IIB] : IIB [TTNM] : 1 [NTNM] : 1 [MTNM] : 0 [de-identified] : 4400 cGy [de-identified] : 5000 cGy [de-identified] : left chest wall and nodes

## 2019-07-18 NOTE — PHYSICAL EXAM
[Sclera] : the sclera and conjunctiva were normal [Extraocular Movements] : extraocular movements were intact [Outer Ear] : the ears and nose were normal in appearance [Examination Of The Oral Cavity] : the lips and gums were normal [] : no respiratory distress [Respiration, Rhythm And Depth] : normal respiratory rhythm and effort [Exaggerated Use Of Accessory Muscles For Inspiration] : no accessory muscle use [Auscultation Breath Sounds / Voice Sounds] : lungs were clear to auscultation bilaterally [Abdomen Tenderness] : non-tender [Normal] : no palpable adenopathy [Musculoskeletal - Swelling] : no joint swelling [Range of Motion to Joints] : range of motion to joints [Motor Tone] : muscle strength and tone were normal [No Focal Deficits] : no focal deficits [Oriented To Time, Place, And Person] : oriented to person, place, and time [de-identified] : Right ear hearing loss (congenital). Congenital right ear deformity.  [de-identified] : Port-a-cath Right chest.  [de-identified] : Periareolar incisions, well healed.  Area of firmness to mid inner area of left breast. Mild edema to LUE. Grade 1 dermatitis to left chest, no desquamation noted.  [de-identified] : abdominal incisions, well healed. prior SOSA drain sites to lateral abdomen, well healed.  [de-identified] : Uses cane to ambulate.  [de-identified] : as above in breast section [de-identified] : Anxious.

## 2019-07-18 NOTE — REVIEW OF SYSTEMS
[Fatigue: Grade 1 - Fatigue relieved by rest] : Fatigue: Grade 1 - Fatigue relieved by rest [Breast Pain: Grade 1 - Mild pain] : Breast Pain: Grade 1 - Mild pain [Skin Hyperpigmentation: Grade 0] : Skin Hyperpigmentation: Grade 0 [Dermatitis Radiation: Grade 1 - Faint erythema or dry desquamation] : Dermatitis Radiation: Grade 1 - Faint erythema or dry desquamation [Patient Intake Form Reviewed] : Patient intake form was reviewed [Fatigue] : fatigue [Recent Change In Weight] : ~T recent weight change [Loss of Hearing] : loss of hearing [Anxiety] : anxiety [Depression] : depression [Negative] : Allergic/Immunologic [Fever] : no fever [Chills] : no chills [Night Sweats] : no night sweats [Dysphagia] : no dysphagia [Nosebleeds] : no nosebleeds [Joint Pain] : no joint pain [Muscle Pain] : no muscle pain [Muscle Weakness] : no muscle weakness [Confused] : no confusion [Dizziness] : no dizziness [Fainting] : no fainting [Suicidal] : not suicidal [Insomnia] : no insomnia [FreeTextEntry2] : 13 pound weight loss since mastectomy and BSO [FreeTextEntry4] : 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  [de-identified] : unsteady gait 2/2 neuropathy of bilateral feet [de-identified] : poor sleep initiation and maintenance, anxious and overwhelmed.

## 2019-07-18 NOTE — HISTORY OF PRESENT ILLNESS
[FreeTextEntry1] : 7/17/19- OTV- Ms. Bernstein has completed 4400 cGy/ 5000 cGy to the LEFT chest wall and nodes.  Today, she reports grade 1 fatigue continues; she is working full time. Also having mild left CW soreness, most notably to area of firmness. She had been doing PT/ OT, but put it on hold for the time being due to schedule. She continues to do home exercises for lymphedema.   She denies dysphagia, CP, SOB. She notes erythema to chest, but no irritation or skin breakdown. Using Aquaphor daily.\par \par 7/10/19- OTV- Ms. Bernstein has completed 3400 cGy/ 5000 cGy to the LEFT chest wall and nodes.  Today, she reports grade 1 fatigue, continues to work full time. Also having mild left CW soreness. She takes Aleve prn once daily, generally before RT so that arm positioning is more comfortable. She inquired about taking a break from RT due to balancing work and RT appointments. She continues to report mild LUE lymphedema, has put PT on hold due to her schedule.  She denies dysphagia, CP, SOB. She notes mild erythema to chest, but no irritation. Using Aquaphor daily.\par \par 7/3/19- OTV- Ms. Bernstein has completed 2600 cGy/ 5000 cGy to the LEFT chest wall and nodes.  Today, she reports she is feeling well. She is working with PT for mild LUE lymphedema, which is improving.  She denies breast or CW pain/ soreness, dysphagia, CP, SOB. She notes mild erythema to chest, but no irritation. Using Aquaphor daily.\par \par \par 6/26/19- OTV- Ms. Bernstein has completed 1600 cGy/ 5000 cGy to the LEFT chest wall and nodes.  Today, she reports she is feeling well. Her PCP refilled her Valium and she is taking 1/2 tab prior to RT. Has mild LUE lymphedema that is improving. She is working with PT and also doing daily home exercises. She otherwise feels well; denies breast or CW pain/ soreness, dysphagia, CP, SOB, skin irritation. Using Aquaphor daily.\par \par 6/19/19 - OTV - Ms. Bernstein has completed 600 cGy/ 5000 cGy to the LEFT chest wall and nodes.  Today, she reports she is anxious about treatment. She has Valium that was prescribed by her PCP, which she took prior to her first treatment. She states this was helpful, but she is almost out of Valium and will call her PCP for a refill. She otherwise feels well; denies breast or CW pain/ soreness, edema, dysphagia, CP, SOB, skin irritation. Using Aquaphor daily. \par \par \par ____________________________________________________\par ONCOLOGIC HISTORY (2/7/19)\par Ms. Julian Bernstein is a 63F, former smoker (10 pack years, quit 1993) referred by Dr. Hooper for consideration of radiation therapy for LEFT breast cancer. \par \par On 4/12/18, she underwent routine mammo-elvin/ sono which showed the following:\par -1.2 cm oval shaped mass with slightly spiculated margins, and an associated group of fine pleomorphic microcalcifications in a linear distribution, in the upper outer LEFT breast, new from prior study and corresponding to a 1.2 x 0.5 x 0.8 cm irregular shaped hypoechoic lesion at 2:00 6cmFN on ultrasound. U/S guided biopsy recommended. \par -Cortically thickened left axillary lymph node with cortical thickening measuring up to 0.8 cm. U/S guided core biopsy advised.\par -Benign findings in right breast. \par \par On 4/23/18, she underwent U/S core biopsy of left breast 2:00 and left prominent axillary lymph node. Pathology revealed the following:\par 1. Left breast, 2 o'clock, 6 cm from nipple; ultrasound guided core biopsy:\par - Infiltrating ductal carcinoma, moderately to poorly differentiated, with focal micropapillary features and scanty mucin production, at\par least 1.1 cm.  - Positive for lymphovascular invasion\par 2. Left axillary lymph node, core biopsy: - Metastatic carcinoma, 0.4 cm focus\par ER positive, HR equivocal, HER-2 negative via FISH. \par \par She initially saw Dr. Wray on 4/30/18, and at the time denied any skin changes, palpable masses, or nipple discharge. Genetic testing was also done at this time, and she was found to be BRCA1 positive with a VUS in the Chek 2 gene.  \par \par MRI breast on 5/15/18 showed the following:\par Right breast- benign findings.\par Left breast- 0.7 x 2.1 x 1.5 cm enhancing mass with an associated microclip at 2:00 6.5cmFN, consistent with patient's known malignancy. A few anterior foci of non-mass enhancement, the most anterior of which is a 0.4 foci of non-mass enhancement, 3 cm anterior to the mass. MRI guided biopsy recommended.   BI-RADS 4. \par \par She saw Dr. Carlin for initial consultation in June 2018 for neoadjuvant chemotherapy. She received ACT from 6/2018 to 11/2018 (one treatment withheld due to side effects- neuropathy). She developed SHRESTHA a couple weeks after completion of systemic therapy; chest CT results consistent with air trapping and and she saw a pulmonologist on 12/21/18.  SHRESTHA resolved on its own. Of note, she was found to have a 4.6 cm RIGHT adnexal mass. She saw Dr. Burris/ Blayne for this; plan was for BSO at time of breast surgery. \par \par On 1/9/19, she underwent bilateral mastectomy, left ALND with bilateral NUBIA flap reconstruction and BSO (Geraldo Hooper, Lerman, Blayne). Pathology showed the following: \par \par 1. [place] lymph nodes x 3, left; biopsy: \par - Macrometastatic carcinoma to one of five lymph nodes (1/5)  - Metastatic focus measures 6 mm\par Note: Sections of a lymph node with metallic clip show mucin pools with scattered scanty residual tumor cells. Negative for\par extracapsular extension.\par 2. [place] lymph node, left; biopsy: 0/1\par 3. Breast, right; mastectomy:- Benign breast tissue, nipple, and skin\par 4. Upper outer flap, right, clip at final margin; excision: - Benign fibroadipose tissue\par 5. Breast, left; mastectomy:\par - Invasive and in situ ductal carcinoma status post neoadjuvant therapy\par - Invasive ductal carcinoma with mucinous features, present predominantly as mucin pools with scattered foci of residual\par tumor cells, spanning up to 9 mm in greatest dimension, location UOQ. \par - Significant response to chemotherapy with tumor cellularity\par - Rare foci of ductal carcinoma in situ (DCIS), solid and cribriform types with treatment related changes, within tumor bed\par area.    - Tumor bed area measures: 1.0 x 0.8 x 0.8 cm\par - Margins:\par Invasive carcinoma:  Posterior: 1 mm (scattered foci < 1mm).   All remaining: >10 mm\par DCIS:   Posterior: 1 mm (single focus).   All remaining: > 10 mm\par - Two biopsy site changes\par - Lymphovascular invasion is not identified\par - The surrounding breast tissue with flat epithelial atypia (FEA), mucocele-like lesion and fibrocystic changes\par - Benign nipple and skin\par \par 6. Axillary content, left; biopsy: 0/22\par 7. Internal mammary lymph node, right; biopsy: 0/1 \par 8. Internal mammary lymph node, left; biopsy: 0/1\par 9. Fallopian tube and ovary, left; salpingo-oophorectomy:\par - Ovary with benign inclusion cysts   - Fallopian tube within normal limits\par 10. 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 She last saw Dr. Carlin last week, no change in plan at this time and she will see her again first week of March. She last saw Dr. Hooper on 1/25/19, at which time she was referred here and advised to follow up with Dr. Carlin.  She should follow up with breast surgeon in six months.  She last saw Dr. Lerman on 2/5/19; SOSA drain removed at the time. She has also started packing breast wounds (one on each breast, left wound larger than right) with wet gauze, changes dressing twice a day. \par \par Today, she reports she has soreness to both breasts, L>R. Discomfort is well controlled with Advil. She denies pain or discomfort to axilla or UE. She continues to have numbness in bilateral feet as a result of chemotherapy, but states it is improving. She uses a cane to ambulate due to feeling unsteady on her feet. She further reports fatigue and a 13 pound weight loss since the mastectomy. She will be starting Physical Therapy soon. She reports being overwhelmed and anxious regarding her health and having to pack the breast wounds. She is currently not working (she is a ), but plans to return in four weeks. She lives on her own, but one her sons lives a few blocks away. 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 \par \par Family history notable for paternal grandmother dx in her 40's with breast cancer, mother dx with uterine vs ovarian cancer at age 48 and passed away from cancer. \par \par [facility]- 5/16/19\par Ms. Bernstein returns for further consideration of radiation therapy for LEFT breast cancer. When she was seen on 2/7/19, plan was to tentatively proceed with PMRT after wound healing is complete, and return in 6- 8 weeks. In the interim, she has been following up with Dr. Lerman. On 3/15/19, she had debridement, local tissue arrangement, and wound closure to bilateral breasts. She developed left breast cellulitis; started on a course of Bactrim on 3/26/19, but was then changed to Ciprofloxacin (14 day course) on 4/1/19.\par \par On 4/5/19, she underwent U/S guided FNA of fluid collection 6- 8 o'clock left breast adjacent to lumpectomy scar. \par Noted was 6.0 x 1.5 cm complex fluid collection 6-8 o'clock axis left reconstructed breast most consistent with a postoperative seroma; 30 mL drained. Culture and gram stain were negative. \par \par She last saw Dr. Lerman on 5/7/19; noted to have erythema to left breast, possible early cellulitis. She was started on a 2 week course of Ciprofloxacin. Also advised to continue lymphedema therapy/ PT, and RTC 2 weeks. She last saw Dr. Carlin beginning of April, was started on Anastrozole then. \par \par Today, she reports occasional soreness to left breast upon palpation, more so in an area of firmness at mid inner quadrant. She reports LUE edema and ROM has been improving since working with PT. She states energy level is good and she is working. She will complete course of Ciprofloxacin on 5/21. She notes she will be having mediport removed, likely next week, as per Dr. Carlin. 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 She denies any other c/o to include fever, chills, CP, SOB, N/V/D. \par

## 2019-07-18 NOTE — VITALS
[Maximal Pain Intensity: 2/10] : 2/10 [Least Pain Intensity: 0/10] : 0/10 [Pain Location: ___] : Pain Location: [unfilled] [OTC] : OTC [70: Cares for self; unalbe to carry on normal activity or do active work.] : 70: Cares for self; unable to carry on normal activity or do active work. [Pain Interferes with ADLs] : Pain does not interfere with activities of daily living

## 2019-07-23 NOTE — VITALS
[Least Pain Intensity: 0/10] : 0/10 [Maximal Pain Intensity: 2/10] : 2/10 [OTC] : OTC [Pain Location: ___] : Pain Location: [unfilled] [Pain Interferes with ADLs] : Pain does not interfere with activities of daily living [70: Cares for self; unalbe to carry on normal activity or do active work.] : 70: Cares for self; unable to carry on normal activity or do active work.

## 2019-07-23 NOTE — PHYSICAL EXAM
[Sclera] : the sclera and conjunctiva were normal [Extraocular Movements] : extraocular movements were intact [Outer Ear] : the ears and nose were normal in appearance [Examination Of The Oral Cavity] : the lips and gums were normal [Respiration, Rhythm And Depth] : normal respiratory rhythm and effort [Exaggerated Use Of Accessory Muscles For Inspiration] : no accessory muscle use [] : no respiratory distress [Auscultation Breath Sounds / Voice Sounds] : lungs were clear to auscultation bilaterally [Normal] : no palpable adenopathy [Musculoskeletal - Swelling] : no joint swelling [Abdomen Tenderness] : non-tender [Motor Tone] : muscle strength and tone were normal [Range of Motion to Joints] : range of motion to joints [No Focal Deficits] : no focal deficits [de-identified] : Port-a-cath Right chest.  [de-identified] : Right ear hearing loss (congenital). Congenital right ear deformity.  [Oriented To Time, Place, And Person] : oriented to person, place, and time [de-identified] : Uses cane to ambulate.  [de-identified] : abdominal incisions, well healed. prior SOSA drain sites to lateral abdomen, well healed.  [de-identified] : Periareolar incisions, well healed.  Area of firmness to mid inner area of left breast. Mild edema to LUE. Grade 1 dermatitis to left chest, no desquamation noted.  [de-identified] : as above in breast section [de-identified] : Anxious.

## 2019-07-23 NOTE — HISTORY OF PRESENT ILLNESS
[FreeTextEntry1] : 7/23/19 OTV - Completed treatment today.  5000/5000.   Reports grade 1 fatigue continues; she is still working full time. Also having mild left CW soreness, most notably to area of firmness.  She denies dysphagia, CP, SOB. She notes erythema to chest, but no irritation or skin breakdown. Using Aquaphor daily.\par \par \par 7/17/19- OTV- Ms. Bernstein has completed 4400 cGy/ 5000 cGy to the LEFT chest wall and nodes.  Today, she reports grade 1 fatigue continues; she is working full time. Also having mild left CW soreness, most notably to area of firmness. She had been doing PT/ OT, but put it on hold for the time being due to schedule. She continues to do home exercises for lymphedema.   She denies dysphagia, CP, SOB. She notes erythema to chest, but no irritation or skin breakdown. Using Aquaphor daily.\par \par 7/10/19- OTV- Ms. Bernstein has completed 3400 cGy/ 5000 cGy to the LEFT chest wall and nodes.  Today, she reports grade 1 fatigue, continues to work full time. Also having mild left CW soreness. She takes Aleve prn once daily, generally before RT so that arm positioning is more comfortable. She inquired about taking a break from RT due to balancing work and RT appointments. She continues to report mild LUE lymphedema, has put PT on hold due to her schedule.  She denies dysphagia, CP, SOB. She notes mild erythema to chest, but no irritation. Using Aquaphor daily.\par \par 7/3/19- OTV- Ms. Bernstein has completed 2600 cGy/ 5000 cGy to the LEFT chest wall and nodes.  Today, she reports she is feeling well. She is working with PT for mild LUE lymphedema, which is improving.  She denies breast or CW pain/ soreness, dysphagia, CP, SOB. She notes mild erythema to chest, but no irritation. Using Aquaphor daily.\par \par \par 6/26/19- OTV- Ms. Bernstein has completed 1600 cGy/ 5000 cGy to the LEFT chest wall and nodes.  Today, she reports she is feeling well. Her PCP refilled her Valium and she is taking 1/2 tab prior to RT. Has mild LUE lymphedema that is improving. She is working with PT and also doing daily home exercises. She otherwise feels well; denies breast or CW pain/ soreness, dysphagia, CP, SOB, skin irritation. Using Aquaphor daily.\par \par 6/19/19 - OTV - Ms. Bernstein has completed 600 cGy/ 5000 cGy to the LEFT chest wall and nodes.  Today, she reports she is anxious about treatment. She has Valium that was prescribed by her PCP, which she took prior to her first treatment. She states this was helpful, but she is almost out of Valium and will call her PCP for a refill. She otherwise feels well; denies breast or CW pain/ soreness, edema, dysphagia, CP, SOB, skin irritation. Using Aquaphor daily. \par \par \par ____________________________________________________\par ONCOLOGIC HISTORY (2/7/19)\par Ms. Julian Bernstein is a 63F, former smoker (10 pack years, quit 1993) referred by Dr. Hooper for consideration of radiation therapy for LEFT breast cancer. \par \par On 4/12/18, she underwent routine mammo-elvin/ sono which showed the following:\par -1.2 cm oval shaped mass with slightly spiculated margins, and an associated group of fine pleomorphic microcalcifications in a linear distribution, in the upper outer LEFT breast, new from prior study and corresponding to a 1.2 x 0.5 x 0.8 cm irregular shaped hypoechoic lesion at 2:00 6cmFN on ultrasound. U/S guided biopsy recommended. \par -Cortically thickened left axillary lymph node with cortical thickening measuring up to 0.8 cm. U/S guided core biopsy advised.\par -Benign findings in right breast. \par \par On 4/23/18, she underwent U/S core biopsy of left breast 2:00 and left prominent axillary lymph node. Pathology revealed the following:\par 1. Left breast, 2 o'clock, 6 cm from nipple; ultrasound guided core biopsy:\par - Infiltrating ductal carcinoma, moderately to poorly differentiated, with focal micropapillary features and scanty mucin production, at\par least 1.1 cm.  - Positive for lymphovascular invasion\par 2. Left axillary lymph node, core biopsy: - Metastatic carcinoma, 0.4 cm focus\par ER positive, HR equivocal, HER-2 negative via FISH. \par \par She initially saw Dr. Wray on 4/30/18, and at the time denied any skin changes, palpable masses, or nipple discharge. Genetic testing was also done at this time, and she was found to be BRCA1 positive with a VUS in the Chek 2 gene.  \par \par MRI breast on 5/15/18 showed the following:\par Right breast- benign findings.\par Left breast- 0.7 x 2.1 x 1.5 cm enhancing mass with an associated microclip at 2:00 6.5cmFN, consistent with patient's known malignancy. A few anterior foci of non-mass enhancement, the most anterior of which is a 0.4 foci of non-mass enhancement, 3 cm anterior to the mass. MRI guided biopsy recommended.   BI-RADS 4. \par \par She saw Dr. Carlin for initial consultation in June 2018 for neoadjuvant chemotherapy. She received ACT from 6/2018 to 11/2018 (one treatment withheld due to side effects- neuropathy). She developed SHRESTHA a couple weeks after completion of systemic therapy; chest CT results consistent with air trapping and and she saw a pulmonologist on 12/21/18.  SHRESTHA resolved on its own. Of note, she was found to have a 4.6 cm RIGHT adnexal mass. She saw Dr. Burris/ Blayne for this; plan was for BSO at time of breast surgery. \par \par On 1/9/19, she underwent bilateral mastectomy, left ALND with bilateral NUBIA flap reconstruction and BSO (Geraldo Hooper, Lerman, Blayne). Pathology showed the following: \par \par 1. [place] lymph nodes x 3, left; biopsy: \par - Macrometastatic carcinoma to one of five lymph nodes (1/5)  - Metastatic focus measures 6 mm\par Note: Sections of a lymph node with metallic clip show mucin pools with scattered scanty residual tumor cells. Negative for\par extracapsular extension.\par 2. [place] lymph node, left; biopsy: 0/1\par 3. Breast, right; mastectomy:- Benign breast tissue, nipple, and skin\par 4. Upper outer flap, right, clip at final margin; excision: - Benign fibroadipose tissue\par 5. Breast, left; mastectomy:\par - Invasive and in situ ductal carcinoma status post neoadjuvant therapy\par - Invasive ductal carcinoma with mucinous features, present predominantly as mucin pools with scattered foci of residual\par tumor cells, spanning up to 9 mm in greatest dimension, location UOQ. \par - Significant response to chemotherapy with tumor cellularity\par - Rare foci of ductal carcinoma in situ (DCIS), solid and cribriform types with treatment related changes, within tumor bed\par area.    - Tumor bed area measures: 1.0 x 0.8 x 0.8 cm\par - Margins:\par Invasive carcinoma:  Posterior: 1 mm (scattered foci < 1mm).   All remaining: >10 mm\par DCIS:   Posterior: 1 mm (single focus).   All remaining: > 10 mm\par - Two biopsy site changes\par - Lymphovascular invasion is not identified\par - The surrounding breast tissue with flat epithelial atypia (FEA), mucocele-like lesion and fibrocystic changes\par - Benign nipple and skin\par \par 6. Axillary content, left; biopsy: 0/22\par 7. Internal mammary lymph node, right; biopsy: 0/1 \par 8. Internal mammary lymph node, left; biopsy: 0/1\par 9. Fallopian tube and ovary, left; salpingo-oophorectomy:\par - Ovary with benign inclusion cysts   - Fallopian tube within normal limits\par 10. 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 She last saw Dr. Carlin last week, no change in plan at this time and she will see her again first week of March. She last saw Dr. Hooper on 1/25/19, at which time she was referred here and advised to follow up with Dr. Carlin.  She should follow up with breast surgeon in six months.  She last saw Dr. Lerman on 2/5/19; SOSA drain removed at the time. She has also started packing breast wounds (one on each breast, left wound larger than right) with wet gauze, changes dressing twice a day. \par \par Today, she reports she has soreness to both breasts, L>R. Discomfort is well controlled with Advil. She denies pain or discomfort to axilla or UE. She continues to have numbness in bilateral feet as a result of chemotherapy, but states it is improving. She uses a cane to ambulate due to feeling unsteady on her feet. She further reports fatigue and a 13 pound weight loss since the mastectomy. She will be starting Physical Therapy soon. She reports being overwhelmed and anxious regarding her health and having to pack the breast wounds. She is currently not working (she is a ), but plans to return in four weeks. She lives on her own, but one her sons lives a few blocks away. 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 \par \par Family history notable for paternal grandmother dx in her 40's with breast cancer, mother dx with uterine vs ovarian cancer at age 48 and passed away from cancer. \par \par [facility]- 5/16/19\par Ms. Bernstein returns for further consideration of radiation therapy for LEFT breast cancer. When she was seen on 2/7/19, plan was to tentatively proceed with PMRT after wound healing is complete, and return in 6- 8 weeks. In the interim, she has been following up with Dr. Lerman. On 3/15/19, she had debridement, local tissue arrangement, and wound closure to bilateral breasts. She developed left breast cellulitis; started on a course of Bactrim on 3/26/19, but was then changed to Ciprofloxacin (14 day course) on 4/1/19.\par \par On 4/5/19, she underwent U/S guided FNA of fluid collection 6- 8 o'clock left breast adjacent to lumpectomy scar. \par Noted was 6.0 x 1.5 cm complex fluid collection 6-8 o'clock axis left reconstructed breast most consistent with a postoperative seroma; 30 mL drained. Culture and gram stain were negative. \par \par She last saw Dr. Lerman on 5/7/19; noted to have erythema to left breast, possible early cellulitis. She was started on a 2 week course of Ciprofloxacin. Also advised to continue lymphedema therapy/ PT, and RTC 2 weeks. She last saw Dr. Carlin beginning of April, was started on Anastrozole then. \par \par Today, she reports occasional soreness to left breast upon palpation, more so in an area of firmness at mid inner quadrant. She reports LUE edema and ROM has been improving since working with PT. She states energy level is good and she is working. She will complete course of Ciprofloxacin on 5/21. She notes she will be having mediport removed, likely next week, as per Dr. Carlin. 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 She denies any other c/o to include fever, chills, CP, SOB, N/V/D. \par

## 2019-07-23 NOTE — REVIEW OF SYSTEMS
[Fatigue: Grade 1 - Fatigue relieved by rest] : Fatigue: Grade 1 - Fatigue relieved by rest [Breast Pain: Grade 1 - Mild pain] : Breast Pain: Grade 1 - Mild pain [Skin Hyperpigmentation: Grade 0] : Skin Hyperpigmentation: Grade 0 [Dermatitis Radiation: Grade 1 - Faint erythema or dry desquamation] : Dermatitis Radiation: Grade 1 - Faint erythema or dry desquamation [Fever] : no fever [Chills] : no chills [Patient Intake Form Reviewed] : Patient intake form was reviewed [Fatigue] : fatigue [Night Sweats] : no night sweats [Recent Change In Weight] : ~T recent weight change [Loss of Hearing] : loss of hearing [Dysphagia] : no dysphagia [Nosebleeds] : no nosebleeds [Joint Pain] : no joint pain [Muscle Pain] : no muscle pain [Confused] : no confusion [Muscle Weakness] : no muscle weakness [Dizziness] : no dizziness [Fainting] : no fainting [Suicidal] : not suicidal [Insomnia] : no insomnia [Anxiety] : anxiety [Depression] : depression [Negative] : Endocrine [FreeTextEntry7] : Abdominal soreness, abdominal incisions [FreeTextEntry2] : 13 pound weight loss since mastectomy and BSO [FreeTextEntry4] : 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  [de-identified] : unsteady gait 2/2 neuropathy of bilateral feet [de-identified] : poor sleep initiation and maintenance, anxious and overwhelmed.

## 2019-07-23 NOTE — DISEASE MANAGEMENT
[FreeTextEntry4] : G [Pathological] : TNM Stage: p [TTNM] : 1 [NTNM] : 1 [MTNM] : 0 [IIB] : IIB [de-identified] : 4400 cGy [de-identified] : 5000 cGy [de-identified] : left chest wall and nodes

## 2019-08-27 ENCOUNTER — APPOINTMENT (OUTPATIENT)
Dept: RADIATION ONCOLOGY | Facility: CLINIC | Age: 64
End: 2019-08-27
Payer: COMMERCIAL

## 2019-08-27 PROCEDURE — 99024 POSTOP FOLLOW-UP VISIT: CPT

## 2019-08-27 NOTE — HISTORY OF PRESENT ILLNESS
[FreeTextEntry1] : Ms. 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 BRCA1+. She is s/p bilateral mastectomy, left ALND with bilateral NUBIA flap reconstruction on 2/27/19. She tolerated her radiation well and did not develop any grade 3 or higher acute toxicities as a result of treatment. \par \par 8/27/19- PTE\par Ms. Bernstein returns for post treatment evaluation. She is scheduled to see Dr. Nunez for follow up on 8/29/19. She will see Dr. Lerman on 9/24/19. She last saw Dr. Carlin on last week, on anastrazole. Today, she feels well.  Restarted PT last week, dermatitis resolved.  No interval medical events.  Feels well with good energy. \par \par \par [facility]- 5/16/19\par Ms. Bernstein returns for further consideration of radiation therapy for LEFT breast cancer. When she was seen on 2/7/19, plan was to tentatively proceed with PMRT after wound healing is complete, and return in 6- 8 weeks. In the interim, she has been following up with Dr. Lerman. On 3/15/19, she had debridement, local tissue arrangement, and wound closure to bilateral breasts. She developed left breast cellulitis; started on a course of Bactrim on 3/26/19, but was then changed to Ciprofloxacin (14 day course) on 4/1/19.\par \par On 4/5/19, she underwent U/S guided FNA of fluid collection 6- 8 o'clock left breast adjacent to lumpectomy scar. \par Noted was 6.0 x 1.5 cm complex fluid collection 6-8 o'clock axis left reconstructed breast most consistent with a postoperative seroma; 30 mL drained. Culture and gram stain were negative. \par \par She last saw Dr. Lerman on 5/7/19; noted to have erythema to left breast, possible early cellulitis. She was started on a 2 week course of Ciprofloxacin. Also advised to continue lymphedema therapy/ PT, and RTC 2 weeks. She last saw Dr. Carlin beginning of April, was started on Anastrozole then. \par \par Today, she reports occasional soreness to left breast upon palpation, more so in an area of firmness at mid inner quadrant. She reports LUE edema and ROM has been improving since working with PT. She states energy level is good and she is working. She will complete course of Ciprofloxacin on 5/21. She notes she will be having mediport removed, likely next week, as per Dr. Carlin. 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 She denies any other c/o to include fever, chills, CP, SOB, N/V/D. \par \par ____________________________________________________\par ONCOLOGIC HISTORY (2/7/19)\par Ms. Julian Bernstein is a 63F, former smoker (10 pack years, quit 1993) referred by Dr. Hooper for consideration of radiation therapy for LEFT breast cancer. \par \par On 4/12/18, she underwent routine mammo-elvin/ sono which showed the following:\par -1.2 cm oval shaped mass with slightly spiculated margins, and an associated group of fine pleomorphic microcalcifications in a linear distribution, in the upper outer LEFT breast, new from prior study and corresponding to a 1.2 x 0.5 x 0.8 cm irregular shaped hypoechoic lesion at 2:00 6cmFN on ultrasound. U/S guided biopsy recommended. \par -Cortically thickened left axillary lymph node with cortical thickening measuring up to 0.8 cm. U/S guided core biopsy advised.\par -Benign findings in right breast. \par \par On 4/23/18, she underwent U/S core biopsy of left breast 2:00 and left prominent axillary lymph node. Pathology revealed the following:\par 1. Left breast, 2 o'clock, 6 cm from nipple; ultrasound guided core biopsy:\par - Infiltrating ductal carcinoma, moderately to poorly differentiated, with focal micropapillary features and scanty mucin production, at\par least 1.1 cm.  - Positive for lymphovascular invasion\par 2. Left axillary lymph node, core biopsy: - Metastatic carcinoma, 0.4 cm focus\par ER positive, HR equivocal, HER-2 negative via FISH. \par \par She initially saw Dr. Wray on 4/30/18, and at the time denied any skin changes, palpable masses, or nipple discharge. Genetic testing was also done at this time, and she was found to be BRCA1 positive with a VUS in the Chek 2 gene.  \par \par MRI breast on 5/15/18 showed the following:\par Right breast- benign findings.\par Left breast- 0.7 x 2.1 x 1.5 cm enhancing mass with an associated microclip at 2:00 6.5cmFN, consistent with patient's known malignancy. A few anterior foci of non-mass enhancement, the most anterior of which is a 0.4 foci of non-mass enhancement, 3 cm anterior to the mass. MRI guided biopsy recommended.   BI-RADS 4. \par \par She saw Dr. Carlin for initial consultation in June 2018 for neoadjuvant chemotherapy. She received ACT from 6/2018 to 11/2018 (one treatment withheld due to side effects- neuropathy). She developed SHRESTHA a couple weeks after completion of systemic therapy; chest CT results consistent with air trapping and and she saw a pulmonologist on 12/21/18.  SHRESTHA resolved on its own. Of note, she was found to have a 4.6 cm RIGHT adnexal mass. She saw Dr. Burris/ Blayne for this; plan was for BSO at time of breast surgery. \par \par On 1/9/19, she underwent bilateral mastectomy, left ALND with bilateral NUBIA flap reconstruction and BSO (Geraldo Hooper, Lerman, Blayne). Pathology showed the following: \par \par 1. [place] lymph nodes x 3, left; biopsy: \par - Macrometastatic carcinoma to one of five lymph nodes (1/5)  - Metastatic focus measures 6 mm\par Note: Sections of a lymph node with metallic clip show mucin pools with scattered scanty residual tumor cells. Negative for\par extracapsular extension.\par 2. [place] lymph node, left; biopsy: 0/1\par 3. Breast, right; mastectomy:- Benign breast tissue, nipple, and skin\par 4. Upper outer flap, right, clip at final margin; excision: - Benign fibroadipose tissue\par 5. Breast, left; mastectomy:\par - Invasive and in situ ductal carcinoma status post neoadjuvant therapy\par - Invasive ductal carcinoma with mucinous features, present predominantly as mucin pools with scattered foci of residual\par tumor cells, spanning up to 9 mm in greatest dimension, location UOQ. \par - Significant response to chemotherapy with tumor cellularity\par - Rare foci of ductal carcinoma in situ (DCIS), solid and cribriform types with treatment related changes, within tumor bed\par area.    - Tumor bed area measures: 1.0 x 0.8 x 0.8 cm\par - Margins:\par Invasive carcinoma:  Posterior: 1 mm (scattered foci < 1mm).   All remaining: >10 mm\par DCIS:   Posterior: 1 mm (single focus).   All remaining: > 10 mm\par - Two biopsy site changes\par - Lymphovascular invasion is not identified\par - The surrounding breast tissue with flat epithelial atypia (FEA), mucocele-like lesion and fibrocystic changes\par - Benign nipple and skin\par \par 6. Axillary content, left; biopsy: 0/22\par 7. Internal mammary lymph node, right; biopsy: 0/1 \par 8. Internal mammary lymph node, left; biopsy: 0/1\par 9. Fallopian tube and ovary, left; salpingo-oophorectomy:\par - Ovary with benign inclusion cysts   - Fallopian tube within normal limits\par 10. 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 She last saw Dr. Carlin last week, no change in plan at this time and she will see her again first week of March. She last saw Dr. Hooper on 1/25/19, at which time she was referred here and advised to follow up with Dr. Carlin.  She should follow up with breast surgeon in six months.  She last saw Dr. Lerman on 2/5/19; SOSA drain removed at the time. She has also started packing breast wounds (one on each breast, left wound larger than right) with wet gauze, changes dressing twice a day. \par \par Today, she reports she has soreness to both breasts, L>R. Discomfort is well controlled with Advil. She denies pain or discomfort to axilla or UE. She continues to have numbness in bilateral feet as a result of chemotherapy, but states it is improving. She uses a cane to ambulate due to feeling unsteady on her feet. She further reports fatigue and a 13 pound weight loss since the mastectomy. She will be starting Physical Therapy soon. She reports being overwhelmed and anxious regarding her health and having to pack the breast wounds. She is currently not working (she is a ), but plans to return in four weeks. She lives on her own, but one her sons lives a few blocks away. 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 \par \par Family history notable for paternal grandmother dx in her 40's with breast cancer, mother dx with uterine vs ovarian cancer at age 48 and passed away from cancer.

## 2019-08-27 NOTE — VITALS
[Maximal Pain Intensity: 2/10] : 2/10 [Least Pain Intensity: 0/10] : 0/10 [Pain Description/Quality: ___] : Pain description/quality: [unfilled] [Pain Location: ___] : Pain Location: [unfilled] [OTC] : OTC [70: Cares for self; unalbe to carry on normal activity or do active work.] : 70: Cares for self; unable to carry on normal activity or do active work. [Pain Interferes with ADLs] : Pain does not interfere with activities of daily living

## 2019-08-27 NOTE — DISEASE MANAGEMENT
[Pathological] : TNM Stage: p [IIB] : IIB [NTNM] : 1 [TTNM] : 1 [de-identified] : left chest wall and nodes [MTNM] : 0

## 2019-08-27 NOTE — PHYSICAL EXAM
[Respiration, Rhythm And Depth] : normal respiratory rhythm and effort [] : no respiratory distress [Auscultation Breath Sounds / Voice Sounds] : lungs were clear to auscultation bilaterally [Exaggerated Use Of Accessory Muscles For Inspiration] : no accessory muscle use [Normal] : no palpable adenopathy [Abdomen Tenderness] : non-tender [Musculoskeletal - Swelling] : no joint swelling [Range of Motion to Joints] : range of motion to joints [No Focal Deficits] : no focal deficits [Motor Tone] : muscle strength and tone were normal [Oriented To Time, Place, And Person] : oriented to person, place, and time [de-identified] : Right ear hearing loss (congenital). Congenital right ear deformity.  [de-identified] : Periareolar incisions, well healed.  Faint grade 1 hyperpigmentation of reconstructed inframammary fold.  [de-identified] : Port-a-cath Right chest removed in interval with well healed scar [de-identified] : abdominal incisions, well healed. prior SOSA drain sites to lateral abdomen, well healed.  [de-identified] : as above in breast section

## 2019-08-27 NOTE — REVIEW OF SYSTEMS
[Patient Intake Form Reviewed] : Patient intake form was reviewed [Fatigue] : fatigue [Recent Change In Weight] : ~T recent weight change [Loss of Hearing] : loss of hearing [Depression] : depression [Anxiety] : anxiety [Negative] : Allergic/Immunologic [Fever] : no fever [Chills] : no chills [Night Sweats] : no night sweats [Nosebleeds] : no nosebleeds [Dysphagia] : no dysphagia [Joint Pain] : no joint pain [Muscle Pain] : no muscle pain [Muscle Weakness] : no muscle weakness [Confused] : no confusion [Dizziness] : no dizziness [Fainting] : no fainting [Suicidal] : not suicidal [Insomnia] : no insomnia [FreeTextEntry4] : 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  [de-identified] : unsteady gait 2/2 neuropathy of bilateral feet [de-identified] : poor sleep initiation and maintenance, anxious and overwhelmed.

## 2019-08-28 ENCOUNTER — RESULT REVIEW (OUTPATIENT)
Age: 64
End: 2019-08-28

## 2019-08-29 ENCOUNTER — TRANSCRIPTION ENCOUNTER (OUTPATIENT)
Age: 64
End: 2019-08-29

## 2019-08-29 ENCOUNTER — APPOINTMENT (OUTPATIENT)
Dept: SURGERY | Facility: CLINIC | Age: 64
End: 2019-08-29
Payer: COMMERCIAL

## 2019-08-29 VITALS
HEIGHT: 64 IN | DIASTOLIC BLOOD PRESSURE: 70 MMHG | SYSTOLIC BLOOD PRESSURE: 113 MMHG | WEIGHT: 185 LBS | BODY MASS INDEX: 31.58 KG/M2 | HEART RATE: 89 BPM

## 2019-08-29 DIAGNOSIS — Z80.49 FAMILY HISTORY OF MALIGNANT NEOPLASM OF OTHER GENITAL ORGANS: ICD-10-CM

## 2019-08-29 PROCEDURE — 99205 OFFICE O/P NEW HI 60 MIN: CPT

## 2019-08-29 RX ORDER — OFLOXACIN 3 MG/ML
0.3 SOLUTION/ DROPS OPHTHALMIC
Qty: 5 | Refills: 0 | Status: DISCONTINUED | COMMUNITY
Start: 2016-11-29 | End: 2019-08-29

## 2019-08-29 RX ORDER — MAGNESIUM HYDROXIDE 1200 MG/15ML
0.9 LIQUID ORAL
Qty: 1 | Refills: 0 | Status: DISCONTINUED | COMMUNITY
Start: 2019-02-14 | End: 2019-08-29

## 2019-08-29 RX ORDER — ANASTROZOLE TABLETS 1 MG/1
1 TABLET ORAL
Refills: 0 | Status: ACTIVE | COMMUNITY

## 2019-08-29 RX ORDER — CALCIUM CITRATE/VITAMIN D3 315MG-6.25
TABLET ORAL
Refills: 0 | Status: ACTIVE | COMMUNITY

## 2019-08-29 RX ORDER — ALPRAZOLAM 0.5 MG/1
0.5 TABLET ORAL 3 TIMES DAILY
Qty: 15 | Refills: 1 | Status: DISCONTINUED | COMMUNITY
Start: 2019-01-25 | End: 2019-08-29

## 2019-08-29 RX ORDER — SODIUM HYPOCHLORITE 1.25 MG/ML
0.12 SOLUTION TOPICAL
Qty: 1 | Refills: 2 | Status: DISCONTINUED | COMMUNITY
Start: 2019-02-26 | End: 2019-08-29

## 2019-08-29 RX ORDER — CIPROFLOXACIN HYDROCHLORIDE 500 MG/1
500 TABLET, FILM COATED ORAL TWICE DAILY
Qty: 28 | Refills: 0 | Status: DISCONTINUED | COMMUNITY
Start: 2019-04-01 | End: 2019-08-29

## 2019-08-29 NOTE — PAST MEDICAL HISTORY
[Postmenopausal] : The patient is postmenopausal [Menarche Age ____] : age at menarche was [unfilled] [unknown] : the patient is unsure of the date of her LMP [Total Preg ___] : G[unfilled] [Live Births ___] : P[unfilled]  [Living ___] : Living: [unfilled] [Age At Live Birth ___] : Age at live birth: [unfilled] [History of Hormone Replacement Treatment] : has no history of hormone replacement treatment [FreeTextEntry5] : Salpingo-oophorectomy - January 2019 [FreeTextEntry2] : 2 boys [FreeTextEntry6] : None [FreeTextEntry7] : OCP's x 15 years, stopped 30 years ago [FreeTextEntry8] : No history

## 2019-08-29 NOTE — HISTORY OF PRESENT ILLNESS
[FreeTextEntry1] : Julian is a 65 yo, BRCA1+, post-menopausal female previously under the care of Dr. Hooper; she presents for follow-up after completing treatment for left breast infiltrating ductal carcinoma (ER+/GA borderline/ HER2 negative) w/ DCIS, s/p neoadjuvant chemo followed-by a b/l mastectomy w/ NUBIA recon and BSO on 1/7/19, 1/5 sentinel nodes positive, total of 29 left LNs removed; clear margins for invasive cancer, DCIS posterior margin was 1mm; she is now s/p XRT w/ Dr De Leon which completed on 7/23/19. She is currently taking Anastrozole managed by her medical oncologist, Dr. Jody Carlin. She is undergoing PT for left arm lymphedema and soft tissue mobilization. \par \par Pt has family hx of cancer: paternal grandmother dx in her 40's with breast cancer, mother dx with uterine cancer at age 48 and passed away from ca. \par

## 2019-08-30 ENCOUNTER — APPOINTMENT (OUTPATIENT)
Dept: GYNECOLOGIC ONCOLOGY | Facility: CLINIC | Age: 64
End: 2019-08-30
Payer: COMMERCIAL

## 2019-08-30 VITALS
WEIGHT: 186 LBS | HEIGHT: 64 IN | SYSTOLIC BLOOD PRESSURE: 128 MMHG | DIASTOLIC BLOOD PRESSURE: 80 MMHG | BODY MASS INDEX: 31.76 KG/M2

## 2019-08-30 DIAGNOSIS — B37.2 CANDIDIASIS OF SKIN AND NAIL: ICD-10-CM

## 2019-08-30 PROCEDURE — 99214 OFFICE O/P EST MOD 30 MIN: CPT

## 2019-08-30 NOTE — PHYSICAL EXAM
[Abnormal] : General appearance: Abnormal [Normal] : Recto-Vaginal Exam: Normal [Fully active, able to carry on all pre-disease performance without restriction] : Status 0 - Fully active, able to carry on all pre-disease performance without restriction [Absent] : Adnexa(ae): Absent [de-identified] : candida dermatitis bilaterally along lateral aspect of labia majora

## 2019-08-30 NOTE — ASSESSMENT
[FreeTextEntry1] : 65 y/o w/ h/o of left breast CA w/ lymphovascular involvement s/p BL mastectomy w/ NUBIA reconstruction as well as BSO for benign appearing ovarian mass on 1/7/2019. Pt is s/p RT and is currently on anastrozole.  Doing well, asymptomatic. \par \par S/S of primary periotneal cancer discussed in detail. Patient to follow up with me in 6 months. \par \par Rx: clotrimazole 1%

## 2019-08-30 NOTE — REVIEW OF SYSTEMS
[Negative] : Cardiovascular [Change in vision] : change in vision [Fatigue] : fatigue [FreeTextEntry2] : difficulty walking [FreeTextEntry8] : diminished and blurring vision [FreeTextEntry7] : weakness [de-identified] : nausea, heartburn [de-identified] : leg cramps

## 2019-08-30 NOTE — PAST MEDICAL HISTORY
[Menarche Age ____] : age at menarche was [unfilled] [Oral Contraceptives] : uses oral contraceptives [Total Preg ___] : G[unfilled] [de-identified] : for 18  years [FreeTextEntry3] : "a long time ago" [FreeTextEntry2] : vaginal x2 on 11/1977, and 3/1982

## 2019-08-30 NOTE — HISTORY OF PRESENT ILLNESS
[FreeTextEntry1] : Problem\par 1)Hx Ovarian mass s/p BSO on 1/7/2019\par 2)Hx of left invasive breast cancer w/ lymphovascular involvement \par 3)BRCA Positive,FamHx of breast Ca, Paternal GM Breast Ca, possibly mother death of unknown reason (uterine v ovarian ca)\par \par \par Previous Therapy\par 1)CTAP 6/25/2018\par    a)Right ovarian mass of 3.9x3.9x3.4cm\par    b)Heterogenous and thickened endometrium and leiomyomatous uterus\par    c)Hepatic steatosis\par 2)CTAP 7/12/2018\par    a)Right adnexal mass of 4.6cm\par 3) Pelvic ultrasound 12/12/18\par     a)Right adenexal mass of 3.9cm, stable\par On 12/17/2018 discussion was made for laparoscopic BSO procedure on 1/7/2019.\par \par 4) Invasive left Breast cancer, s/p neoadjuvant chemotherapy,  BL mastectomy, left ALND and BL NUBIA flap reconstruction on 1/7/2019. Pt has also completed radiation treatment.

## 2019-08-30 NOTE — REASON FOR VISIT
[FreeTextEntry1] : Ms. Bernstein is here as a follow up. She has been following up in Rad Onc for post treatment evaluation of breast cancer. She has completed radiation therapy to the left chest wall and nodes. Pt is on anastrazole. \par \par GynHx: Post menopausal (reports episode of PMB last year, EMBx negative). 18 year OCP use.\par ObHx:  x 2\par PmHx: Breast Ca\par SurgHx: R hip replacement\par Meds: simvastatin, ondansetron\par All: NKDA\par \par SocHx: lives alone, . Works as . No toxic habits.\par FamHx: Mother ( young, uterine vs. ovarian, patietn unsure)

## 2019-09-30 ENCOUNTER — APPOINTMENT (OUTPATIENT)
Dept: RADIOLOGY | Facility: HOSPITAL | Age: 64
End: 2019-09-30
Payer: COMMERCIAL

## 2019-09-30 ENCOUNTER — OUTPATIENT (OUTPATIENT)
Dept: OUTPATIENT SERVICES | Facility: HOSPITAL | Age: 64
LOS: 1 days | End: 2019-09-30
Payer: COMMERCIAL

## 2019-09-30 DIAGNOSIS — Z96.641 PRESENCE OF RIGHT ARTIFICIAL HIP JOINT: Chronic | ICD-10-CM

## 2019-09-30 DIAGNOSIS — Z98.890 OTHER SPECIFIED POSTPROCEDURAL STATES: Chronic | ICD-10-CM

## 2019-09-30 DIAGNOSIS — Z41.9 ENCOUNTER FOR PROCEDURE FOR PURPOSES OTHER THAN REMEDYING HEALTH STATE, UNSPECIFIED: Chronic | ICD-10-CM

## 2019-09-30 PROCEDURE — 77080 DXA BONE DENSITY AXIAL: CPT | Mod: 26

## 2019-09-30 PROCEDURE — 77080 DXA BONE DENSITY AXIAL: CPT

## 2019-10-01 ENCOUNTER — APPOINTMENT (OUTPATIENT)
Dept: INTERNAL MEDICINE | Facility: CLINIC | Age: 64
End: 2019-10-01
Payer: COMMERCIAL

## 2019-10-01 VITALS
WEIGHT: 185 LBS | HEIGHT: 64 IN | OXYGEN SATURATION: 98 % | DIASTOLIC BLOOD PRESSURE: 79 MMHG | SYSTOLIC BLOOD PRESSURE: 120 MMHG | HEART RATE: 96 BPM | BODY MASS INDEX: 31.58 KG/M2 | TEMPERATURE: 97.9 F

## 2019-10-01 DIAGNOSIS — E78.5 HYPERLIPIDEMIA, UNSPECIFIED: ICD-10-CM

## 2019-10-01 DIAGNOSIS — Z00.00 ENCOUNTER FOR GENERAL ADULT MEDICAL EXAMINATION W/OUT ABNORMAL FINDINGS: ICD-10-CM

## 2019-10-01 LAB — CYTOLOGY CVX/VAG DOC THIN PREP: NORMAL

## 2019-10-01 PROCEDURE — 99386 PREV VISIT NEW AGE 40-64: CPT | Mod: 25,GC

## 2019-10-14 NOTE — PATIENT PROFILE ADULT - NSPROPTRIGHTSUPPORTPERSON_GEN_A_NUR
Bed: ED09  Expected date: 10/13/19  Expected time: 5:54 PM  Means of arrival: Ambulance  Comments:  [facility] 57m pneumonia ETA 1800  
Pt. Mom went home, ordered a wheelchair van for pt to return home. Verified with mom that he has gone home that way before. Mom also states there is help at home to get pt out of wheel chair. Regular ambulance called due to  of van did not feel safe to transport.  
declines

## 2019-10-29 ENCOUNTER — APPOINTMENT (OUTPATIENT)
Dept: PLASTIC SURGERY | Facility: CLINIC | Age: 64
End: 2019-10-29
Payer: COMMERCIAL

## 2019-10-29 VITALS — WEIGHT: 190 LBS | BODY MASS INDEX: 32.44 KG/M2 | HEIGHT: 64 IN

## 2019-10-29 DIAGNOSIS — S21.009D: ICD-10-CM

## 2019-10-29 DIAGNOSIS — S21.009A UNSPECIFIED OPEN WOUND OF UNSPECIFIED BREAST, INITIAL ENCOUNTER: ICD-10-CM

## 2019-10-29 DIAGNOSIS — N61.0 MASTITIS WITHOUT ABSCESS: ICD-10-CM

## 2019-10-29 DIAGNOSIS — N65.0 DEFORMITY OF RECONSTRUCTED BREAST: ICD-10-CM

## 2019-10-29 DIAGNOSIS — Z92.3 PERSONAL HISTORY OF IRRADIATION: ICD-10-CM

## 2019-10-29 PROCEDURE — 99213 OFFICE O/P EST LOW 20 MIN: CPT

## 2019-10-29 NOTE — PHYSICAL EXAM
[NI] : Normal [de-identified] : left breast > right, redundant skin at  "T" point in left breast, nipples surgically absent, palpable firm area of probably fat necrosis in LIQ of left breast, incisions well healed [de-identified] : soft, NT, well healed, no hernia or bulge + dog ears L>R

## 2019-10-29 NOTE — SURGICAL HISTORY
[de-identified] : 1-7-19 BL mastectomy (Sandie) and BSO (Adelaida) with NUBIA reconstruction [de-identified] : 3/15/19 - LOCAL TISSUE REARRANGEMENT AND WOUND CLOSURE

## 2019-10-29 NOTE — ASSESSMENT
[FreeTextEntry1] : 3 months from XRT -- completed 7/23/19\par Healing well\par Will need revision surgery (Left breast larger than right with vertical dog ear) including NAC recon(pt unsure if she wants Nipple recon), abdominal dog ear excision and excision of left breast fat necrosis - But she is not sure if she wants any more surgery\par Continue PT\par F/u in 2-3 months to assess readiness for 2nd stage surgery\par

## 2019-10-29 NOTE — HISTORY OF PRESENT ILLNESS
[FreeTextEntry1] : 65 y/o BRCA1+ female with history of left invasive breast cancer s/p NAC and bilateral mastectomy with Sandie s/p BL NUBIA flap on 1/7/19 s/p local tissue rearrangement and wound closure on 3/15/19.\par \par Here 3 months s/p completion of left breast radiation with Dr. De Leon (completed left chest wall and nodes from 6/17/19 to 7/23/19). Feeling well. Does note area of firmness in left breast--states improvement with PT at [facility] with Dr. Freedman ([phone]). No new complaints.

## 2019-12-18 ENCOUNTER — APPOINTMENT (OUTPATIENT)
Dept: RADIATION ONCOLOGY | Facility: CLINIC | Age: 64
End: 2019-12-18
Payer: COMMERCIAL

## 2019-12-18 PROCEDURE — 99214 OFFICE O/P EST MOD 30 MIN: CPT

## 2019-12-19 NOTE — DISEASE MANAGEMENT
[Pathological] : TNM Stage: p [IIB] : IIB [TTNM] : 1 [NTNM] : 1 [MTNM] : 0 [de-identified] : left chest wall and nodes

## 2019-12-19 NOTE — VITALS
[Maximal Pain Intensity: 2/10] : 2/10 [Pain Description/Quality: ___] : Pain description/quality: [unfilled] [Least Pain Intensity: 0/10] : 0/10 [Pain Location: ___] : Pain Location: [unfilled] [OTC] : OTC [70: Cares for self; unalbe to carry on normal activity or do active work.] : 70: Cares for self; unable to carry on normal activity or do active work. [Pain Interferes with ADLs] : Pain does not interfere with activities of daily living

## 2019-12-19 NOTE — PHYSICAL EXAM
[] : no respiratory distress [Respiration, Rhythm And Depth] : normal respiratory rhythm and effort [Exaggerated Use Of Accessory Muscles For Inspiration] : no accessory muscle use [Auscultation Breath Sounds / Voice Sounds] : lungs were clear to auscultation bilaterally [Normal] : no palpable adenopathy [Abdomen Tenderness] : non-tender [Musculoskeletal - Swelling] : no joint swelling [Range of Motion to Joints] : range of motion to joints [Motor Tone] : muscle strength and tone were normal [No Focal Deficits] : no focal deficits [Oriented To Time, Place, And Person] : oriented to person, place, and time [de-identified] : Right ear hearing loss (congenital). Congenital right ear deformity.  [de-identified] : Periareolar incisions, well healed.    [de-identified] : abdominal incisions, well healed. prior SOSA drain sites to lateral abdomen, well healed.  [de-identified] : as above in breast section

## 2019-12-19 NOTE — HISTORY OF PRESENT ILLNESS
[FreeTextEntry1] : Ms. Julian Bernstein has completed radiation therapy to the LEFT chest wall and nodes for a total of 5000 cGy over 25 fractions from 6/17/19 to 7/23/19 for a [id number], [id number], ER positive, CA equivocal, HER-2 negative, poorly differentiated infiltrating ductal carcinoma of the upper outer quadrant of the left breast, AJCC stage IIB. BRCA1+. She is s/p bilateral mastectomy, left ALND with bilateral NUBIA flap reconstruction, and BSO on 1/9/19. She tolerated her radiation well and did not develop any grade 3 or higher acute toxicities as a result of treatment. She is taking Anastrozole under the care of Dr. Carlin. \par \par 12/18/19- FOLLOW UP\par Ms. Bernstein returns for routine follow up. When she was last seen on 8/27/19, she was feeling well, recently restarted PT; plan was to continue follow up with Geraldo Nunez, Lerman, and Raegan. She followed up with Dr. Nunez on 8/29/19; plan is to RTC 6 months. She last saw Dr. Carlni on 11/1 (sees q 3 months). She last saw Dr. Lerman on 10/29/19; plan is to RTC in January/ February 2020 to assess readiness for second stage surgery. Also follows with Dr. Burris and PCP Dr. Arechiga.\par \par Today, she reports tightness to left axilla, does PT once a week. She notes UE edema is improving. Also notes intermittent CW tightness, but not painful or bothersome. Reports good energy levels and appetite. Denies CP, SOB, dysphagia, or any further complaints. \par \par \par 8/27/19- PTE\par Ms. Bernstein returns for post treatment evaluation. She is scheduled to see Dr. Nunez for follow up on 8/29/19. She will see Dr. Lerman on 9/24/19. She last saw Dr. Carlin on last week, on anastrazole. Today, she feels well.  Restarted PT last week, dermatitis resolved.  No interval medical events.  Feels well with good energy. \par \par \par SNA- 5/16/19\par Ms. Bernstein returns for further consideration of radiation therapy for LEFT breast cancer. When she was seen on 2/7/19, plan was to tentatively proceed with PMRT after wound healing is complete, and return in 6- 8 weeks. In the interim, she has been following up with Dr. Lerman. On 3/15/19, she had debridement, local tissue arrangement, and wound closure to bilateral breasts. She developed left breast cellulitis; started on a course of Bactrim on 3/26/19, but was then changed to Ciprofloxacin (14 day course) on 4/1/19.\par \par On 4/5/19, she underwent U/S guided FNA of fluid collection 6- 8 o'clock left breast adjacent to lumpectomy scar. \par Noted was 6.0 x 1.5 cm complex fluid collection 6-8 o'clock axis left reconstructed breast most consistent with a postoperative seroma; 30 mL drained. Culture and gram stain were negative. \par \par She last saw Dr. Lerman on 5/7/19; noted to have erythema to left breast, possible early cellulitis. She was started on a 2 week course of Ciprofloxacin. Also advised to continue lymphedema therapy/ PT, and RTC 2 weeks. She last saw Dr. Carlin beginning of April, was started on Anastrozole then. \par \par Today, she reports occasional soreness to left breast upon palpation, more so in an area of firmness at mid inner quadrant. She reports LUE edema and ROM has been improving since working with PT. She states energy level is good and she is working. She will complete course of Ciprofloxacin on 5/21. She notes she will be having mediport removed, likely next week, as per Dr. Carlin. 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 She denies any other c/o to include fever, chills, CP, SOB, N/V/D. \par \par ____________________________________________________\par ONCOLOGIC HISTORY (2/7/19)\par Ms. Julian Bernstein is a 63F, former smoker (10 pack years, quit 1993) referred by Dr. Hooper for consideration of radiation therapy for LEFT breast cancer. \par \par On 4/12/18, she underwent routine mammo-elvin/ sono which showed the following:\par -1.2 cm oval shaped mass with slightly spiculated margins, and an associated group of fine pleomorphic microcalcifications in a linear distribution, in the upper outer LEFT breast, new from prior study and corresponding to a 1.2 x 0.5 x 0.8 cm irregular shaped hypoechoic lesion at 2:00 6cmFN on ultrasound. U/S guided biopsy recommended. \par -Cortically thickened left axillary lymph node with cortical thickening measuring up to 0.8 cm. U/S guided core biopsy advised.\par -Benign findings in right breast. \par \par On 4/23/18, she underwent U/S core biopsy of left breast 2:00 and left prominent axillary lymph node. Pathology revealed the following:\par 1. Left breast, 2 o'clock, 6 cm from nipple; ultrasound guided core biopsy:\par - Infiltrating ductal carcinoma, moderately to poorly differentiated, with focal micropapillary features and scanty mucin production, at\par least 1.1 cm.  - Positive for lymphovascular invasion\par 2. Left axillary lymph node, core biopsy: - Metastatic carcinoma, 0.4 cm focus\par ER positive, HR equivocal, HER-2 negative via FISH. \par \par She initially saw Dr. Wray on 4/30/18, and at the time denied any skin changes, palpable masses, or nipple discharge. Genetic testing was also done at this time, and she was found to be BRCA1 positive with a VUS in the Chek 2 gene.  \par \par MRI breast on 5/15/18 showed the following:\par Right breast- benign findings.\par Left breast- 0.7 x 2.1 x 1.5 cm enhancing mass with an associated microclip at 2:00 6.5cmFN, consistent with patient's known malignancy. A few anterior foci of non-mass enhancement, the most anterior of which is a 0.4 foci of non-mass enhancement, 3 cm anterior to the mass. MRI guided biopsy recommended.   BI-RADS 4. \par \par She saw Dr. Carlin for initial consultation in June 2018 for neoadjuvant chemotherapy. She received ACT from 6/2018 to 11/2018 (one treatment withheld due to side effects- neuropathy). She developed SHRESTHA a couple weeks after completion of systemic therapy; chest CT results consistent with air trapping and and she saw a pulmonologist on 12/21/18.  SHRESTHA resolved on its own. Of note, she was found to have a 4.6 cm RIGHT adnexal mass. She saw Dr. Burris/ [facility] for this; plan was for BSO at time of breast surgery. \par \par On 1/9/19, she underwent bilateral mastectomy, left ALND with bilateral NUBIA flap reconstruction and BSO (Geraldo Hooper, Lerman, Blayne). Pathology showed the following: \par \par 1. [place] lymph nodes x 3, left; biopsy: \par - Macrometastatic carcinoma to one of five lymph nodes (1/5)  - Metastatic focus measures 6 mm\par Note: Sections of a lymph node with metallic clip show mucin pools with scattered scanty residual tumor cells. Negative for\par extracapsular extension.\par 2. [place] lymph node, left; biopsy: 0/1\par 3. Breast, right; mastectomy:- Benign breast tissue, nipple, and skin\par 4. Upper outer flap, right, clip at final margin; excision: - Benign fibroadipose tissue\par 5. Breast, left; mastectomy:\par - Invasive and in situ ductal carcinoma status post neoadjuvant therapy\par - Invasive ductal carcinoma with mucinous features, present predominantly as mucin pools with scattered foci of residual\par tumor cells, spanning up to 9 mm in greatest dimension, location UOQ. \par - Significant response to chemotherapy with tumor cellularity\par - Rare foci of ductal carcinoma in situ (DCIS), solid and cribriform types with treatment related changes, within tumor bed\par area.    - Tumor bed area measures: 1.0 x 0.8 x 0.8 cm\par - Margins:\par Invasive carcinoma:  Posterior: 1 mm (scattered foci < 1mm).   All remaining: >10 mm\par DCIS:   Posterior: 1 mm (single focus).   All remaining: > 10 mm\par - Two biopsy site changes\par - Lymphovascular invasion is not identified\par - The surrounding breast tissue with flat epithelial atypia (FEA), mucocele-like lesion and fibrocystic changes\par - Benign nipple and skin\par \par 6. Axillary content, left; biopsy: 0/22\par 7. Internal mammary lymph node, right; biopsy: 0/1 \par 8. Internal mammary lymph node, left; biopsy: 0/1\par 9. Fallopian tube and ovary, left; salpingo-oophorectomy:\par - Ovary with benign inclusion cysts   - Fallopian tube within normal limits\par 10. 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 She last saw Dr. Carlin last week, no change in plan at this time and she will see her again first week of March. She last saw Dr. Hooper on 1/25/19, at which time she was referred here and advised to follow up with Dr. Carlin.  She should follow up with breast surgeon in six months.  She last saw Dr. Lerman on 2/5/19; SOSA drain removed at the time. She has also started packing breast wounds (one on each breast, left wound larger than right) with wet gauze, changes dressing twice a day. \par \par Today, she reports she has soreness to both breasts, L>R. Discomfort is well controlled with Advil. She denies pain or discomfort to axilla or UE. She continues to have numbness in bilateral feet as a result of chemotherapy, but states it is improving. She uses a cane to ambulate due to feeling unsteady on her feet. She further reports fatigue and a 13 pound weight loss since the mastectomy. She will be starting Physical Therapy soon. She reports being overwhelmed and anxious regarding her health and having to pack the breast wounds. She is currently not working (she is a ), but plans to return in four weeks. She lives on her own, but one her sons lives a few blocks away. 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 \par \par Family history notable for paternal grandmother dx in her 40's with breast cancer, mother dx with uterine vs ovarian cancer at age 48 and passed away from cancer.

## 2019-12-19 NOTE — REVIEW OF SYSTEMS
[Patient Intake Form Reviewed] : Patient intake form was reviewed [Fatigue] : fatigue [Recent Change In Weight] : ~T recent weight change [Loss of Hearing] : loss of hearing [Anxiety] : anxiety [Depression] : depression [Negative] : Allergic/Immunologic [Breast Pain: Grade 1 - Mild pain] : Breast Pain: Grade 1 - Mild pain [Skin Hyperpigmentation: Grade 0] : Skin Hyperpigmentation: Grade 0 [Dermatitis Radiation: Grade 0] : Dermatitis Radiation: Grade 0 [Fever] : no fever [Chills] : no chills [Night Sweats] : no night sweats [Dysphagia] : no dysphagia [Nosebleeds] : no nosebleeds [Joint Pain] : no joint pain [Muscle Pain] : no muscle pain [Muscle Weakness] : no muscle weakness [Confused] : no confusion [Dizziness] : no dizziness [Fainting] : no fainting [Suicidal] : not suicidal [Insomnia] : no insomnia [FreeTextEntry2] : 13 pound weight loss since mastectomy and BSO [FreeTextEntry4] : 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  [de-identified] : unsteady gait 2/2 neuropathy of bilateral feet [de-identified] : poor sleep initiation and maintenance, anxious and overwhelmed.

## 2020-02-27 NOTE — PHYSICAL EXAM
[Abnormal] : General appearance: Abnormal [Absent] : Adnexa(ae): Absent [Normal] : Recto-Vaginal Exam: Normal [Fully active, able to carry on all pre-disease performance without restriction] : Status 0 - Fully active, able to carry on all pre-disease performance without restriction

## 2020-02-28 ENCOUNTER — APPOINTMENT (OUTPATIENT)
Dept: GYNECOLOGIC ONCOLOGY | Facility: CLINIC | Age: 65
End: 2020-02-28
Payer: COMMERCIAL

## 2020-02-28 VITALS
BODY MASS INDEX: 32.44 KG/M2 | WEIGHT: 190 LBS | DIASTOLIC BLOOD PRESSURE: 74 MMHG | HEART RATE: 72 BPM | SYSTOLIC BLOOD PRESSURE: 122 MMHG | HEIGHT: 64 IN | OXYGEN SATURATION: 96 %

## 2020-02-28 PROCEDURE — 99215 OFFICE O/P EST HI 40 MIN: CPT

## 2020-02-28 NOTE — REVIEW OF SYSTEMS
[Negative] : Respiratory [Change in vision] : change in vision [Fatigue] : fatigue [FreeTextEntry2] : difficulty walking [FreeTextEntry7] : weakness [FreeTextEntry8] : diminished and blurring vision [de-identified] : nausea, heartburn [de-identified] : leg cramps

## 2020-02-28 NOTE — HISTORY OF PRESENT ILLNESS
[FreeTextEntry1] : Problem\par 1)Hx Ovarian mass s/p BSO on 1/7/2019\par 2)Hx of left invasive breast cancer w/ lymphovascular involvement \par 3)BRCA Positive,FamHx of breast Ca, Paternal GM Breast Ca, possibly mother death of unknown reason (uterine v ovarian ca)\par \par Previous Therapy\par 1)CTAP 6/25/2018\par    a)Right ovarian mass of 3.9x3.9x3.4cm\par    b)Heterogenous and thickened endometrium and leiomyomatous uterus\par    c)Hepatic steatosis\par 2)CTAP 7/12/2018\par    a)Right adnexal mass of 4.6cm\par 3) Pelvic ultrasound 12/12/18\par     a)Right adenexal mass of 3.9cm, stable\par On 12/17/2018 discussion was made for laparoscopic BSO procedure on 1/7/2019.\par \par 4) Invasive left Breast cancer, s/p neoadjuvant chemotherapy,  BL mastectomy, left ALND and BL NUBIA flap reconstruction on 1/7/2019. Pt has also completed radiation treatment.

## 2020-02-28 NOTE — PAST MEDICAL HISTORY
[Menarche Age ____] : age at menarche was [unfilled] [Oral Contraceptives] : uses oral contraceptives [Total Preg ___] : G[unfilled] [FreeTextEntry3] : "a long time ago" [de-identified] : for 18  years [FreeTextEntry2] : vaginal x2 on 11/1977, and 3/1982

## 2020-02-28 NOTE — ASSESSMENT
[FreeTextEntry1] : Patient doing well today. We discussed 6 mo vs. 12 mo routine visits and she would prefer to continue with 6 mo visits for now.\par \par Follow up in 6 months.

## 2020-02-28 NOTE — REASON FOR VISIT
[FreeTextEntry1] : Ms. Bernstein is here as a follow up. She has been following up in Rad Onc for post treatment evaluation of breast cancer. She has completed radiation therapy to the left chest wall and nodes 2019 to 2019. Pt is on anastrazole. \par \par ROS negative today. Although her risk is very low following RRSO, we once again reviewed the s/s of primary peritoneal cancer.\par \par GynHx: Post menopausal (reports episode of PMB last year, EMBx negative). 18 year OCP use.\par ObHx:  x 2\par PmHx: Breast Ca\par SurgHx: R hip replacement\par Meds: simvastatin, ondansetron\par All: NKDA\par \par SocHx: lives alone, . Works as . No toxic habits.\par FamHx: Mother ( young, uterine vs. ovarian, patietn unsure)

## 2020-04-02 ENCOUNTER — APPOINTMENT (OUTPATIENT)
Dept: SURGERY | Facility: CLINIC | Age: 65
End: 2020-04-02

## 2020-06-10 ENCOUNTER — APPOINTMENT (OUTPATIENT)
Dept: BREAST CENTER | Facility: CLINIC | Age: 65
End: 2020-06-10
Payer: COMMERCIAL

## 2020-06-10 VITALS
BODY MASS INDEX: 30.73 KG/M2 | WEIGHT: 180 LBS | OXYGEN SATURATION: 96 % | HEART RATE: 90 BPM | HEIGHT: 64 IN | SYSTOLIC BLOOD PRESSURE: 149 MMHG | DIASTOLIC BLOOD PRESSURE: 93 MMHG | TEMPERATURE: 98.2 F

## 2020-06-10 PROCEDURE — 99203 OFFICE O/P NEW LOW 30 MIN: CPT

## 2020-06-12 NOTE — HISTORY OF PRESENT ILLNESS
[FreeTextEntry1] : Julian is a 64 year old, BRCA1+, post-menopausal female previously under the care of Dr. Baylee Nunez. She presents for evaluation and establishing care for a history of left breast upper outer quadrant (2:00 6cMFN) infiltrating ductal carcinoma (ER 90%/AL borderline/ HER-2 FISH negative) w/ DCIS, s/p neoadjuvant chemo followed by a b/l mastectomy w/ NUBIA recon and BSO on 1/7/19, 1/5 sentinel nodes positive, total of 29 left LNs removed; clear margins for invasive cancer, DCIS posterior margin was 1mm; she is now s/p XRT w/ Dr De Leon which completed on 7/23/19. She is currently taking Anastrozole managed by her medical oncologist, Dr. Jody Carlin. \par \par She was undergoing PT for left arm lymphedema and soft tissue mobilization; however due to COVID-19 she had to refrain from going to PT. She will be reestablishing with PT this week. Patient is scheduled to follow up with rad onc this month.\par \par Patient has no breast complaints today. Denies breast skin changes or dimpling. Denies fever and chills.\par

## 2020-06-12 NOTE — PHYSICAL EXAM
[Atraumatic] : atraumatic [Normocephalic] : normocephalic [Supple] : supple [No Supraclavicular Adenopathy] : no supraclavicular adenopathy [Examined in the supine and seated position] : examined in the supine and seated position [No dominant masses] : no dominant masses in right breast  [No dominant masses] : no dominant masses left breast [No Axillary Lymphadenopathy] : no left axillary lymphadenopathy [No Ulceration] : no ulceration [No Rashes] : no rashes [No Edema] : no edema

## 2020-06-12 NOTE — REASON FOR VISIT
[FreeTextEntry1] :  history of left breast upper outer quadrant (2:00 6cMFN) infiltrating ductal carcinoma (ER 90%/MT borderline/ HER-2 FISH negative) w/ DCIS, s/p neoadjuvant chemo followed by a b/l mastectomy w/ NUBIA recon and BSO on 1/7/19, 1/5 sentinel nodes positive, total of 29 left LNs removed; clear margins for invasive cancer, DCIS posterior margin was 1mm; she is now s/p XRT w/ Dr De Leon which completed on 7/23/19. She is currently taking Anastrozole  [Consultation] : a consultation visit

## 2020-06-12 NOTE — DATA REVIEWED
[FreeTextEntry1] : Family hx of cancer: paternal grandmother dx in her 40's with breast cancer, mother dx with uterine cancer at age 48 and passed away from ca.

## 2020-08-12 ENCOUNTER — APPOINTMENT (OUTPATIENT)
Dept: RADIATION ONCOLOGY | Facility: CLINIC | Age: 65
End: 2020-08-12
Payer: COMMERCIAL

## 2020-08-12 PROCEDURE — 99212 OFFICE O/P EST SF 10 MIN: CPT

## 2020-08-12 NOTE — REVIEW OF SYSTEMS
[Patient Intake Form Reviewed] : Patient intake form was reviewed [Fatigue] : fatigue [Loss of Hearing] : loss of hearing [Recent Change In Weight] : ~T recent weight change [Anxiety] : anxiety [Depression] : depression [Negative] : Heme/Lymph [Skin Hyperpigmentation: Grade 0] : Skin Hyperpigmentation: Grade 0 [Dermatitis Radiation: Grade 0] : Dermatitis Radiation: Grade 0 [Fatigue: Grade 0] : Fatigue: Grade 0 [Breast Pain: Grade 0] : Breast Pain: Grade 0 [Fever] : no fever [Chills] : no chills [Night Sweats] : no night sweats [Dysphagia] : no dysphagia [Nosebleeds] : no nosebleeds [Joint Pain] : no joint pain [Muscle Pain] : no muscle pain [Muscle Weakness] : no muscle weakness [Confused] : no confusion [Dizziness] : no dizziness [Fainting] : no fainting [Suicidal] : not suicidal [Insomnia] : no insomnia [FreeTextEntry2] : 13 pound weight loss since mastectomy and BSO [FreeTextEntry7] : Abdominal soreness, abdominal incisions [FreeTextEntry4] : 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  [de-identified] : unsteady gait 2/2 neuropathy of bilateral feet [de-identified] : poor sleep initiation and maintenance, anxious and overwhelmed.

## 2020-08-12 NOTE — VITALS
[Maximal Pain Intensity: 2/10] : 2/10 [Least Pain Intensity: 0/10] : 0/10 [Pain Description/Quality: ___] : Pain description/quality: [unfilled] [Pain Location: ___] : Pain Location: [unfilled] [OTC] : OTC [80: Normal activity with effort; some signs or symptoms of disease.] : 80: Normal activity with effort; some signs or symptoms of disease.  [Pain Interferes with ADLs] : Pain does not interfere with activities of daily living

## 2020-08-12 NOTE — DISEASE MANAGEMENT
[Pathological] : TNM Stage: p [IIB] : IIB [TTNM] : 1 [NTNM] : 1 [MTNM] : 0 [de-identified] : left chest wall and nodes

## 2020-08-12 NOTE — PHYSICAL EXAM
[] : no respiratory distress [Respiration, Rhythm And Depth] : normal respiratory rhythm and effort [Auscultation Breath Sounds / Voice Sounds] : lungs were clear to auscultation bilaterally [Exaggerated Use Of Accessory Muscles For Inspiration] : no accessory muscle use [Abdomen Tenderness] : non-tender [Normal] : normal spine exam without palpable tenderness, no kyphosis or scoliosis [Range of Motion to Joints] : range of motion to joints [Musculoskeletal - Swelling] : no joint swelling [Motor Tone] : muscle strength and tone were normal [No Focal Deficits] : no focal deficits [Oriented To Time, Place, And Person] : oriented to person, place, and time [Obese] : obese [de-identified] : Right ear hearing loss (congenital). Congenital right ear deformity.  [de-identified] : Bilaterarl reconstructed breasts with good symmetry and identical skin tone.  Periareolar incisions, well healed.   There is a firm but smooth ridge at 7 o'clock of the left, treated, "breast."  Overall there are no suspicious findings.  [de-identified] : grade 1 lymphedema of the left arm and hand.  [de-identified] : Well healed periareolar incisions.

## 2020-08-12 NOTE — HISTORY OF PRESENT ILLNESS
[FreeTextEntry1] : Ms. Julian Bernstein completed radiation therapy to the LEFT chest wall and nodes for a total of 5000 cGy over 25 fractions from 6/17/19 to 7/23/19 for a [id number], [id number], ER positive, [place] equivocal, HER-2 negative, poorly differentiated infiltrating ductal carcinoma of the upper outer quadrant of the left breast, AJCC stage IIB. BRCA1+. She is s/p bilateral mastectomy, left ALND with bilateral NUBIA flap reconstruction, and BSO on 1/9/19. She tolerated her radiation well and did not develop any grade 3 or higher acute toxicities as a result of treatment, aside from left arm lymphedema.  She is taking Anastrozole under the care of Dr. Carlin. \par \par 8/12/2020- FOLLOW UP\par Ms. Bernstein returns for routine follow up. When she was last seen by Dr. De Leon on 12/18/19, she was feeling well; plan was to continue follow up with Drs. Police, Lerman, Raegan, and Fatuma. She followed up with Dr. Burris on 2/28/2020 and will follow up again next month.  She last followed up with Dr. Carlin in May and will see her again in September. She followed up with Dr. Mallory on 6/10/2020; plan is to follow up in December and obtain MRI in January 2021. \par \par Today, she reports she feels well. She was unable to do PT amidst the Covid- 19 pandemic, which resulted in LUE edema. She resumed PT in June, goes once a week. Also has compression machine and compression sleeve. She denies pain, CP, SOB, dysphagia, or any further complaints. \par \par __________________\par 12/18/19- FOLLOW UP\par Ms. Bernstein returns for routine follow up. When she was last seen on 8/27/19, she was feeling well, recently restarted PT; plan was to continue follow up with Drs. Police, Lerman, and Raegan. She followed up with Dr. Nunez on 8/29/19; plan is to RTC 6 months. She last saw Dr. Carlin on 11/1 (sees q 3 months). She last saw Dr. Lerman on 10/29/19; plan is to RTC in January/ February 2020 to assess readiness for second stage surgery. Also follows with Dr. Burris and PCP Dr. Arechiga.\par \par Today, she reports tightness to left axilla, does PT once a week. She notes UE edema is improving. Also notes intermittent CW tightness, but not painful or bothersome. Reports good energy levels and appetite. Denies CP, SOB, dysphagia, or any further complaints. \par \par ___________\par 8/27/19- PTE\par Ms. Bernstein returns for post treatment evaluation. She is scheduled to see Dr. Nunez for follow up on 8/29/19. She will see Dr. Lerman on 9/24/19. She last saw Dr. Carlin on last week, on anastrazole. Today, she feels well.  Restarted PT last week, dermatitis resolved.  No interval medical events.  Feels well with good energy. \par \par \par SNA- 5/16/19\par Ms. Bernstein returns for further consideration of radiation therapy for LEFT breast cancer. When she was seen on 2/7/19, plan was to tentatively proceed with PMRT after wound healing is complete, and return in 6- 8 weeks. In the interim, she has been following up with Dr. Lerman. On 3/15/19, she had debridement, local tissue arrangement, and wound closure to bilateral breasts. She developed left breast cellulitis; started on a course of Bactrim on 3/26/19, but was then changed to Ciprofloxacin (14 day course) on 4/1/19.\par \par On 4/5/19, she underwent U/S guided FNA of fluid collection 6- 8 o'clock left breast adjacent to lumpectomy scar. \par Noted was 6.0 x 1.5 cm complex fluid collection 6-8 o'clock axis left reconstructed breast most consistent with a postoperative seroma; 30 mL drained. Culture and gram stain were negative. \par \par She last saw Dr. Lerman on 5/7/19; noted to have erythema to left breast, possible early cellulitis. She was started on a 2 week course of Ciprofloxacin. Also advised to continue lymphedema therapy/ PT, and RTC 2 weeks. She last saw Dr. Carlin beginning of April, was started on Anastrozole then. \par \par Today, she reports occasional soreness to left breast upon palpation, more so in an area of firmness at mid inner quadrant. She reports LUE edema and ROM has been improving since working with PT. She states energy level is good and she is working. She will complete course of Ciprofloxacin on 5/21. She notes she will be having mediport removed, likely next week, as per Dr. Carlin. 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 She denies any other c/o to include fever, chills, CP, SOB, N/V/D. \par \par ____________________________________________________\par ONCOLOGIC HISTORY (2/7/19)\par Ms. Julian Bernstein is a 63F, former smoker (10 pack years, quit 1993) referred by Dr. Hooper for consideration of radiation therapy for LEFT breast cancer. \par \par On 4/12/18, she underwent routine mammo-elvin/ sono which showed the following:\par -1.2 cm oval shaped mass with slightly spiculated margins, and an associated group of fine pleomorphic microcalcifications in a linear distribution, in the upper outer LEFT breast, new from prior study and corresponding to a 1.2 x 0.5 x 0.8 cm irregular shaped hypoechoic lesion at 2:00 6cmFN on ultrasound. U/S guided biopsy recommended. \par -Cortically thickened left axillary lymph node with cortical thickening measuring up to 0.8 cm. U/S guided core biopsy advised.\par -Benign findings in right breast. \par \par On 4/23/18, she underwent U/S core biopsy of left breast 2:00 and left prominent axillary lymph node. Pathology revealed the following:\par 1. Left breast, 2 o'clock, 6 cm from nipple; ultrasound guided core biopsy:\par - Infiltrating ductal carcinoma, moderately to poorly differentiated, with focal micropapillary features and scanty mucin production, at\par least 1.1 cm.  - Positive for lymphovascular invasion\par 2. Left axillary lymph node, core biopsy: - Metastatic carcinoma, 0.4 cm focus\par ER positive, HR equivocal, HER-2 negative via FISH. \par \par She initially saw Dr. Wray on 4/30/18, and at the time denied any skin changes, palpable masses, or nipple discharge. Genetic testing was also done at this time, and she was found to be BRCA1 positive with a VUS in the Chek 2 gene.  \par \par MRI breast on 5/15/18 showed the following:\par Right breast- benign findings.\par Left breast- 0.7 x 2.1 x 1.5 cm enhancing mass with an associated microclip at 2:00 6.5cmFN, consistent with patient's known malignancy. A few anterior foci of non-mass enhancement, the most anterior of which is a 0.4 foci of non-mass enhancement, 3 cm anterior to the mass. MRI guided biopsy recommended.   BI-RADS 4. \par \par She saw Dr. Carlin for initial consultation in June 2018 for neoadjuvant chemotherapy. She received ACT from 6/2018 to 11/2018 (one treatment withheld due to side effects- neuropathy). She developed SHRESTHA a couple weeks after completion of systemic therapy; chest CT results consistent with air trapping and and she saw a pulmonologist on 12/21/18.  SHRESTHA resolved on its own. Of note, she was found to have a 4.6 cm RIGHT adnexal mass. She saw Dr. Burris/ [place]ne for this; plan was for BSO at time of breast surgery. \par \par On 1/9/19, she underwent bilateral mastectomy, left ALND with bilateral NUBIA flap reconstruction and BSO (Geraldo Hooper, Lerman, Blayne). Pathology showed the following: \par \par 1. [place] lymph nodes x 3, left; biopsy: \par - Macrometastatic carcinoma to one of five lymph nodes (1/5)  - Metastatic focus measures 6 mm\par Note: Sections of a lymph node with metallic clip show mucin pools with scattered scanty residual tumor cells. Negative for\par extracapsular extension.\par 2. [place] lymph node, left; biopsy: 0/1\par 3. Breast, right; mastectomy:- Benign breast tissue, nipple, and skin\par 4. Upper outer flap, right, clip at final margin; excision: - Benign fibroadipose tissue\par 5. Breast, left; mastectomy:\par - Invasive and in situ ductal carcinoma status post neoadjuvant therapy\par - Invasive ductal carcinoma with mucinous features, present predominantly as mucin pools with scattered foci of residual\par tumor cells, spanning up to 9 mm in greatest dimension, location UOQ. \par - Significant response to chemotherapy with tumor cellularity\par - Rare foci of ductal carcinoma in situ (DCIS), solid and cribriform types with treatment related changes, within tumor bed\par area.    - Tumor bed area measures: 1.0 x 0.8 x 0.8 cm\par - Margins:\par Invasive carcinoma:  Posterior: 1 mm (scattered foci < 1mm).   All remaining: >10 mm\par DCIS:   Posterior: 1 mm (single focus).   All remaining: > 10 mm\par - Two biopsy site changes\par - Lymphovascular invasion is not identified\par - The surrounding breast tissue with flat epithelial atypia (FEA), mucocele-like lesion and fibrocystic changes\par - Benign nipple and skin\par \par 6. Axillary content, left; biopsy: 0/22\par 7. Internal mammary lymph node, right; biopsy: 0/1 \par 8. Internal mammary lymph node, left; biopsy: 0/1\par 9. Fallopian tube and ovary, left; salpingo-oophorectomy:\par - Ovary with benign inclusion cysts   - Fallopian tube within normal limits\par 10. 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 She last saw Dr. Carlin last week, no change in plan at this time and she will see her again first week of March. She last saw Dr. Hooper on 1/25/19, at which time she was referred here and advised to follow up with Dr. Carlin.  She should follow up with breast surgeon in six months.  She last saw Dr. Lerman on 2/5/19; SOSA drain removed at the time. She has also started packing breast wounds (one on each breast, left wound larger than right) with wet gauze, changes dressing twice a day. \par \par Today, she reports she has soreness to both breasts, L>R. Discomfort is well controlled with Advil. She denies pain or discomfort to axilla or UE. She continues to have numbness in bilateral feet as a result of chemotherapy, but states it is improving. She uses a cane to ambulate due to feeling unsteady on her feet. She further reports fatigue and a 13 pound weight loss since the mastectomy. She will be starting Physical Therapy soon. She reports being overwhelmed and anxious regarding her health and having to pack the breast wounds. She is currently not working (she is a ), but plans to return in four weeks. She lives on her own, but one her sons lives a few blocks away. 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 \par \par Family history notable for paternal grandmother dx in her 40's with breast cancer, mother dx with uterine vs ovarian cancer at age 48 and passed away from cancer.

## 2020-09-25 ENCOUNTER — APPOINTMENT (OUTPATIENT)
Dept: GYNECOLOGIC ONCOLOGY | Facility: CLINIC | Age: 65
End: 2020-09-25
Payer: COMMERCIAL

## 2020-09-25 VITALS
DIASTOLIC BLOOD PRESSURE: 86 MMHG | HEIGHT: 64 IN | BODY MASS INDEX: 32.95 KG/M2 | HEART RATE: 86 BPM | TEMPERATURE: 98.5 F | SYSTOLIC BLOOD PRESSURE: 134 MMHG | WEIGHT: 193 LBS

## 2020-09-25 PROCEDURE — 99215 OFFICE O/P EST HI 40 MIN: CPT

## 2020-09-25 NOTE — ASSESSMENT
[FreeTextEntry1] : Patient doing well today. We discussed 6 mo vs. 12 mo routine visits and she decided she would like to "split the difference" and see me in 9 months. \par \par S/S of peritoneal cancer discussed in detail. \par \par Follow up in 9 months.

## 2020-09-25 NOTE — HISTORY OF PRESENT ILLNESS
[FreeTextEntry1] : Problem\par 1)Hx Ovarian mass s/p BSO on 1/7/2019\par 2)Hx of left invasive breast cancer w/ lymphovascular involvement \par 3)BRCA Positive,FamHx of breast Ca, Paternal GM Breast Ca, possibly mother death of unknown reason (uterine v ovarian ca)\par \par Previous Therapy\par 1)CTAP 6/25/2018\par    a)Right ovarian mass of 3.9x3.9x3.4cm\par    b)Heterogenous and thickened endometrium and leiomyomatous uterus\par    c)Hepatic steatosis\par 2)CTAP 7/12/2018\par    a)Right adnexal mass of 4.6cm\par 3) Pelvic ultrasound 12/12/18\par     a)Right adenexal mass of 3.9cm, stable\par \par 4) Lapraoscopic BSO 1/2019\par \par 5) Invasive left Breast cancer, s/p neoadjuvant chemotherapy,  BL mastectomy, left ALND and BL NUBIA flap reconstruction on 1/7/2019. Pt has also completed radiation treatment.

## 2020-09-25 NOTE — REASON FOR VISIT
[FreeTextEntry1] : Ms. Bernstein is here as a 6 month  follow up. She reports no changes in her medical history since her last visit and that she is overall doing well. She has been following up regularly with her breast team and her oncologist Dr. Carlin. \par \par ROS negative today. Although her risk is very low following RRSO, we once again reviewed the s/s of primary peritoneal cancer.\par \par GynHx: Post menopausal (reports episode of PMB last year, EMBx negative). 18 year OCP use.\par ObHx:  x 2\par PmHx: Breast Ca\par SurgHx: R hip replacement\par Meds: simvastatin, ondansetron\par All: NKDA\par \par SocHx: lives alone, . Works as . No toxic habits.\par FamHx: Mother ( young, uterine vs. ovarian, patietn unsure)

## 2020-09-25 NOTE — REVIEW OF SYSTEMS
[Negative] : Respiratory [Fatigue] : fatigue [Change in vision] : change in vision [FreeTextEntry2] : difficulty walking [FreeTextEntry7] : weakness [FreeTextEntry8] : diminished and blurring vision [de-identified] : nausea, heartburn [de-identified] : leg cramps

## 2020-09-25 NOTE — PAST MEDICAL HISTORY
[Menarche Age ____] : age at menarche was [unfilled] [Oral Contraceptives] : uses oral contraceptives [Total Preg ___] : G[unfilled] [FreeTextEntry3] : "a long time ago" [de-identified] : for 18  years [FreeTextEntry2] : vaginal x2 on 11/1977, and 3/1982

## 2020-10-13 NOTE — PATIENT PROFILE ADULT - DO YOU FEEL THREATENED BY OTHERS?
INFORMATION YOUR PROVIDER WANTS YOU TO KNOW    Thank you for choosing Dr. Haresh Montague at [facility]. It was a pleasure to care for you today!    Clinic Policy:  Cancellation and No Show: If you must cancel a visit, please give minimally 24 hours notice so we can accommodate other patients that have been waiting to be seen. Do not rely on a reminder call for your appointment as it is a courtesy and not guaranteed. Please write it on your calendar. You are responsible to be at all scheduled appointments.   You can cancel your appointment by calling our office at [phone] during our normal business hours which are as follows:  Monday-Thursday 8:00 am to 4:30 pm  Friday 8:00 am to 2:00 pm     Messages:  Messages left for Dr. Haresh Montague will be returned within 24-48 business hours.     Medication Requests:  We need up to 7 business days notice for refills to be completed. Please contact your preferred pharmacy for all non-narcotic refills. The pharmacy will contact the office for those refills. It is imperative that you plan ahead as we are unable to guarantee your refill by a certain date if this is not followed. If you miss appointments, you may not get a refill. When calling for refills or other concerns, please take into consideration that we are closed for holidays.    No medication changes are made over the phone, if you feel that a medication change is needed, please make an appointment.    Test results:  Any tests ordered by Dr. Montague will be reviewed at your next appointment.    Forms (FMLA/ Disability):  Please complete your portion of any forms prior to bringing them into the office. This includes adding a telephone number where you can be reached during normal business hours. Please allow 7-14 days for any form to be completed. Some FMLA forms will need appointment to be completed. Please note that if you are requesting disability paperwork, you will have  to complete a Functional Capacity Evaluation. This involves an evaluation that lasts approximately 4 hours and you will be responsible to call your insurance company to verify it is a covered expense.     To improve your clinical care, we practice treatments that often include Urine Drug Testing, a tool that helps to best monitor your care.    Reports are always confidential.    Urine Drug Testing (UDT) can be a valuable tool in clinical practices that include prescription of opioid analgesics for treatment of chronic pain. The overriding principle is that this testing is used to help the patient, and to enhance positive outcomes. The issue of testing is complex and the purpose of this guidance document is to assist the practitioner in formulating the rationale for testing, as well as to provide a framework for using test results in a positive way for the patient.    For Urine Drug Testing billing questions, please contact:      [phone]     8 a.m-8 p.m Eastern Time     Monday-Friday    For more information, visit [email or web address]    For Pain Management Clinic Billing Questions Please call [phone]    We know how important your pain management is and together we can help you live your best life.    A key factor to living with a spine or back pain condition is staying healthy. Overall wellness is a combination of a balanced diet, appropriate exercise and physical activity, restful sleep and positive lifestyle choices.    PLEASE VISIT [email or web address] AS DISCUSSED WITH DR. CARVER TO LEARN MORE ABOUT YOUR CONDITION(S), TREATMENTS, ARTICLES, VIDEOS (INCLUDING VIDEOS OF ALL PROCEDURES), IMAGE LIBRARY, FORUMS, & BLOGS.     FOR ANY INQUIRIES THAT YOU WOULD LIKE TO LOOK UP, PLEASE BE SURE TO USE REPUTABLE RESOURCES.        Your opinion matters!  Our desire is to increase your overall state of wellness and improve your quality of life with individualized patient care and innovative interventional  modalities.    In the next few weeks, you may receive a Press Ganey survey regarding your clinic visit with us today. Your responses on this survey help us to maintain and improve the care we provide to you! We look forward to hearing from you!  A key factor to living with a spine or back pain condition is staying healthy. Overall wellness is a combination of a balanced diet, appropriate exercise and physical activity, restful sleep and positive lifestyle choices.           [email or web address]    Diagnostic Medial Branch Block    What is it?  The medial branch nerve is the part of the nerve running along the spine that carries pain fibers from the facet joints.  Each vertebra in your spine has facets (flat surfaces). They touch where the vertebrae fit together. This forms a facet joint. Each facet joint has at least two medial branch nerves. They are part of the nerve pathway to and from each facet joint. A facet joint in your back or neck can become inflamed (swollen and irritated). Pain messages may then travel along the nerve pathway from the facet joint to your brain.      Blocking Pain Messages  Medial branch nerves in each facet joint send and carry messages about back or neck pain. Destroying a few of these nerves can keep certain pain messages from reaching your brain. This can help bring you relief.    Why is this for me?  Back or neck pain may be due to problems with certain nerves near your spine. If so, a radiofrequency ablation can help relieve your pain. Your doctor will give you  medial branch blocks to predict your response to radiofrequency ablation. The treatment uses heat, cold, radiofrequency, or chemicals to destroy the nerves near a problem joint. This keeps some pain messages from traveling to your brain, and helps relieve your symptoms.    The block:  Depending on where your pain is, will determine whether you are on your back or stomach for the procedure. You will be placed on a x-ray  table and the skin will be cleansed with an antiseptic solution. The area will then be numbed using a local anesthetic. Because of the complex innervations of the joints, multiple injections will be necessary.     Complications:   Complications are very rare. You may have some tenderness at the procedure site for a few days. If the site appears red and swollen, or warm to the touch, notify the Pain Management Center immediately. If you have any concerns or problems after hours, weekends or holidays, please go to the nearest Emergency Department.     Special Instructions:  You will need to keep a \"pain diary\" for two days to note your pain pattern. Track if your pain goes away after the procedure. If your pain goes away document for how long and if and when it returns or why.  It is normal for your pain to return to its baseline level after this injection. This will help determine you future plan of care.     © 5118-2656 [employer], [address] [place] [address], [place] [address]. All rights reserved. This information is not intended as a substitute for professional medical care. Always follow your healthcare professional's instructions.     no

## 2020-12-02 ENCOUNTER — APPOINTMENT (OUTPATIENT)
Dept: BREAST CENTER | Facility: CLINIC | Age: 65
End: 2020-12-02
Payer: COMMERCIAL

## 2020-12-02 VITALS — BODY MASS INDEX: 32.95 KG/M2 | WEIGHT: 193 LBS | OXYGEN SATURATION: 99 % | HEIGHT: 64 IN | HEART RATE: 97 BPM

## 2020-12-02 PROCEDURE — 99072 ADDL SUPL MATRL&STAF TM PHE: CPT

## 2020-12-02 PROCEDURE — 99213 OFFICE O/P EST LOW 20 MIN: CPT

## 2020-12-02 NOTE — REASON FOR VISIT
[Follow-Up: _____] : a [unfilled] follow-up visit [FreeTextEntry1] : BRCA1+ and hx of left breast IDC & DCIS s/p chemo, and b/l mastectomy w/ NUBIA.

## 2020-12-02 NOTE — HISTORY OF PRESENT ILLNESS
[FreeTextEntry1] : Julian is a 64 year old, BRCA1+, post-menopausal female presents for a history of left breast upper outer quadrant (2:00 6cMFN) infiltrating ductal carcinoma (ER 90%/IN borderline/ HER-2 FISH negative) w/ DCIS, s/p neoadjuvant chemo followed by a b/l mastectomy w/ NUBIA recon and BSO on 1/7/19, 1/5 sentinel nodes positive, total of 29 left LNs removed; clear margins for invasive cancer, DCIS posterior margin was 1mm; she is now s/p XRT w/ Dr De Leon which completed on 7/23/19. She is currently taking Anastrozole managed by her medical oncologist, Dr. Jody Carlin. Has left upper extremity lymphedema and shoulder issues which she's seeing PT for. Patient has no breast complaints today. Denies breast skin changes or dimpling. Denies fever and chills.\par \par Discussed benefits of surveillance and well as implication of the sensitivity of MRI. Patient understands and agrees to go forward with MRI for breast cancer surveillance.\par

## 2020-12-02 NOTE — PHYSICAL EXAM
[Normocephalic] : normocephalic [Atraumatic] : atraumatic [Supple] : supple [No Supraclavicular Adenopathy] : no supraclavicular adenopathy [Examined in the supine and seated position] : examined in the supine and seated position [No dominant masses] : no dominant masses in right breast  [No dominant masses] : no dominant masses left breast [No Axillary Lymphadenopathy] : no left axillary lymphadenopathy [No Edema] : no edema [No Rashes] : no rashes [No Ulceration] : no ulceration

## 2020-12-23 ENCOUNTER — APPOINTMENT (OUTPATIENT)
Dept: MRI IMAGING | Facility: CLINIC | Age: 65
End: 2020-12-23

## 2021-01-05 ENCOUNTER — NON-APPOINTMENT (OUTPATIENT)
Age: 66
End: 2021-01-05

## 2021-01-11 ENCOUNTER — RESULT REVIEW (OUTPATIENT)
Age: 66
End: 2021-01-11

## 2021-01-11 ENCOUNTER — APPOINTMENT (OUTPATIENT)
Dept: ORTHOPEDIC SURGERY | Facility: CLINIC | Age: 66
End: 2021-01-11
Payer: MEDICARE

## 2021-01-11 ENCOUNTER — OUTPATIENT (OUTPATIENT)
Dept: OUTPATIENT SERVICES | Facility: HOSPITAL | Age: 66
LOS: 1 days | End: 2021-01-11
Payer: MEDICARE

## 2021-01-11 VITALS — WEIGHT: 190 LBS | BODY MASS INDEX: 32.44 KG/M2 | HEIGHT: 64 IN

## 2021-01-11 DIAGNOSIS — Z96.641 PRESENCE OF RIGHT ARTIFICIAL HIP JOINT: Chronic | ICD-10-CM

## 2021-01-11 DIAGNOSIS — Z41.9 ENCOUNTER FOR PROCEDURE FOR PURPOSES OTHER THAN REMEDYING HEALTH STATE, UNSPECIFIED: Chronic | ICD-10-CM

## 2021-01-11 DIAGNOSIS — Z98.890 OTHER SPECIFIED POSTPROCEDURAL STATES: Chronic | ICD-10-CM

## 2021-01-11 PROCEDURE — 99204 OFFICE O/P NEW MOD 45 MIN: CPT | Mod: 25

## 2021-01-11 PROCEDURE — 20610 DRAIN/INJ JOINT/BURSA W/O US: CPT | Mod: LT

## 2021-01-11 PROCEDURE — 73030 X-RAY EXAM OF SHOULDER: CPT

## 2021-01-11 PROCEDURE — 73030 X-RAY EXAM OF SHOULDER: CPT | Mod: 26,LT

## 2021-01-11 NOTE — PHYSICAL EXAM
[de-identified] : Patient is well nourished, well-developed, in no acute distress, with appropriate mood and affect. The patient is oriented to time, place, and person. Gait evaluation does not reveal a limp. The skin examination does not reveal obvious lesions, lacerations, or ecchymosis.\par left shoulder ROM / ER 85 IR L1 (compared to T8 on opposite shoulder)\par SS/IS 5/5 positive Vann and Neer discomfort. Only minimal AC join tenderness. \par \par  [de-identified] : 4 views of left shoulder show AC joint arthritis no head elevation and type II acromion.

## 2021-01-11 NOTE — HISTORY OF PRESENT ILLNESS
[Pain Location] : pain [] : left shoulder [Worsening] : worsening [7] : an average pain level of 7/10 [Intermit.] : ~He/She~ states the symptoms seem to be intermittent [Lifting] : worsened by lifting [Rest] : relieved by rest [de-identified] : RADHA LEMONS is a 65 year  old  patient that presents today with Left Shoulder pain. Patient states that the pain is increased when she attempts to \par lift her Left arm.Her current reason for coming is to decrease the shoulder pain  enough to be able to have a Breast MI needed for follow up of her Breast\par  Cancer and reconstructive surgery. SHe could not tolerate the position with overhead arm position in the MRI. Wants non operativ e options to be able to tolerate the Breast MRI. \par \par \par \par

## 2021-01-11 NOTE — PROCEDURE
[de-identified] : The left shoulder was prepped with alchohol and ethyl chloride was used to numb the skin. A 6 cc injection with 3 cc xylocaine, 2 cc sensoricaine and 1 cc depomedrol , was given without complication into the SA space. Patient instructed that there may be an inflammatory flare for 24 hrs , to use ice or advil if needed\par

## 2021-01-11 NOTE — DISCUSSION/SUMMARY
[de-identified] : 65 year old female s/p reconstructive surgery after Mastectomy for Breast Ca with left shoulder impingment and Rc tendonitis pain plus mild capsulitis. \par Patient needs to get a Breast MRI but cant tolerate the overhead arm position. \par Plan : Injection today with xylocaine marcaine and cortisone to decrease pain. Valium specifically for the MRI test only. \par PT for RC and scapular stabilization\par F/U after breast MRI if pain persists. \par

## 2021-01-25 ENCOUNTER — APPOINTMENT (OUTPATIENT)
Dept: ORTHOPEDIC SURGERY | Facility: CLINIC | Age: 66
End: 2021-01-25
Payer: MEDICARE

## 2021-01-25 VITALS
DIASTOLIC BLOOD PRESSURE: 80 MMHG | HEIGHT: 64 IN | TEMPERATURE: 97.9 F | WEIGHT: 190 LBS | SYSTOLIC BLOOD PRESSURE: 130 MMHG | BODY MASS INDEX: 32.44 KG/M2 | OXYGEN SATURATION: 99 % | HEART RATE: 90 BPM

## 2021-01-25 DIAGNOSIS — C50.919 MALIGNANT NEOPLASM OF UNSPECIFIED SITE OF UNSPECIFIED FEMALE BREAST: ICD-10-CM

## 2021-01-25 PROCEDURE — 20610 DRAIN/INJ JOINT/BURSA W/O US: CPT | Mod: LT

## 2021-01-25 PROCEDURE — 99213 OFFICE O/P EST LOW 20 MIN: CPT | Mod: 25

## 2021-01-25 RX ORDER — SIMVASTATIN 20 MG/1
20 TABLET, FILM COATED ORAL
Refills: 0 | Status: COMPLETED | COMMUNITY
End: 2021-01-25

## 2021-01-25 RX ORDER — POLYETHYLENE GLYCOL-3350 AND ELECTROLYTES WITH FLAVOR PACK 240; 5.84; 2.98; 6.72; 22.72 G/278.26G; G/278.26G; G/278.26G; G/278.26G; G/278.26G
240 POWDER, FOR SOLUTION ORAL
Qty: 4000 | Refills: 0 | Status: COMPLETED | COMMUNITY
Start: 2020-09-17

## 2021-01-25 RX ORDER — DIAZEPAM 2 MG/1
2 TABLET ORAL
Qty: 3 | Refills: 0 | Status: COMPLETED | COMMUNITY
Start: 2020-12-02 | End: 2021-01-25

## 2021-01-25 RX ORDER — HYDROCODONE BITARTRATE AND ACETAMINOPHEN 5; 300 MG/1; MG/1
5-300 TABLET ORAL
Qty: 30 | Refills: 0 | Status: COMPLETED | COMMUNITY
Start: 2020-12-21

## 2021-01-25 NOTE — HISTORY OF PRESENT ILLNESS
[de-identified] : Julian returns her pain has decreased after cortisone injection however for her MRI of Breast today she still has pain layinf flat with arms overhead. \par Therefore she has come for left shoulder xylocaine/marcaine injection to be able to have the test and to say she still has discomfort so I believe further work up is needed.

## 2021-01-25 NOTE — PHYSICAL EXAM
[de-identified] : Patient is well nourished, well-developed, in no acute distress, with appropriate mood and affect. The patient is oriented to time, place, and person. Gait evaluation does not reveal a limp. The skin examination does not reveal obvious lesions, lacerations, or ecchymosis.\par Left shoulder still has positive Neer and Vann but full AROM. \par  [de-identified] : none today

## 2021-01-25 NOTE — PROCEDURE
[de-identified] : The left shoulder was prepped with alchohol and ethyl chloride was used to numb the skin. A 6 cc injection with 3 cc xylocaine, 3 cc sensoricaine and 0 cc depomedrol , was given without complication into the SA space. Patient instructed to proceed with Breast MRI today with shoulder numb. \par

## 2021-01-25 NOTE — DISCUSSION/SUMMARY
[de-identified] : SHe returns with left shoulder persistent pain and impingement and ius going for her Breast MRI today. Today we gave iunjection so she can tolerate the MRI but I believe she also needs a shoulder ultrasound to rule out RC tear or any occult lesion due to breast CA. She is so clautrophobic I think ultrasound would be preferable. \par We will arrange this test and bring her in one week later.

## 2021-02-08 ENCOUNTER — NON-APPOINTMENT (OUTPATIENT)
Age: 66
End: 2021-02-08

## 2021-04-05 ENCOUNTER — APPOINTMENT (OUTPATIENT)
Dept: ORTHOPEDIC SURGERY | Facility: CLINIC | Age: 66
End: 2021-04-05
Payer: MEDICARE

## 2021-04-05 ENCOUNTER — RX RENEWAL (OUTPATIENT)
Age: 66
End: 2021-04-05

## 2021-04-05 PROCEDURE — 99214 OFFICE O/P EST MOD 30 MIN: CPT

## 2021-04-05 NOTE — DISCUSSION/SUMMARY
[de-identified] : Julian's breast follow up work up was negative for any cancer and she was able to tolerate the MRI of Breast. \par Left shoulder ultrasound shows just tendonosis and impingement no tear. \par Plan: Continue PT. \par F/U 2 months\par

## 2021-04-05 NOTE — PHYSICAL EXAM
[de-identified] : Patient is well nourished, well-developed, in no acute distress, with appropriate mood and affect. The patient is oriented to time, place, and person. Gait evaluation does not reveal a limp. The skin examination does not reveal obvious lesions, lacerations, or ecchymosis.\par left shoulder full AROM positive Neer / Vann but 5/5 strength to testing of supra and infra spinatus. \par

## 2021-04-05 NOTE — HISTORY OF PRESENT ILLNESS
[de-identified] : Julian's ultrasound came back with no tear just signs of RC tendonitis and impingement. \par SHe st	ll has symptoms but no drop arm today on exam. \par

## 2021-05-25 ENCOUNTER — NON-APPOINTMENT (OUTPATIENT)
Age: 66
End: 2021-05-25

## 2021-06-03 NOTE — PATIENT PROFILE ADULT. - PMH
Date / Time of Evaluation: 6/3/2021 4:36 PM  Assessment Completed by: Sherice Hernandez    Patient Admission Status:     [address] [address]    Pt mobile number [phone]    (Best Practice:  Have patient / caregiver verify above address and phone number by stating out loud their current address and reachable phone number.)  Is above information correct? Phone number was corrected    Current PCP:  Amor Aguirre, DO  PCP verified? yes    Initial Assessment Completed at bedside with:  yes    Emergency Contacts:  Extended Emergency Contact Information  Primary Emergency Contact: Victoria Reid  Address: [address], [facility] [place] [address] Phone: [phone]  Relation: Child  Secondary Emergency Contact: Jose C Reid  Address: [address], [address] Phone: [phone]  Relation: Other    Advance Directives: Code Status:  Prior    Financial:  Payor: Dianne Tesfaye / Plan: [facility] [employer] / Product Type: *No Product type* /     Pre-Cert required for SNF:  yes    Have [address] [place] [address] Avenue:  no    Pharmacy:   [address]   Phone: [phone] Fax: [phone] [address] [place] [address] [phone] -  [phone]  [address] [place] [id number]  Phone: [phone] Fax: [phone]      Potential assistance purchasing medications?   no    ADLS:  Support System:  Needs assistance    Current Home Environment:  Lives with daughter    Plans to RETURN to current housing: yes  Barriers to RETURNING to current housing:  no    Currently ACTIVE with Home Health CARE: no, in the past  [facility]:  [facility] [place] ([facility])    DME Provider:  n/a    Has a pulse oximetry unit at home: no    Had [facility] prior to admission:  no  Oxygen Hyperlipidemia    Osteoarthritis

## 2021-06-07 ENCOUNTER — APPOINTMENT (OUTPATIENT)
Dept: BREAST CENTER | Facility: CLINIC | Age: 66
End: 2021-06-07
Payer: MEDICARE

## 2021-06-07 VITALS — BODY MASS INDEX: 32.44 KG/M2 | HEIGHT: 64 IN | OXYGEN SATURATION: 97 % | HEART RATE: 102 BPM | WEIGHT: 190 LBS

## 2021-06-07 PROCEDURE — 99213 OFFICE O/P EST LOW 20 MIN: CPT

## 2021-06-09 NOTE — PHYSICAL THERAPY INITIAL EVALUATION ADULT - LEVEL OF INDEPENDENCE: STAND/SIT, REHAB EVAL
supervision Metronidazole Pregnancy And Lactation Text: This medication is Pregnancy Category B and considered safe during pregnancy.  It is also excreted in breast milk.

## 2021-06-09 NOTE — DATA REVIEWED
[FreeTextEntry1] : Family hx of cancer: paternal grandmother dx in her 40's with breast cancer, mother dx with uterine cancer at age 48 and passed away from ca. \par \par 12/29/2020 (R) MRI breasts: nondiagnostic due to lack of IV contrast. Pt should return for breast MRI w/ and w/o IV contrast. BI-RADS 0. \par \par 1/25/2021 ([facility]) MRI (recall): no e/o malignancy. BI-RADS 2.

## 2021-06-09 NOTE — HISTORY OF PRESENT ILLNESS
[FreeTextEntry1] : Julian is a 64 year old, BRCA1+, post-menopausal female presents for a history of left breast upper outer quadrant (2:00 6cMFN) infiltrating ductal carcinoma (ER 90%/IN borderline/ HER-2 FISH negative) w/ DCIS, s/p neoadjuvant chemo followed by a b/l mastectomy w/ NUBIA recon and BSO on 1/7/19, 1/5 sentinel nodes positive, total of 29 left LNs removed; clear margins for invasive cancer, DCIS posterior margin was 1mm; she is now s/p XRT w/ Dr De Leon which completed on 7/23/19. She is currently taking Anastrozole managed by her medical oncologist, Dr. Jody Carlin. Has left upper extremity lymphedema and shoulder issues which she's seeing PT for. Patient has no breast complaints today. Denies breast skin changes or dimpling. Denies fever and chills.\par \par She had been using the lymphapress pneumatic pump at home for her left arm lymphedema but stopped as she developed cellulitis a few months ago treated with oral antibiotics. She sees her PT twice weekly. She is wearing her compression sleeve and glove, but does not wear it daily. \par \par She is complaining of "trigger thumb", L>R, s/p 4 steroid injections without much improvement. Her orthopedic hand surgeon (Dr. Nahid Mcallister) t is recommending surgical repair, she is uncertain if she can proceed with surgery given her lymphedema status. Discussed her increased risk of infection due to recent events and her lymphedema. Will discuss case with Dr. Nahid Mcallister.\par \par Discussed benefits of surveillance and well as implication of the sensitivity of MRI. Patient understands and agrees to go forward with MRI for breast cancer surveillance.\par

## 2021-07-13 ENCOUNTER — APPOINTMENT (OUTPATIENT)
Dept: GYNECOLOGIC ONCOLOGY | Facility: CLINIC | Age: 66
End: 2021-07-13
Payer: MEDICARE

## 2021-07-19 ENCOUNTER — APPOINTMENT (OUTPATIENT)
Dept: ORTHOPEDIC SURGERY | Facility: CLINIC | Age: 66
End: 2021-07-19
Payer: MEDICARE

## 2021-07-19 PROCEDURE — 99213 OFFICE O/P EST LOW 20 MIN: CPT

## 2021-07-19 NOTE — HISTORY OF PRESENT ILLNESS
[de-identified] : 66 y/o female presents for Left Shoulder follow up. Patient states that she's attending her sessions with physical therapy, while continuing to manage her joint pain. \par She has regained full act	ve ROM and she is still monitoring her breast CA so far negative. \par

## 2021-07-19 NOTE — DISCUSSION/SUMMARY
[de-identified] : Doing well controlling her impingement . No evidence of progression. \par Plan will do one more PT for home program teaching and f/u in December

## 2021-07-20 ENCOUNTER — RX RENEWAL (OUTPATIENT)
Age: 66
End: 2021-07-20

## 2021-07-23 ENCOUNTER — APPOINTMENT (OUTPATIENT)
Dept: GYNECOLOGIC ONCOLOGY | Facility: CLINIC | Age: 66
End: 2021-07-23
Payer: MEDICARE

## 2021-07-23 VITALS
DIASTOLIC BLOOD PRESSURE: 80 MMHG | HEART RATE: 100 BPM | RESPIRATION RATE: 18 BRPM | HEIGHT: 64 IN | BODY MASS INDEX: 32.44 KG/M2 | OXYGEN SATURATION: 96 % | WEIGHT: 190 LBS | TEMPERATURE: 95.7 F | SYSTOLIC BLOOD PRESSURE: 118 MMHG

## 2021-07-23 DIAGNOSIS — Z12.4 ENCOUNTER FOR SCREENING FOR MALIGNANT NEOPLASM OF CERVIX: ICD-10-CM

## 2021-07-23 PROCEDURE — 99215 OFFICE O/P EST HI 40 MIN: CPT | Mod: 25

## 2021-07-23 NOTE — HISTORY OF PRESENT ILLNESS
[FreeTextEntry1] : Problem\par 1)Hx Ovarian mass s/p BSO on 1/7/2019\par 2)Hx of left invasive breast cancer w/ lymphovascular involvement \par 3)BRCA Positive,FamHx of breast Ca, Paternal GM Breast Ca, possibly mother death of unknown reason (uterine v ovarian ca)\par \par Previous Therapy\par 1)CTAP 6/25/2018\par    a)Right ovarian mass of 3.9x3.9x3.4cm\par    b)Heterogenous and thickened endometrium and leiomyomatous uterus\par    c)Hepatic steatosis\par 2)CTAP 7/12/2018\par    a)Right adnexal mass of 4.6cm\par 3) Pelvic ultrasound 12/12/18\par     a)Right adenexal mass of 3.9cm, stable\par \par 4) Lapraoscopic BSO 1/2019\par a) Fallopian tube and ovary, left; salpingo-oophorectomy:\par - Ovary with benign inclusion cysts\par - Fallopian tube within normal limits\par \par b) Fallopian tube and ovary, right; salpingo-oophorectomy:\par - Ovary with fibrothecoma, 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 Pt has also completed radiation treatment.

## 2021-07-23 NOTE — REASON FOR VISIT
[FreeTextEntry1] : Pt presents for 9 month follow up. Patient reports no changes in her interval medical history. She is most bothered by left upper extremity lymph edema. Recent breast surveillance was normal.\par \par Most recent pap was Sep 2019 and was cytology alone. no HPV was reported. Will repeat today.\par \par GynHx: Post menopausal (reports episode of PMB last year, EMBx negative). 18 year OCP use.\par ObHx:  x 2\par PmHx: Breast Ca\par SurgHx: R hip replacement\par Meds: simvastatin, ondansetron\par All: NKDA\par \par SocHx: lives alone, . Works as . No toxic habits.\par FamHx: Mother ( young, uterine vs. ovarian, patietn unsure)

## 2021-07-23 NOTE — REVIEW OF SYSTEMS
[FreeTextEntry2] : difficulty walking [FreeTextEntry7] : weakness [FreeTextEntry8] : diminished and blurring vision [de-identified] : nausea, heartburn [de-identified] : leg cramps

## 2021-07-23 NOTE — ASSESSMENT
[FreeTextEntry1] : Normal exam today.\par \par S/S of primary peritoneal cancer discussed in detail.\par \par PAP co-test performed today\par \par Follow up in 9 months\par

## 2021-07-23 NOTE — PAST MEDICAL HISTORY
[FreeTextEntry3] : "a long time ago" [de-identified] : for 18  years [FreeTextEntry2] : vaginal x2 on 11/1977, and 3/1982

## 2021-07-26 LAB — HPV HIGH+LOW RISK DNA PNL CVX: NOT DETECTED

## 2021-08-02 ENCOUNTER — TRANSCRIPTION ENCOUNTER (OUTPATIENT)
Age: 66
End: 2021-08-02

## 2021-10-18 ENCOUNTER — APPOINTMENT (OUTPATIENT)
Dept: BREAST CENTER | Facility: CLINIC | Age: 66
End: 2021-10-18
Payer: MEDICARE

## 2021-10-18 VITALS — HEART RATE: 102 BPM | HEIGHT: 64 IN | OXYGEN SATURATION: 98 % | BODY MASS INDEX: 31.07 KG/M2 | WEIGHT: 182 LBS

## 2021-10-18 PROCEDURE — 99213 OFFICE O/P EST LOW 20 MIN: CPT

## 2021-10-21 ENCOUNTER — NON-APPOINTMENT (OUTPATIENT)
Age: 66
End: 2021-10-21

## 2021-10-21 NOTE — PHYSICAL EXAM
[Normocephalic] : normocephalic [Atraumatic] : atraumatic [Supple] : supple [No Supraclavicular Adenopathy] : no supraclavicular adenopathy [Examined in the supine and seated position] : examined in the supine and seated position [No dominant masses] : no dominant masses in right breast  [No Axillary Lymphadenopathy] : no left axillary lymphadenopathy [No Edema] : no edema [No Rashes] : no rashes [No Ulceration] : no ulceration [de-identified] : new left breast 2cm nodule at 3n3, well circumscribed, oval shaped and firm

## 2021-10-21 NOTE — HISTORY OF PRESENT ILLNESS
[FreeTextEntry1] : Julian is a 66 year old, BRCA1+, post-menopausal female presents for a new complaint of a left breast palpable nodule first noted a few days ago by the patient. Denies fever and chills\par \par The patient has a history of left breast upper outer quadrant (2:00 6cMFN) infiltrating ductal carcinoma (ER 90%/CT borderline/ HER-2 FISH negative) w/ DCIS, s/p neoadjuvant chemo followed by a b/l mastectomy w/ NUBIA recon and BSO on 1/7/19, 1/5 sentinel nodes positive, total of 29 left LNs removed; clear margins for invasive cancer, DCIS posterior margin was 1mm; she is now s/p XRT w/ Dr De Leon which completed on 7/23/19. She is currently taking Anastrozole managed by her medical oncologist, Dr. Jody Carlin. \par \par She has left upper extremity lymphedema and shoulder issues which she's seeing PT for. She uses a lymphapress pneumatic pump at home for her left arm lymphedema. She sees her PT once weekly. She is wearing her compression sleeve and glove, but does not wear it daily. \par \par She is complaining of "trigger thumb", L>R, s/p 4 steroid injections without much improvement. Her orthopedic hand surgeon (Dr. Nahid Mcallister)  is recommending surgical repair, she is uncertain if she can proceed with surgery given her lymphedema status. Discussed her increased risk of infection due to recent events and her lymphedema. She is going to be following up with a new hand surgeon for a second opinion. \par \par Discussed benefits of surveillance and well as implication of the sensitivity of MRI. Patient understands and agrees to go forward with MRI for breast cancer surveillance.\par

## 2021-11-05 ENCOUNTER — EMERGENCY (EMERGENCY)
Facility: HOSPITAL | Age: 66
LOS: 1 days | Discharge: HOME | End: 2021-11-05
Attending: EMERGENCY MEDICINE | Admitting: EMERGENCY MEDICINE
Payer: MEDICARE

## 2021-11-05 VITALS
OXYGEN SATURATION: 96 % | TEMPERATURE: 98 F | DIASTOLIC BLOOD PRESSURE: 82 MMHG | HEART RATE: 105 BPM | HEIGHT: 64 IN | SYSTOLIC BLOOD PRESSURE: 138 MMHG | RESPIRATION RATE: 17 BRPM | WEIGHT: 182.1 LBS

## 2021-11-05 DIAGNOSIS — Z41.9 ENCOUNTER FOR PROCEDURE FOR PURPOSES OTHER THAN REMEDYING HEALTH STATE, UNSPECIFIED: Chronic | ICD-10-CM

## 2021-11-05 DIAGNOSIS — Z85.3 PERSONAL HISTORY OF MALIGNANT NEOPLASM OF BREAST: ICD-10-CM

## 2021-11-05 DIAGNOSIS — Z96.641 PRESENCE OF RIGHT ARTIFICIAL HIP JOINT: Chronic | ICD-10-CM

## 2021-11-05 DIAGNOSIS — L03.114 CELLULITIS OF LEFT UPPER LIMB: ICD-10-CM

## 2021-11-05 DIAGNOSIS — D72.829 ELEVATED WHITE BLOOD CELL COUNT, UNSPECIFIED: ICD-10-CM

## 2021-11-05 DIAGNOSIS — Z91.048 OTHER NONMEDICINAL SUBSTANCE ALLERGY STATUS: ICD-10-CM

## 2021-11-05 DIAGNOSIS — Z98.890 OTHER SPECIFIED POSTPROCEDURAL STATES: Chronic | ICD-10-CM

## 2021-11-05 LAB
ALBUMIN SERPL ELPH-MCNC: 4.6 G/DL — SIGNIFICANT CHANGE UP (ref 3.3–5)
ALP SERPL-CCNC: 98 U/L — SIGNIFICANT CHANGE UP (ref 40–120)
ALT FLD-CCNC: 18 U/L — SIGNIFICANT CHANGE UP (ref 10–45)
ANION GAP SERPL CALC-SCNC: 12 MMOL/L — SIGNIFICANT CHANGE UP (ref 5–17)
AST SERPL-CCNC: 19 U/L — SIGNIFICANT CHANGE UP (ref 10–40)
BASOPHILS # BLD AUTO: 0.03 K/UL — SIGNIFICANT CHANGE UP (ref 0–0.2)
BASOPHILS NFR BLD AUTO: 0.3 % — SIGNIFICANT CHANGE UP (ref 0–2)
BILIRUB SERPL-MCNC: 0.5 MG/DL — SIGNIFICANT CHANGE UP (ref 0.2–1.2)
BUN SERPL-MCNC: 14 MG/DL — SIGNIFICANT CHANGE UP (ref 7–23)
CALCIUM SERPL-MCNC: 9.7 MG/DL — SIGNIFICANT CHANGE UP (ref 8.4–10.5)
CHLORIDE SERPL-SCNC: 102 MMOL/L — SIGNIFICANT CHANGE UP (ref 96–108)
CO2 SERPL-SCNC: 26 MMOL/L — SIGNIFICANT CHANGE UP (ref 22–31)
CREAT SERPL-MCNC: 0.75 MG/DL — SIGNIFICANT CHANGE UP (ref 0.5–1.3)
EOSINOPHIL # BLD AUTO: 0.07 K/UL — SIGNIFICANT CHANGE UP (ref 0–0.5)
EOSINOPHIL NFR BLD AUTO: 0.6 % — SIGNIFICANT CHANGE UP (ref 0–6)
GLUCOSE SERPL-MCNC: 102 MG/DL — HIGH (ref 70–99)
HCT VFR BLD CALC: 42.2 % — SIGNIFICANT CHANGE UP (ref 34.5–45)
HGB BLD-MCNC: 14.7 G/DL — SIGNIFICANT CHANGE UP (ref 11.5–15.5)
IMM GRANULOCYTES NFR BLD AUTO: 0.3 % — SIGNIFICANT CHANGE UP (ref 0–1.5)
LYMPHOCYTES # BLD AUTO: 1.23 K/UL — SIGNIFICANT CHANGE UP (ref 1–3.3)
LYMPHOCYTES # BLD AUTO: 11.1 % — LOW (ref 13–44)
MCHC RBC-ENTMCNC: 33.6 PG — SIGNIFICANT CHANGE UP (ref 27–34)
MCHC RBC-ENTMCNC: 34.8 GM/DL — SIGNIFICANT CHANGE UP (ref 32–36)
MCV RBC AUTO: 96.6 FL — SIGNIFICANT CHANGE UP (ref 80–100)
MONOCYTES # BLD AUTO: 0.89 K/UL — SIGNIFICANT CHANGE UP (ref 0–0.9)
MONOCYTES NFR BLD AUTO: 8 % — SIGNIFICANT CHANGE UP (ref 2–14)
NEUTROPHILS # BLD AUTO: 8.85 K/UL — HIGH (ref 1.8–7.4)
NEUTROPHILS NFR BLD AUTO: 79.7 % — HIGH (ref 43–77)
NRBC # BLD: 0 /100 WBCS — SIGNIFICANT CHANGE UP (ref 0–0)
PLATELET # BLD AUTO: 204 K/UL — SIGNIFICANT CHANGE UP (ref 150–400)
POTASSIUM SERPL-MCNC: 4.2 MMOL/L — SIGNIFICANT CHANGE UP (ref 3.5–5.3)
POTASSIUM SERPL-SCNC: 4.2 MMOL/L — SIGNIFICANT CHANGE UP (ref 3.5–5.3)
PROT SERPL-MCNC: 7.5 G/DL — SIGNIFICANT CHANGE UP (ref 6–8.3)
RBC # BLD: 4.37 M/UL — SIGNIFICANT CHANGE UP (ref 3.8–5.2)
RBC # FLD: 12.4 % — SIGNIFICANT CHANGE UP (ref 10.3–14.5)
SODIUM SERPL-SCNC: 140 MMOL/L — SIGNIFICANT CHANGE UP (ref 135–145)
WBC # BLD: 11.1 K/UL — HIGH (ref 3.8–10.5)
WBC # FLD AUTO: 11.1 K/UL — HIGH (ref 3.8–10.5)

## 2021-11-05 PROCEDURE — 96374 THER/PROPH/DIAG INJ IV PUSH: CPT

## 2021-11-05 PROCEDURE — 85025 COMPLETE CBC W/AUTO DIFF WBC: CPT

## 2021-11-05 PROCEDURE — 99284 EMERGENCY DEPT VISIT MOD MDM: CPT | Mod: 25

## 2021-11-05 PROCEDURE — 99284 EMERGENCY DEPT VISIT MOD MDM: CPT

## 2021-11-05 PROCEDURE — 87040 BLOOD CULTURE FOR BACTERIA: CPT

## 2021-11-05 PROCEDURE — 80053 COMPREHEN METABOLIC PANEL: CPT

## 2021-11-05 PROCEDURE — 36415 COLL VENOUS BLD VENIPUNCTURE: CPT

## 2021-11-05 RX ORDER — CEFAZOLIN SODIUM 1 G
2000 VIAL (EA) INJECTION ONCE
Refills: 0 | Status: COMPLETED | OUTPATIENT
Start: 2021-11-05 | End: 2021-11-05

## 2021-11-05 RX ORDER — CEPHALEXIN 500 MG
1 CAPSULE ORAL
Qty: 40 | Refills: 0
Start: 2021-11-05 | End: 2021-11-14

## 2021-11-05 RX ADMIN — Medication 100 MILLIGRAM(S): at 15:46

## 2021-11-05 RX ADMIN — Medication 2000 MILLIGRAM(S): at 16:06

## 2021-11-05 NOTE — ED PROVIDER NOTE - MUSCULOSKELETAL, MLM
left forearm with +erythema, +warmth, +swelling on volar aspect extending to antecubital fossa, no proximal streaking above the elbow, +FROM elbow, radial pulse 2+, strength 5/5, sensation intact distally

## 2021-11-05 NOTE — ED PROVIDER NOTE - PATIENT PORTAL LINK FT
You can access the FollowMyHealth Patient Portal offered by [facility] by registering at the following website: [email or web address]. By joining [employer]’s FollowMyHealth portal, you will also be able to view your health information using other applications (apps) compatible with our system.

## 2021-11-05 NOTE — ED PROVIDER NOTE - CLINICAL SUMMARY MEDICAL DECISION MAKING FREE TEXT BOX
65 y/o f hx breast CA s/p mastectomy/chemo, chronic left arm lymphedema presents with a recurrent left arm cellulitis for the past day; pt afebrile in ED, vss, labs with mild leukocytosis of 11.  Pt given ancef in ED, already on bactrim, will add keflex and 5 more days of bactrim for 10 days of both.  Will d/c to f/u with her pmd, return to ED if sx worsen.

## 2021-11-05 NOTE — PRE-OP CHECKLIST - ALLERGIES REVIEWED
done Helical Rim Advancement Flap Text: The defect edges were debeveled with a #15 blade scalpel.  Given the location of the defect and the proximity to free margins (helical rim) a double helical rim advancement flap was deemed most appropriate.  Using a sterile surgical marker, the appropriate advancement flaps were drawn incorporating the defect and placing the expected incisions between the helical rim and antihelix where possible.  The area thus outlined was incised through and through with a #15 scalpel blade.  With a skin hook and iris scissors, the flaps were gently and sharply undermined and freed up.

## 2021-11-05 NOTE — ED PROVIDER NOTE - QUALITY
DATE OF VISIT:  17  PATIENT NAME:  Neelima Smiley     YOB: 1981    SUBJECTIVE:    Chief Complaint:  Chief Complaint   Patient presents with    Follow-Up from Hospital     Patient is here today for a ER follow up for some light bleeding. She states that she is 17 weeks pregnant and thursday and friday she had light red bleeding. They told her that the bleeding was from a bacteria infection. They gave her medication and she is feeling better. Patient is not having any cramping right now. She is taking prenatals at home. LMP 2017. HPI:  Gina Arias  is being seen today for missed menses. She reports a  positive pregnancy test on 8/3/17. This  is not a planned pregnancy. She is  accepting at this time. LMP: 17      Sure and definite: Yes     28 day cycle: Yes    She was not on a contraceptive at the time of conception. Estimated weeks gestation today : 18w 0d  Tentative MICHELLE: 5/3/18    Relationship with FOB: involved      OBJECTIVE:    Vitals:    17 1030   BP: 116/74   Site: Right Arm   Position: Sitting   Cuff Size: Large Adult   Pulse: 93   Resp: 18   Weight: 220 lb (99.8 kg)     Body mass index is 33.45 kg/m². Patient's last menstrual period was 2017. Mother's ethnicity:  [place]uc[place]  Father's ethnicity:      She is complaining today of:   Pain: No  Cramping: No  Bleeding or spotting: No    Nausea: No  Vomiting: No    Breast enlargement/tenderness: Yes  Fatigue: Yes  Frequency of urination: Yes    Previous high risk obstetrical history: no   OB History    Para Term  AB Living   1             SAB TAB Ectopic Molar Multiple Live Births                    # Outcome Date GA Lbr Juan Carlos/2nd Weight Sex Delivery Anes PTL Lv   1 Current                   ROS was done and is negative except as documented in HPI. History was reviewed as documented on Epic Navigator. ASSESSMENT:  Missed menses with + UCG  1.  Amenorrhea    2. 18 weeks gestation of pregnancy aching

## 2021-11-05 NOTE — ED PROVIDER NOTE - OBJECTIVE STATEMENT
65 y/o f hx breast CA s/p mastectomy and chemo, chronic left arm lymphedema presents with recurrent cellulitis of her left arm.  Pt stating she woke up today with arm more swollen than her baseline, red and warm.  Pt saw her oncologist who recommended she come to ED for a dose of IV abx.  Pt denies fever, chills, numbness/tingling to ext, all other ROS negative.

## 2021-11-05 NOTE — ED ADULT TRIAGE NOTE - CHIEF COMPLAINT QUOTE
pt "sent to  ED  by MD Carlin for IV antibiotics to treat Cellulitis of the left arm". redness and swelling noted to the area.

## 2021-11-05 NOTE — ED ADULT NURSE NOTE - OBJECTIVE STATEMENT
Pt referred by PCP to ED for further eval and IV antibiotics for cellulitis of L arm. Pt states Hx of lymphoedema, noticed redness/swelling to L arm. Pt states tender/ warm to touch, unsure of origin.

## 2021-11-10 LAB
CULTURE RESULTS: SIGNIFICANT CHANGE UP
CULTURE RESULTS: SIGNIFICANT CHANGE UP
SPECIMEN SOURCE: SIGNIFICANT CHANGE UP
SPECIMEN SOURCE: SIGNIFICANT CHANGE UP

## 2021-12-13 ENCOUNTER — APPOINTMENT (OUTPATIENT)
Dept: ORTHOPEDIC SURGERY | Facility: CLINIC | Age: 66
End: 2021-12-13

## 2022-01-31 ENCOUNTER — APPOINTMENT (OUTPATIENT)
Dept: BREAST CENTER | Facility: CLINIC | Age: 67
End: 2022-01-31
Payer: MEDICARE

## 2022-01-31 VITALS — WEIGHT: 182 LBS | HEIGHT: 64 IN | OXYGEN SATURATION: 98 % | BODY MASS INDEX: 31.07 KG/M2 | HEART RATE: 101 BPM

## 2022-01-31 PROCEDURE — 99213 OFFICE O/P EST LOW 20 MIN: CPT

## 2022-01-31 NOTE — DATA REVIEWED
[FreeTextEntry1] : Family hx of cancer: paternal grandmother dx in her 40's with breast cancer, mother dx with uterine cancer at age 48 and passed away from ca. \par \par 12/29/2020 ([facility]) MRI breasts: nondiagnostic due to lack of IV contrast. Pt should return for breast MRI w/ and w/o IV contrast. BI-RADS 0. \par \par 1/25/2021 ([facility]) MRI (recall): no e/o malignancy. BI-RADS 2. \par \par 10/21/21 ([facility]) Left targeted US: The patient's palpable abnormality corresponds to a 1.5 x 0.4 x 1.1 cm cyst that abuts the deep margin of the subcutaneous tissues. BI-RADS 2.

## 2022-01-31 NOTE — HISTORY OF PRESENT ILLNESS
[FreeTextEntry1] : Julian is a 66 year old, BRCA1+, post-menopausal female presents for follow-up; she has ongoing complaint of a left breast palpable nodule first in October 2021, benign cyst on imaging, no changes. Otherwise, Patient has no breast complaints today. Denies nipple discharge, nipple/breast skin changes or dimpling. Denies fever and chills.\par \par The patient has a history of left breast upper outer quadrant (2:00 6cMFN) infiltrating ductal carcinoma (ER 90%/[place] borderline/ HER-2 FISH negative) w/ DCIS, s/p neoadjuvant chemo followed by a b/l mastectomy w/ NUBIA recon and BSO on 1/7/19, 1/5 sentinel nodes positive, total of 29 left LNs removed; clear margins for invasive cancer, DCIS posterior margin was 1mm; she is now s/p XRT w/ Dr De Leon which completed on 7/23/19. She is currently taking Anastrozole managed by her medical oncologist, Dr. Jody Carlin. \par \par She has left upper extremity lymphedema and shoulder issues which she's seeing PT for. She uses a lymphapress pneumatic pump at home for her left arm lymphedema. She sees her PT once weekly. She is wearing her compression sleeve and glove, but does not wear it daily. \par \par She is complaining of "trigger thumb", L>R, s/p 4 steroid injections without much improvement. Her orthopedic hand surgeon (Dr. Nahid Mcallister)  is recommending surgical repair, she is uncertain if she can proceed with surgery given her lymphedema status. Discussed her increased risk of infection due to recent events and her lymphedema. She is going to be following up with a new hand surgeon for a second opinion. \par

## 2022-01-31 NOTE — PHYSICAL EXAM
[Normocephalic] : normocephalic [Atraumatic] : atraumatic [Supple] : supple [No Supraclavicular Adenopathy] : no supraclavicular adenopathy [Examined in the supine and seated position] : examined in the supine and seated position [No dominant masses] : no dominant masses in right breast  [No Axillary Lymphadenopathy] : no left axillary lymphadenopathy [No Edema] : no edema [No Rashes] : no rashes [No Ulceration] : no ulceration [de-identified] : new left breast 2cm nodule at 3n3, well circumscribed, oval shaped and firm, stable

## 2022-03-16 ENCOUNTER — OUTPATIENT (OUTPATIENT)
Dept: OUTPATIENT SERVICES | Facility: HOSPITAL | Age: 67
LOS: 1 days | End: 2022-03-16
Payer: MEDICARE

## 2022-03-16 ENCOUNTER — APPOINTMENT (OUTPATIENT)
Dept: RADIOLOGY | Facility: HOSPITAL | Age: 67
End: 2022-03-16
Payer: MEDICARE

## 2022-03-16 DIAGNOSIS — Z96.641 PRESENCE OF RIGHT ARTIFICIAL HIP JOINT: Chronic | ICD-10-CM

## 2022-03-16 DIAGNOSIS — Z41.9 ENCOUNTER FOR PROCEDURE FOR PURPOSES OTHER THAN REMEDYING HEALTH STATE, UNSPECIFIED: Chronic | ICD-10-CM

## 2022-03-16 DIAGNOSIS — Z98.890 OTHER SPECIFIED POSTPROCEDURAL STATES: Chronic | ICD-10-CM

## 2022-03-16 PROCEDURE — 77080 DXA BONE DENSITY AXIAL: CPT | Mod: 26

## 2022-03-16 PROCEDURE — 77080 DXA BONE DENSITY AXIAL: CPT

## 2022-04-22 ENCOUNTER — APPOINTMENT (OUTPATIENT)
Dept: GYNECOLOGIC ONCOLOGY | Facility: CLINIC | Age: 67
End: 2022-04-22
Payer: MEDICARE

## 2022-04-22 VITALS
RESPIRATION RATE: 18 BRPM | WEIGHT: 180 LBS | SYSTOLIC BLOOD PRESSURE: 123 MMHG | BODY MASS INDEX: 30.73 KG/M2 | TEMPERATURE: 97.1 F | HEIGHT: 64 IN | HEART RATE: 94 BPM | DIASTOLIC BLOOD PRESSURE: 85 MMHG | OXYGEN SATURATION: 95 %

## 2022-04-22 PROCEDURE — 99214 OFFICE O/P EST MOD 30 MIN: CPT

## 2022-04-22 NOTE — REASON FOR VISIT
[FreeTextEntry1] : Pt presents for 9 month follow up. Patient reports no changes in her interval medical history. She sees a Physical therapist for her chronic left upper extremity lymph edema. denies vb/abdominal pain/changes in bowel habits. \par \par She is excited to share that her DIL is in labor and the baby is expected today.\par \par GynHx: Post menopausal (reports episode of PMB last year, EMBx negative). 18 year OCP use.\par ObHx:  x 2\par PmHx: Breast Ca\par SurgHx: R hip replacement\par Meds: simvastatin, ondansetron\par All: NKDA\par \par SocHx: lives alone, . Works as . No toxic habits.\par FamHx: Mother ( young, uterine vs. ovarian, patietn unsure)\par \par \par Pap: Neg, HPV neg 2021

## 2022-04-22 NOTE — ASSESSMENT
[FreeTextEntry1] : Breast mass has been worked up and is consistent with a cyst, per patient. She will continue to follow up with Dr. Julien.\par \par S/S of 1' peritoneal cancer discussed.\par \par Patient would like to continue seeing me more than annually. She will follow up again in 9 months\par \par [] f/u in 9 months

## 2022-04-22 NOTE — PHYSICAL EXAM
[Abnormal] : Examination of breasts: Abnormal [Absent] : Adnexa(ae): Absent [Normal] : Recto-Vaginal Exam: Normal [de-identified] : softly distended, consistent with patient's body habitus [de-identified] : 2cm mobile cystic mass at 7 oclock of left breast [de-identified] : confirms

## 2022-05-11 RX ORDER — DIAZEPAM 2 MG/1
2 TABLET ORAL
Qty: 2 | Refills: 0 | Status: COMPLETED | COMMUNITY
Start: 2022-05-11 | End: 2022-05-12

## 2022-06-06 ENCOUNTER — APPOINTMENT (OUTPATIENT)
Dept: ORTHOPEDIC SURGERY | Facility: CLINIC | Age: 67
End: 2022-06-06
Payer: MEDICARE

## 2022-06-06 DIAGNOSIS — M75.42 IMPINGEMENT SYNDROME OF LEFT SHOULDER: ICD-10-CM

## 2022-06-06 PROCEDURE — 20610 DRAIN/INJ JOINT/BURSA W/O US: CPT | Mod: LT

## 2022-06-06 PROCEDURE — 99213 OFFICE O/P EST LOW 20 MIN: CPT | Mod: 25

## 2022-06-06 NOTE — DISCUSSION/SUMMARY
[de-identified] : Gave injection today without complication. she still maintains full ROM and 5/ Ss IS but pain with prolonged overhead activity. \par F/U prn \par

## 2022-06-06 NOTE — HISTORY OF PRESENT ILLNESS
[de-identified] : Julian continues to control her left shoulder impingement with exercise . She maintains full ROM but has pain with overhead activitiy at times. She comes in for an injection so she can perform the overhead motion to get her breast annual imaging.

## 2022-06-06 NOTE — PROCEDURE
[de-identified] : The left shoulder was prepped with alchohol and ethyl chloride was used to numb the skin. A 6 cc injection with 3 cc xylocaine, 2 cc sensoricaine and 1 cc depomedrol , was given without complication into the SA space. Patient instructed that there may be an inflammatory flare for 24 hrs , to use ice or advil if needed\par \par

## 2022-06-07 ENCOUNTER — NON-APPOINTMENT (OUTPATIENT)
Age: 67
End: 2022-06-07

## 2022-06-26 NOTE — ED PROVIDER NOTE - PSH
Finger stick monitored- HS- 172, due coverage given.  Problem: Fall Risk - Rehab  Goal: Patient will remain free from falls  Outcome: Progressing  Note: Leia Queen Fall risk Assessment Score: 9    Low fall risk interventions   - Call light within reach   - Yellow  socks   - Belongings within reach   - Bed in the lowest position        Problem: Pain - Standard  Goal: Alleviation of pain or a reduction in pain to the patient’s comfort goal  Outcome: Progressing  Note: Pain under control. Needs anticipated and attended.   The patient is Stable - Low risk of patient condition declining or worsening    Shift Goals  Clinical Goals: safety  Patient Goals: Sleep well    Progress made toward(s) clinical / shift goals:  Pt free from fall and injury.     Elective surgery  left shoulder surgery  History of elbow surgery  left  S/P hip replacement, right    Surgery, elective  right chest port

## 2022-06-28 ENCOUNTER — NON-APPOINTMENT (OUTPATIENT)
Age: 67
End: 2022-06-28

## 2022-06-28 ENCOUNTER — APPOINTMENT (OUTPATIENT)
Dept: GYNECOLOGIC ONCOLOGY | Facility: CLINIC | Age: 67
End: 2022-06-28

## 2022-06-28 ENCOUNTER — LABORATORY RESULT (OUTPATIENT)
Age: 67
End: 2022-06-28

## 2022-06-28 VITALS
TEMPERATURE: 96.4 F | BODY MASS INDEX: 30.73 KG/M2 | HEART RATE: 91 BPM | WEIGHT: 180 LBS | OXYGEN SATURATION: 95 % | RESPIRATION RATE: 18 BRPM | DIASTOLIC BLOOD PRESSURE: 82 MMHG | HEIGHT: 64 IN | SYSTOLIC BLOOD PRESSURE: 123 MMHG

## 2022-06-28 DIAGNOSIS — N93.9 ABNORMAL UTERINE AND VAGINAL BLEEDING, UNSPECIFIED: ICD-10-CM

## 2022-06-28 PROCEDURE — 99215 OFFICE O/P EST HI 40 MIN: CPT

## 2022-06-28 NOTE — PHYSICAL EXAM
[Abnormal] : Examination of ureathral meatus: abnormal [Absent] : Adnexa(ae): Absent [Normal] : Recto-Vaginal Exam: Normal [de-identified] : softly distended, consistent with patient's body habitus [de-identified] : small mucosal rucae present below the urethra, not friable, discolored compared to surrounding mucosa likely 2' to healing. [de-identified] : confirms

## 2022-06-28 NOTE — REASON FOR VISIT
[FreeTextEntry1] : Pt presents c/o vaginal spotting x 1 day. She reports it happened when she wiped and things she may have scratched herself. No bleeding since.\par \par GynHx: Post menopausal (reports episode of PMB last year, EMBx negative). 18 year OCP use.\par ObHx:  x 2\par PmHx: Breast Ca\par SurgHx: R hip replacement\par Meds: simvastatin, ondansetron\par All: NKDA\par \par SocHx: lives alone, . Works as . No toxic habits.\par FamHx: Mother ( young, uterine vs. ovarian, patietn unsure)\par \par \par Pap: Neg, HPV neg 2021

## 2022-06-28 NOTE — ASSESSMENT
[FreeTextEntry1] : No evidence of bleeding on today's exam.\par \par Will send UA to r/o hematuria and request TVS to assess endometrial lining. \par \par

## 2022-06-30 LAB
APPEARANCE: CLEAR
BILIRUBIN URINE: ABNORMAL
BLOOD URINE: NEGATIVE
COLOR: ABNORMAL
GLUCOSE QUALITATIVE U: NEGATIVE
KETONES URINE: NORMAL
LEUKOCYTE ESTERASE URINE: NEGATIVE
NITRITE URINE: NEGATIVE
PH URINE: 6
PROTEIN URINE: NORMAL
SPECIFIC GRAVITY URINE: 1.02
UROBILINOGEN URINE: NORMAL

## 2022-07-25 ENCOUNTER — APPOINTMENT (OUTPATIENT)
Dept: BREAST CENTER | Facility: CLINIC | Age: 67
End: 2022-07-25

## 2022-07-25 VITALS — OXYGEN SATURATION: 97 % | WEIGHT: 180 LBS | HEART RATE: 83 BPM | BODY MASS INDEX: 30.73 KG/M2 | HEIGHT: 64 IN

## 2022-07-25 PROCEDURE — 99213 OFFICE O/P EST LOW 20 MIN: CPT

## 2022-07-25 RX ORDER — MELOXICAM 15 MG/1
15 TABLET ORAL DAILY
Qty: 90 | Refills: 0 | Status: DISCONTINUED | COMMUNITY
Start: 2021-04-05 | End: 2022-07-25

## 2022-07-25 RX ORDER — DIAZEPAM 10 MG/1
10 TABLET ORAL
Qty: 1 | Refills: 0 | Status: DISCONTINUED | COMMUNITY
Start: 2021-01-11 | End: 2022-07-25

## 2022-07-25 RX ORDER — MELOXICAM 15 MG/1
15 TABLET ORAL DAILY
Qty: 90 | Refills: 0 | Status: DISCONTINUED | COMMUNITY
Start: 2021-07-19 | End: 2022-07-25

## 2022-07-25 RX ORDER — CLOTRIMAZOLE AND BETAMETHASONE DIPROPIONATE 10; .5 MG/G; MG/G
1-0.05 CREAM TOPICAL TWICE DAILY
Qty: 1 | Refills: 0 | Status: DISCONTINUED | COMMUNITY
Start: 2019-08-30 | End: 2022-07-25

## 2022-07-25 RX ORDER — CLOTRIMAZOLE 10 MG/G
1 CREAM TOPICAL TWICE DAILY
Qty: 1 | Refills: 2 | Status: DISCONTINUED | COMMUNITY
Start: 2019-08-30 | End: 2022-07-25

## 2022-07-27 NOTE — DATA REVIEWED
[FreeTextEntry1] : Family hx of cancer: paternal grandmother dx in her 40's with breast cancer, mother dx with uterine cancer at age 48 and passed away from ca. \par \par 12/29/2020 ([facility]) MRI breasts: nondiagnostic due to lack of IV contrast. Pt should return for breast MRI w/ and w/o IV contrast. BI-RADS 0. \par \par 1/25/2021 ([facility]) MRI (recall): no e/o malignancy. BI-RADS 2. \par \par 10/21/21 ([facility]) Left targeted US: The patient's palpable abnormality corresponds to a 1.5 x 0.4 x 1.1 cm cyst that abuts the deep margin of the subcutaneous tissues. BI-RADS 2. \par \par 6/6/22 ([facility]) MRI: No e/o malignancy. The previously described area of palpable concern in the left breast most likely corresponds to post surgical fat necrosis. BI-RADS 2.

## 2022-07-27 NOTE — HISTORY OF PRESENT ILLNESS
[FreeTextEntry1] : Julian is a 66 year old, BRCA1+, post-menopausal female presents for follow-up; she has ongoing complaint of a left breast palpable nodule first in October 2021, benign cyst on imaging, she denies any changes to the palpable lump. Otherwise, Patient has no breast complaints today. Denies nipple discharge, nipple/breast skin changes or dimpling. Denies fever and chills. \par \par The patient has a history of left breast upper outer quadrant (2:00 6cMFN) infiltrating ductal carcinoma (ER 90%/[place] borderline/ HER-2 FISH negative) w/ DCIS, s/p neoadjuvant chemo followed by a b/l mastectomy w/ NUBIA recon and BSO on 1/7/19, 1/5 sentinel nodes positive, total of 29 left LNs removed; clear margins for invasive cancer, DCIS posterior margin was 1mm; she is now s/p XRT w/ Dr De Leon which completed on 7/23/19. She is currently taking Anastrozole managed by her medical oncologist, Dr. Jody Carlin. \par \par She has left upper extremity lymphedema, which she's seeing PT for. She uses a lymphapress pneumatic pump at home and additional automatic compression device (Lunix) recommended by her PT for her left arm lymphedema. She sees her PT once weekly to once every two weeks. She states she does not wear her compression sleeve and glove. States she performs exercises and lymphatic massage at home routinely. \par \par Patient states the breast MRI has been difficult to undergo secondary to the limited ROM of her left upper extremity, in order to tolerate the MRI, patient has been getting steroid injections in her left shoulder, but is inquiring other options regarding breast surveillance or if she can push out her breast MRI. Discussed with patient to follow up in 6 months and will consider contrast enhanced mammography at that time. \par \par Patient states her "trigger thumb", L>R, s/p 4 steroid injections has improved and she has not undergone surgical repair. Her orthopedic hand surgeon (Dr. Nahid Mcallister).\par

## 2022-07-27 NOTE — PHYSICAL EXAM
[Normocephalic] : normocephalic [Atraumatic] : atraumatic [Supple] : supple [No Supraclavicular Adenopathy] : no supraclavicular adenopathy [Examined in the supine and seated position] : examined in the supine and seated position [No dominant masses] : no dominant masses in right breast  [No Axillary Lymphadenopathy] : no left axillary lymphadenopathy [No Edema] : no edema [No Rashes] : no rashes [No Ulceration] : no ulceration [de-identified] : left breast 2cm nodule at 3n3, well circumscribed, oval shaped and softer, stable\par \par Bilateral NUBIA flaps intact and well healed. NUBIA paddle viable. Left breast at 7n9, 3x2cm mass that is scar tissue and stable, this is the area she had her post op infection and has decreased in size. No dominant masses noted right breast. No LAD bilaterally\par

## 2022-11-13 ENCOUNTER — NON-APPOINTMENT (OUTPATIENT)
Age: 67
End: 2022-11-13

## 2022-12-06 ENCOUNTER — NON-APPOINTMENT (OUTPATIENT)
Age: 67
End: 2022-12-06

## 2022-12-06 ENCOUNTER — APPOINTMENT (OUTPATIENT)
Dept: GYNECOLOGIC ONCOLOGY | Facility: CLINIC | Age: 67
End: 2022-12-06

## 2022-12-06 VITALS
OXYGEN SATURATION: 97 % | TEMPERATURE: 96.5 F | SYSTOLIC BLOOD PRESSURE: 124 MMHG | DIASTOLIC BLOOD PRESSURE: 87 MMHG | BODY MASS INDEX: 31.76 KG/M2 | HEIGHT: 64 IN | WEIGHT: 186 LBS | HEART RATE: 83 BPM

## 2022-12-06 PROCEDURE — 99214 OFFICE O/P EST MOD 30 MIN: CPT

## 2022-12-06 NOTE — HISTORY OF PRESENT ILLNESS
[FreeTextEntry1] : Problem\par 1)Hx Ovarian mass s/p BSO on 1/7/2019\par 2)Hx of left invasive breast cancer w/ lymphovascular involvement \par 3)BRCA 1 Positive, VUS in CHEK\par \par Previous Therapy\par 1)CTAP 6/25/2018\par    a)Right ovarian mass of 3.9x3.9x3.4cm\par    b)Heterogenous and thickened endometrium and leiomyomatous uterus\par    c)Hepatic steatosis\par 2)CTAP 7/12/2018\par    a)Right adnexal mass of 4.6cm\par 3) Pelvic ultrasound 12/12/18\par     a)Right adenexal mass of 3.9cm, stable\par \par 4) Lapraoscopic BSO 1/2019\par a) Fallopian tube and ovary, left; salpingo-oophorectomy:\par - Ovary with benign inclusion cysts\par - Fallopian tube within normal limits\par \par b) Fallopian tube and ovary, right; salpingo-oophorectomy:\par - Ovary with fibrothecoma, 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 Pt has also completed radiation treatment.

## 2022-12-06 NOTE — ASSESSMENT
[FreeTextEntry1] : Normal exam today.\par \par S/S of primary peritoneal cancer discussed.\par \par Follow up in 9 months\par

## 2022-12-06 NOTE — PHYSICAL EXAM
[Abnormal] : Examination of ureathral meatus: abnormal [Absent] : Adnexa(ae): Absent [Normal] : Recto-Vaginal Exam: Normal [de-identified] : softly distended, consistent with patient's body habitus

## 2022-12-06 NOTE — REASON FOR VISIT
[FreeTextEntry1] : Pt presents for 9 month follow up. \par \par Reports she currently has an URI for which she was prescribed steroid taper, albuterol inhaler, azithromycin and cough suppressant. Covid test yesterday as negative, flu test last week also negative. States she never went for TVS in  because spotting self resolved within a day or two. She has not had any bleeding since then. She actually believes the bleeding was from a skin excoriation, and the bleeding resolved once the skin healed. \par \par Patient is excited to share that she has a grandchild, 7 months old and will be spending the holidays with family.\par \par GynHx: Post menopausal (reports episode of PMB last year, EMBx negative). 18 year OCP use.\par ObHx:  x 2\par PmHx: Breast Ca\par SurgHx: R hip replacement\par Meds: simvastatin, ondansetron\par All: NKDA\par \par SocHx: lives alone, . Works as . No toxic habits.\par FamHx: Mother ( young, uterine vs. ovarian, patietn unsure)\par \par \par Pap: Neg, HPV neg 2021

## 2022-12-20 ENCOUNTER — APPOINTMENT (OUTPATIENT)
Dept: BREAST CENTER | Facility: CLINIC | Age: 67
End: 2022-12-20
Payer: MEDICARE

## 2022-12-20 VITALS
BODY MASS INDEX: 31.67 KG/M2 | HEART RATE: 77 BPM | WEIGHT: 185.5 LBS | SYSTOLIC BLOOD PRESSURE: 106 MMHG | DIASTOLIC BLOOD PRESSURE: 72 MMHG | HEIGHT: 64 IN

## 2022-12-20 DIAGNOSIS — Z78.9 OTHER SPECIFIED HEALTH STATUS: ICD-10-CM

## 2022-12-20 DIAGNOSIS — Z85.3 PERSONAL HISTORY OF MALIGNANT NEOPLASM OF BREAST: ICD-10-CM

## 2022-12-20 DIAGNOSIS — Z90.13 ACQUIRED ABSENCE OF BILATERAL BREASTS AND NIPPLES: ICD-10-CM

## 2022-12-20 DIAGNOSIS — I89.0 LYMPHEDEMA, NOT ELSEWHERE CLASSIFIED: ICD-10-CM

## 2022-12-20 PROCEDURE — 99213 OFFICE O/P EST LOW 20 MIN: CPT

## 2022-12-20 RX ORDER — ROSUVASTATIN CALCIUM 20 MG/1
20 TABLET, FILM COATED ORAL
Refills: 0 | Status: COMPLETED | COMMUNITY
Start: 2020-09-17 | End: 2022-12-20

## 2022-12-20 NOTE — PHYSICAL EXAM
[Normocephalic] : normocephalic [No Supraclavicular Adenopathy] : no supraclavicular adenopathy [Examined in the supine and seated position] : examined in the supine and seated position [No dominant masses] : no dominant masses in right breast  [No Axillary Lymphadenopathy] : no left axillary lymphadenopathy [No Edema] : no edema [No Swelling] : no swelling [No Rashes] : no rashes [No Ulceration] : no ulceration [de-identified] : bilateral NUBIA flaps intact and well healed, paddle viable  [de-identified] : 2cm well circumscribed palpable nodule at 3n7, unchanged from prior exam  [de-identified] : 2-3+ edema to the left arm and hand; no edema to the right

## 2022-12-20 NOTE — PAST MEDICAL HISTORY
[Postmenopausal] : The patient is postmenopausal [Menarche Age ____] : age at menarche was [unfilled] [unknown] : the patient is unsure of the date of her LMP [Total Preg ___] : G[unfilled] [Live Births ___] : P[unfilled]  [Living ___] : Living: [unfilled] [Age At Live Birth ___] : Age at live birth: [unfilled] [Menopause Age____] : age at menopause was [unfilled] [History of Hormone Replacement Treatment] : has no history of hormone replacement treatment [FreeTextEntry5] : Salpingo-oophorectomy - January 2019 [FreeTextEntry2] : 2 boys [FreeTextEntry6] : None [FreeTextEntry7] : OCP's x 15 years, stopped 30 years ago [FreeTextEntry8] : n/a

## 2022-12-20 NOTE — REVIEW OF SYSTEMS
[Limb Swelling] : limb swelling [As Noted in HPI] : as noted in HPI [Breast Pain] : no breast pain [Breast Lump] : breast lump [Negative] : Heme/Lymph

## 2022-12-20 NOTE — ASSESSMENT
[FreeTextEntry1] : 66 yo BRCA1+ female presents for follow-up with a history of left breast IDC s/p adjuvant chemo, b/l mastectomy w/ NUBIA w/ ALND and BSO & XRT in 2019. Currently on AI with Dr. Carlin. She is doing well, has chronic lymphedema in her left arm which is being managed by a lymphedema specialist. She states that she has an active prescription, does not need an updated one at this time. \par \par Reviewed with the patient the indications for MRI; the patient states that even with steroid injections it is very painful to get them done and would like to skip this year. Reviewed that I would like to have her get a bilateral US with axillary views in June. Would hold off on contrast mammograms for now unless there is a suspicious finding on US, given history of bilateral mastectomy.

## 2022-12-20 NOTE — HISTORY OF PRESENT ILLNESS
[FreeTextEntry1] : Julian is a 67 year old, BRCA1+, post-menopausal female presents for follow-up; she has ongoing complaint of a left breast palpable nodule first in October 2021, benign cyst on imaging, she denies any changes to the palpable lump. Otherwise, Patient has no breast complaints today. Denies nipple discharge, nipple/breast skin changes or dimpling. Denies fever and chills. \par \par The patient has a history of left breast upper outer quadrant (2:00 6cMFN) infiltrating ductal carcinoma (ER 90%/[place] borderline/ HER-2 FISH negative) w/ DCIS, s/p neoadjuvant chemo followed by a b/l mastectomy w/ NUBIA recon and BSO on 1/7/19, 1/5 sentinel nodes positive, total of 29 left LNs removed; clear margins for invasive cancer, DCIS posterior margin was 1mm; she is now s/p XRT w/ Dr De Leon which completed on 7/23/19. She is currently taking Anastrozole managed by her medical oncologist, Dr. Jody Carlin. \par \par She has left upper extremity lymphedema, which she's seeing PT for. She uses a lymphapress pneumatic pump at home and additional automatic compression device (Lunix) recommended by her PT for her left arm lymphedema. She sees her PT once weekly to once every two weeks. She states she does not wear her compression sleeve as it displaces the edema-- she has started to dada a glove only which helps. States she performs exercises and lymphatic massage at home routinely. \par \par Patient states the breast MRI has been difficult to undergo secondary to the limited ROM of her left upper extremity, in order to tolerate the MRI, patient has been getting steroid injections in her left shoulder, but is inquiring other options regarding breast surveillance or if she can push out her breast MRI. \par \par Patient states her "trigger thumb", L>R, s/p 4 steroid injections has improved and she has not undergone surgical repair. Her orthopedic hand surgeon (Dr. Nahid Mcallister).

## 2023-01-30 NOTE — HISTORY OF PRESENT ILLNESS
[FreeTextEntry1] : Ms. Julian Bernstein is a 63F, former smoker (10 pack years, quit 1993) referred by Dr. Hooper for consideration of radiation therapy for LEFT breast cancer. \par \par On 4/12/18, she underwent routine mammo-elvin/ sono which showed the following:\par -1.2 cm oval shaped mass with slightly spiculated margins, and an associated group of fine pleomorphic microcalcifications in a linear distribution, in the upper outer LEFT breast, new from prior study and corresponding to a 1.2 x 0.5 x 0.8 cm irregular shaped hypoechoic lesion at 2:00 6cmFN on ultrasound. U/S guided biopsy recommended. \par -Cortically thickened left axillary lymph node with cortical thickening measuring up to 0.8 cm. U/S guided core biopsy advised.\par -Benign findings in right breast. \par \par On 4/23/18, she underwent U/S core biopsy of left breast 2:00 and left prominent axillary lymph node. Pathology revealed the following:\par 1. Left breast, 2 o'clock, 6 cm from nipple; ultrasound guided core biopsy:\par - Infiltrating ductal carcinoma, moderately to poorly differentiated, with focal micropapillary features and scanty mucin production, at\par least 1.1 cm.  - Positive for lymphovascular invasion\par 2. Left axillary lymph node, core biopsy: - Metastatic carcinoma, 0.4 cm focus\par ER positive, HR equivocal, HER-2 negative via FISH. \par \par She initially saw Dr. Wray on 4/30/18, and at the time denied any skin changes, palpable masses, or nipple discharge. Genetic testing was also done at this time, and she was found to be BRCA1 positive with a VUS in the Chek 2 gene.  \par \par MRI breast on 5/15/18 showed the following:\par Right breast- benign findings.\par Left breast- 0.7 x 2.1 x 1.5 cm enhancing mass with an associated microclip at 2:00 6.5cmFN, consistent with patient's known malignancy. A few anterior foci of non-mass enhancement, the most anterior of which is a 0.4 foci of non-mass enhancement, 3 cm anterior to the mass. MRI guided biopsy recommended.   BI-RADS 4. \par \par She saw Dr. Carlin for initial consultation in June 2018 for neoadjuvant chemotherapy. She received ACT from 6/2018 to 11/2018 (one treatment withheld due to side effects- neuropathy). She developed SHRESTHA a couple weeks after completion of systemic therapy; chest CT results consistent with air trapping and and she saw a pulmonologist on 12/21/18.  SHRESTHA resolved on its own. Of note, she was found to have a 4.6 cm RIGHT adnexal mass. She saw Dr. Burris/ Blayne for this; plan was for BSO at time of breast surgery. \par \par On 1/9/19, she underwent bilateral mastectomy, left ALND with bilateral NUBIA flap reconstruction and BSO (Geraldo Hooper, Lerman, Blayne). Pathology showed the following: \par \par 1. Climax lymph nodes x 3, left; biopsy: \par - Macrometastatic carcinoma to one of five lymph nodes (1/5)  - Metastatic focus measures 6 mm\par Note: Sections of a lymph node with metallic clip show mucin pools with scattered scanty residual tumor cells. Negative for\par extracapsular extension.\par 2. Climax lymph node, left; biopsy: 0/1\par 3. Breast, right; mastectomy:- Benign breast tissue, nipple, and skin\par 4. Upper outer flap, right, clip at final margin; excision: - Benign fibroadipose tissue\par 5. Breast, left; mastectomy:\par - Invasive and in situ ductal carcinoma status post neoadjuvant therapy\par - Invasive ductal carcinoma with mucinous features, present predominantly as mucin pools with scattered foci of residual\par tumor cells, spanning up to 9 mm in greatest dimension, location UOQ. \par - Significant response to chemotherapy with tumor cellularity\par - Rare foci of ductal carcinoma in situ (DCIS), solid and cribriform types with treatment related changes, within tumor bed\par area.    - Tumor bed area measures: 1.0 x 0.8 x 0.8 cm\par - Margins:\par Invasive carcinoma:  Posterior: 1 mm (scattered foci < 1mm).   All remaining: >10 mm\par DCIS:   Posterior: 1 mm (single focus).   All remaining: > 10 mm\par - Two biopsy site changes\par - Lymphovascular invasion is not identified\par - The surrounding breast tissue with flat epithelial atypia (FEA), mucocele-like lesion and fibrocystic changes\par - Benign nipple and skin\par \par 6. Axillary content, left; biopsy: 0/22\par 7. Internal mammary lymph node, right; biopsy: 0/1 \par 8. Internal mammary lymph node, left; biopsy: 0/1\par 9. Fallopian tube and ovary, left; salpingo-oophorectomy:\par - Ovary with benign inclusion cysts   - Fallopian tube within normal limits\par 10. 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 She last saw Dr. Carlin last week, no change in plan at this time and she will see her again first week of March. She last saw Dr. Hooper on 1/25/19, at which time she was referred here and advised to follow up with Dr. Carlin.  She should follow up with breast surgeon in six months.  She last saw Dr. Lerman on 2/5/19; SOSA drain removed at the time. She has also started packing breast wounds (one on each breast, left wound larger than right) with wet gauze, changes dressing twice a day. \par \par Today, she reports she has soreness to both breasts, L>R. Discomfort is well controlled with Advil. She denies pain or discomfort to axilla or UE. She continues to have numbness in bilateral feet as a result of chemotherapy, but states it is improving. She uses a cane to ambulate due to feeling unsteady on her feet. She further reports fatigue and a 13 pound weight loss since the mastectomy. She will be starting Physical Therapy soon. She reports being overwhelmed and anxious regarding her health and having to pack the breast wounds. She is currently not working (she is a ), but plans to return in four weeks. She lives on her own, but one her sons lives a few blocks away. 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 \par \par Family history notable for paternal grandmother dx in her 40's with breast cancer, mother dx with uterine vs ovarian cancer at age 48 and passed away from cancer.  no wheezing/no dyspnea/no cough

## 2023-03-31 NOTE — ED ADULT NURSE NOTE - RESPIRATORY ASSESSMENT
Thursdays are preferred if possible, in the afternoon. If not then any day whatever the latest time she has available is. Thank you.       Appointment canceled for Sofi Fontenot ([id number])  Visit Type: BOTOX INJECTION  Date        Time      Length    Provider                  Department  3/30/2023   11:15 AM  60 mins.  [facility]     Reason for Cancellation: Too sick to come in     Patient Comments: Can't drive to appointment migraine with vertigo     - - -

## 2023-05-04 ENCOUNTER — APPOINTMENT (OUTPATIENT)
Dept: ORTHOPEDIC SURGERY | Facility: CLINIC | Age: 68
End: 2023-05-04
Payer: MEDICARE

## 2023-05-04 VITALS
HEIGHT: 64 IN | DIASTOLIC BLOOD PRESSURE: 81 MMHG | OXYGEN SATURATION: 95 % | WEIGHT: 186 LBS | BODY MASS INDEX: 31.76 KG/M2 | HEART RATE: 75 BPM | SYSTOLIC BLOOD PRESSURE: 123 MMHG

## 2023-05-04 DIAGNOSIS — M72.2 PLANTAR FASCIAL FIBROMATOSIS: ICD-10-CM

## 2023-05-04 PROCEDURE — 76942 ECHO GUIDE FOR BIOPSY: CPT

## 2023-05-04 PROCEDURE — 99213 OFFICE O/P EST LOW 20 MIN: CPT | Mod: 25

## 2023-05-04 PROCEDURE — 20550 NJX 1 TENDON SHEATH/LIGAMENT: CPT | Mod: LT

## 2023-05-04 NOTE — PHYSICAL EXAM
[de-identified] : General: Well-nourished, well-developed, alert, and in no acute distress.\par Head: Normocephalic.\par Eyes: Pupils equal, extraocular muscles intact, normal sclera.\par Nose: No nasal discharge.\par Cardiovascular: Extremities are warm and well perfused. Distal pulses are symmetric bilaterally.\par Respiratory: No labored breathing.\par Extremities: Sensation is intact distally bilaterally. Distal pulses are symmetric bilaterally\par Lymphatic: No regional lymphadenopathy, no lymphedema\par Neurologic: No focal deficits\par Skin: Normal skin color, texture, and turgor\par Psychiatric: Normal affect \par \par MSK:\par Examination of left foot and ankle\par Gait antalgic\par Able to toe walk, heel walk\par Ambulating independently \par Inspection: No erythema, hematoma, ecchymosis or edema \par Calcaneal calluses \par Hammertoes \par No warmth\par Alignment: pes planus\par \par Tender to palpation: medial plantar calcaneus plantar fascia origin\par Nontender to palpation: medial malleolus, lateral malleolus, base of 5th metatarsal, navicular, ATFL, CFL, PTFL, AITFL, Achilles tendon, PT, FHL, anterior tibialis, peroneals\par \par ROM: Plantar flexion 45 deg, dorsiflexion 20 deg, inversion 20 deg, eversion 30 deg\par \par Special Tests: \par \par Karuna's click negative \par Palpable piezogenic papules \par No Hallux valgus deformity \par No Valgus hindfoot deformity \par No Jacky's deformity \par Anterior Drawer (ATFL): negative for pain and laxity\par Talar Tilt (CFL): negative for pain and laxity\par Kleigers (ext rot): negative for pain and laxity at deltoid ligament or distal fibula\par Tinel's at tarsal tunnel negative\par De La Rosa test negative\par \par \par Sensation is intact to light touch over the superficial and deep peroneal nerve distributions and the posterior tibial nerve distribution. Capillary refill is less than two seconds. Posterior tibial and dorsalis pedis pulses 2+ equal bilaterally. No calf swelling or tenderness bilaterally. Strength testing shows  Hip flexion 5/5, Hip abduction 5/5, Hip abduction 5/5, Knee Extension 5/5, Knee Flexion 5/5, dorsiflexion 5/5, plantar flexion 5/5, EHL 5/5\par Reflexes: Patellar 2+, Achilles 2+, Babinski negative

## 2023-05-04 NOTE — PROCEDURE
[de-identified] : Ultrasound-guided plantar fascia steroid injection of the left foot:\par \par Following a discussion of the risks (bleeding, infection, lack of response) and benefits, verbal consent was obtained. Patient placed in supine position.\par The plantar fascia origin at the plantar medial calcaneus was identified with Sonosite US 15 MHz transducer. The area was anaesthetised with ethyl chloride spray then prepped with chlorhexadine. \par Under strict sterile technique 22 G needle was inserted with needle location confirmed on US. After negative aspiration, a mixture of  2 mL of 0.5% Bupivacaine, 2mL of 1% lidocaine, and 1 mL of 40 mg/ml triamcinolone was injected into the plantar fascia origin. Upon removal of the needle a sterile bandage was applied.\par \par The patient tolerated the procedure well and was provided with post injection instructions (no vigorous activity, ice intermittently for 48 hrs). Patient verbalized understanding.

## 2023-05-04 NOTE — ASSESSMENT
[FreeTextEntry1] : RADHA LEMONS is a 67 year old female with left foot pain.\par I discussed with the patient that their symptoms, signs, and imaging are most consistent with plantar fasciitis. \par We reviewed the natural history of this condition and treatment options.\par We agreed on the following plan:\par \par US guided CSI per patient request\par Activity modification: low impact aerobic activity (stationary bike, elliptical, swimming)\par Recommend 150 min per week of moderate intensity aerobic activity \par Start Home Exercises for plantar fasciitis with emphasis on early morning stretches. Demonstration and handout provided. \par Physical therapy. Referral provided.\par Medication: Etodolac 400mg BID prescription provided.\par Gel heel cups\par Advanced imaging: consider XR, MRI if no symptomatic improvement. \par Consider night splint\par Follow up in 6-8 weeks.

## 2023-05-04 NOTE — HISTORY OF PRESENT ILLNESS
[de-identified] : RADHA LEMONS is a 67 year old female who presents with left foot pain.\par States the onset of pain was 2 weeks ago\par Pain is medial plantar calcaneus \par Pain is nonradiating.\par There is no associated swelling, numbness, paraesthesia or weakness.\par Exacerbating factors are first steps out of bed, walking for prolonged periods.\par Has tried Naproxen which was not helped\par Has not tried orthotics/insoles\par Patient ambulates independently.\par Exercises regularly. \par Has not tried PT. \par

## 2023-05-21 NOTE — ED ADULT NURSE NOTE - NS ED NURSE LEVEL OF CONSCIOUSNESS MENTAL STATUS
Patient presents to the emergency department with concerns of worsening foot wound. Patient has not been at baseline for more than one year when she had gangrene to the ulcer on her right foot. Three weeks ago she has had increased drainage and pain. She states that she does not comply with blood sugar management or wound care. She is concerned for fever.   
Awake/Alert/Cooperative

## 2023-07-13 ENCOUNTER — APPOINTMENT (OUTPATIENT)
Dept: BREAST CENTER | Facility: CLINIC | Age: 68
End: 2023-07-13
Payer: MEDICARE

## 2023-07-13 VITALS
SYSTOLIC BLOOD PRESSURE: 119 MMHG | HEART RATE: 76 BPM | BODY MASS INDEX: 32.44 KG/M2 | DIASTOLIC BLOOD PRESSURE: 82 MMHG | WEIGHT: 190 LBS | HEIGHT: 64 IN

## 2023-07-13 DIAGNOSIS — Z15.01 GENETIC SUSCEPTIBILITY TO MALIGNANT NEOPLASM OF BREAST: ICD-10-CM

## 2023-07-13 DIAGNOSIS — Z17.0 MALIGNANT NEOPLASM OF UNSPECIFIED SITE OF LEFT FEMALE BREAST: ICD-10-CM

## 2023-07-13 DIAGNOSIS — N64.1 FAT NECROSIS OF BREAST: ICD-10-CM

## 2023-07-13 DIAGNOSIS — C50.912 MALIGNANT NEOPLASM OF UNSPECIFIED SITE OF LEFT FEMALE BREAST: ICD-10-CM

## 2023-07-13 DIAGNOSIS — N63.0 UNSPECIFIED LUMP IN UNSPECIFIED BREAST: ICD-10-CM

## 2023-07-13 PROCEDURE — 99213 OFFICE O/P EST LOW 20 MIN: CPT

## 2023-07-13 NOTE — REVIEW OF SYSTEMS
[Limb Swelling] : limb swelling [As Noted in HPI] : as noted in HPI [Breast Lump] : breast lump [Negative] : Heme/Lymph [Breast Pain] : no breast pain

## 2023-07-13 NOTE — PHYSICAL EXAM
[Normocephalic] : normocephalic [Examined in the supine and seated position] : examined in the supine and seated position [No Supraclavicular Adenopathy] : no supraclavicular adenopathy [No dominant masses] : no dominant masses in right breast  [No Axillary Lymphadenopathy] : no left axillary lymphadenopathy [No Edema] : no edema [No Swelling] : no swelling [No Rashes] : no rashes [No Ulceration] : no ulceration [de-identified] : bilateral NUBIA flaps intact and well healed, paddle viable  [de-identified] : 2cm well circumscribed palpable nodule at 3n3, unchanged from prior exam  [de-identified] : acquired absence of b/l nipples  [de-identified] : 2-3+ edema to the left arm and hand; no edema to the right

## 2023-07-13 NOTE — ASSESSMENT
[FreeTextEntry1] : 66 yo BRCA1+ female presents for follow-up with a history of left breast IDC s/p adjuvant chemo, b/l mastectomy w/ NUBIA w/ ALND and BSO & XRT in 2019. Currently on AI with Dr. Carlin. She is doing well, has chronic lymphedema in her left arm which is being managed by a lymphedema specialist. \par \par Reviewed that in the setting of a bilateral mastectomy, and oophorectomy, we can continue to hold off on MRIs which is in line with current guidelines. The patient would like to continue to be followed with ultrasounds, which we will plan on getting annually. \par \par The patient notes that she has breast exams with Dr. Carlin, Dr. Burris, and her PCP routinely. Given this, will move her next appointment to be in 1 year with me, and PRN in the interim. All questions were answered; the patient verbalized understanding and is in agreement with the plan.\par

## 2023-07-13 NOTE — HISTORY OF PRESENT ILLNESS
[FreeTextEntry1] : Julian is a 67 year old, BRCA1+, post-menopausal female presents for follow-up; she has ongoing complaint of a left breast palpable nodule first in October 2021, stable on imaging. She denies any changes to the palpable lump. Otherwise, Patient has no breast complaints today. Denies nipple discharge, nipple/breast skin changes or dimpling. Denies fever and chills. \par \par The patient has a history of left breast upper outer quadrant (2:00 6cMFN) infiltrating ductal carcinoma (ER 90%/MA borderline/ HER-2 FISH negative) w/ DCIS, s/p neoadjuvant chemo followed by a b/l mastectomy w/ NUBIA recon and BSO on 1/7/19, 1/5 sentinel nodes positive, total of 29 left LNs removed; clear margins for invasive cancer, DCIS posterior margin was 1mm; she is now s/p XRT w/ Dr De Leon which completed on 7/23/19. She is currently taking Anastrozole managed by her medical oncologist, Dr. Jody Carlin. Is unsure if she will be on it for 5 years or 10 years. \par \par She has left upper extremity lymphedema, which she's seeing PT for. She uses a lymphapress pneumatic pump at home and additional automatic compression device (Lunix) recommended by her PT for her left arm lymphedema. She sees her PT once weekly to once every two weeks. She states she does not wear her compression sleeve as it displaces the edema-- she has started to dada a glove only which helps. States she performs exercises, has started swimming, and lymphatic massage at home routinely. \par \par Patient states the breast MRI has been difficult to undergo secondary to the limited ROM of her left upper extremity, in order to tolerate the MRI, patient has been getting steroid injections in her left shoulder; would like to hold off unless necessary.

## 2023-07-13 NOTE — DATA REVIEWED
[FreeTextEntry1] : Family hx of cancer: paternal grandmother dx in her 40's with breast cancer, mother dx with uterine cancer at age 48 and passed away from ca. \par \par 12/29/2020 (R) MRI breasts: nondiagnostic due to lack of IV contrast. Pt should return for breast MRI w/ and w/o IV contrast. BI-RADS 0. \par \par 1/25/2021 ([facility]) MRI (recall): no e/o malignancy. BI-RADS 2. \par \par 10/21/21 (R) Left targeted US: The patient's palpable abnormality corresponds to a 1.5 x 0.4 x 1.1 cm cyst that abuts the deep margin of the subcutaneous tissues. BI-RADS 2. \par \par 6/6/22 (R) MRI: No e/o malignancy. The previously described area of palpable concern in the left breast most likely corresponds to post surgical fat necrosis. BI-RADS 2. \par \par 7/13/23 (R) b/l US: Left 3n2, stable cystic finding, likely fat necrosis measuring 1.5cm. Benign findings. BI-RADS 2.

## 2023-07-13 NOTE — PAST MEDICAL HISTORY
[Postmenopausal] : The patient is postmenopausal [Menarche Age ____] : age at menarche was [unfilled] [Menopause Age____] : age at menopause was [unfilled] [unknown] : the patient is unsure of the date of her LMP [Total Preg ___] : G[unfilled] [Live Births ___] : P[unfilled]  [Living ___] : Living: [unfilled] [Age At Live Birth ___] : Age at live birth: [unfilled] [History of Hormone Replacement Treatment] : has no history of hormone replacement treatment [FreeTextEntry5] : Salpingo-oophorectomy - January 2019 [FreeTextEntry2] : 2 boys [FreeTextEntry6] : None [FreeTextEntry7] : OCP's x 15 years, stopped 30 years ago [FreeTextEntry8] : n/a

## 2023-07-31 ENCOUNTER — EMERGENCY (EMERGENCY)
Facility: HOSPITAL | Age: 68
LOS: 1 days | Discharge: LEFT BEFORE DISCHARGE | End: 2023-07-31
Attending: EMERGENCY MEDICINE | Admitting: EMERGENCY MEDICINE
Payer: MEDICARE

## 2023-07-31 ENCOUNTER — INPATIENT (INPATIENT)
Facility: HOSPITAL | Age: 68
LOS: 1 days | Discharge: HOME | DRG: 872 | End: 2023-08-02
Attending: HOSPITALIST | Admitting: STUDENT IN AN ORGANIZED HEALTH CARE EDUCATION/TRAINING PROGRAM
Payer: MEDICARE

## 2023-07-31 VITALS
OXYGEN SATURATION: 97 % | DIASTOLIC BLOOD PRESSURE: 82 MMHG | SYSTOLIC BLOOD PRESSURE: 125 MMHG | HEART RATE: 107 BPM | RESPIRATION RATE: 16 BRPM | TEMPERATURE: 98 F

## 2023-07-31 VITALS
SYSTOLIC BLOOD PRESSURE: 127 MMHG | RESPIRATION RATE: 18 BRPM | TEMPERATURE: 99 F | OXYGEN SATURATION: 98 % | HEIGHT: 64 IN | DIASTOLIC BLOOD PRESSURE: 77 MMHG | HEART RATE: 117 BPM | WEIGHT: 192.02 LBS

## 2023-07-31 VITALS
SYSTOLIC BLOOD PRESSURE: 110 MMHG | RESPIRATION RATE: 16 BRPM | OXYGEN SATURATION: 97 % | TEMPERATURE: 99 F | DIASTOLIC BLOOD PRESSURE: 63 MMHG | WEIGHT: 194.01 LBS | HEART RATE: 106 BPM | HEIGHT: 64 IN

## 2023-07-31 DIAGNOSIS — Z91.048 OTHER NONMEDICINAL SUBSTANCE ALLERGY STATUS: ICD-10-CM

## 2023-07-31 DIAGNOSIS — Z41.9 ENCOUNTER FOR PROCEDURE FOR PURPOSES OTHER THAN REMEDYING HEALTH STATE, UNSPECIFIED: Chronic | ICD-10-CM

## 2023-07-31 DIAGNOSIS — Z29.9 ENCOUNTER FOR PROPHYLACTIC MEASURES, UNSPECIFIED: ICD-10-CM

## 2023-07-31 DIAGNOSIS — L03.119 CELLULITIS OF UNSPECIFIED PART OF LIMB: ICD-10-CM

## 2023-07-31 DIAGNOSIS — D72.829 ELEVATED WHITE BLOOD CELL COUNT, UNSPECIFIED: ICD-10-CM

## 2023-07-31 DIAGNOSIS — E78.5 HYPERLIPIDEMIA, UNSPECIFIED: ICD-10-CM

## 2023-07-31 DIAGNOSIS — Z92.3 PERSONAL HISTORY OF IRRADIATION: ICD-10-CM

## 2023-07-31 DIAGNOSIS — R00.0 TACHYCARDIA, UNSPECIFIED: ICD-10-CM

## 2023-07-31 DIAGNOSIS — A41.9 SEPSIS, UNSPECIFIED ORGANISM: ICD-10-CM

## 2023-07-31 DIAGNOSIS — Z85.3 PERSONAL HISTORY OF MALIGNANT NEOPLASM OF BREAST: ICD-10-CM

## 2023-07-31 DIAGNOSIS — Z96.641 PRESENCE OF RIGHT ARTIFICIAL HIP JOINT: Chronic | ICD-10-CM

## 2023-07-31 DIAGNOSIS — I89.0 LYMPHEDEMA, NOT ELSEWHERE CLASSIFIED: ICD-10-CM

## 2023-07-31 DIAGNOSIS — M79.642 PAIN IN LEFT HAND: ICD-10-CM

## 2023-07-31 DIAGNOSIS — Z92.21 PERSONAL HISTORY OF ANTINEOPLASTIC CHEMOTHERAPY: ICD-10-CM

## 2023-07-31 DIAGNOSIS — Z79.811 LONG TERM (CURRENT) USE OF AROMATASE INHIBITORS: ICD-10-CM

## 2023-07-31 DIAGNOSIS — R82.998 OTHER ABNORMAL FINDINGS IN URINE: ICD-10-CM

## 2023-07-31 DIAGNOSIS — Z98.890 OTHER SPECIFIED POSTPROCEDURAL STATES: Chronic | ICD-10-CM

## 2023-07-31 DIAGNOSIS — Z53.29 PROCEDURE AND TREATMENT NOT CARRIED OUT BECAUSE OF PATIENT'S DECISION FOR OTHER REASONS: ICD-10-CM

## 2023-07-31 DIAGNOSIS — R53.83 OTHER FATIGUE: ICD-10-CM

## 2023-07-31 DIAGNOSIS — R74.02 ELEVATION OF LEVELS OF LACTIC ACID DEHYDROGENASE [LDH]: ICD-10-CM

## 2023-07-31 DIAGNOSIS — Z90.13 ACQUIRED ABSENCE OF BILATERAL BREASTS AND NIPPLES: ICD-10-CM

## 2023-07-31 DIAGNOSIS — L03.114 CELLULITIS OF LEFT UPPER LIMB: ICD-10-CM

## 2023-07-31 LAB
ALBUMIN SERPL ELPH-MCNC: 4.3 G/DL — SIGNIFICANT CHANGE UP (ref 3.3–5)
ALP SERPL-CCNC: 97 U/L — SIGNIFICANT CHANGE UP (ref 40–120)
ALT FLD-CCNC: 17 U/L — SIGNIFICANT CHANGE UP (ref 10–45)
ANION GAP SERPL CALC-SCNC: 13 MMOL/L — SIGNIFICANT CHANGE UP (ref 5–17)
APPEARANCE UR: ABNORMAL
APTT BLD: 32.3 SEC — SIGNIFICANT CHANGE UP (ref 24.5–35.6)
AST SERPL-CCNC: 17 U/L — SIGNIFICANT CHANGE UP (ref 10–40)
BACTERIA # UR AUTO: ABNORMAL /HPF
BASE EXCESS BLDV CALC-SCNC: 2.4 MMOL/L — SIGNIFICANT CHANGE UP (ref -2–3)
BASOPHILS # BLD AUTO: 0.03 K/UL — SIGNIFICANT CHANGE UP (ref 0–0.2)
BASOPHILS NFR BLD AUTO: 0.2 % — SIGNIFICANT CHANGE UP (ref 0–2)
BILIRUB SERPL-MCNC: 0.6 MG/DL — SIGNIFICANT CHANGE UP (ref 0.2–1.2)
BILIRUB UR-MCNC: NEGATIVE — SIGNIFICANT CHANGE UP
BUN SERPL-MCNC: 19 MG/DL — SIGNIFICANT CHANGE UP (ref 7–23)
CA-I SERPL-SCNC: 1.15 MMOL/L — SIGNIFICANT CHANGE UP (ref 1.15–1.33)
CALCIUM SERPL-MCNC: 8.8 MG/DL — SIGNIFICANT CHANGE UP (ref 8.4–10.5)
CHLORIDE SERPL-SCNC: 101 MMOL/L — SIGNIFICANT CHANGE UP (ref 96–108)
CO2 BLDV-SCNC: 28.5 MMOL/L — HIGH (ref 22–26)
CO2 SERPL-SCNC: 27 MMOL/L — SIGNIFICANT CHANGE UP (ref 22–31)
COLOR SPEC: YELLOW — SIGNIFICANT CHANGE UP
CREAT SERPL-MCNC: 0.67 MG/DL — SIGNIFICANT CHANGE UP (ref 0.5–1.3)
DIFF PNL FLD: NEGATIVE — SIGNIFICANT CHANGE UP
EGFR: 96 ML/MIN/1.73M2 — SIGNIFICANT CHANGE UP
EOSINOPHIL # BLD AUTO: 0.06 K/UL — SIGNIFICANT CHANGE UP (ref 0–0.5)
EOSINOPHIL NFR BLD AUTO: 0.4 % — SIGNIFICANT CHANGE UP (ref 0–6)
EPI CELLS # UR: SIGNIFICANT CHANGE UP /HPF (ref 0–5)
GAS PNL BLDV: 134 MMOL/L — LOW (ref 136–145)
GAS PNL BLDV: SIGNIFICANT CHANGE UP
GLUCOSE SERPL-MCNC: 123 MG/DL — HIGH (ref 70–99)
GLUCOSE UR QL: NEGATIVE — SIGNIFICANT CHANGE UP
HCO3 BLDV-SCNC: 27 MMOL/L — SIGNIFICANT CHANGE UP (ref 22–29)
HCT VFR BLD CALC: 44.1 % — SIGNIFICANT CHANGE UP (ref 34.5–45)
HGB BLD-MCNC: 15.6 G/DL — HIGH (ref 11.5–15.5)
IMM GRANULOCYTES NFR BLD AUTO: 0.5 % — SIGNIFICANT CHANGE UP (ref 0–0.9)
INR BLD: 1 — SIGNIFICANT CHANGE UP (ref 0.85–1.18)
KETONES UR-MCNC: NEGATIVE — SIGNIFICANT CHANGE UP
LACTATE SERPL-SCNC: 1.4 MMOL/L — SIGNIFICANT CHANGE UP (ref 0.5–2)
LACTATE SERPL-SCNC: 2.3 MMOL/L — HIGH (ref 0.5–2)
LEUKOCYTE ESTERASE UR-ACNC: ABNORMAL
LYMPHOCYTES # BLD AUTO: 1.01 K/UL — SIGNIFICANT CHANGE UP (ref 1–3.3)
LYMPHOCYTES # BLD AUTO: 6.3 % — LOW (ref 13–44)
MCHC RBC-ENTMCNC: 33.8 PG — SIGNIFICANT CHANGE UP (ref 27–34)
MCHC RBC-ENTMCNC: 35.4 GM/DL — SIGNIFICANT CHANGE UP (ref 32–36)
MCV RBC AUTO: 95.5 FL — SIGNIFICANT CHANGE UP (ref 80–100)
MONOCYTES # BLD AUTO: 0.77 K/UL — SIGNIFICANT CHANGE UP (ref 0–0.9)
MONOCYTES NFR BLD AUTO: 4.8 % — SIGNIFICANT CHANGE UP (ref 2–14)
NEUTROPHILS # BLD AUTO: 14.19 K/UL — HIGH (ref 1.8–7.4)
NEUTROPHILS NFR BLD AUTO: 87.8 % — HIGH (ref 43–77)
NITRITE UR-MCNC: NEGATIVE — SIGNIFICANT CHANGE UP
NRBC # BLD: 0 /100 WBCS — SIGNIFICANT CHANGE UP (ref 0–0)
PCO2 BLDV: 42 MMHG — SIGNIFICANT CHANGE UP (ref 39–42)
PH BLDV: 7.42 — SIGNIFICANT CHANGE UP (ref 7.32–7.43)
PH UR: 5.5 — SIGNIFICANT CHANGE UP (ref 5–8)
PLATELET # BLD AUTO: 196 K/UL — SIGNIFICANT CHANGE UP (ref 150–400)
PO2 BLDV: <33 MMHG — SIGNIFICANT CHANGE UP (ref 25–45)
POTASSIUM BLDV-SCNC: 3.8 MMOL/L — SIGNIFICANT CHANGE UP (ref 3.5–5.1)
POTASSIUM SERPL-MCNC: 4.1 MMOL/L — SIGNIFICANT CHANGE UP (ref 3.5–5.3)
POTASSIUM SERPL-SCNC: 4.1 MMOL/L — SIGNIFICANT CHANGE UP (ref 3.5–5.3)
PROT SERPL-MCNC: 7 G/DL — SIGNIFICANT CHANGE UP (ref 6–8.3)
PROT UR-MCNC: ABNORMAL MG/DL
PROTHROM AB SERPL-ACNC: 11.4 SEC — SIGNIFICANT CHANGE UP (ref 9.5–13)
RBC # BLD: 4.62 M/UL — SIGNIFICANT CHANGE UP (ref 3.8–5.2)
RBC # FLD: 12.8 % — SIGNIFICANT CHANGE UP (ref 10.3–14.5)
RBC CASTS # UR COMP ASSIST: < 5 /HPF — SIGNIFICANT CHANGE UP
SAO2 % BLDV: 47.4 % — LOW (ref 67–88)
SODIUM SERPL-SCNC: 141 MMOL/L — SIGNIFICANT CHANGE UP (ref 135–145)
SP GR SPEC: >=1.03 — SIGNIFICANT CHANGE UP (ref 1–1.03)
UROBILINOGEN FLD QL: 0.2 E.U./DL — SIGNIFICANT CHANGE UP
WBC # BLD: 16.14 K/UL — HIGH (ref 3.8–10.5)
WBC # FLD AUTO: 16.14 K/UL — HIGH (ref 3.8–10.5)
WBC UR QL: < 5 /HPF — SIGNIFICANT CHANGE UP

## 2023-07-31 PROCEDURE — 80053 COMPREHEN METABOLIC PANEL: CPT

## 2023-07-31 PROCEDURE — 85025 COMPLETE CBC W/AUTO DIFF WBC: CPT

## 2023-07-31 PROCEDURE — 99284 EMERGENCY DEPT VISIT MOD MDM: CPT | Mod: 25

## 2023-07-31 PROCEDURE — 87086 URINE CULTURE/COLONY COUNT: CPT

## 2023-07-31 PROCEDURE — 93005 ELECTROCARDIOGRAM TRACING: CPT

## 2023-07-31 PROCEDURE — 87040 BLOOD CULTURE FOR BACTERIA: CPT

## 2023-07-31 PROCEDURE — 82330 ASSAY OF CALCIUM: CPT

## 2023-07-31 PROCEDURE — 81001 URINALYSIS AUTO W/SCOPE: CPT

## 2023-07-31 PROCEDURE — 99285 EMERGENCY DEPT VISIT HI MDM: CPT

## 2023-07-31 PROCEDURE — 85610 PROTHROMBIN TIME: CPT

## 2023-07-31 PROCEDURE — 96374 THER/PROPH/DIAG INJ IV PUSH: CPT

## 2023-07-31 PROCEDURE — 36415 COLL VENOUS BLD VENIPUNCTURE: CPT

## 2023-07-31 PROCEDURE — 85730 THROMBOPLASTIN TIME PARTIAL: CPT

## 2023-07-31 PROCEDURE — 82803 BLOOD GASES ANY COMBINATION: CPT

## 2023-07-31 PROCEDURE — 83605 ASSAY OF LACTIC ACID: CPT

## 2023-07-31 PROCEDURE — 84295 ASSAY OF SERUM SODIUM: CPT

## 2023-07-31 PROCEDURE — 99223 1ST HOSP IP/OBS HIGH 75: CPT | Mod: GC

## 2023-07-31 PROCEDURE — 84132 ASSAY OF SERUM POTASSIUM: CPT

## 2023-07-31 RX ORDER — SODIUM CHLORIDE 9 MG/ML
1000 INJECTION INTRAMUSCULAR; INTRAVENOUS; SUBCUTANEOUS ONCE
Refills: 0 | Status: COMPLETED | OUTPATIENT
Start: 2023-07-31 | End: 2023-07-31

## 2023-07-31 RX ORDER — ENOXAPARIN SODIUM 100 MG/ML
40 INJECTION SUBCUTANEOUS EVERY 24 HOURS
Refills: 0 | Status: DISCONTINUED | OUTPATIENT
Start: 2023-08-01 | End: 2023-08-02

## 2023-07-31 RX ORDER — ACETAMINOPHEN 500 MG
650 TABLET ORAL EVERY 6 HOURS
Refills: 0 | Status: DISCONTINUED | OUTPATIENT
Start: 2023-07-31 | End: 2023-08-02

## 2023-07-31 RX ORDER — ACETAMINOPHEN 500 MG
1000 TABLET ORAL ONCE
Refills: 0 | Status: COMPLETED | OUTPATIENT
Start: 2023-07-31 | End: 2023-07-31

## 2023-07-31 RX ORDER — ANASTROZOLE 1 MG/1
1 TABLET ORAL DAILY
Refills: 0 | Status: DISCONTINUED | OUTPATIENT
Start: 2023-08-01 | End: 2023-08-02

## 2023-07-31 RX ORDER — ACETAMINOPHEN 500 MG
1000 TABLET ORAL ONCE
Refills: 0 | Status: DISCONTINUED | OUTPATIENT
Start: 2023-07-31 | End: 2023-07-31

## 2023-07-31 RX ORDER — CEFAZOLIN SODIUM 1 G
1000 VIAL (EA) INJECTION EVERY 8 HOURS
Refills: 0 | Status: DISCONTINUED | OUTPATIENT
Start: 2023-07-31 | End: 2023-08-02

## 2023-07-31 RX ORDER — ACETAMINOPHEN 500 MG
650 TABLET ORAL ONCE
Refills: 0 | Status: COMPLETED | OUTPATIENT
Start: 2023-07-31 | End: 2023-07-31

## 2023-07-31 RX ORDER — VANCOMYCIN HCL 1 G
1000 VIAL (EA) INTRAVENOUS ONCE
Refills: 0 | Status: COMPLETED | OUTPATIENT
Start: 2023-07-31 | End: 2023-07-31

## 2023-07-31 RX ORDER — LANOLIN ALCOHOL/MO/W.PET/CERES
3 CREAM (GRAM) TOPICAL AT BEDTIME
Refills: 0 | Status: DISCONTINUED | OUTPATIENT
Start: 2023-07-31 | End: 2023-08-02

## 2023-07-31 RX ORDER — SIMVASTATIN 20 MG/1
20 TABLET, FILM COATED ORAL AT BEDTIME
Refills: 0 | Status: DISCONTINUED | OUTPATIENT
Start: 2023-07-31 | End: 2023-08-02

## 2023-07-31 RX ADMIN — SODIUM CHLORIDE 1000 MILLILITER(S): 9 INJECTION INTRAMUSCULAR; INTRAVENOUS; SUBCUTANEOUS at 13:11

## 2023-07-31 RX ADMIN — Medication 100 MILLIGRAM(S): at 22:55

## 2023-07-31 RX ADMIN — SODIUM CHLORIDE 1000 MILLILITER(S): 9 INJECTION INTRAMUSCULAR; INTRAVENOUS; SUBCUTANEOUS at 20:52

## 2023-07-31 RX ADMIN — Medication 250 MILLIGRAM(S): at 13:10

## 2023-07-31 RX ADMIN — Medication 400 MILLIGRAM(S): at 20:56

## 2023-07-31 RX ADMIN — Medication 650 MILLIGRAM(S): at 13:11

## 2023-07-31 NOTE — H&P ADULT - PROBLEM SELECTOR PLAN 1
Pt presenting with tachycardia to low 100s, leukocytosis of 16 and lactate 2.3 --> 1.4. Received 2L in the ED, tylenol and Vancomycin. Source suspected to be LUE cellulitis. Pt has no other localizing symptoms, including no shortness of breath, cough, diarrhea  -f/u Bcx  -rest of management as listed below Pt presenting with tachycardia to low 100s, leukocytosis of 16 and lactate 2.3 --> 1.4. Received 2L in the ED, tylenol and Vancomycin. Source suspected to be LUE cellulitis. Pt has no other localizing symptoms, including no shortness of breath, cough, diarrhea  -CXR pending  -f/u Bcx  -rest of management as listed below

## 2023-07-31 NOTE — ED ADULT TRIAGE NOTE - CHIEF COMPLAINT QUOTE
Cellulitis in RUE x 1 day. Chronic lymphedema in RUE r/t hx of breast cancer- in remission. Sent by oncologist for IV ABX. Denies fevers. AAOX4, NAD.

## 2023-07-31 NOTE — ED ADULT NURSE NOTE - NSFALLRISKINTERV_ED_ALL_ED

## 2023-07-31 NOTE — H&P ADULT - NSHPSOCIALHISTORY_GEN_ALL_CORE
Lives at home, independent with ADLs  former smoker, quit 35 years ago  no alcohol or illicit drug use

## 2023-07-31 NOTE — ED PROVIDER NOTE - PHYSICAL EXAMINATION
general: Well appearing, in no acute distress  HEENT: Normocephalic, atraumatic, extraocular movements intact  CV: Regular rate  Pulm: No respiratory distress, no tachypnea  Abd: Flat, no gross distension  Ext: warm and well perfused, 2+ pulse, sensation intact  Skin: No gross rashes or lesions, LLE warmth and redness extending proximally from demarcated line  Neuro: Alert and oriented, moving all extremities

## 2023-07-31 NOTE — ED PROVIDER NOTE - CLINICAL SUMMARY MEDICAL DECISION MAKING FREE TEXT BOX
66 yo F with UE cellulitis, low grade fever now, will check rectal, repeat lactate, IV hydration, already sp IV vanc several hours pta. Will admit.

## 2023-07-31 NOTE — ED ADULT NURSE NOTE - NSICDXPASTSURGICALHX_GEN_ALL_CORE_FT
PAST SURGICAL HISTORY:  Elective surgery left shoulder surgery    History of elbow surgery left    S/P hip replacement, right     Surgery, elective right chest port

## 2023-07-31 NOTE — ED PROVIDER NOTE - PROGRESS NOTE DETAILS
Klepfish: WBC 16.14, Hgb 15.6, lactate 2.3, other labs grossly wnl. UA w/ trace leuk esterase, <5 WBCs, no urinary symptoms. Vitals improved. No spreading of redness beyond demarcated line. However, given initial tachycardia, leukocytosis, mildly elevated lactate, fatigue, rapid onset and high risk for spread (2/2 lymphedema) I recommended that she get admitted to hospital for further treatment.   Pt is clinically sober, A&Ox3, free from distracting injury. Throughout our interactions in the Emergency Department today, the pt has demonstrated concrete thinking/reasoning, has maintained an orderly & reasonable conversation, appears to have intact insight/judgment/reason, a sound mind & therefore in my opinion has capacity now to make decisions.  Given the pt’s presentation, I explained, in laymen's terms, my concern for  cellulitis that needs IV abx/admission.  The pt verbalized an understanding of my worries. I've communicated to the pt that the ED evaluation is incomplete.  I have discussed the need for further ED/hospital w/u.  I have reviewed the range of possible dx, potential testing & tx options.  Our discussions included the potential outcomes of leaving AMA, including worsening of their condition, becoming permanently disabled/in pain/critically ill, & death.  Despite these efforts, I was unable to persuade the pt to stay.  The pt is refusing any further ED care & is leaving AMA. I have attempted to offer tx/rx/guidance for any dangerous conditions which are most likely and/or dangerous.  I have answered all questions & have implored the pt to return to the ED ASAP to complete the w/u.  Pt states she has 10 days worth of keflex 500 q6 at home. Will cover w/ bactrim as well.

## 2023-07-31 NOTE — ED PROVIDER NOTE - NS ED ROS FT
Constitutional: No fever or chills  Eyes: No discharge or drainage  Ears, Nose, Mouth, Throat: No nasal discharge, no sore throat  Cardiovascular: No chest pain, no palpitations  Respiratory: No shortness of breath, no cough  Gastrointestinal: No nausea or vomiting, no abdominal pain, no diarrhea or constipation  Musculoskeletal: No joint pain, no swelling  Skin: LUE redness  Neurological: No numbness, weakness, tingling, no headache

## 2023-07-31 NOTE — H&P ADULT - ATTENDING COMMENTS
#Sepsis: hx of recurrent cellulitis, usually improves on PO keflix, presents for LUE cellulitis. Good ROM of L elbow and wrist. No fluctuance/crepitus on exam. F/up blood cx, cxr/LUE xray. C/w cefazolin for non purulent cellulitis. Serial exams    #LUE non purulent cellulitis: c/w cefazolin, serial exams

## 2023-07-31 NOTE — ED ADULT NURSE NOTE - OBJECTIVE STATEMENT
67yoF PMH of breast CA, In remission for five years. Came in today from her oncologist c/o cellulitis of the L arm. Pt states " I always have chronic lymphedema but today I woke up and my entire arm was red and more swollen, I took one keflex pill and went to my oncologist and they told me to come here for IV antibiotics". Pt states that she has a history of cellulitis, last time was 12/22. Denies fever, n/v/d chest pain and SOB.

## 2023-07-31 NOTE — ED ADULT TRIAGE NOTE - CHIEF COMPLAINT QUOTE
Pt, with hx of breast CA, presents to ER c/o LUE cellulitis since this morning, Pt seen here earlier today, left AMA. Pt denies any other complaints at this time.

## 2023-07-31 NOTE — H&P ADULT - NSHPLABSRESULTS_GEN_ALL_CORE
.  LABS:                         15.6   16.14 )-----------( 196      ( 31 Jul 2023 13:08 )             44.1     07-31    141  |  101  |  19  ----------------------------<  123<H>  4.1   |  27  |  0.67    Ca    8.8      31 Jul 2023 13:08    TPro  7.0  /  Alb  4.3  /  TBili  0.6  /  DBili  x   /  AST  17  /  ALT  17  /  AlkPhos  97  07-31    PT/INR - ( 31 Jul 2023 13:08 )   PT: 11.4 sec;   INR: 1.00          PTT - ( 31 Jul 2023 13:08 )  PTT:32.3 sec  Urinalysis Basic - ( 31 Jul 2023 13:08 )    Color: Yellow / Appearance: Cloudy / SG: >=1.030 / pH: x  Gluc: 123 mg/dL / Ketone: NEGATIVE  / Bili: Negative / Urobili: 0.2 E.U./dL   Blood: x / Protein: Trace mg/dL / Nitrite: NEGATIVE   Leuk Esterase: Trace / RBC: < 5 /HPF / WBC < 5 /HPF   Sq Epi: x / Non Sq Epi: x / Bacteria: Many /HPF        Lactate, Blood: 1.4 mmol/L (07-31 @ 20:47)  Lactate, Blood: 2.3 mmol/L (07-31 @ 13:08)      RADIOLOGY, EKG & ADDITIONAL TESTS: Reviewed.

## 2023-07-31 NOTE — ED ADULT NURSE NOTE - CHPI ED NUR SYMPTOMS NEG
Follow up with gastroenterology within 3-4 weeks of discharge. You may follow up with will bariatric surgeon Dr. Brown as outpatient. 
no chills/no decreased eating/drinking/no dizziness/no fever/no nausea/no pain/no tingling/no vomiting

## 2023-07-31 NOTE — PATIENT PROFILE ADULT - FALL HARM RISK - RISK INTERVENTIONS

## 2023-07-31 NOTE — ED PROVIDER NOTE - PATIENT PORTAL LINK FT
You can access the FollowMyHealth Patient Portal offered by [facility] by registering at the following website: [email or web address] By joining [email or web address] FollowMyHealth portal, you will also be able to view your health information using other applications (apps) compatible with our system.

## 2023-07-31 NOTE — H&P ADULT - HISTORY OF PRESENT ILLNESS
In the ED:  Vitals: 99.1 oral --> 101.5 rectal, , /63, 97% on RA  Labs: WBC 16, neutrophil 87%, lactate 2.3 --> 1.4, Cr 0.67  UA negative nitrite, trace leuk esterase, <5 WBCs  EKG:  Interventions: Vancomycin 1g, NS 1L IVBx2, Ofirmev 1g  67F PMH L breast ca s/p neoadjuvant chemo, b/l mastectom (2019), XRT (2019), currently on anastrazole (follows w/ Dr. Carlin), chronic LUE lymphedema (s/p lymph node removal in 2019), who presents for a one day history of LUE warmth, redness and pain. Pt reports she has a history of cellulitis and has had it 5 times in the past; most recently a few months ago. Pt reports she typically receives Cephalexin from her oncologist, which improves her symptoms. Pt denies any injections or trauma to the arm, but remembers that she cut her finger a few days ago, which may have caused the infection. Pt went to her oncologist today, Dr. Carlin, who demarcated the area and recommended she come to the ED for evaluation. Pt initially presented this afternoon, was afebrile with leukocytosis to 16 and lactate of 2.3. She received one dose of Vancomycin 1g and was recommended for admission, however patient left AMA initially because she thought her symptoms would be able to be managed at home with PO antibiotics. Pt was discharged on PO Keflex and Bactrim. After going home, she started to feel febrile and noticed the erythema was extending beyond the demarcated line, which is what prompted her to come back to the ED. Pt denies numbness/tingling in the arm, but states it is painful to move it.    In the ED:  Vitals: 99.1 oral --> 101.5 rectal, , /63, 97% on RA  Labs: WBC 16, neutrophil 87%, lactate 2.3 --> 1.4, Cr 0.67  UA negative nitrite, trace leuk esterase, <5 WBCs  EKG: NSR at 92bpm, QTc 452, no acute ST wave changes  Interventions: Vancomycin 1g, NS 1L IVBx2, Ofirmev 1g

## 2023-07-31 NOTE — H&P ADULT - PROBLEM SELECTOR PLAN 5
F: s/p 2L in the ED  E: replete K>4 Mg>2  N: regular  D: lovenox  Dispo: [facility]

## 2023-07-31 NOTE — ED ADULT NURSE NOTE - OBJECTIVE STATEMENT
66 yo F PMHx breast ca in remission for 5 years presents to the ED co cellulitis to the L arm. Pt awake and alert, AOx4. Pt reports this morning she woke up and noticed that her L arm was red and swollen, so she called her oncologist who instructed her to go to the emergency room for IV antibiotics. Pt reports she came to the [facility] ED and received antibiotics, but they wanted to admit her and she left AMA. Pt states, "This happens to me a couple times a year. I have a small cut on my finger which is probably what all of this is from. I usually come for IV antibiotics and it goes away, but I feel like it is getting worse because the redness has passed the line that they hammad on me today." Pt reports she feels like she has a fever. Pt presents with redness and swelling to L arm that is warm to touch. Pt has a hematoma on her L hip from a slip and fall three weeks ago. Pt denies CP, SOB, N/V/D, lightheadedness, dizziness, numbness, tingling, vision changes.

## 2023-07-31 NOTE — H&P ADULT - ASSESSMENT
67F PMH L breast ca s/p neoadjuvant chemo, b/l mastectom (2019), XRT (2019), currently on anastrazole (follows w/ Dr. Carlin), chronic LUE lymphedema (s/p lymph node removal in 2019), who presents for a one day history of LUE warmth, redness and pain, admitted for sepsis secondary to cellulitis.

## 2023-07-31 NOTE — ED PROVIDER NOTE - OBJECTIVE STATEMENT
67F PMH L breast ca s/p neoadjuvant chemo, b/l mastectomy (2019), XRT (2019), currently on anastrazole (follows w/ Dr. Carlin), chronic LUE lymphedema, p/w LUE warmth/redness/pain since this morning. Also worsening swelling. Feels fatigued. Similar to prior episodes attributed to cellulitis. Went to Dr. Carlin who referred to ED. Pt had zero UE symptoms last night when she went to sleep.   Denies f/c, SOB/CP, NVD, joint pains, abd pain, urinary complaints, focal weakness/numbness, black/bloody stool, trauma.

## 2023-07-31 NOTE — H&P ADULT - NSHPPHYSICALEXAM_GEN_ALL_CORE
.  VITAL SIGNS:  T(C): 36.7 (07-31-23 @ 22:02), Max: 38.6 (07-31-23 @ 20:43)  T(F): 98.1 (07-31-23 @ 22:02), Max: 101.5 (07-31-23 @ 20:43)  HR: 96 (07-31-23 @ 22:02) (92 - 117)  BP: 126/76 (07-31-23 @ 22:02) (110/63 - 127/77)  BP(mean): --  RR: 17 (07-31-23 @ 22:02) (16 - 18)  SpO2: 97% (07-31-23 @ 22:02) (97% - 98%)  Wt(kg): --    PHYSICAL EXAM:    Constitutional: WDWN resting comfortably in bed; NAD  Head: NC/AT  Eyes: PERRL, EOMI, anicteric sclera  ENT: MMM  Neck: supple  Respiratory: CTA B/L; no W/R/R  Cardiac: +S1/S2; RRR; no M/R/G  Gastrointestinal: abdomen soft, NT/ND; no rebound or guarding; +BSx4  Extremities: WWP  LUE: erythema, warmth and swelling extending from hand to bicep. Area demarcated with mild erythema extension beyond demarcation. no purulence or fluctuance. full ROM of wrist and elbow. radial pulse intact  Vascular: 2+ radial, DP/PT pulses B/L  Neurologic: AAOx3; no focal deficits  Psychiatric: affect and characteristics of appearance, verbalizations, behaviors are appropriate

## 2023-07-31 NOTE — ED PROVIDER NOTE - NSFOLLOWUPINSTRUCTIONS_ED_ALL_ED_FT
We recommended that you get admitted to the hospital but you did not want to. You understand that by leaving the hospital you are at increased work of worsening infection which can lead to permanent disability or death or increased financial costs.   Please return with any worsening symptoms.     Can take tylenol 650mg every 6hrs as needed for pain.    Take antibiotics as prescribed - keflex (cephalexin) 500mg every 6 hours for 10 days, bactrim twice a day for 10 days.      Take daily pictures of affected area.    Stay well hydrated.    Return for fevers, persistent vomit, uncontrolled pain, worsening breathing, worsening lightheaded, spreading redness, worsening swelling.    Follow up with primary doctor within 1-2 days.     Cellulitis    Cellulitis is a skin infection caused by bacteria. This condition occurs most often in the arms and lower legs but can occur anywhere over the body. Symptoms include redness, swelling, warm skin, tenderness, and chills/fever. If you were prescribed an antibiotic medicine, take it as told by your health care provider. Do not stop taking the antibiotic even if you start to feel better.    SEEK IMMEDIATE MEDICAL CARE IF YOU HAVE ANY OF THE FOLLOWING SYMPTOMS: worsening fever, red streaks coming from affected area, vomiting or diarrhea, or dizziness/lightheadedness.

## 2023-07-31 NOTE — ED ADULT NURSE NOTE - NSICDXPASTMEDICALHX_GEN_ALL_CORE_FT
PAST MEDICAL HISTORY:  Breast cancer left  s/p chemotherapy    Hyperlipidemia     Neuropathy both feet    Osteoarthritis     SOB (shortness of breath)

## 2023-07-31 NOTE — ED PROVIDER NOTE - PHYSICAL EXAMINATION
LUE: erythema/warmth extending from hand to elbow region (w/i demarcated line from Dr. Carlin) 1+ edema (pt states this is slightly worse than her baseline).   normal cap refill, FROM all fingers/joints/wrist. Soft compartments. no obvious open wounds.   No crepitus, firmness, induration, fluctuance.

## 2023-07-31 NOTE — H&P ADULT - PROBLEM SELECTOR PLAN 2
Pt presenting with a one day history of LUE erythema, swelling and warmth. Reports hx of cellulitis 5 times in the past; has chronic lymphedema after L lymph node removal 2/2 breast CA. Oncologist demarcated area of cellulitis today; initially presented to the ED and left AMA with PO Keflex and Bactrim. Came back to the ED as erythema was extending beyond demarcated area  -s/p Vancomycin 1g in the ED. No purulence noted on exam. Pt with full ROM of wrist and elbow, however reports pain with movement.   -will c/w Cefazolin 1g q8h  -given significant swelling on exam, will obtain LUE US to r/o DVT Pt presenting with a one day history of LUE erythema, swelling and warmth. Reports hx of cellulitis 5 times in the past; has chronic lymphedema after L lymph node removal 2/2 breast CA. Oncologist demarcated area of cellulitis today; initially presented to the ED and received Vancomycin 1g IV. Pt left AMA with PO Keflex and Bactrim. Came back to the ED as erythema was extending beyond demarcated area. Physical exam with erythema, swelling and warmth from hand extending to bicep region; Pt with full ROM of wrist and elbow, however reports pain with movement.   -Pt not currently on chemotherapy and no purulence noted on exam, low suspicion for MRSA. Also, pt reports previously, cellulitis of the LUE appeared similar and improved with Keflex. will c/w Cefazolin 1g q8h  -f/u LUE xray  -given significant swelling on exam, will obtain LUE US to r/o DVT / abscess

## 2023-07-31 NOTE — ED PROVIDER NOTE - OBJECTIVE STATEMENT
67F PMH L breast ca s/p neoadjuvant chemo, b/l mastectomy (2019), XRT (2019), currently on anastrazole (follows w/ Dr. Carlin), chronic LUE lymphedema, p/w LUE warmth/redness/pain since this morning with some fatigue. Saw Dr. Carlin who told pt to come in for admission for IV abx. Pt was here around 2 PM, had WBC 16, lactate 2.3, received dose of IV Vanco in Er. Pt states insurance would be too expensive for admission and was discharged on PO Keflex and Bactrim. Pt returned because she felt feverish at home and redness extended beyond demarcated line drawn by Dr. Carlin. Denies numbness, tingling, no cp or sob or cough, no abd pain, no n/v/d. ROS as above.

## 2023-07-31 NOTE — ED PROVIDER NOTE - CLINICAL SUMMARY MEDICAL DECISION MAKING FREE TEXT BOX
67F PMH L breast ca s/p neoadjuvant chemo, b/l mastectomy (2019), XRT (2019), currently on anastrazole (follows w/ Dr. Carlin), chronic LUE lymphedema, p/w LUE warmth/redness/pain since this morning. Also worsening swelling. Feels fatigued. Similar to prior episodes attributed to cellulitis. Went to Dr. Carlin who referred to ED. Pt had zero UE symptoms last night when she went to sleep.   Mild tachycardia, other vitals wnl. Exam as above.  ddx: Clinical cellulitis. Clinically not nec fasc/DVT.   Labs, IV abx, likely admission, updated Dr. Carlin.

## 2023-07-31 NOTE — ED ADULT NURSE NOTE - NURSING ED SKIN COLOR
1. Elavil 10mg at bedtime for 7 days, then 25mg at bedtime  2. Drink a lot of water, keep headache log  3. Continue stroke prevention  4. Follow with eye doctor.      Return in 6-8 weeks    Arnaldo Judd MD, MS
normal for race

## 2023-07-31 NOTE — H&P ADULT - NSHPREVIEWOFSYSTEMS_GEN_ALL_CORE
REVIEW OF SYSTEMS:    CONSTITUTIONAL: +subjective fevers, no chills  EYES/ENT: No visual changes;  No vertigo or throat pain   NECK: No pain or stiffness  RESPIRATORY: No cough, wheezing, hemoptysis; No shortness of breath  CARDIOVASCULAR: No chest pain or palpitations  GASTROINTESTINAL: No abdominal or epigastric pain. No nausea, vomiting, or hematemesis; No diarrhea or constipation. No melena or hematochezia.  GENITOURINARY: No dysuria, frequency or hematuria  NEUROLOGICAL: No numbness or weakness  SKIN: No itching, rashes

## 2023-08-01 ENCOUNTER — TRANSCRIPTION ENCOUNTER (OUTPATIENT)
Age: 68
End: 2023-08-01

## 2023-08-01 DIAGNOSIS — A41.9 SEPSIS, UNSPECIFIED ORGANISM: ICD-10-CM

## 2023-08-01 LAB
ANION GAP SERPL CALC-SCNC: 10 MMOL/L — SIGNIFICANT CHANGE UP (ref 5–17)
BASOPHILS # BLD AUTO: 0.03 K/UL — SIGNIFICANT CHANGE UP (ref 0–0.2)
BASOPHILS NFR BLD AUTO: 0.2 % — SIGNIFICANT CHANGE UP (ref 0–2)
BUN SERPL-MCNC: 13 MG/DL — SIGNIFICANT CHANGE UP (ref 7–23)
CALCIUM SERPL-MCNC: 8.6 MG/DL — SIGNIFICANT CHANGE UP (ref 8.4–10.5)
CHLORIDE SERPL-SCNC: 110 MMOL/L — HIGH (ref 96–108)
CO2 SERPL-SCNC: 22 MMOL/L — SIGNIFICANT CHANGE UP (ref 22–31)
CREAT SERPL-MCNC: 0.76 MG/DL — SIGNIFICANT CHANGE UP (ref 0.5–1.3)
CULTURE RESULTS: NO GROWTH — SIGNIFICANT CHANGE UP
EGFR: 86 ML/MIN/1.73M2 — SIGNIFICANT CHANGE UP
EOSINOPHIL # BLD AUTO: 0.13 K/UL — SIGNIFICANT CHANGE UP (ref 0–0.5)
EOSINOPHIL NFR BLD AUTO: 1.1 % — SIGNIFICANT CHANGE UP (ref 0–6)
GLUCOSE SERPL-MCNC: 116 MG/DL — HIGH (ref 70–99)
HCT VFR BLD CALC: 38.4 % — SIGNIFICANT CHANGE UP (ref 34.5–45)
HCV AB S/CO SERPL IA: 0.04 S/CO — SIGNIFICANT CHANGE UP
HCV AB SERPL-IMP: SIGNIFICANT CHANGE UP
HGB BLD-MCNC: 12.8 G/DL — SIGNIFICANT CHANGE UP (ref 11.5–15.5)
IMM GRANULOCYTES NFR BLD AUTO: 0.5 % — SIGNIFICANT CHANGE UP (ref 0–0.9)
LYMPHOCYTES # BLD AUTO: 1.51 K/UL — SIGNIFICANT CHANGE UP (ref 1–3.3)
LYMPHOCYTES # BLD AUTO: 12.3 % — LOW (ref 13–44)
MAGNESIUM SERPL-MCNC: 2.1 MG/DL — SIGNIFICANT CHANGE UP (ref 1.6–2.6)
MCHC RBC-ENTMCNC: 33.2 PG — SIGNIFICANT CHANGE UP (ref 27–34)
MCHC RBC-ENTMCNC: 33.3 GM/DL — SIGNIFICANT CHANGE UP (ref 32–36)
MCV RBC AUTO: 99.5 FL — SIGNIFICANT CHANGE UP (ref 80–100)
MONOCYTES # BLD AUTO: 0.84 K/UL — SIGNIFICANT CHANGE UP (ref 0–0.9)
MONOCYTES NFR BLD AUTO: 6.8 % — SIGNIFICANT CHANGE UP (ref 2–14)
NEUTROPHILS # BLD AUTO: 9.74 K/UL — HIGH (ref 1.8–7.4)
NEUTROPHILS NFR BLD AUTO: 79.1 % — HIGH (ref 43–77)
NRBC # BLD: 0 /100 WBCS — SIGNIFICANT CHANGE UP (ref 0–0)
PHOSPHATE SERPL-MCNC: 3.1 MG/DL — SIGNIFICANT CHANGE UP (ref 2.5–4.5)
PLATELET # BLD AUTO: 146 K/UL — LOW (ref 150–400)
POTASSIUM SERPL-MCNC: 4.5 MMOL/L — SIGNIFICANT CHANGE UP (ref 3.5–5.3)
POTASSIUM SERPL-SCNC: 4.5 MMOL/L — SIGNIFICANT CHANGE UP (ref 3.5–5.3)
RBC # BLD: 3.86 M/UL — SIGNIFICANT CHANGE UP (ref 3.8–5.2)
RBC # FLD: 13.2 % — SIGNIFICANT CHANGE UP (ref 10.3–14.5)
SODIUM SERPL-SCNC: 142 MMOL/L — SIGNIFICANT CHANGE UP (ref 135–145)
SPECIMEN SOURCE: SIGNIFICANT CHANGE UP
WBC # BLD: 12.31 K/UL — HIGH (ref 3.8–10.5)
WBC # FLD AUTO: 12.31 K/UL — HIGH (ref 3.8–10.5)

## 2023-08-01 PROCEDURE — 99233 SBSQ HOSP IP/OBS HIGH 50: CPT | Mod: GC

## 2023-08-01 PROCEDURE — 93971 EXTREMITY STUDY: CPT | Mod: 26,LT

## 2023-08-01 PROCEDURE — 73090 X-RAY EXAM OF FOREARM: CPT | Mod: 26,LT

## 2023-08-01 PROCEDURE — 73070 X-RAY EXAM OF ELBOW: CPT | Mod: 26,LT

## 2023-08-01 PROCEDURE — 73120 X-RAY EXAM OF HAND: CPT | Mod: 26,LT

## 2023-08-01 PROCEDURE — 71045 X-RAY EXAM CHEST 1 VIEW: CPT | Mod: 26

## 2023-08-01 RX ADMIN — Medication 100 MILLIGRAM(S): at 14:03

## 2023-08-01 RX ADMIN — ANASTROZOLE 1 MILLIGRAM(S): 1 TABLET ORAL at 14:02

## 2023-08-01 RX ADMIN — Medication 100 MILLIGRAM(S): at 22:04

## 2023-08-01 RX ADMIN — Medication 100 MILLIGRAM(S): at 06:11

## 2023-08-01 RX ADMIN — SIMVASTATIN 20 MILLIGRAM(S): 20 TABLET, FILM COATED ORAL at 22:04

## 2023-08-01 NOTE — PROGRESS NOTE ADULT - PROBLEM SELECTOR PLAN 1
Pt presenting with tachycardia to low 100s, leukocytosis of 16 and lactate 2.3 --> 1.4. Received 2L in the ED, tylenol and Vancomycin. Source suspected to be LUE cellulitis. Pt has no other localizing symptoms, including no shortness of breath, cough, diarrhea  -CXR showed no acute pathology  -f/u Bcx - no growth at 12 hrs  -rest of management as listed below Pt presenting with a one day history of LUE erythema, swelling and warmth. Reports hx of cellulitis 5 times in the past; has chronic lymphedema after L lymph node removal 2/2 breast CA. Oncologist demarcated area of cellulitis today; initially presented to the ED and received Vancomycin 1g IV. Pt left AMA with PO Keflex and Bactrim. Came back to the ED as erythema was extending beyond demarcated area. Physical exam with erythema, swelling and warmth from hand extending to bicep region; Pt with full ROM of wrist and elbow, however reports pain with movement.   -Pt not currently on chemotherapy and no purulence noted on exam, low suspicion for MRSA. Also, pt reports previously, cellulitis of the LUE appeared similar and improved with Keflex. will c/w Cefazolin 1g q8h  -f/u LUE xray  -given significant swelling on exam, will obtain LUE US to r/o DVT / abscess  -Cefazolin 1g q8 (Day 2) Pt presenting with tachycardia to low 100s, leukocytosis of 16 and lactate 2.3 --> 1.4. Received 2L in the ED, tylenol and Vancomycin. Source suspected to be LUE cellulitis. Pt has no other localizing symptoms, including no shortness of breath, cough, diarrhea  -CXR showed no acute pathology  -f/u Bcx - no growth at 12 hrs  -Cefazolin 1g q8 (Day 2)

## 2023-08-01 NOTE — DISCHARGE NOTE PROVIDER - HOSPITAL COURSE
#Discharge: do not delete    RADHA LEMONS is a 67y Female with a past medical history of breast ca s/p neoadjuvant chemo, b/l mastectomy (2019), XRT (2019), who presented with a one day history of LUE warmth, redness and pain, found to have sepsis 2/2 LUE cellulitis.    Problem List/Main Diagnoses (system-based):   # sepsis  Pt presenting with tachycardia to low 100s, leukocytosis of 16 and lactate 2.3 --> 1.4. Received 2L in the ED, tylenol and Vancomycin. Source suspected to be LUE cellulitis. Pt has no other localizing symptoms, including no shortness of breath, cough, diarrhea  -CXR showed no acute pathology   - Blood cultures negative at ___  -rest of management as listed below.      # left upper extremity cellulitis  Pt presenting with a one day history of LUE erythema, swelling and warmth. Reports hx of cellulitis 5 times in the past; has chronic lymphedema after L lymph node removal 2/2 breast CA. Oncologist demarcated area of cellulitis today; initially presented to the ED and received Vancomycin 1g IV. Pt left AMA with PO Keflex and Bactrim. Came back to the ED as erythema was extending beyond demarcated area. Physical exam with erythema, swelling and warmth from hand extending to bicep region; Pt with full ROM of wrist and elbow, however reports pain with movement.   -Pt not currently on chemotherapy and no purulence noted on exam, low suspicion for MRSA. Also, pt reports previously, cellulitis of the LUE appeared similar and improved with Keflex. will c/w Cefazolin 1g q8h  -f/u LUE xray  -given significant swelling on exam, will obtain LUE US to r/o DVT / abscess.    # history of breast cancer  - s/p chemo and b/l mastectomy in 2019  - continue on anastrazole 1mg PO QD    #hyperlipidemia  - continue on simvastatin 20mg PO QD    Inpatient treatment course:     Patient was discharged to: home    New medications:   Changes to old medications: None  Medications that were stopped: None    Items to follow up as outpatient:    Physical exam at the time of discharge:     General: NAD  HEENT: NC/AT; PERRL, anicteric sclera; MMM  Neck: supple w/o palpable nodularity  Cardiovascular: +S1/S2; RRR  Respiratory: CTA B/L; no W/R/R  Gastrointestinal: soft, NT/ND; +BSx4  Extremities: erythema, warmth and swelling extending from L hand to L bicep - mildly tender to palpation. Area demarcated with minimal erythema extension beyond demarcation. no purulence or fluctuance. full ROM of L digits, wrist and elbow.   Vascular: 2+ radial, DP/PT pulses B/L  Neurological: AAOx3; no focal deficits    LABS & STUDIES:   #Discharge: do not delete    RADHA LEMONS is a 67y Female with a past medical history of breast ca s/p neoadjuvant chemo, b/l mastectomy (2019), XRT (2019), who presented with a one day history of LUE warmth, redness and pain, found to have sepsis 2/2 LUE cellulitis.    Problem List/Main Diagnoses (system-based):   # sepsis  Pt presenting with tachycardia to low 100s, leukocytosis of 16 and lactate 2.3 --> 1.4. Received 2L in the ED, tylenol and Vancomycin. Source suspected to be LUE cellulitis. Pt has no other localizing symptoms, including no shortness of breath, cough, diarrhea  -CXR showed no acute pathology   - Blood cultures negative at 1 day.  -rest of management as listed below.      # left upper extremity cellulitis  Pt presenting with a one day history of LUE erythema, swelling and warmth. Reports hx of cellulitis 5 times in the past; has chronic lymphedema after L lymph node removal 2/2 breast CA. Oncologist demarcated area of cellulitis today; initially presented to the ED and received Vancomycin 1g IV. Pt left AMA with PO Keflex and Bactrim. Came back to the ED as erythema was extending beyond demarcated area. Physical exam with erythema, swelling and warmth from hand extending to bicep region; Pt with full ROM of wrist and elbow, however reports pain with movement.   -Pt not currently on chemotherapy and no purulence noted on exam, low suspicion for MRSA. Also, pt reports previously, cellulitis of the LUE appeared similar and improved with Keflex.   - Cefazolin 1g q8h received while inpatient  - LUE XRs showed diffuse soft tissue edema - no other findings.  -LUE US showed no sign of DVT    # history of breast cancer  - s/p chemo and b/l mastectomy in 2019  - continued on anastrazole 1mg PO QD    #hyperlipidemia  - continued on simvastatin 20mg PO QD    Inpatient treatment course: Patient was started on cefazolin 1g q8hr. Ultrasound of the left upper extremity showed no sign of deep vein thrombosis, X-rays of left hand, forearm, elbow confirmed diffuse soft tissue edema. Patient clinically improved by hospital day 2 - WBC 9.25, afebrile. Erythema of the LUE significantly improved from initial presentation on admission, normal range of motion. Discharged home on cefadroxil 1000mg PO QD for 10 days.    Patient was discharged to: home    New medications: Cefadroxil 1000mg oral tablet - one tab(s) orally once a day   Changes to old medications: None  Medications that were stopped: None    Items to follow up as outpatient:     Physical exam at the time of discharge:     General: NAD  HEENT: NC/AT; PERRL, anicteric sclera; MMM  Neck: supple w/o palpable nodularity  Cardiovascular: +S1/S2; RRR  Respiratory: CTA B/L; no W/R/R  Gastrointestinal: soft, NT/ND; +BSx4  Extremities: erythema, warmth and swelling extending from L hand to L bicep - mildly tender to palpation. Area demarcated with no erythema extension beyond demarcation. no purulence or fluctuance. full ROM of L digits, wrist and elbow.   Vascular: 2+ radial, DP/PT pulses B/L  Neurological: AAOx3; no focal deficits    LABS & STUDIES:   #Discharge: do not delete    RADHA LEMONS is a 67y Female with a past medical history of breast ca s/p neoadjuvant chemo, b/l mastectomy (2019), XRT (2019), who presented with a one day history of LUE warmth, redness and pain, found to have sepsis 2/2 LUE cellulitis.    Problem List/Main Diagnoses (system-based):   # sepsis  Pt presenting with tachycardia to low 100s, leukocytosis of 16 and lactate 2.3 --> 1.4. Received 2L in the ED, tylenol and Vancomycin. Source suspected to be LUE cellulitis. Pt has no other localizing symptoms, including no shortness of breath, cough, diarrhea  -CXR showed no acute pathology   - Blood cultures negative at 1 day.  -rest of management as listed below.      # left upper extremity cellulitis  Pt presenting with a one day history of LUE erythema, swelling and warmth. Reports hx of cellulitis 5 times in the past; has chronic lymphedema after L lymph node removal 2/2 breast CA. Oncologist demarcated area of cellulitis today; initially presented to the ED and received Vancomycin 1g IV. Pt left AMA with PO Keflex and Bactrim. Came back to the ED as erythema was extending beyond demarcated area. Physical exam with erythema, swelling and warmth from hand extending to bicep region; Pt with full ROM of wrist and elbow, however reports pain with movement.   -Pt not currently on chemotherapy and no purulence noted on exam, low suspicion for MRSA. Also, pt reports previously, cellulitis of the LUE appeared similar and improved with Keflex.   - Cefazolin 1g q8h received while inpatient  - LUE XRs showed diffuse soft tissue edema - no other findings.  -LUE US showed no sign of DVT    # history of breast cancer  - s/p chemo and b/l mastectomy in 2019  - continued on anastrazole 1mg PO QD    #hyperlipidemia  - continued on simvastatin 20mg PO QD    Inpatient treatment course: Patient was started on cefazolin 1g q8hr. Ultrasound of the left upper extremity showed no sign of deep vein thrombosis, X-rays of left hand, forearm, elbow confirmed diffuse soft tissue edema. Patient clinically improved by hospital day 2 - WBC 9.25, afebrile. Erythema of the LUE significantly improved from initial presentation on admission, normal range of motion. Discharged home on cefadroxil 1000mg PO QD for 6 days.    Patient was discharged to: home    New medications: Cefadroxil 1000mg oral tablet - one tab(s) orally once a day   Changes to old medications: None  Medications that were stopped: None    Items to follow up as outpatient:     Physical exam at the time of discharge:     General: NAD  HEENT: NC/AT; PERRL, anicteric sclera; MMM  Neck: supple w/o palpable nodularity  Cardiovascular: +S1/S2; RRR  Respiratory: CTA B/L; no W/R/R  Gastrointestinal: soft, NT/ND; +BSx4  Extremities: erythema, warmth and swelling extending from L hand to L bicep - mildly tender to palpation. Area demarcated with no erythema extension beyond demarcation. no purulence or fluctuance. full ROM of L digits, wrist and elbow.   Vascular: 2+ radial, DP/PT pulses B/L  Neurological: AAOx3; no focal deficits    LABS & STUDIES:   #Discharge: do not delete    RADHA LEMONS is a 67y Female with a past medical history of breast ca s/p neoadjuvant chemo, b/l mastectomy (2019), XRT (2019), who presented with a one day history of LUE warmth, redness and pain, found to have sepsis 2/2 LUE cellulitis.    Problem List/Main Diagnoses (system-based):   # sepsis  Pt presenting with tachycardia to low 100s, leukocytosis of 16 and lactate 2.3 --> 1.4. Received 2L in the ED, tylenol and Vancomycin. Source suspected to be LUE cellulitis. Pt has no other localizing symptoms, including no shortness of breath, cough, diarrhea.  -CXR showed no acute pathology   - Blood cultures negative at 1 day.  -rest of management as listed below.      # left upper extremity cellulitis  Pt presenting with a one day history of LUE erythema, swelling and warmth. Reports hx of cellulitis 5 times in the past; has chronic lymphedema after L lymph node removal 2/2 breast CA. Oncologist demarcated area of cellulitis today; initially presented to the ED and received Vancomycin 1g IV. Pt left AMA with PO Keflex and Bactrim. Came back to the ED as erythema was extending beyond demarcated area. Physical exam with erythema, swelling and warmth from hand extending to bicep region; Pt with full ROM of wrist and elbow, however reports pain with movement.   -Pt not currently on chemotherapy and no purulence noted on exam, low suspicion for MRSA. Also, pt reports previously, cellulitis of the LUE appeared similar and improved with Keflex.   - Cefazolin 1g q8h received while inpatient  - LUE XRs showed diffuse soft tissue edema - no other findings.  -LUE US showed no sign of DVT    # history of breast cancer  - s/p chemo and b/l mastectomy in 2019  - continued on anastrazole 1mg PO QD    #hyperlipidemia  - continued on simvastatin 20mg PO QD    Inpatient treatment course: Patient was started on cefazolin 1g q8hr. Ultrasound of the left upper extremity showed no sign of deep vein thrombosis, X-rays of left hand, forearm, elbow confirmed diffuse soft tissue edema. Patient clinically improved by hospital day 2 - WBC 9.25, afebrile. Erythema of the LUE significantly improved from initial presentation on admission, normal range of motion. Discharged home on cefadroxil 1000mg PO QD for 6 days.    Patient was discharged to: home    New medications: Cefadroxil 1000mg oral tablet - one tab(s) orally once a day   Changes to old medications: None  Medications that were stopped: None    Items to follow up as outpatient: Please follow up with your PCP and oncologist within 1-2 weeks.    Physical exam at the time of discharge:     General: NAD  HEENT: NC/AT; PERRL, anicteric sclera; MMM  Neck: supple w/o palpable nodularity  Cardiovascular: +S1/S2; RRR  Respiratory: CTA B/L; no W/R/R  Gastrointestinal: soft, NT/ND; +BSx4  Extremities: erythema, warmth and swelling extending from L hand to L bicep - mildly tender to palpation. Area demarcated with no erythema extension beyond demarcation. no purulence or fluctuance. full ROM of L digits, wrist and elbow.   Vascular: 2+ radial, DP/PT pulses B/L  Neurological: AAOx3; no focal deficits   #Discharge: do not delete    RADHA LEMONS is a 67y Female with a past medical history of breast ca s/p neoadjuvant chemo, b/l mastectomy (2019), XRT (2019), who presented with a one day history of LUE warmth, redness and pain, found to have sepsis 2/2 LUE cellulitis.    Problem List/Main Diagnoses (system-based):   #Severe sepsis  Pt presenting with tachycardia to low 100s, leukocytosis of 16 and lactate 2.3 --> 1.4; lactic acidosis 2/2 Sepsis. Received 2L in the ED, tylenol and Vancomycin. Source suspected to be LUE cellulitis. Pt has no other localizing symptoms, including no shortness of breath, cough, diarrhea.  -CXR showed no acute pathology   - Blood cultures negative at 1 day.  -rest of management as listed below.      # left upper extremity cellulitis  Pt presenting with a one day history of LUE erythema, swelling and warmth. Reports hx of cellulitis 5 times in the past; has chronic lymphedema after L lymph node removal 2/2 breast CA. Oncologist demarcated area of cellulitis today; initially presented to the ED and received Vancomycin 1g IV. Pt left AMA with PO Keflex and Bactrim. Came back to the ED as erythema was extending beyond demarcated area. Physical exam with erythema, swelling and warmth from hand extending to bicep region; Pt with full ROM of wrist and elbow, however reports pain with movement.   -Pt not currently on chemotherapy and no purulence noted on exam, low suspicion for MRSA. Also, pt reports previously, cellulitis of the LUE appeared similar and improved with Keflex.   - Cefazolin 1g q8h received while inpatient  - LUE XRs showed diffuse soft tissue edema - no other findings.  -LUE US showed no sign of DVT    # history of breast cancer  - s/p chemo and b/l mastectomy in 2019  - continued on anastrazole 1mg PO QD    #hyperlipidemia  - continued on simvastatin 20mg PO QD    Inpatient treatment course: Patient was started on cefazolin 1g q8hr. Ultrasound of the left upper extremity showed no sign of deep vein thrombosis, X-rays of left hand, forearm, elbow confirmed diffuse soft tissue edema. Patient clinically improved by hospital day 2 - WBC 9.25, afebrile. Erythema of the LUE significantly improved from initial presentation on admission, normal range of motion. Discharged home on cefadroxil 1000mg PO QD for 6 days.    Patient was discharged to: home    New medications: Cefadroxil 1000mg oral tablet - one tab(s) orally once a day   Changes to old medications: None  Medications that were stopped: None    Items to follow up as outpatient: Please follow up with your PCP and oncologist within 1-2 weeks.    Physical exam at the time of discharge:     General: NAD  HEENT: NC/AT; PERRL, anicteric sclera; MMM  Neck: supple w/o palpable nodularity  Cardiovascular: +S1/S2; RRR  Respiratory: CTA B/L; no W/R/R  Gastrointestinal: soft, NT/ND; +BSx4  Extremities: erythema, warmth and swelling extending from L hand to L bicep - mildly tender to palpation. Area demarcated with no erythema extension beyond demarcation. no purulence or fluctuance. full ROM of L digits, wrist and elbow.   Vascular: 2+ radial, DP/PT pulses B/L  Neurological: AAOx3; no focal deficits

## 2023-08-01 NOTE — PROGRESS NOTE ADULT - SUBJECTIVE AND OBJECTIVE BOX
OVERNIGHT EVENTS: No acute events.    SUBJECTIVE / INTERVAL HPI: Patient seen and examined at bedside.     VITAL SIGNS:  Vital Signs Last 24 Hrs  T(C): 36.9 (01 Aug 2023 05:30), Max: 38.6 (31 Jul 2023 20:43)  T(F): 98.5 (01 Aug 2023 05:30), Max: 101.5 (31 Jul 2023 20:43)  HR: 83 (01 Aug 2023 05:30) (83 - 117)  BP: 114/71 (01 Aug 2023 05:30) (110/63 - 127/77)  BP(mean): --  RR: 18 (01 Aug 2023 05:30) (16 - 18)  SpO2: 97% (01 Aug 2023 05:30) (97% - 98%)    Parameters below as of 01 Aug 2023 05:30  Patient On (Oxygen Delivery Method): room air        PHYSICAL EXAM:    General: NAD  HEENT: NC/AT; PERRL, anicteric sclera; MMM  Neck: supple w/o palpable nodularity  Cardiovascular: +S1/S2; RRR  Respiratory: CTA B/L; no W/R/R  Gastrointestinal: soft, NT/ND; +BSx4  Extremities: erythema, warmth and swelling extending from R hand to R bicep. Area demarcated with mild erythema extension beyond demarcation. no purulence of fluctuance. full ROM of R wrist and elbow.   Vascular: 2+ radial, DP/PT pulses B/L  Neurological: AAOx3; no focal deficits    MEDICATIONS:  MEDICATIONS  (STANDING):  anastrozole 1 milliGRAM(s) Oral daily  ceFAZolin   IVPB 1000 milliGRAM(s) IV Intermittent every 8 hours  enoxaparin Injectable 40 milliGRAM(s) SubCutaneous every 24 hours  simvastatin 20 milliGRAM(s) Oral at bedtime    MEDICATIONS  (PRN):  acetaminophen     Tablet .. 650 milliGRAM(s) Oral every 6 hours PRN Temp greater or equal to 38C (100.4F), Mild Pain (1 - 3)  melatonin 3 milliGRAM(s) Oral at bedtime PRN Insomnia      ALLERGIES:  Allergies    No Known Drug Allergies  dust (Sneezing)    Intolerances        LABS:                        12.8   12.31 )-----------( 146      ( 01 Aug 2023 05:30 )             38.4     08-01    142  |  x   |  x   ----------------------------<  x   4.5   |  x   |  x     Ca    8.8      31 Jul 2023 13:08    TPro  7.0  /  Alb  4.3  /  TBili  0.6  /  DBili  x   /  AST  17  /  ALT  17  /  AlkPhos  97  07-31    PT/INR - ( 31 Jul 2023 13:08 )   PT: 11.4 sec;   INR: 1.00          PTT - ( 31 Jul 2023 13:08 )  PTT:32.3 sec  Urinalysis Basic - ( 31 Jul 2023 13:08 )    Color: Yellow / Appearance: Cloudy / SG: >=1.030 / pH: x  Gluc: 123 mg/dL / Ketone: NEGATIVE  / Bili: Negative / Urobili: 0.2 E.U./dL   Blood: x / Protein: Trace mg/dL / Nitrite: NEGATIVE   Leuk Esterase: Trace / RBC: < 5 /HPF / WBC < 5 /HPF   Sq Epi: x / Non Sq Epi: x / Bacteria: Many /HPF      CAPILLARY BLOOD GLUCOSE          RADIOLOGY & ADDITIONAL TESTS: Reviewed.   OVERNIGHT EVENTS: No acute events.    SUBJECTIVE / INTERVAL HPI: Patient seen and examined at bedside. Endorses slight pain when making a fist with L hand. Denies abdominal pain, shortness of breath, chills.    VITAL SIGNS:  Vital Signs Last 24 Hrs  T(C): 36.9 (01 Aug 2023 05:30), Max: 38.6 (31 Jul 2023 20:43)  T(F): 98.5 (01 Aug 2023 05:30), Max: 101.5 (31 Jul 2023 20:43)  HR: 83 (01 Aug 2023 05:30) (83 - 117)  BP: 114/71 (01 Aug 2023 05:30) (110/63 - 127/77)  BP(mean): --  RR: 18 (01 Aug 2023 05:30) (16 - 18)  SpO2: 97% (01 Aug 2023 05:30) (97% - 98%)    Parameters below as of 01 Aug 2023 05:30  Patient On (Oxygen Delivery Method): room air        PHYSICAL EXAM:    General: NAD  HEENT: NC/AT; PERRL, anicteric sclera; MMM  Neck: supple w/o palpable nodularity  Cardiovascular: +S1/S2; RRR  Respiratory: CTA B/L; no W/R/R  Gastrointestinal: soft, NT/ND; +BSx4  Extremities: erythema, warmth and swelling extending from L hand to L bicep - mildly tender to palpation. Area demarcated with minimal erythema extension beyond demarcation. no purulence or fluctuance. full ROM of L digits, wrist and elbow.   Vascular: 2+ radial, DP/PT pulses B/L  Neurological: AAOx3; no focal deficits    MEDICATIONS:  MEDICATIONS  (STANDING):  anastrozole 1 milliGRAM(s) Oral daily  ceFAZolin   IVPB 1000 milliGRAM(s) IV Intermittent every 8 hours  enoxaparin Injectable 40 milliGRAM(s) SubCutaneous every 24 hours  simvastatin 20 milliGRAM(s) Oral at bedtime    MEDICATIONS  (PRN):  acetaminophen     Tablet .. 650 milliGRAM(s) Oral every 6 hours PRN Temp greater or equal to 38C (100.4F), Mild Pain (1 - 3)  melatonin 3 milliGRAM(s) Oral at bedtime PRN Insomnia      ALLERGIES:  Allergies    No Known Drug Allergies  dust (Sneezing)    Intolerances        LABS:                        12.8   12.31 )-----------( 146      ( 01 Aug 2023 05:30 )             38.4     08-01    142  |  x   |  x   ----------------------------<  x   4.5   |  x   |  x     Ca    8.8      31 Jul 2023 13:08    TPro  7.0  /  Alb  4.3  /  TBili  0.6  /  DBili  x   /  AST  17  /  ALT  17  /  AlkPhos  97  07-31    PT/INR - ( 31 Jul 2023 13:08 )   PT: 11.4 sec;   INR: 1.00          PTT - ( 31 Jul 2023 13:08 )  PTT:32.3 sec  Urinalysis Basic - ( 31 Jul 2023 13:08 )    Color: Yellow / Appearance: Cloudy / SG: >=1.030 / pH: x  Gluc: 123 mg/dL / Ketone: NEGATIVE  / Bili: Negative / Urobili: 0.2 E.U./dL   Blood: x / Protein: Trace mg/dL / Nitrite: NEGATIVE   Leuk Esterase: Trace / RBC: < 5 /HPF / WBC < 5 /HPF   Sq Epi: x / Non Sq Epi: x / Bacteria: Many /HPF      CAPILLARY BLOOD GLUCOSE          RADIOLOGY & ADDITIONAL TESTS: Reviewed.   OVERNIGHT EVENTS: No acute events.    SUBJECTIVE / INTERVAL HPI: Patient seen and examined at bedside. Endorses slight pain when making a fist with L hand. Denies abdominal pain, shortness of breath, chills.    VITAL SIGNS:  Vital Signs Last 24 Hrs  T(C): 36.9 (01 Aug 2023 05:30), Max: 38.6 (31 Jul 2023 20:43)  T(F): 98.5 (01 Aug 2023 05:30), Max: 101.5 (31 Jul 2023 20:43)  HR: 83 (01 Aug 2023 05:30) (83 - 117)  BP: 114/71 (01 Aug 2023 05:30) (110/63 - 127/77)  BP(mean): --  RR: 18 (01 Aug 2023 05:30) (16 - 18)  SpO2: 97% (01 Aug 2023 05:30) (97% - 98%)    Parameters below as of 01 Aug 2023 05:30  Patient On (Oxygen Delivery Method): room air        PHYSICAL EXAM:    General: NAD  HEENT: NC/AT; PERRL, anicteric sclera; MMM  Neck: supple w/o palpable nodularity  Cardiovascular: +S1/S2; RRR  Respiratory: CTA B/L; no W/R/R  Gastrointestinal: soft, NT/ND; +BSx4  Extremities: erythema, warmth and swelling extending from L hand to L bicep - mildly tender to palpation. Area demarcated with minimal erythema extension beyond demarcation. no purulence or fluctuance. full ROM of L digits, wrist and elbow.   Vascular: 2+ radial, DP/PT pulses B/L  Neurological: AAOx3; no focal deficits    MEDICATIONS:  MEDICATIONS  (STANDING):  anastrozole 1 milliGRAM(s) Oral daily  ceFAZolin   IVPB 1000 milliGRAM(s) IV Intermittent every 8 hours  enoxaparin Injectable 40 milliGRAM(s) SubCutaneous every 24 hours  simvastatin 20 milliGRAM(s) Oral at bedtime    MEDICATIONS  (PRN):  acetaminophen     Tablet .. 650 milliGRAM(s) Oral every 6 hours PRN Temp greater or equal to 38C (100.4F), Mild Pain (1 - 3)  melatonin 3 milliGRAM(s) Oral at bedtime PRN Insomnia      ALLERGIES:  Allergies    No Known Drug Allergies  dust (Sneezing)    Intolerances        LABS:                        12.8   12.31 )-----------( 146      ( 01 Aug 2023 05:30 )             38.4     08-01    142  |  110<H>  |  13  ----------------------------<  116<H>  4.5   |  22  |  0.76    Ca    8.6      01 Aug 2023 05:30  Phos  3.1     08-01  Mg     2.1     08-01    TPro  7.0  /  Alb  4.3  /  TBili  0.6  /  DBili  x   /  AST  17  /  ALT  17  /  AlkPhos  97  07-31      PT/INR - ( 31 Jul 2023 13:08 )   PT: 11.4 sec;   INR: 1.00          PTT - ( 31 Jul 2023 13:08 )  PTT:32.3 sec  Urinalysis Basic - ( 31 Jul 2023 13:08 )    Color: Yellow / Appearance: Cloudy / SG: >=1.030 / pH: x  Gluc: 123 mg/dL / Ketone: NEGATIVE  / Bili: Negative / Urobili: 0.2 E.U./dL   Blood: x / Protein: Trace mg/dL / Nitrite: NEGATIVE   Leuk Esterase: Trace / RBC: < 5 /HPF / WBC < 5 /HPF   Sq Epi: x / Non Sq Epi: x / Bacteria: Many /HPF      CAPILLARY BLOOD GLUCOSE          RADIOLOGY & ADDITIONAL TESTS: Reviewed.

## 2023-08-01 NOTE — PROGRESS NOTE ADULT - PROBLEM SELECTOR PLAN 5
F: s/p 2L in the ED  E: replete K>4 Mg>2  N: regular  D: lovenox  Dispo: [facility] Pt presenting with tachycardia to low 100s, leukocytosis of 16 and lactate 2.3 --> 1.4. Received 2L in the ED, tylenol and Vancomycin. Source suspected to be LUE cellulitis. Pt has no other localizing symptoms, including no shortness of breath, cough, diarrhea  -CXR showed no acute pathology  -f/u Bcx - no growth at 12 hrs  -Cefazolin 1g q8 (Day 2) F: s/p 2L in the ED  E: replete K>4 Mg>2  N: regular  D: lovenox  Dispo: [facility]

## 2023-08-01 NOTE — PROGRESS NOTE ADULT - PROBLEM SELECTOR PLAN 2
Pt presenting with a one day history of LUE erythema, swelling and warmth. Reports hx of cellulitis 5 times in the past; has chronic lymphedema after L lymph node removal 2/2 breast CA. Oncologist demarcated area of cellulitis today; initially presented to the ED and received Vancomycin 1g IV. Pt left AMA with PO Keflex and Bactrim. Came back to the ED as erythema was extending beyond demarcated area. Physical exam with erythema, swelling and warmth from hand extending to bicep region; Pt with full ROM of wrist and elbow, however reports pain with movement.   -Pt not currently on chemotherapy and no purulence noted on exam, low suspicion for MRSA. Also, pt reports previously, cellulitis of the LUE appeared similar and improved with Keflex. will c/w Cefazolin 1g q8h  -f/u LUE xray  -given significant swelling on exam, will obtain LUE US to r/o DVT / abscess Pt presenting with a one day history of LUE erythema, swelling and warmth. Reports hx of cellulitis 5 times in the past; has chronic lymphedema after L lymph node removal 2/2 breast CA. Oncologist demarcated area of cellulitis today; initially presented to the ED and received Vancomycin 1g IV. Pt left AMA with PO Keflex and Bactrim. Came back to the ED as erythema was extending beyond demarcated area. Physical exam with erythema, swelling and warmth from hand extending to bicep region; Pt with full ROM of wrist and elbow, however reports pain with movement.   -Pt not currently on chemotherapy and no purulence noted on exam, low suspicion for MRSA. Also, pt reports previously, cellulitis of the LUE appeared similar and improved with Keflex. will c/w Cefazolin 1g q8h  -f/u LUE xray  -given significant swelling on exam, will obtain LUE US to r/o DVT / abscess  -Cefadroxyl 1g 7 day course (Day 2) Pt presenting with a one day history of LUE erythema, swelling and warmth. Reports hx of cellulitis 5 times in the past; has chronic lymphedema after L lymph node removal 2/2 breast CA. Oncologist demarcated area of cellulitis today; initially presented to the ED and received Vancomycin 1g IV. Pt left AMA with PO Keflex and Bactrim. Came back to the ED as erythema was extending beyond demarcated area. Physical exam with erythema, swelling and warmth from hand extending to bicep region; Pt with full ROM of wrist and elbow, however reports pain with movement.   -Pt not currently on chemotherapy and no purulence noted on exam, low suspicion for MRSA. Also, pt reports previously, cellulitis of the LUE appeared similar and improved with Keflex. will c/w Cefazolin 1g q8h  -f/u LUE xray  -given significant swelling on exam, will obtain LUE US to r/o DVT / abscess  -Cefazolin 1g q8 (Day 2) s/p chemo and b/l mastectomy in 2019  -c/w anastrazole 1mg PO qD

## 2023-08-01 NOTE — DISCHARGE NOTE PROVIDER - NSDCMRMEDTOKEN_GEN_ALL_CORE_FT
anastrozole 1 mg oral tablet: 1 orally once a day  simvastatin 20 mg oral tablet: 1 orally once a day   anastrozole 1 mg oral tablet: 1 orally once a day  cefadroxil 500 mg oral capsule: 1 cap(s) orally every 12 hours  simvastatin 20 mg oral tablet: 1 orally once a day   anastrozole 1 mg oral tablet: 1 orally once a day  cefadroxil 1000 mg oral tablet: 1 tab(s) orally once a day  simvastatin 20 mg oral tablet: 1 orally once a day

## 2023-08-01 NOTE — DISCHARGE NOTE PROVIDER - ATTENDING DISCHARGE PHYSICAL EXAMINATION:
Patient was seen and examined at bedside on 8/2/2023 at 11 am. She reports improvement of her L arm in terms of erythema and edema. Denies SOB, CP. ROS is otherwise negative. Vitals, labwork and pertinent imaging. Exam - NAD, AAO x 4, PERRLA, EOMI, MMM, supple neck, chest - CTA b/l, CV - rrr, s1s2, no m/r/g, abd - soft, NTND, + BS, ext - wwp, LUE w/ edema (chronic), erythema receding from area of demarcation, psych - normal affect    Plan:  C/w Cefazolin, plan to transition to Cefadroxil on d/c  -Patient is medically ready for d/c

## 2023-08-01 NOTE — PROGRESS NOTE ADULT - ATTENDING COMMENTS
Patient was seen and examined at bedside on 8/1/2023 at 11 am. She reports improvement of her L arm in terms of erythema and edema. Denies SOB, CP. ROS is otherwise negative. Vitals, labwork and pertinent imaging. Exam - NAD, AAO x 4, PERRLA, EOMI, MMM, supple neck, chest - CTA b/l, CV - rrr, s1s2, no m/r/g, abd - soft, NTND, + BS, ext - wwp, LUE w/ edema (chronic), erythema receding from area of demarcation, psych - normal affect    Plan:  C/w Cefazolin, plan to transition to Cefadroxil in AM if shows continued improvement

## 2023-08-01 NOTE — DISCHARGE NOTE PROVIDER - NSDCCPCAREPLAN_GEN_ALL_CORE_FT
PRINCIPAL DISCHARGE DIAGNOSIS  Diagnosis: Cellulitis  Assessment and Plan of Treatment:      PRINCIPAL DISCHARGE DIAGNOSIS  Diagnosis: Cellulitis  Assessment and Plan of Treatment: Cellulitis is an infection of the skin and soft tissue of the skin. The infection is usually caused by bacteria, such as staphylococci ("Staph") or streptococci ("Strep"); these commonly live on the skin or inner surface of the nose or mouth among healthy people. For many people, these skin bacteria never cause infection. However, cellulitis can develop if there is a break in the skin, such as a wound.  Such breaks in the skin allow bacteria to enter and grow, causing infection.  You have been prescribed cefadroxil, an antibiotic that will help resolve the infection. You should take the entire course of treatment, and not skip any doses.  Please elevate the arm above the level of the heart, as it can help to reduce swelling and speed healing.  It is also important to keep the infected area clean and dry. You can shower or bathe normally and pat the area dry with a clean towel.        PRINCIPAL DISCHARGE DIAGNOSIS  Diagnosis: Cellulitis  Assessment and Plan of Treatment: Cellulitis is an infection of the skin and soft tissue of the skin. The infection is usually caused by bacteria, such as staphylococci ("Staph") or streptococci ("Strep"); these commonly live on the skin or inner surface of the nose or mouth among healthy people. For many people, these skin bacteria never cause infection. However, cellulitis can develop if there is a break in the skin, such as a wound.  Such breaks in the skin allow bacteria to enter and grow, causing infection.  You have been prescribed cefadroxil, an antibiotic that will help resolve the infection. You should take the entire course of treatment, and not skip any doses. Please take your first dose today 8/2 at 7 PM, then once a day moving forward for a total of 6 days.   Please elevate the arm above the level of the heart, as it can help to reduce swelling and speed healing.  It is also important to keep the infected area clean and dry. You can shower or bathe normally and pat the area dry with a clean towel.

## 2023-08-01 NOTE — PROGRESS NOTE ADULT - PROBLEM SELECTOR PLAN 4
c/w simvastatin 20mg PO qD F: s/p 2L in the ED  E: replete K>4 Mg>2  N: regular  D: lovenox  Dispo: [facility]

## 2023-08-02 ENCOUNTER — TRANSCRIPTION ENCOUNTER (OUTPATIENT)
Age: 68
End: 2023-08-02

## 2023-08-02 VITALS
SYSTOLIC BLOOD PRESSURE: 128 MMHG | TEMPERATURE: 98 F | DIASTOLIC BLOOD PRESSURE: 79 MMHG | OXYGEN SATURATION: 98 % | RESPIRATION RATE: 18 BRPM | HEART RATE: 69 BPM

## 2023-08-02 LAB
ANION GAP SERPL CALC-SCNC: 10 MMOL/L — SIGNIFICANT CHANGE UP (ref 5–17)
BASOPHILS # BLD AUTO: 0.03 K/UL — SIGNIFICANT CHANGE UP (ref 0–0.2)
BASOPHILS NFR BLD AUTO: 0.3 % — SIGNIFICANT CHANGE UP (ref 0–2)
BUN SERPL-MCNC: 10 MG/DL — SIGNIFICANT CHANGE UP (ref 7–23)
CALCIUM SERPL-MCNC: 9.3 MG/DL — SIGNIFICANT CHANGE UP (ref 8.4–10.5)
CHLORIDE SERPL-SCNC: 106 MMOL/L — SIGNIFICANT CHANGE UP (ref 96–108)
CO2 SERPL-SCNC: 23 MMOL/L — SIGNIFICANT CHANGE UP (ref 22–31)
CREAT SERPL-MCNC: 0.68 MG/DL — SIGNIFICANT CHANGE UP (ref 0.5–1.3)
EGFR: 95 ML/MIN/1.73M2 — SIGNIFICANT CHANGE UP
EOSINOPHIL # BLD AUTO: 0.2 K/UL — SIGNIFICANT CHANGE UP (ref 0–0.5)
EOSINOPHIL NFR BLD AUTO: 2.2 % — SIGNIFICANT CHANGE UP (ref 0–6)
GLUCOSE SERPL-MCNC: 114 MG/DL — HIGH (ref 70–99)
HCT VFR BLD CALC: 41.8 % — SIGNIFICANT CHANGE UP (ref 34.5–45)
HGB BLD-MCNC: 14.2 G/DL — SIGNIFICANT CHANGE UP (ref 11.5–15.5)
IMM GRANULOCYTES NFR BLD AUTO: 0.3 % — SIGNIFICANT CHANGE UP (ref 0–0.9)
LYMPHOCYTES # BLD AUTO: 1.7 K/UL — SIGNIFICANT CHANGE UP (ref 1–3.3)
LYMPHOCYTES # BLD AUTO: 18.4 % — SIGNIFICANT CHANGE UP (ref 13–44)
MAGNESIUM SERPL-MCNC: 2.2 MG/DL — SIGNIFICANT CHANGE UP (ref 1.6–2.6)
MCHC RBC-ENTMCNC: 33.3 PG — SIGNIFICANT CHANGE UP (ref 27–34)
MCHC RBC-ENTMCNC: 34 GM/DL — SIGNIFICANT CHANGE UP (ref 32–36)
MCV RBC AUTO: 98.1 FL — SIGNIFICANT CHANGE UP (ref 80–100)
MONOCYTES # BLD AUTO: 0.8 K/UL — SIGNIFICANT CHANGE UP (ref 0–0.9)
MONOCYTES NFR BLD AUTO: 8.6 % — SIGNIFICANT CHANGE UP (ref 2–14)
NEUTROPHILS # BLD AUTO: 6.49 K/UL — SIGNIFICANT CHANGE UP (ref 1.8–7.4)
NEUTROPHILS NFR BLD AUTO: 70.2 % — SIGNIFICANT CHANGE UP (ref 43–77)
NRBC # BLD: 0 /100 WBCS — SIGNIFICANT CHANGE UP (ref 0–0)
PHOSPHATE SERPL-MCNC: 3.4 MG/DL — SIGNIFICANT CHANGE UP (ref 2.5–4.5)
PLATELET # BLD AUTO: 178 K/UL — SIGNIFICANT CHANGE UP (ref 150–400)
POTASSIUM SERPL-MCNC: 4.7 MMOL/L — SIGNIFICANT CHANGE UP (ref 3.5–5.3)
POTASSIUM SERPL-SCNC: 4.7 MMOL/L — SIGNIFICANT CHANGE UP (ref 3.5–5.3)
RBC # BLD: 4.26 M/UL — SIGNIFICANT CHANGE UP (ref 3.8–5.2)
RBC # FLD: 13 % — SIGNIFICANT CHANGE UP (ref 10.3–14.5)
SODIUM SERPL-SCNC: 139 MMOL/L — SIGNIFICANT CHANGE UP (ref 135–145)
WBC # BLD: 9.25 K/UL — SIGNIFICANT CHANGE UP (ref 3.8–10.5)
WBC # FLD AUTO: 9.25 K/UL — SIGNIFICANT CHANGE UP (ref 3.8–10.5)

## 2023-08-02 PROCEDURE — 80048 BASIC METABOLIC PNL TOTAL CA: CPT

## 2023-08-02 PROCEDURE — 73070 X-RAY EXAM OF ELBOW: CPT

## 2023-08-02 PROCEDURE — 93971 EXTREMITY STUDY: CPT

## 2023-08-02 PROCEDURE — 73120 X-RAY EXAM OF HAND: CPT

## 2023-08-02 PROCEDURE — 87040 BLOOD CULTURE FOR BACTERIA: CPT

## 2023-08-02 PROCEDURE — 71045 X-RAY EXAM CHEST 1 VIEW: CPT

## 2023-08-02 PROCEDURE — 85025 COMPLETE CBC W/AUTO DIFF WBC: CPT

## 2023-08-02 PROCEDURE — 83735 ASSAY OF MAGNESIUM: CPT

## 2023-08-02 PROCEDURE — 36415 COLL VENOUS BLD VENIPUNCTURE: CPT

## 2023-08-02 PROCEDURE — 84100 ASSAY OF PHOSPHORUS: CPT

## 2023-08-02 PROCEDURE — 73090 X-RAY EXAM OF FOREARM: CPT

## 2023-08-02 PROCEDURE — 86803 HEPATITIS C AB TEST: CPT

## 2023-08-02 PROCEDURE — 99285 EMERGENCY DEPT VISIT HI MDM: CPT

## 2023-08-02 PROCEDURE — 83605 ASSAY OF LACTIC ACID: CPT

## 2023-08-02 PROCEDURE — 99239 HOSP IP/OBS DSCHRG MGMT >30: CPT | Mod: GC

## 2023-08-02 RX ADMIN — ANASTROZOLE 1 MILLIGRAM(S): 1 TABLET ORAL at 11:18

## 2023-08-02 RX ADMIN — Medication 100 MILLIGRAM(S): at 06:17

## 2023-08-02 NOTE — DISCHARGE NOTE NURSING/CASE MANAGEMENT/SOCIAL WORK - PATIENT PORTAL LINK FT
You can access the FollowMyHealth Patient Portal offered by [facility] by registering at the following website: [email or web address]. By joining [employer]’s FollowMyHealth portal, you will also be able to view your health information using other applications (apps) compatible with our system.

## 2023-08-02 NOTE — DISCHARGE NOTE NURSING/CASE MANAGEMENT/SOCIAL WORK - NSDCPEFALRISK_GEN_ALL_CORE
For information on Fall & Injury Prevention, visit: [email or web address] OR  [email or web address] OR  https://www.cdc.gov/steadi/patient.html

## 2023-08-08 DIAGNOSIS — Z90.13 ACQUIRED ABSENCE OF BILATERAL BREASTS AND NIPPLES: ICD-10-CM

## 2023-08-08 DIAGNOSIS — G62.9 POLYNEUROPATHY, UNSPECIFIED: ICD-10-CM

## 2023-08-08 DIAGNOSIS — Z96.641 PRESENCE OF RIGHT ARTIFICIAL HIP JOINT: ICD-10-CM

## 2023-08-08 DIAGNOSIS — Z92.3 PERSONAL HISTORY OF IRRADIATION: ICD-10-CM

## 2023-08-08 DIAGNOSIS — Z92.21 PERSONAL HISTORY OF ANTINEOPLASTIC CHEMOTHERAPY: ICD-10-CM

## 2023-08-08 DIAGNOSIS — R65.20 SEVERE SEPSIS WITHOUT SEPTIC SHOCK: ICD-10-CM

## 2023-08-08 DIAGNOSIS — E78.5 HYPERLIPIDEMIA, UNSPECIFIED: ICD-10-CM

## 2023-08-08 DIAGNOSIS — Z80.3 FAMILY HISTORY OF MALIGNANT NEOPLASM OF BREAST: ICD-10-CM

## 2023-08-08 DIAGNOSIS — A41.9 SEPSIS, UNSPECIFIED ORGANISM: ICD-10-CM

## 2023-08-08 DIAGNOSIS — L03.114 CELLULITIS OF LEFT UPPER LIMB: ICD-10-CM

## 2023-08-08 DIAGNOSIS — Z87.891 PERSONAL HISTORY OF NICOTINE DEPENDENCE: ICD-10-CM

## 2023-08-08 DIAGNOSIS — E87.20 ACIDOSIS, UNSPECIFIED: ICD-10-CM

## 2023-08-08 DIAGNOSIS — Z41.8 ENCOUNTER FOR OTHER PROCEDURES FOR PURPOSES OTHER THAN REMEDYING HEALTH STATE: ICD-10-CM

## 2023-08-25 NOTE — ASSESSMENT
[FreeTextEntry1] : s/p b/l local tissue rearrangement and wound closure of breast mounds\par infection in left breast resolved but lymphedema in left arm not resolved with conservative treatment\par compression, elevation, lymphedema physical therapy failed, recommend lympha press for treatment\par continue PT\par f/u with Dr. De Leon re: radiation\par F/U in 2 weeks\par  [FreeTextEntry1] : Patient presents for followup on A. fib/hyperlipidemia/type 2 diabetes. Patient is currently fasting for today's labs. Patient is currently on Eliquisfor A. fib,on zetia for his hyperlipidemia and on prandin for his type 2 diabetes.\par -See prior notes as patient had hospitalization with respiratory failure secondary to combination of acute heart failure as well as pneumonia.  Patient also had obstructive uropathy, following with .\par -Patient had CardioMEMS, went well\par -Urology recommends greenlight procedure which patient will contemplate in the future\par -Overall doing well but still fatigued/weak at times\par  Doxycycline Pregnancy And Lactation Text: This medication is Pregnancy Category D and not consider safe during pregnancy. It is also excreted in breast milk but is considered safe for shorter treatment courses.

## 2023-09-08 ENCOUNTER — APPOINTMENT (OUTPATIENT)
Dept: GYNECOLOGIC ONCOLOGY | Facility: CLINIC | Age: 68
End: 2023-09-08
Payer: MEDICARE

## 2023-09-08 VITALS
HEART RATE: 96 BPM | HEIGHT: 64 IN | BODY MASS INDEX: 32.27 KG/M2 | TEMPERATURE: 97.6 F | OXYGEN SATURATION: 96 % | WEIGHT: 189 LBS | DIASTOLIC BLOOD PRESSURE: 78 MMHG | SYSTOLIC BLOOD PRESSURE: 118 MMHG

## 2023-09-08 DIAGNOSIS — Z15.02 GENETIC SUSCEPTIBILITY TO MALIGNANT NEOPLASM OF OVARY: ICD-10-CM

## 2023-09-08 PROCEDURE — 99213 OFFICE O/P EST LOW 20 MIN: CPT

## 2023-09-08 NOTE — PHYSICAL EXAM
[Chaperone Present] : A chaperone was present in the examining room during all aspects of the physical examination [Abnormal] : Examination of ureathral meatus: abnormal [Absent] : Adnexa(ae): Absent [Normal] : Recto-Vaginal Exam: Normal [de-identified] : softly distended, consistent with patient's body habitus

## 2023-09-08 NOTE — HISTORY OF PRESENT ILLNESS
[FreeTextEntry1] : Problem 1)Hx Ovarian mass s/p BSO on 1/7/2019 2)Hx of left invasive breast cancer w/ lymphovascular involvement  3)BRCA 1 Positive, VUS in CHEK  Previous Therapy 1)CTAP 6/25/2018    a)Right ovarian mass of 3.9x3.9x3.4cm    b)Heterogenous and thickened endometrium and leiomyomatous uterus    c)Hepatic steatosis 2)CTAP 7/12/2018    a)Right adnexal mass of 4.6cm 3) Pelvic ultrasound 12/12/18     a)Right adnexal mass of 3.9cm, stable  4) Lapraoscopic BSO 1/2019 a) Fallopian tube and ovary, left; salpingo-oophorectomy: - Ovary with benign inclusion cysts - Fallopian tube within normal limits  b) Fallopian tube and ovary, right; salpingo-oophorectomy: - Ovary with fibrothecoma, 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 Pt has also completed radiation treatment.

## 2023-09-08 NOTE — REASON FOR VISIT
[FreeTextEntry1] : 9 mo follow-up  69yo here for follow-up visit. Patient was recently hospitalized for IV antibiotics for L arm cellulitis 2/2 lymphedema in late July to early August. Denies chest pain, SOB, abdominopelvic pain, vaginal bleeding, n/v, abdominal bloating, changes in bowel habits.  GynHx: Post menopausal (reports episode of PMB last year, EMBx negative). 18 year OCP use. Had cervical cryotherapy when she was younger. ObHx:  x 2 PmHx: Breast Ca SurgHx: R hip replacement, L/s myomectomy and D&C when she was younger Meds: simvastatin, ondansetron All: NKDA  SocHx: lives alone, . Works as . No toxic habits. FamHx: Mother ( young, uterine vs. ovarian, patient unsure)   Pap: Neg, HPV neg 2021 Mammo: Last breast US  BIRADS 2 Colonoscopy: Last 2020

## 2023-09-15 ASSESSMENT — HOOS JR
RISING FROM SITTING: MILD
HOOS JR RAW SCORE: 0
IMPORTED HOOS JR SCORE: 80.55
GOING UP OR DOWN STAIRS: SEVERE
WALKING ON UNEVEN SURFACE: SEVERE
HOOS JR RAW SCORE: 15
LYING IN BED (TURNING OVER, MAINTAINING HIP POSITION): MODERATE
LYING IN BED (TURNING OVER, MAINTAINING HIP POSITION): EXTREME
IMPORTED HOOS JR SCORE: 85.26
HOOS JR RAW SCORE: 3
HOOS JR RAW SCORE: 2
BENDING TO THE FLOOR TO PICK UP OBJECT: MILD
LYING IN BED (TURNING OVER, MAINTAINING HIP POSITION): MILD
IMPORTED LATERALITY: RIGHT
IMPORTED HOOS JR SCORE: 100.0
BENDING TO THE FLOOR TO PICK UP OBJECT: SEVERE
IMPORTED HOOS JR SCORE: 43.34
SITTING: MILD
IMPORTED FORM: YES
HOOS JR RAW SCORE: 1
IMPORTED HOOS JR SCORE: 92.34

## 2023-10-01 PROBLEM — Z92.3 HISTORY OF RADIATION THERAPY: Status: ACTIVE | Noted: 2019-10-29

## 2023-12-12 NOTE — ED ADULT NURSE NOTE - NS ED NURSE LEVEL OF CONSCIOUSNESS AFFECT
Calm

## 2024-01-01 NOTE — ED PROVIDER NOTE - MUSCULOSKELETAL, MLM
Check here if all serologies below were negative, non-reactive or immune. Otherwise select appropriate status.
Spine appears normal, range of motion is not limited, no muscle or joint tenderness

## 2024-01-01 NOTE — ED PROVIDER NOTE - ATTENDING CONTRIBUTION TO CARE
No 66 F w chronic lymphedema in L arm- has brca   awoke w cellulitis in arm today  took one dose of bactrim  vss  s1s2 lungs cta bl  abd soft nt nd +bs  cellulitis and swelling to L forearm  IMP- Cellulitis  dose of iv abx

## 2024-01-09 ENCOUNTER — INPATIENT (INPATIENT)
Facility: HOSPITAL | Age: 69
LOS: 2 days | Discharge: HOME | DRG: 872 | End: 2024-01-12
Attending: INTERNAL MEDICINE | Admitting: INTERNAL MEDICINE
Payer: MEDICARE

## 2024-01-09 VITALS
OXYGEN SATURATION: 95 % | TEMPERATURE: 102 F | RESPIRATION RATE: 20 BRPM | HEIGHT: 64 IN | WEIGHT: 179.9 LBS | HEART RATE: 122 BPM | DIASTOLIC BLOOD PRESSURE: 72 MMHG | SYSTOLIC BLOOD PRESSURE: 107 MMHG

## 2024-01-09 DIAGNOSIS — Z98.890 OTHER SPECIFIED POSTPROCEDURAL STATES: Chronic | ICD-10-CM

## 2024-01-09 DIAGNOSIS — Z96.641 PRESENCE OF RIGHT ARTIFICIAL HIP JOINT: Chronic | ICD-10-CM

## 2024-01-09 DIAGNOSIS — Z41.9 ENCOUNTER FOR PROCEDURE FOR PURPOSES OTHER THAN REMEDYING HEALTH STATE, UNSPECIFIED: Chronic | ICD-10-CM

## 2024-01-09 LAB
ANION GAP SERPL CALC-SCNC: 11 MMOL/L — SIGNIFICANT CHANGE UP (ref 5–17)
ANION GAP SERPL CALC-SCNC: 11 MMOL/L — SIGNIFICANT CHANGE UP (ref 5–17)
APPEARANCE UR: CLEAR — SIGNIFICANT CHANGE UP
APPEARANCE UR: CLEAR — SIGNIFICANT CHANGE UP
BACTERIA # UR AUTO: ABNORMAL /HPF
BACTERIA # UR AUTO: ABNORMAL /HPF
BASOPHILS # BLD AUTO: 0.02 K/UL — SIGNIFICANT CHANGE UP (ref 0–0.2)
BASOPHILS # BLD AUTO: 0.02 K/UL — SIGNIFICANT CHANGE UP (ref 0–0.2)
BASOPHILS NFR BLD AUTO: 0.1 % — SIGNIFICANT CHANGE UP (ref 0–2)
BASOPHILS NFR BLD AUTO: 0.1 % — SIGNIFICANT CHANGE UP (ref 0–2)
BILIRUB UR-MCNC: NEGATIVE — SIGNIFICANT CHANGE UP
BILIRUB UR-MCNC: NEGATIVE — SIGNIFICANT CHANGE UP
BUN SERPL-MCNC: 15 MG/DL — SIGNIFICANT CHANGE UP (ref 7–23)
BUN SERPL-MCNC: 15 MG/DL — SIGNIFICANT CHANGE UP (ref 7–23)
CALCIUM SERPL-MCNC: 9.7 MG/DL — SIGNIFICANT CHANGE UP (ref 8.4–10.5)
CALCIUM SERPL-MCNC: 9.7 MG/DL — SIGNIFICANT CHANGE UP (ref 8.4–10.5)
CHLORIDE SERPL-SCNC: 99 MMOL/L — SIGNIFICANT CHANGE UP (ref 96–108)
CHLORIDE SERPL-SCNC: 99 MMOL/L — SIGNIFICANT CHANGE UP (ref 96–108)
CO2 SERPL-SCNC: 23 MMOL/L — SIGNIFICANT CHANGE UP (ref 22–31)
CO2 SERPL-SCNC: 23 MMOL/L — SIGNIFICANT CHANGE UP (ref 22–31)
COLOR SPEC: YELLOW — SIGNIFICANT CHANGE UP
COLOR SPEC: YELLOW — SIGNIFICANT CHANGE UP
CREAT SERPL-MCNC: 0.75 MG/DL — SIGNIFICANT CHANGE UP (ref 0.5–1.3)
CREAT SERPL-MCNC: 0.75 MG/DL — SIGNIFICANT CHANGE UP (ref 0.5–1.3)
DIFF PNL FLD: NEGATIVE — SIGNIFICANT CHANGE UP
DIFF PNL FLD: NEGATIVE — SIGNIFICANT CHANGE UP
EGFR: 87 ML/MIN/1.73M2 — SIGNIFICANT CHANGE UP
EGFR: 87 ML/MIN/1.73M2 — SIGNIFICANT CHANGE UP
EOSINOPHIL # BLD AUTO: 0.16 K/UL — SIGNIFICANT CHANGE UP (ref 0–0.5)
EOSINOPHIL # BLD AUTO: 0.16 K/UL — SIGNIFICANT CHANGE UP (ref 0–0.5)
EOSINOPHIL NFR BLD AUTO: 1.1 % — SIGNIFICANT CHANGE UP (ref 0–6)
EOSINOPHIL NFR BLD AUTO: 1.1 % — SIGNIFICANT CHANGE UP (ref 0–6)
FLUAV AG NPH QL: SIGNIFICANT CHANGE UP
FLUAV AG NPH QL: SIGNIFICANT CHANGE UP
FLUBV AG NPH QL: SIGNIFICANT CHANGE UP
FLUBV AG NPH QL: SIGNIFICANT CHANGE UP
GLUCOSE SERPL-MCNC: 145 MG/DL — HIGH (ref 70–99)
GLUCOSE SERPL-MCNC: 145 MG/DL — HIGH (ref 70–99)
GLUCOSE UR QL: NEGATIVE MG/DL — SIGNIFICANT CHANGE UP
GLUCOSE UR QL: NEGATIVE MG/DL — SIGNIFICANT CHANGE UP
HCT VFR BLD CALC: 41.3 % — SIGNIFICANT CHANGE UP (ref 34.5–45)
HCT VFR BLD CALC: 41.3 % — SIGNIFICANT CHANGE UP (ref 34.5–45)
HGB BLD-MCNC: 14.4 G/DL — SIGNIFICANT CHANGE UP (ref 11.5–15.5)
HGB BLD-MCNC: 14.4 G/DL — SIGNIFICANT CHANGE UP (ref 11.5–15.5)
IMM GRANULOCYTES NFR BLD AUTO: 0.6 % — SIGNIFICANT CHANGE UP (ref 0–0.9)
IMM GRANULOCYTES NFR BLD AUTO: 0.6 % — SIGNIFICANT CHANGE UP (ref 0–0.9)
KETONES UR-MCNC: NEGATIVE MG/DL — SIGNIFICANT CHANGE UP
KETONES UR-MCNC: NEGATIVE MG/DL — SIGNIFICANT CHANGE UP
LACTATE SERPL-SCNC: 2 MMOL/L — SIGNIFICANT CHANGE UP (ref 0.5–2)
LACTATE SERPL-SCNC: 2 MMOL/L — SIGNIFICANT CHANGE UP (ref 0.5–2)
LEUKOCYTE ESTERASE UR-ACNC: ABNORMAL
LEUKOCYTE ESTERASE UR-ACNC: ABNORMAL
LYMPHOCYTES # BLD AUTO: 0.83 K/UL — LOW (ref 1–3.3)
LYMPHOCYTES # BLD AUTO: 0.83 K/UL — LOW (ref 1–3.3)
LYMPHOCYTES # BLD AUTO: 5.4 % — LOW (ref 13–44)
LYMPHOCYTES # BLD AUTO: 5.4 % — LOW (ref 13–44)
MCHC RBC-ENTMCNC: 32.6 PG — SIGNIFICANT CHANGE UP (ref 27–34)
MCHC RBC-ENTMCNC: 32.6 PG — SIGNIFICANT CHANGE UP (ref 27–34)
MCHC RBC-ENTMCNC: 34.9 GM/DL — SIGNIFICANT CHANGE UP (ref 32–36)
MCHC RBC-ENTMCNC: 34.9 GM/DL — SIGNIFICANT CHANGE UP (ref 32–36)
MCV RBC AUTO: 93.4 FL — SIGNIFICANT CHANGE UP (ref 80–100)
MCV RBC AUTO: 93.4 FL — SIGNIFICANT CHANGE UP (ref 80–100)
MONOCYTES # BLD AUTO: 0.62 K/UL — SIGNIFICANT CHANGE UP (ref 0–0.9)
MONOCYTES # BLD AUTO: 0.62 K/UL — SIGNIFICANT CHANGE UP (ref 0–0.9)
MONOCYTES NFR BLD AUTO: 4.1 % — SIGNIFICANT CHANGE UP (ref 2–14)
MONOCYTES NFR BLD AUTO: 4.1 % — SIGNIFICANT CHANGE UP (ref 2–14)
NEUTROPHILS # BLD AUTO: 13.51 K/UL — HIGH (ref 1.8–7.4)
NEUTROPHILS # BLD AUTO: 13.51 K/UL — HIGH (ref 1.8–7.4)
NEUTROPHILS NFR BLD AUTO: 88.7 % — HIGH (ref 43–77)
NEUTROPHILS NFR BLD AUTO: 88.7 % — HIGH (ref 43–77)
NITRITE UR-MCNC: NEGATIVE — SIGNIFICANT CHANGE UP
NITRITE UR-MCNC: NEGATIVE — SIGNIFICANT CHANGE UP
NRBC # BLD: 0 /100 WBCS — SIGNIFICANT CHANGE UP (ref 0–0)
NRBC # BLD: 0 /100 WBCS — SIGNIFICANT CHANGE UP (ref 0–0)
PH UR: 6.5 — SIGNIFICANT CHANGE UP (ref 5–8)
PH UR: 6.5 — SIGNIFICANT CHANGE UP (ref 5–8)
PLATELET # BLD AUTO: 185 K/UL — SIGNIFICANT CHANGE UP (ref 150–400)
PLATELET # BLD AUTO: 185 K/UL — SIGNIFICANT CHANGE UP (ref 150–400)
POTASSIUM SERPL-MCNC: 3.7 MMOL/L — SIGNIFICANT CHANGE UP (ref 3.5–5.3)
POTASSIUM SERPL-MCNC: 3.7 MMOL/L — SIGNIFICANT CHANGE UP (ref 3.5–5.3)
POTASSIUM SERPL-SCNC: 3.7 MMOL/L — SIGNIFICANT CHANGE UP (ref 3.5–5.3)
POTASSIUM SERPL-SCNC: 3.7 MMOL/L — SIGNIFICANT CHANGE UP (ref 3.5–5.3)
PROT UR-MCNC: NEGATIVE MG/DL — SIGNIFICANT CHANGE UP
PROT UR-MCNC: NEGATIVE MG/DL — SIGNIFICANT CHANGE UP
RBC # BLD: 4.42 M/UL — SIGNIFICANT CHANGE UP (ref 3.8–5.2)
RBC # BLD: 4.42 M/UL — SIGNIFICANT CHANGE UP (ref 3.8–5.2)
RBC # FLD: 12.7 % — SIGNIFICANT CHANGE UP (ref 10.3–14.5)
RBC # FLD: 12.7 % — SIGNIFICANT CHANGE UP (ref 10.3–14.5)
RBC CASTS # UR COMP ASSIST: 38 /HPF — HIGH (ref 0–4)
RBC CASTS # UR COMP ASSIST: 38 /HPF — HIGH (ref 0–4)
RSV RNA NPH QL NAA+NON-PROBE: SIGNIFICANT CHANGE UP
RSV RNA NPH QL NAA+NON-PROBE: SIGNIFICANT CHANGE UP
SARS-COV-2 RNA SPEC QL NAA+PROBE: SIGNIFICANT CHANGE UP
SARS-COV-2 RNA SPEC QL NAA+PROBE: SIGNIFICANT CHANGE UP
SODIUM SERPL-SCNC: 133 MMOL/L — LOW (ref 135–145)
SODIUM SERPL-SCNC: 133 MMOL/L — LOW (ref 135–145)
SP GR SPEC: 1.01 — SIGNIFICANT CHANGE UP (ref 1–1.03)
SP GR SPEC: 1.01 — SIGNIFICANT CHANGE UP (ref 1–1.03)
SQUAMOUS # UR AUTO: 0 /HPF — SIGNIFICANT CHANGE UP (ref 0–5)
SQUAMOUS # UR AUTO: 0 /HPF — SIGNIFICANT CHANGE UP (ref 0–5)
UROBILINOGEN FLD QL: 0.2 MG/DL — SIGNIFICANT CHANGE UP (ref 0.2–1)
UROBILINOGEN FLD QL: 0.2 MG/DL — SIGNIFICANT CHANGE UP (ref 0.2–1)
WBC # BLD: 15.23 K/UL — HIGH (ref 3.8–10.5)
WBC # BLD: 15.23 K/UL — HIGH (ref 3.8–10.5)
WBC # FLD AUTO: 15.23 K/UL — HIGH (ref 3.8–10.5)
WBC # FLD AUTO: 15.23 K/UL — HIGH (ref 3.8–10.5)
WBC UR QL: 0 /HPF — SIGNIFICANT CHANGE UP (ref 0–5)
WBC UR QL: 0 /HPF — SIGNIFICANT CHANGE UP (ref 0–5)

## 2024-01-09 PROCEDURE — 99285 EMERGENCY DEPT VISIT HI MDM: CPT

## 2024-01-09 PROCEDURE — 99223 1ST HOSP IP/OBS HIGH 75: CPT | Mod: GC

## 2024-01-09 PROCEDURE — 71045 X-RAY EXAM CHEST 1 VIEW: CPT | Mod: 26

## 2024-01-09 RX ORDER — SODIUM CHLORIDE 9 MG/ML
1700 INJECTION INTRAMUSCULAR; INTRAVENOUS; SUBCUTANEOUS ONCE
Refills: 0 | Status: COMPLETED | OUTPATIENT
Start: 2024-01-09 | End: 2024-01-09

## 2024-01-09 RX ORDER — SODIUM CHLORIDE 9 MG/ML
500 INJECTION INTRAMUSCULAR; INTRAVENOUS; SUBCUTANEOUS ONCE
Refills: 0 | Status: DISCONTINUED | OUTPATIENT
Start: 2024-01-09 | End: 2024-01-09

## 2024-01-09 RX ORDER — ACETAMINOPHEN 500 MG
1000 TABLET ORAL ONCE
Refills: 0 | Status: COMPLETED | OUTPATIENT
Start: 2024-01-09 | End: 2024-01-09

## 2024-01-09 RX ORDER — VANCOMYCIN HCL 1 G
1250 VIAL (EA) INTRAVENOUS ONCE
Refills: 0 | Status: COMPLETED | OUTPATIENT
Start: 2024-01-09 | End: 2024-01-09

## 2024-01-09 RX ADMIN — Medication 166.67 MILLIGRAM(S): at 21:11

## 2024-01-09 RX ADMIN — Medication 1000 MILLIGRAM(S): at 20:17

## 2024-01-09 RX ADMIN — SODIUM CHLORIDE 1700 MILLILITER(S): 9 INJECTION INTRAMUSCULAR; INTRAVENOUS; SUBCUTANEOUS at 20:26

## 2024-01-09 NOTE — ED PROVIDER NOTE - CLINICAL SUMMARY MEDICAL DECISION MAKING FREE TEXT BOX
Pt p/w SIRS / sepsis 2/2 cellulitis. No crepitus, or NV compromise. No wounds. NO purulence. Swelling reported as stable a/p pt. NO other infectious sx. IVF, abx, anticipate admission

## 2024-01-09 NOTE — ED ADULT TRIAGE NOTE - CHIEF COMPLAINT QUOTE
"I have left arm redness starting today. I have a history of lymphedema." Endorses fevers and chills starting today.

## 2024-01-09 NOTE — ED PROVIDER NOTE - OBJECTIVE STATEMENT
Pt w/ PMHx breast CA s/p double mastectomy, chemo / LND, w/ chronic lymphedema, and recurrent LUE cellulitis, now p/w cellulitis onset earlier today. She self-took 1g Keflex. Sx worsened, she developed fever, prompting ED eval. The swelling is chronic and unchanged. The redness / warmth is acute. NO severe pain, numbness/tingling.

## 2024-01-09 NOTE — ED ADULT NURSE NOTE - OBJECTIVE STATEMENT
68yF presents to ED for left arm redness that started today. Pt states she noticed some redness today to left arm and then it progressively worsened. Redness noted to left arm with swelling, warm to touch. Oncologist provided her with Keflex, she proceeded to take two pills today vs prescribed dose of one. Hx of breast cancer with b/l mastectomy. States she's been admitted for cellulitis at [place] in the past (most recently June 2023). AAOx4. Ambulates by self at baseline. Spon breathing on RA, unlabored. 68yF presents to ED for left arm redness that started today. Pt states she noticed some redness today to left arm and then it progressively worsened. Redness noted to left arm with swelling, warm to touch. Oncologist provided her with Keflex, she proceeded to take two pills today vs prescribed dose of one. Hx of breast cancer with b/l mastectomy. States she's been admitted for cellulitis at [place] in the past (most recently June 2023). AAOx4. Ambulates by self at baseline. Spon breathing on RA, unlabored.

## 2024-01-09 NOTE — ED ADULT NURSE NOTE - NSFALLUNIVINTERV_ED_ALL_ED
Bed/Stretcher in lowest position, wheels locked, appropriate side rails in place/Call bell, personal items and telephone in reach/Instruct patient to call for assistance before getting out of bed/chair/stretcher/Non-slip footwear applied when patient is off stretcher/[place] to call system/Physically safe environment - no spills, clutter or unnecessary equipment/Purposeful proactive rounding/Room/bathroom lighting operational, light cord in reach Bed/Stretcher in lowest position, wheels locked, appropriate side rails in place/Call bell, personal items and telephone in reach/Instruct patient to call for assistance before getting out of bed/chair/stretcher/Non-slip footwear applied when patient is off stretcher/[place] to call system/Physically safe environment - no spills, clutter or unnecessary equipment/Purposeful proactive rounding/Room/bathroom lighting operational, light cord in reach

## 2024-01-09 NOTE — ED ADULT TRIAGE NOTE - NS ED TRIAGE CLINICAL UPGRADE
No
Deteriorating patient status - Patient was clinically upgraded due to deteriorating patient status.

## 2024-01-10 DIAGNOSIS — L03.90 CELLULITIS, UNSPECIFIED: ICD-10-CM

## 2024-01-10 DIAGNOSIS — Z29.9 ENCOUNTER FOR PROPHYLACTIC MEASURES, UNSPECIFIED: ICD-10-CM

## 2024-01-10 DIAGNOSIS — C50.919 MALIGNANT NEOPLASM OF UNSPECIFIED SITE OF UNSPECIFIED FEMALE BREAST: ICD-10-CM

## 2024-01-10 DIAGNOSIS — A41.9 SEPSIS, UNSPECIFIED ORGANISM: ICD-10-CM

## 2024-01-10 DIAGNOSIS — I89.0 LYMPHEDEMA, NOT ELSEWHERE CLASSIFIED: ICD-10-CM

## 2024-01-10 DIAGNOSIS — E78.5 HYPERLIPIDEMIA, UNSPECIFIED: ICD-10-CM

## 2024-01-10 DIAGNOSIS — E87.1 HYPO-OSMOLALITY AND HYPONATREMIA: ICD-10-CM

## 2024-01-10 LAB
ALBUMIN SERPL ELPH-MCNC: 3.4 G/DL — SIGNIFICANT CHANGE UP (ref 3.3–5)
ALBUMIN SERPL ELPH-MCNC: 3.4 G/DL — SIGNIFICANT CHANGE UP (ref 3.3–5)
ALP SERPL-CCNC: 83 U/L — SIGNIFICANT CHANGE UP (ref 40–120)
ALP SERPL-CCNC: 83 U/L — SIGNIFICANT CHANGE UP (ref 40–120)
ALT FLD-CCNC: 13 U/L — SIGNIFICANT CHANGE UP (ref 10–45)
ALT FLD-CCNC: 13 U/L — SIGNIFICANT CHANGE UP (ref 10–45)
ANION GAP SERPL CALC-SCNC: 11 MMOL/L — SIGNIFICANT CHANGE UP (ref 5–17)
ANION GAP SERPL CALC-SCNC: 11 MMOL/L — SIGNIFICANT CHANGE UP (ref 5–17)
AST SERPL-CCNC: 15 U/L — SIGNIFICANT CHANGE UP (ref 10–40)
AST SERPL-CCNC: 15 U/L — SIGNIFICANT CHANGE UP (ref 10–40)
BASOPHILS # BLD AUTO: 0.03 K/UL — SIGNIFICANT CHANGE UP (ref 0–0.2)
BASOPHILS # BLD AUTO: 0.03 K/UL — SIGNIFICANT CHANGE UP (ref 0–0.2)
BASOPHILS NFR BLD AUTO: 0.2 % — SIGNIFICANT CHANGE UP (ref 0–2)
BASOPHILS NFR BLD AUTO: 0.2 % — SIGNIFICANT CHANGE UP (ref 0–2)
BILIRUB SERPL-MCNC: 0.8 MG/DL — SIGNIFICANT CHANGE UP (ref 0.2–1.2)
BILIRUB SERPL-MCNC: 0.8 MG/DL — SIGNIFICANT CHANGE UP (ref 0.2–1.2)
BUN SERPL-MCNC: 14 MG/DL — SIGNIFICANT CHANGE UP (ref 7–23)
BUN SERPL-MCNC: 14 MG/DL — SIGNIFICANT CHANGE UP (ref 7–23)
CALCIUM SERPL-MCNC: 8.8 MG/DL — SIGNIFICANT CHANGE UP (ref 8.4–10.5)
CALCIUM SERPL-MCNC: 8.8 MG/DL — SIGNIFICANT CHANGE UP (ref 8.4–10.5)
CHLORIDE SERPL-SCNC: 111 MMOL/L — HIGH (ref 96–108)
CHLORIDE SERPL-SCNC: 111 MMOL/L — HIGH (ref 96–108)
CO2 SERPL-SCNC: 21 MMOL/L — LOW (ref 22–31)
CO2 SERPL-SCNC: 21 MMOL/L — LOW (ref 22–31)
CREAT SERPL-MCNC: 0.64 MG/DL — SIGNIFICANT CHANGE UP (ref 0.5–1.3)
CREAT SERPL-MCNC: 0.64 MG/DL — SIGNIFICANT CHANGE UP (ref 0.5–1.3)
EGFR: 96 ML/MIN/1.73M2 — SIGNIFICANT CHANGE UP
EGFR: 96 ML/MIN/1.73M2 — SIGNIFICANT CHANGE UP
EOSINOPHIL # BLD AUTO: 0.24 K/UL — SIGNIFICANT CHANGE UP (ref 0–0.5)
EOSINOPHIL # BLD AUTO: 0.24 K/UL — SIGNIFICANT CHANGE UP (ref 0–0.5)
EOSINOPHIL NFR BLD AUTO: 1.7 % — SIGNIFICANT CHANGE UP (ref 0–6)
EOSINOPHIL NFR BLD AUTO: 1.7 % — SIGNIFICANT CHANGE UP (ref 0–6)
GLUCOSE SERPL-MCNC: 122 MG/DL — HIGH (ref 70–99)
GLUCOSE SERPL-MCNC: 122 MG/DL — HIGH (ref 70–99)
HCT VFR BLD CALC: 36.6 % — SIGNIFICANT CHANGE UP (ref 34.5–45)
HCT VFR BLD CALC: 36.6 % — SIGNIFICANT CHANGE UP (ref 34.5–45)
HGB BLD-MCNC: 12.8 G/DL — SIGNIFICANT CHANGE UP (ref 11.5–15.5)
HGB BLD-MCNC: 12.8 G/DL — SIGNIFICANT CHANGE UP (ref 11.5–15.5)
IMM GRANULOCYTES NFR BLD AUTO: 0.4 % — SIGNIFICANT CHANGE UP (ref 0–0.9)
IMM GRANULOCYTES NFR BLD AUTO: 0.4 % — SIGNIFICANT CHANGE UP (ref 0–0.9)
LYMPHOCYTES # BLD AUTO: 1.6 K/UL — SIGNIFICANT CHANGE UP (ref 1–3.3)
LYMPHOCYTES # BLD AUTO: 1.6 K/UL — SIGNIFICANT CHANGE UP (ref 1–3.3)
LYMPHOCYTES # BLD AUTO: 11.7 % — LOW (ref 13–44)
LYMPHOCYTES # BLD AUTO: 11.7 % — LOW (ref 13–44)
MAGNESIUM SERPL-MCNC: 2 MG/DL — SIGNIFICANT CHANGE UP (ref 1.6–2.6)
MAGNESIUM SERPL-MCNC: 2 MG/DL — SIGNIFICANT CHANGE UP (ref 1.6–2.6)
MCHC RBC-ENTMCNC: 33.4 PG — SIGNIFICANT CHANGE UP (ref 27–34)
MCHC RBC-ENTMCNC: 33.4 PG — SIGNIFICANT CHANGE UP (ref 27–34)
MCHC RBC-ENTMCNC: 35 GM/DL — SIGNIFICANT CHANGE UP (ref 32–36)
MCHC RBC-ENTMCNC: 35 GM/DL — SIGNIFICANT CHANGE UP (ref 32–36)
MCV RBC AUTO: 95.6 FL — SIGNIFICANT CHANGE UP (ref 80–100)
MCV RBC AUTO: 95.6 FL — SIGNIFICANT CHANGE UP (ref 80–100)
MONOCYTES # BLD AUTO: 0.77 K/UL — SIGNIFICANT CHANGE UP (ref 0–0.9)
MONOCYTES # BLD AUTO: 0.77 K/UL — SIGNIFICANT CHANGE UP (ref 0–0.9)
MONOCYTES NFR BLD AUTO: 5.6 % — SIGNIFICANT CHANGE UP (ref 2–14)
MONOCYTES NFR BLD AUTO: 5.6 % — SIGNIFICANT CHANGE UP (ref 2–14)
NEUTROPHILS # BLD AUTO: 11.02 K/UL — HIGH (ref 1.8–7.4)
NEUTROPHILS # BLD AUTO: 11.02 K/UL — HIGH (ref 1.8–7.4)
NEUTROPHILS NFR BLD AUTO: 80.4 % — HIGH (ref 43–77)
NEUTROPHILS NFR BLD AUTO: 80.4 % — HIGH (ref 43–77)
NRBC # BLD: 0 /100 WBCS — SIGNIFICANT CHANGE UP (ref 0–0)
NRBC # BLD: 0 /100 WBCS — SIGNIFICANT CHANGE UP (ref 0–0)
PHOSPHATE SERPL-MCNC: 3 MG/DL — SIGNIFICANT CHANGE UP (ref 2.5–4.5)
PHOSPHATE SERPL-MCNC: 3 MG/DL — SIGNIFICANT CHANGE UP (ref 2.5–4.5)
PLATELET # BLD AUTO: 170 K/UL — SIGNIFICANT CHANGE UP (ref 150–400)
PLATELET # BLD AUTO: 170 K/UL — SIGNIFICANT CHANGE UP (ref 150–400)
POTASSIUM SERPL-MCNC: 3.7 MMOL/L — SIGNIFICANT CHANGE UP (ref 3.5–5.3)
POTASSIUM SERPL-MCNC: 3.7 MMOL/L — SIGNIFICANT CHANGE UP (ref 3.5–5.3)
POTASSIUM SERPL-SCNC: 3.7 MMOL/L — SIGNIFICANT CHANGE UP (ref 3.5–5.3)
POTASSIUM SERPL-SCNC: 3.7 MMOL/L — SIGNIFICANT CHANGE UP (ref 3.5–5.3)
PROT SERPL-MCNC: 6 G/DL — SIGNIFICANT CHANGE UP (ref 6–8.3)
PROT SERPL-MCNC: 6 G/DL — SIGNIFICANT CHANGE UP (ref 6–8.3)
RBC # BLD: 3.83 M/UL — SIGNIFICANT CHANGE UP (ref 3.8–5.2)
RBC # BLD: 3.83 M/UL — SIGNIFICANT CHANGE UP (ref 3.8–5.2)
RBC # FLD: 12.9 % — SIGNIFICANT CHANGE UP (ref 10.3–14.5)
RBC # FLD: 12.9 % — SIGNIFICANT CHANGE UP (ref 10.3–14.5)
SODIUM SERPL-SCNC: 143 MMOL/L — SIGNIFICANT CHANGE UP (ref 135–145)
SODIUM SERPL-SCNC: 143 MMOL/L — SIGNIFICANT CHANGE UP (ref 135–145)
WBC # BLD: 13.72 K/UL — HIGH (ref 3.8–10.5)
WBC # BLD: 13.72 K/UL — HIGH (ref 3.8–10.5)
WBC # FLD AUTO: 13.72 K/UL — HIGH (ref 3.8–10.5)
WBC # FLD AUTO: 13.72 K/UL — HIGH (ref 3.8–10.5)

## 2024-01-10 PROCEDURE — 99233 SBSQ HOSP IP/OBS HIGH 50: CPT | Mod: GC

## 2024-01-10 PROCEDURE — 93971 EXTREMITY STUDY: CPT | Mod: 26,LT

## 2024-01-10 RX ORDER — ATORVASTATIN CALCIUM 80 MG/1
20 TABLET, FILM COATED ORAL AT BEDTIME
Refills: 0 | Status: DISCONTINUED | OUTPATIENT
Start: 2024-01-10 | End: 2024-01-12

## 2024-01-10 RX ORDER — SIMVASTATIN 20 MG/1
1 TABLET, FILM COATED ORAL
Refills: 0 | DISCHARGE

## 2024-01-10 RX ORDER — ANASTROZOLE 1 MG/1
1 TABLET ORAL
Refills: 0 | DISCHARGE

## 2024-01-10 RX ORDER — ACETAMINOPHEN 500 MG
975 TABLET ORAL ONCE
Refills: 0 | Status: COMPLETED | OUTPATIENT
Start: 2024-01-10 | End: 2024-01-10

## 2024-01-10 RX ORDER — ENOXAPARIN SODIUM 100 MG/ML
40 INJECTION SUBCUTANEOUS EVERY 24 HOURS
Refills: 0 | Status: DISCONTINUED | OUTPATIENT
Start: 2024-01-10 | End: 2024-01-12

## 2024-01-10 RX ORDER — ANASTROZOLE 1 MG/1
1 TABLET ORAL DAILY
Refills: 0 | Status: DISCONTINUED | OUTPATIENT
Start: 2024-01-10 | End: 2024-01-12

## 2024-01-10 RX ORDER — ATORVASTATIN CALCIUM 80 MG/1
1 TABLET, FILM COATED ORAL
Refills: 0 | DISCHARGE

## 2024-01-10 RX ORDER — ACETAMINOPHEN 500 MG
650 TABLET ORAL EVERY 6 HOURS
Refills: 0 | Status: DISCONTINUED | OUTPATIENT
Start: 2024-01-10 | End: 2024-01-12

## 2024-01-10 RX ORDER — VANCOMYCIN HCL 1 G
1250 VIAL (EA) INTRAVENOUS EVERY 12 HOURS
Refills: 0 | Status: COMPLETED | OUTPATIENT
Start: 2024-01-10 | End: 2024-01-11

## 2024-01-10 RX ORDER — CEFAZOLIN SODIUM 1 G
2000 VIAL (EA) INJECTION EVERY 8 HOURS
Refills: 0 | Status: DISCONTINUED | OUTPATIENT
Start: 2024-01-10 | End: 2024-01-12

## 2024-01-10 RX ADMIN — Medication 100 MILLIGRAM(S): at 04:02

## 2024-01-10 RX ADMIN — Medication 100 MILLIGRAM(S): at 11:22

## 2024-01-10 RX ADMIN — Medication 975 MILLIGRAM(S): at 02:47

## 2024-01-10 RX ADMIN — Medication 166.67 MILLIGRAM(S): at 18:36

## 2024-01-10 RX ADMIN — Medication 975 MILLIGRAM(S): at 03:29

## 2024-01-10 RX ADMIN — Medication 100 MILLIGRAM(S): at 19:46

## 2024-01-10 RX ADMIN — Medication 166.67 MILLIGRAM(S): at 12:33

## 2024-01-10 RX ADMIN — ENOXAPARIN SODIUM 40 MILLIGRAM(S): 100 INJECTION SUBCUTANEOUS at 11:22

## 2024-01-10 RX ADMIN — ATORVASTATIN CALCIUM 20 MILLIGRAM(S): 80 TABLET, FILM COATED ORAL at 22:59

## 2024-01-10 RX ADMIN — Medication 650 MILLIGRAM(S): at 19:49

## 2024-01-10 NOTE — H&P ADULT - PROBLEM SELECTOR PLAN 4
S/p chemo and b/l mastectomy in 2019  -c/w anastrazole 1mg PO qd S/p chemo and b/l mastectomy in 2019. Oncologist:  Dr. Carlin.  -c/w anastrazole 1mg PO qd Na 133 on admission. Patient w/ sepsis on admission, likely 2/2 dehydration.  - continue to trend Na  - f/u urine studies

## 2024-01-10 NOTE — H&P ADULT - PROBLEM SELECTOR PLAN 3
Patient w/ chronic lymphedema of LUE since b/l mastectomy in 2019 w/ multiple episodes of cellulitis in the arm since surgery. Also w/ baseline swelling due to lymphedema. Patient has a machine that helps compress her arm and help with lymphedema.   - MARGARITA

## 2024-01-10 NOTE — PROGRESS NOTE ADULT - SUBJECTIVE AND OBJECTIVE BOX
O/N Events:    Subjective/ROS: Patient seen and examined at bedside.     Denies Fever/Chills, HA, CP, SOB, n/v, changes in bowel/urinary habits.  12pt ROS otherwise negative.    VITALS  Vital Signs Last 24 Hrs  T(C): 36.4 (10 Darci 2024 09:09), Max: 39.6 (09 Jan 2024 20:27)  T(F): 97.6 (10 Darci 2024 09:09), Max: 103.3 (09 Jan 2024 20:27)  HR: 97 (10 Darci 2024 09:09) (88 - 122)  BP: 132/81 (10 Darci 2024 09:09) (107/72 - 132/81)  BP(mean): 98 (10 Darci 2024 09:09) (98 - 98)  RR: 18 (10 Darci 2024 09:09) (18 - 20)  SpO2: 94% (10 Darci 2024 09:09) (94% - 96%)    Parameters below as of 10 Darci 2024 09:09  Patient On (Oxygen Delivery Method): room air        CAPILLARY BLOOD GLUCOSE          PHYSICAL EXAM  General: NAD  Head: NC/AT; MMM; PERRL; EOMI;  Neck: Supple; no JVD  Respiratory: CTAB; no wheezes/rales/rhonchi  Cardiovascular: Regular rhythm/rate; S1/S2+, no murmurs, rubs gallops   Gastrointestinal: Soft; NTND; bowel sounds normal and present  Extremities: WWP; no edema/cyanosis  Neurological: A&Ox3, CNII-XII grossly intact; no obvious focal deficits    Antimicrobials:  MEDICATIONS  (STANDING):  ceFAZolin   IVPB 2000 milliGRAM(s) IV Intermittent every 8 hours  vancomycin  IVPB 1250 milliGRAM(s) IV Intermittent every 12 hours      Standing Medications:  MEDICATIONS  (STANDING):  anastrozole 1 milliGRAM(s) Oral daily  atorvastatin 20 milliGRAM(s) Oral at bedtime  ceFAZolin   IVPB 2000 milliGRAM(s) IV Intermittent every 8 hours  enoxaparin Injectable 40 milliGRAM(s) SubCutaneous every 24 hours  vancomycin  IVPB 1250 milliGRAM(s) IV Intermittent every 12 hours      PRN Medications:  MEDICATIONS  (PRN):  acetaminophen     Tablet .. 650 milliGRAM(s) Oral every 6 hours PRN Temp greater or equal to 38C (100.4F), Mild Pain (1 - 3)      dust (Sneezing)  No Known Drug Allergies      LABS                        12.8   13.72 )-----------( 170      ( 10 Darci 2024 05:30 )             36.6     01-10    143  |  111<H>  |  14  ----------------------------<  122<H>  3.7   |  21<L>  |  0.64    Ca    8.8      10 Darci 2024 05:30  Phos  3.0     01-10  Mg     2.0     01-10    TPro  6.0  /  Alb  3.4  /  TBili  0.8  /  DBili  x   /  AST  15  /  ALT  13  /  AlkPhos  83  01-10      Urinalysis Basic - ( 10 Darci 2024 05:30 )    Color: x / Appearance: x / SG: x / pH: x  Gluc: 122 mg/dL / Ketone: x  / Bili: x / Urobili: x   Blood: x / Protein: x / Nitrite: x   Leuk Esterase: x / RBC: x / WBC x   Sq Epi: x / Non Sq Epi: x / Bacteria: x            Culture - Urine (collected 01-09-24 @ 21:11)  Source: Clean Catch Clean Catch (Midstream)  Preliminary Report (01-10-24 @ 09:12):    No growth to date        IMAGING/EKG/ETC   O/N Events:    Subjective/ROS: Patient seen and examined at bedside. Patient seated upright in chair, pleasant, conversant. Patient reported that she has had LUE swelling and lymphedema since she began radiation therapy for breast cancer. She went to her oncologist on 1/9 after experiencing worsening LUE lymphedema, swelling, erythema, and warmth; she was given cephalexin and sent home. She then presented to [facility] due to the worsened swelling and redness.    Denies Fever/Chills, HA, CP, SOB, n/v, changes in bowel/urinary habits.  12pt ROS otherwise negative.    VITALS  Vital Signs Last 24 Hrs  T(C): 36.4 (10 Darci 2024 09:09), Max: 39.6 (09 Jan 2024 20:27)  T(F): 97.6 (10 Darci 2024 09:09), Max: 103.3 (09 Jan 2024 20:27)  HR: 97 (10 Darci 2024 09:09) (88 - 122)  BP: 132/81 (10 Darci 2024 09:09) (107/72 - 132/81)  BP(mean): 98 (10 Darci 2024 09:09) (98 - 98)  RR: 18 (10 Darci 2024 09:09) (18 - 20)  SpO2: 94% (10 Darci 2024 09:09) (94% - 96%)    Parameters below as of 10 Darci 2024 09:09  Patient On (Oxygen Delivery Method): room air        CAPILLARY BLOOD GLUCOSE          PHYSICAL EXAM  General: NAD  Head: NC/AT; MMM; PERRL; EOMI;  Neck: Supple; no JVD  Respiratory: CTAB; no wheezes/rales/rhonchi  Cardiovascular: Regular rhythm/rate; S1/S2+, no murmurs, rubs gallops   Gastrointestinal: Soft; NTND; bowel sounds normal and present  Extremities: WWP; no edema/cyanosis  Neurological: A&Ox3, CNII-XII grossly intact; no obvious focal deficits    Antimicrobials:  MEDICATIONS  (STANDING):  ceFAZolin   IVPB 2000 milliGRAM(s) IV Intermittent every 8 hours  vancomycin  IVPB 1250 milliGRAM(s) IV Intermittent every 12 hours      Standing Medications:  MEDICATIONS  (STANDING):  anastrozole 1 milliGRAM(s) Oral daily  atorvastatin 20 milliGRAM(s) Oral at bedtime  ceFAZolin   IVPB 2000 milliGRAM(s) IV Intermittent every 8 hours  enoxaparin Injectable 40 milliGRAM(s) SubCutaneous every 24 hours  vancomycin  IVPB 1250 milliGRAM(s) IV Intermittent every 12 hours      PRN Medications:  MEDICATIONS  (PRN):  acetaminophen     Tablet .. 650 milliGRAM(s) Oral every 6 hours PRN Temp greater or equal to 38C (100.4F), Mild Pain (1 - 3)      dust (Sneezing)  No Known Drug Allergies      LABS                        12.8   13.72 )-----------( 170      ( 10 Darci 2024 05:30 )             36.6     01-10    143  |  111<H>  |  14  ----------------------------<  122<H>  3.7   |  21<L>  |  0.64    Ca    8.8      10 Darci 2024 05:30  Phos  3.0     01-10  Mg     2.0     01-10    TPro  6.0  /  Alb  3.4  /  TBili  0.8  /  DBili  x   /  AST  15  /  ALT  13  /  AlkPhos  83  01-10      Urinalysis Basic - ( 10 Darci 2024 05:30 )    Color: x / Appearance: x / SG: x / pH: x  Gluc: 122 mg/dL / Ketone: x  / Bili: x / Urobili: x   Blood: x / Protein: x / Nitrite: x   Leuk Esterase: x / RBC: x / WBC x   Sq Epi: x / Non Sq Epi: x / Bacteria: x            Culture - Urine (collected 01-09-24 @ 21:11)  Source: Clean Catch Clean Catch (Midstream)  Preliminary Report (01-10-24 @ 09:12):    No growth to date        IMAGING/EKG/ETC   O/N Events:    Subjective/ROS: Patient seen and examined at bedside. Patient seated upright in chair, pleasant, conversant. Patient reported that she has had LUE swelling and lymphedema since she began radiation therapy for breast cancer. She went to her oncologist on 1/9 after experiencing worsening LUE lymphedema, swelling, erythema, and warmth; she was given cephalexin and sent home. She then presented to [facility] due to the worsened swelling and redness.    Denies Fever/Chills, HA, CP, SOB, n/v, changes in bowel/urinary habits.  12pt ROS otherwise negative.    VITALS  Vital Signs Last 24 Hrs  T(C): 36.4 (10 Darci 2024 09:09), Max: 39.6 (09 Jan 2024 20:27)  T(F): 97.6 (10 Darci 2024 09:09), Max: 103.3 (09 Jan 2024 20:27)  HR: 97 (10 Darci 2024 09:09) (88 - 122)  BP: 132/81 (10 Darci 2024 09:09) (107/72 - 132/81)  BP(mean): 98 (10 Darci 2024 09:09) (98 - 98)  RR: 18 (10 Darci 2024 09:09) (18 - 20)  SpO2: 94% (10 Darci 2024 09:09) (94% - 96%)    Parameters below as of 10 Darci 2024 09:09  Patient On (Oxygen Delivery Method): room air        CAPILLARY BLOOD GLUCOSE          PHYSICAL EXAM  General: NAD  Head: NC/AT; MMM; PERRL; EOMI;  Neck: Supple; no JVD  Respiratory: CTAB; no wheezes/rales/rhonchi  Cardiovascular: Regular rhythm/rate; S1/S2+, no murmurs, rubs gallops   Gastrointestinal: Soft; NTND; bowel sounds normal and present  Extremities: WWP; no edema/cyanosis  Neurological: A&Ox3, CNII-XII grossly intact; no obvious focal deficits    Antimicrobials:  MEDICATIONS  (STANDING):  ceFAZolin   IVPB 2000 milliGRAM(s) IV Intermittent every 8 hours  vancomycin  IVPB 1250 milliGRAM(s) IV Intermittent every 12 hours      Standing Medications:  MEDICATIONS  (STANDING):  anastrozole 1 milliGRAM(s) Oral daily  atorvastatin 20 milliGRAM(s) Oral at bedtime  ceFAZolin   IVPB 2000 milliGRAM(s) IV Intermittent every 8 hours  enoxaparin Injectable 40 milliGRAM(s) SubCutaneous every 24 hours  vancomycin  IVPB 1250 milliGRAM(s) IV Intermittent every 12 hours      PRN Medications:  MEDICATIONS  (PRN):  acetaminophen     Tablet .. 650 milliGRAM(s) Oral every 6 hours PRN Temp greater or equal to 38C (100.4F), Mild Pain (1 - 3)      dust (Sneezing)  No Known Drug Allergies      LABS                        12.8   13.72 )-----------( 170      ( 10 Darci 2024 05:30 )             36.6     01-10    143  |  111<H>  |  14  ----------------------------<  122<H>  3.7   |  21<L>  |  0.64    Ca    8.8      10 Darci 2024 05:30  Phos  3.0     01-10  Mg     2.0     01-10    TPro  6.0  /  Alb  3.4  /  TBili  0.8  /  DBili  x   /  AST  15  /  ALT  13  /  AlkPhos  83  01-10      Urinalysis Basic - ( 10 Darci 2024 05:30 )    Color: x / Appearance: x / SG: x / pH: x  Gluc: 122 mg/dL / Ketone: x  / Bili: x / Urobili: x   Blood: x / Protein: x / Nitrite: x   Leuk Esterase: x / RBC: x / WBC x   Sq Epi: x / Non Sq Epi: x / Bacteria: x            Culture - Urine (collected 01-09-24 @ 21:11)  Source: Clean Catch Clean Catch (Midstream)  Preliminary Report (01-10-24 @ 09:12):    No growth to date        IMAGING/EKG/ETC   O/N Events:    Subjective/ROS: Patient seen and examined at bedside. Patient seated upright in chair, pleasant, conversant. Patient reported that she has had LUE swelling and lymphedema since she began radiation therapy for breast cancer. She went to her oncologist on 1/9 after experiencing worsening LUE lymphedema, swelling, erythema, and warmth; she was given cephalexin and sent home. She then presented to [facility] due to the worsened swelling and redness. Patient states that she had a similar episode on October, and was admitted to [facility] for two nights.    Denies Fever/Chills, HA, CP, SOB, n/v, changes in bowel/urinary habits.  12pt ROS otherwise negative.    VITALS  Vital Signs Last 24 Hrs  T(C): 36.4 (10 Darci 2024 09:09), Max: 39.6 (09 Jan 2024 20:27)  T(F): 97.6 (10 Darci 2024 09:09), Max: 103.3 (09 Jan 2024 20:27)  HR: 97 (10 Darci 2024 09:09) (88 - 122)  BP: 132/81 (10 Darci 2024 09:09) (107/72 - 132/81)  BP(mean): 98 (10 Darci 2024 09:09) (98 - 98)  RR: 18 (10 Darci 2024 09:09) (18 - 20)  SpO2: 94% (10 Darci 2024 09:09) (94% - 96%)    Parameters below as of 10 Darci 2024 09:09  Patient On (Oxygen Delivery Method): room air        CAPILLARY BLOOD GLUCOSE          PHYSICAL EXAM  General: NAD  Head: NC/AT; MMM; PERRL; EOMI;  Neck: Supple; no JVD  Respiratory: CTAB; no wheezes/rales/rhonchi  Cardiovascular: Regular rhythm/rate; S1/S2+, no murmurs, rubs gallops   Gastrointestinal: Soft; NTND; bowel sounds normal and present  Extremities: WWP; no edema/cyanosis  Neurological: A&Ox3, CNII-XII grossly intact; no obvious focal deficits    Antimicrobials:  MEDICATIONS  (STANDING):  ceFAZolin   IVPB 2000 milliGRAM(s) IV Intermittent every 8 hours  vancomycin  IVPB 1250 milliGRAM(s) IV Intermittent every 12 hours      Standing Medications:  MEDICATIONS  (STANDING):  anastrozole 1 milliGRAM(s) Oral daily  atorvastatin 20 milliGRAM(s) Oral at bedtime  ceFAZolin   IVPB 2000 milliGRAM(s) IV Intermittent every 8 hours  enoxaparin Injectable 40 milliGRAM(s) SubCutaneous every 24 hours  vancomycin  IVPB 1250 milliGRAM(s) IV Intermittent every 12 hours      PRN Medications:  MEDICATIONS  (PRN):  acetaminophen     Tablet .. 650 milliGRAM(s) Oral every 6 hours PRN Temp greater or equal to 38C (100.4F), Mild Pain (1 - 3)      dust (Sneezing)  No Known Drug Allergies      LABS                        12.8   13.72 )-----------( 170      ( 10 Darci 2024 05:30 )             36.6     01-10    143  |  111<H>  |  14  ----------------------------<  122<H>  3.7   |  21<L>  |  0.64    Ca    8.8      10 Darci 2024 05:30  Phos  3.0     01-10  Mg     2.0     01-10    TPro  6.0  /  Alb  3.4  /  TBili  0.8  /  DBili  x   /  AST  15  /  ALT  13  /  AlkPhos  83  01-10      Urinalysis Basic - ( 10 Darci 2024 05:30 )    Color: x / Appearance: x / SG: x / pH: x  Gluc: 122 mg/dL / Ketone: x  / Bili: x / Urobili: x   Blood: x / Protein: x / Nitrite: x   Leuk Esterase: x / RBC: x / WBC x   Sq Epi: x / Non Sq Epi: x / Bacteria: x            Culture - Urine (collected 01-09-24 @ 21:11)  Source: Clean Catch Clean Catch (Midstream)  Preliminary Report (01-10-24 @ 09:12):    No growth to date        IMAGING/EKG/ETC   O/N Events:    Subjective/ROS: Patient seen and examined at bedside. Patient seated upright in chair, pleasant, conversant. Patient reported that she has had LUE swelling and lymphedema since she began radiation therapy for breast cancer. She went to her oncologist on 1/9 after experiencing worsening LUE lymphedema, swelling, erythema, and warmth; she was given cephalexin and sent home. She then presented to [facility] due to the worsened swelling and redness. Patient states that she had a similar episode on October, and was admitted to [facility] for two nights.    Denies Fever/Chills, HA, CP, SOB, n/v, changes in bowel/urinary habits.  12pt ROS otherwise negative.    VITALS  Vital Signs Last 24 Hrs  T(C): 36.4 (10 Darci 2024 09:09), Max: 39.6 (09 Jan 2024 20:27)  T(F): 97.6 (10 Darci 2024 09:09), Max: 103.3 (09 Jan 2024 20:27)  HR: 97 (10 Darci 2024 09:09) (88 - 122)  BP: 132/81 (10 Darci 2024 09:09) (107/72 - 132/81)  BP(mean): 98 (10 Darci 2024 09:09) (98 - 98)  RR: 18 (10 Darci 2024 09:09) (18 - 20)  SpO2: 94% (10 Darci 2024 09:09) (94% - 96%)    Parameters below as of 10 Darci 2024 09:09  Patient On (Oxygen Delivery Method): room air        CAPILLARY BLOOD GLUCOSE          PHYSICAL EXAM  General: NAD  Head: NC/AT; MMM; PERRL; EOMI;  Neck: Supple; no JVD  Respiratory: CTAB; no wheezes/rales/rhonchi  Cardiovascular: Regular rhythm/rate; S1/S2+, no murmurs, rubs gallops   Gastrointestinal: Soft; NTND; bowel sounds normal and present  Extremities: WWP; no edema/cyanosis  Neurological: A&Ox3, CNII-XII grossly intact; no obvious focal deficits    Antimicrobials:  MEDICATIONS  (STANDING):  ceFAZolin   IVPB 2000 milliGRAM(s) IV Intermittent every 8 hours  vancomycin  IVPB 1250 milliGRAM(s) IV Intermittent every 12 hours      Standing Medications:  MEDICATIONS  (STANDING):  anastrozole 1 milliGRAM(s) Oral daily  atorvastatin 20 milliGRAM(s) Oral at bedtime  ceFAZolin   IVPB 2000 milliGRAM(s) IV Intermittent every 8 hours  enoxaparin Injectable 40 milliGRAM(s) SubCutaneous every 24 hours  vancomycin  IVPB 1250 milliGRAM(s) IV Intermittent every 12 hours      PRN Medications:  MEDICATIONS  (PRN):  acetaminophen     Tablet .. 650 milliGRAM(s) Oral every 6 hours PRN Temp greater or equal to 38C (100.4F), Mild Pain (1 - 3)      dust (Sneezing)  No Known Drug Allergies      LABS                        12.8   13.72 )-----------( 170      ( 10 Darci 2024 05:30 )             36.6     01-10    143  |  111<H>  |  14  ----------------------------<  122<H>  3.7   |  21<L>  |  0.64    Ca    8.8      10 Darci 2024 05:30  Phos  3.0     01-10  Mg     2.0     01-10    TPro  6.0  /  Alb  3.4  /  TBili  0.8  /  DBili  x   /  AST  15  /  ALT  13  /  AlkPhos  83  01-10      Urinalysis Basic - ( 10 Darci 2024 05:30 )    Color: x / Appearance: x / SG: x / pH: x  Gluc: 122 mg/dL / Ketone: x  / Bili: x / Urobili: x   Blood: x / Protein: x / Nitrite: x   Leuk Esterase: x / RBC: x / WBC x   Sq Epi: x / Non Sq Epi: x / Bacteria: x            Culture - Urine (collected 01-09-24 @ 21:11)  Source: Clean Catch Clean Catch (Midstream)  Preliminary Report (01-10-24 @ 09:12):    No growth to date        IMAGING/EKG/ETC   O/N Events:    Subjective/ROS: Patient seen and examined at bedside. Patient seated upright in chair, pleasant, conversant. Patient reported that she has had LUE swelling and lymphedema since she began radiation therapy for breast cancer. She went to her oncologist on 1/9 after experiencing worsening LUE lymphedema, swelling, erythema, and warmth; she was given cephalexin and sent home. She then presented to [facility] due to the worsened swelling and redness. Patient states that she had a similar episode in July/August, and was admitted to [facility] for two nights.    Denies Fever/Chills, HA, CP, SOB, n/v, changes in bowel/urinary habits.  12pt ROS otherwise negative.    VITALS  Vital Signs Last 24 Hrs  T(C): 36.4 (10 Darci 2024 09:09), Max: 39.6 (09 Jan 2024 20:27)  T(F): 97.6 (10 Darci 2024 09:09), Max: 103.3 (09 Jan 2024 20:27)  HR: 97 (10 Darci 2024 09:09) (88 - 122)  BP: 132/81 (10 Darci 2024 09:09) (107/72 - 132/81)  BP(mean): 98 (10 Darci 2024 09:09) (98 - 98)  RR: 18 (10 Darci 2024 09:09) (18 - 20)  SpO2: 94% (10 Darci 2024 09:09) (94% - 96%)    Parameters below as of 10 Darci 2024 09:09  Patient On (Oxygen Delivery Method): room air        CAPILLARY BLOOD GLUCOSE          PHYSICAL EXAM  General: NAD  Head: NC/AT; MMM; PERRL; EOMI;  Neck: Supple; no JVD  Respiratory: CTAB; no wheezes/rales/rhonchi  Cardiovascular: Regular rhythm/rate; S1/S2+, no murmurs, rubs gallops   Gastrointestinal: Soft; NTND; bowel sounds normal and present  Extremities: WWP; no edema/cyanosis  Neurological: A&Ox3, CNII-XII grossly intact; no obvious focal deficits    Antimicrobials:  MEDICATIONS  (STANDING):  ceFAZolin   IVPB 2000 milliGRAM(s) IV Intermittent every 8 hours  vancomycin  IVPB 1250 milliGRAM(s) IV Intermittent every 12 hours      Standing Medications:  MEDICATIONS  (STANDING):  anastrozole 1 milliGRAM(s) Oral daily  atorvastatin 20 milliGRAM(s) Oral at bedtime  ceFAZolin   IVPB 2000 milliGRAM(s) IV Intermittent every 8 hours  enoxaparin Injectable 40 milliGRAM(s) SubCutaneous every 24 hours  vancomycin  IVPB 1250 milliGRAM(s) IV Intermittent every 12 hours      PRN Medications:  MEDICATIONS  (PRN):  acetaminophen     Tablet .. 650 milliGRAM(s) Oral every 6 hours PRN Temp greater or equal to 38C (100.4F), Mild Pain (1 - 3)      dust (Sneezing)  No Known Drug Allergies      LABS                        12.8   13.72 )-----------( 170      ( 10 Darci 2024 05:30 )             36.6     01-10    143  |  111<H>  |  14  ----------------------------<  122<H>  3.7   |  21<L>  |  0.64    Ca    8.8      10 Darci 2024 05:30  Phos  3.0     01-10  Mg     2.0     01-10    TPro  6.0  /  Alb  3.4  /  TBili  0.8  /  DBili  x   /  AST  15  /  ALT  13  /  AlkPhos  83  01-10      Urinalysis Basic - ( 10 Darci 2024 05:30 )    Color: x / Appearance: x / SG: x / pH: x  Gluc: 122 mg/dL / Ketone: x  / Bili: x / Urobili: x   Blood: x / Protein: x / Nitrite: x   Leuk Esterase: x / RBC: x / WBC x   Sq Epi: x / Non Sq Epi: x / Bacteria: x            Culture - Urine (collected 01-09-24 @ 21:11)  Source: Clean Catch Clean Catch (Midstream)  Preliminary Report (01-10-24 @ 09:12):    No growth to date        IMAGING/EKG/ETC   O/N Events:    Subjective/ROS: Patient seen and examined at bedside. Patient seated upright in chair, pleasant, conversant. Patient reported that she has had LUE swelling and lymphedema since she began radiation therapy for breast cancer. She went to her oncologist on 1/9 after experiencing worsening LUE lymphedema, swelling, erythema, and warmth; she was given cephalexin and sent home. She then presented to [facility] due to the worsened swelling and redness. Patient states that she had a similar episode in July/August, and was admitted to [facility] for two nights.    Denies Fever/Chills, HA, CP, SOB, n/v, changes in bowel/urinary habits.  12pt ROS otherwise negative.    VITALS  Vital Signs Last 24 Hrs  T(C): 36.4 (10 Darci 2024 09:09), Max: 39.6 (09 Jan 2024 20:27)  T(F): 97.6 (10 Darci 2024 09:09), Max: 103.3 (09 Jan 2024 20:27)  HR: 97 (10 Darci 2024 09:09) (88 - 122)  BP: 132/81 (10 Darci 2024 09:09) (107/72 - 132/81)  BP(mean): 98 (10 Darci 2024 09:09) (98 - 98)  RR: 18 (10 Darci 2024 09:09) (18 - 20)  SpO2: 94% (10 Darci 2024 09:09) (94% - 96%)    Parameters below as of 10 Darci 2024 09:09  Patient On (Oxygen Delivery Method): room air        CAPILLARY BLOOD GLUCOSE          PHYSICAL EXAM  General: NAD  Head: NC/AT; MMM; PERRL; EOMI;  Neck: Supple; no JVD  Respiratory: CTAB; no wheezes/rales/rhonchi  Cardiovascular: Regular rhythm/rate; S1/S2+, no murmurs, rubs gallops   Gastrointestinal: Soft; NTND; bowel sounds normal and present  Extremities: WWP; no edema/cyanosis  Neurological: A&Ox3, CNII-XII grossly intact; no obvious focal deficits    Antimicrobials:  MEDICATIONS  (STANDING):  ceFAZolin   IVPB 2000 milliGRAM(s) IV Intermittent every 8 hours  vancomycin  IVPB 1250 milliGRAM(s) IV Intermittent every 12 hours      Standing Medications:  MEDICATIONS  (STANDING):  anastrozole 1 milliGRAM(s) Oral daily  atorvastatin 20 milliGRAM(s) Oral at bedtime  ceFAZolin   IVPB 2000 milliGRAM(s) IV Intermittent every 8 hours  enoxaparin Injectable 40 milliGRAM(s) SubCutaneous every 24 hours  vancomycin  IVPB 1250 milliGRAM(s) IV Intermittent every 12 hours      PRN Medications:  MEDICATIONS  (PRN):  acetaminophen     Tablet .. 650 milliGRAM(s) Oral every 6 hours PRN Temp greater or equal to 38C (100.4F), Mild Pain (1 - 3)      dust (Sneezing)  No Known Drug Allergies      LABS                        12.8   13.72 )-----------( 170      ( 10 Darci 2024 05:30 )             36.6     01-10    143  |  111<H>  |  14  ----------------------------<  122<H>  3.7   |  21<L>  |  0.64    Ca    8.8      10 Darci 2024 05:30  Phos  3.0     01-10  Mg     2.0     01-10    TPro  6.0  /  Alb  3.4  /  TBili  0.8  /  DBili  x   /  AST  15  /  ALT  13  /  AlkPhos  83  01-10      Urinalysis Basic - ( 10 Darci 2024 05:30 )    Color: x / Appearance: x / SG: x / pH: x  Gluc: 122 mg/dL / Ketone: x  / Bili: x / Urobili: x   Blood: x / Protein: x / Nitrite: x   Leuk Esterase: x / RBC: x / WBC x   Sq Epi: x / Non Sq Epi: x / Bacteria: x            Culture - Urine (collected 01-09-24 @ 21:11)  Source: Clean Catch Clean Catch (Midstream)  Preliminary Report (01-10-24 @ 09:12):    No growth to date        IMAGING/EKG/ETC   O/N Events:      Subjective/ROS: Patient seen and examined at bedside. Patient seated upright in chair, pleasant, conversant. Patient reported that she has had LUE swelling and lymphedema since she began radiation therapy for breast cancer. She went to her oncologist on 1/9 after experiencing worsening LUE lymphedema, swelling, erythema, and warmth; she was given cephalexin and sent home. She then presented to [facility] due to the worsened swelling and redness. Patient states that she had a similar episode in July/August, and was admitted to [facility] for two nights.    Denies Fever/Chills, HA, CP, SOB, n/v, changes in bowel/urinary habits.  12pt ROS otherwise negative.    VITALS  Vital Signs Last 24 Hrs  T(C): 36.4 (10 Darci 2024 09:09), Max: 39.6 (09 Jan 2024 20:27)  T(F): 97.6 (10 Darci 2024 09:09), Max: 103.3 (09 Jan 2024 20:27)  HR: 97 (10 Darci 2024 09:09) (88 - 122)  BP: 132/81 (10 Darci 2024 09:09) (107/72 - 132/81)  BP(mean): 98 (10 Darci 2024 09:09) (98 - 98)  RR: 18 (10 Darci 2024 09:09) (18 - 20)  SpO2: 94% (10 Darci 2024 09:09) (94% - 96%)    Parameters below as of 10 Darci 2024 09:09  Patient On (Oxygen Delivery Method): room air        CAPILLARY BLOOD GLUCOSE          PHYSICAL EXAM  General: NAD  Head: NC/AT; MMM; PERRL; EOMI;  Neck: Supple; no JVD  Respiratory: CTAB; no wheezes/rales/rhonchi  Cardiovascular: Regular rhythm/rate; S1/S2+, no murmurs, rubs gallops   Gastrointestinal: Soft; NTND; bowel sounds normal and present  Extremities: WWP; no edema/cyanosis  Neurological: A&Ox3, CNII-XII grossly intact; no obvious focal deficits    Antimicrobials:  MEDICATIONS  (STANDING):  ceFAZolin   IVPB 2000 milliGRAM(s) IV Intermittent every 8 hours  vancomycin  IVPB 1250 milliGRAM(s) IV Intermittent every 12 hours      Standing Medications:  MEDICATIONS  (STANDING):  anastrozole 1 milliGRAM(s) Oral daily  atorvastatin 20 milliGRAM(s) Oral at bedtime  ceFAZolin   IVPB 2000 milliGRAM(s) IV Intermittent every 8 hours  enoxaparin Injectable 40 milliGRAM(s) SubCutaneous every 24 hours  vancomycin  IVPB 1250 milliGRAM(s) IV Intermittent every 12 hours      PRN Medications:  MEDICATIONS  (PRN):  acetaminophen     Tablet .. 650 milliGRAM(s) Oral every 6 hours PRN Temp greater or equal to 38C (100.4F), Mild Pain (1 - 3)      dust (Sneezing)  No Known Drug Allergies      LABS                        12.8   13.72 )-----------( 170      ( 10 Darci 2024 05:30 )             36.6     01-10    143  |  111<H>  |  14  ----------------------------<  122<H>  3.7   |  21<L>  |  0.64    Ca    8.8      10 Darci 2024 05:30  Phos  3.0     01-10  Mg     2.0     01-10    TPro  6.0  /  Alb  3.4  /  TBili  0.8  /  DBili  x   /  AST  15  /  ALT  13  /  AlkPhos  83  01-10      Urinalysis Basic - ( 10 Darci 2024 05:30 )    Color: x / Appearance: x / SG: x / pH: x  Gluc: 122 mg/dL / Ketone: x  / Bili: x / Urobili: x   Blood: x / Protein: x / Nitrite: x   Leuk Esterase: x / RBC: x / WBC x   Sq Epi: x / Non Sq Epi: x / Bacteria: x            Culture - Urine (collected 01-09-24 @ 21:11)  Source: Clean Catch Clean Catch (Midstream)  Preliminary Report (01-10-24 @ 09:12):    No growth to date        IMAGING/EKG/ETC   O/N Events:      Subjective/ROS: Patient seen and examined at bedside. Patient seated upright in chair, pleasant, conversant. Patient reported that she has had LUE swelling and lymphedema since she began radiation therapy for breast cancer. She went to her oncologist on 1/9 after experiencing worsening LUE lymphedema, swelling, erythema, and warmth; she was given cephalexin and sent home. She then presented to [facility] due to the worsened swelling and redness. Patient states that she had a similar episode in July/August, and was admitted to [facility] for two nights.    Denies Fever/Chills, HA, CP, SOB, n/v, changes in bowel/urinary habits.  12pt ROS otherwise negative.    VITALS  Vital Signs Last 24 Hrs  T(C): 36.4 (10 Darci 2024 09:09), Max: 39.6 (09 Jan 2024 20:27)  T(F): 97.6 (10 Darci 2024 09:09), Max: 103.3 (09 Jan 2024 20:27)  HR: 97 (10 Darci 2024 09:09) (88 - 122)  BP: 132/81 (10 Darci 2024 09:09) (107/72 - 132/81)  BP(mean): 98 (10 Darci 2024 09:09) (98 - 98)  RR: 18 (10 Darci 2024 09:09) (18 - 20)  SpO2: 94% (10 Darci 2024 09:09) (94% - 96%)    Parameters below as of 10 Darci 2024 09:09  Patient On (Oxygen Delivery Method): room air        CAPILLARY BLOOD GLUCOSE          PHYSICAL EXAM  General: NAD  Head: NC/AT; MMM; PERRL; EOMI;  Neck: Supple; no JVD  Respiratory: CTAB; no wheezes/rales/rhonchi  Cardiovascular: Regular rhythm/rate; S1/S2+, no murmurs, rubs gallops   Gastrointestinal: Soft; NTND; bowel sounds normal and present  Extremities: WWP; no edema/cyanosis  Neurological: A&Ox3, CNII-XII grossly intact; no obvious focal deficits    Antimicrobials:  MEDICATIONS  (STANDING):  ceFAZolin   IVPB 2000 milliGRAM(s) IV Intermittent every 8 hours  vancomycin  IVPB 1250 milliGRAM(s) IV Intermittent every 12 hours      Standing Medications:  MEDICATIONS  (STANDING):  anastrozole 1 milliGRAM(s) Oral daily  atorvastatin 20 milliGRAM(s) Oral at bedtime  ceFAZolin   IVPB 2000 milliGRAM(s) IV Intermittent every 8 hours  enoxaparin Injectable 40 milliGRAM(s) SubCutaneous every 24 hours  vancomycin  IVPB 1250 milliGRAM(s) IV Intermittent every 12 hours      PRN Medications:  MEDICATIONS  (PRN):  acetaminophen     Tablet .. 650 milliGRAM(s) Oral every 6 hours PRN Temp greater or equal to 38C (100.4F), Mild Pain (1 - 3)      dust (Sneezing)  No Known Drug Allergies      LABS                        12.8   13.72 )-----------( 170      ( 10 Darci 2024 05:30 )             36.6     01-10    143  |  111<H>  |  14  ----------------------------<  122<H>  3.7   |  21<L>  |  0.64    Ca    8.8      10 Darci 2024 05:30  Phos  3.0     01-10  Mg     2.0     01-10    TPro  6.0  /  Alb  3.4  /  TBili  0.8  /  DBili  x   /  AST  15  /  ALT  13  /  AlkPhos  83  01-10      Urinalysis Basic - ( 10 Darci 2024 05:30 )    Color: x / Appearance: x / SG: x / pH: x  Gluc: 122 mg/dL / Ketone: x  / Bili: x / Urobili: x   Blood: x / Protein: x / Nitrite: x   Leuk Esterase: x / RBC: x / WBC x   Sq Epi: x / Non Sq Epi: x / Bacteria: x            Culture - Urine (collected 01-09-24 @ 21:11)  Source: Clean Catch Clean Catch (Midstream)  Preliminary Report (01-10-24 @ 09:12):    No growth to date        IMAGING/EKG/ETC

## 2024-01-10 NOTE — H&P ADULT - PROBLEM SELECTOR PLAN 1
Patient w/ leukocytosis 15.23, tachycardia w/ , meeting 2/4 SIRS criteria for sepsis w/ source of infection being LUE cellulitis. S/p Vanc 1500mg qd and NS 1700ml in ED.  - f/u BCx  - start Cefazolin 2g Q8h for non-purulent cellulitis  - continue to monitor erythema

## 2024-01-10 NOTE — PROGRESS NOTE ADULT - PROBLEM SELECTOR PLAN 2
Patient presenting w/ erythema and warmth to LUE with fever and chills. No purulence or areas of opening on the arm. Saw her oncologist, Dr. Carlin today who demarcated the area and recommended cephalexin 500mg Q6hrs for cellulitis. She went home and had increasing erythema up her arm then presented to ED. S/p Vanc 1250mg x1 in the ED.   - start Cefazolin 2g Q8h for non-purulent cellulitis  - f/u BCx  - continue to monitor erythema, currently demarcated on LUE  - compartment checks due to swelling  - LUE dopplers Patient presenting w/ erythema and warmth to LUE with fever and chills. No purulence or areas of opening on the arm. Saw her oncologist, Dr. Carlin today who demarcated the area and recommended cephalexin 500mg Q6hrs for cellulitis. She went home and had increasing erythema up her arm then presented to ED. S/p Vanc 1250mg x1 in the ED.   - BCx preliminary negative  - Continue Cefazolin 2g Q8h for non-purulent cellulitis  - continue to monitor erythema, currently demarcated on LUE  - compartment checks due to swelling  - f/u LUE dopplers

## 2024-01-10 NOTE — H&P ADULT - ASSESSMENT
68F w/ PMHx L breast ca s/p neoadjuvant chemo, b/l mastectomy (2019), XRT (2019), currently on anastrazole (follows w/ Dr. Carlin), chronic LUE lymphedema (s/p lymph node removal in 2019), prior admission for LUE cellulitis (7/31-8/2/23), now presenting with erythema and warmth to LUE, admitted for sepsis 2/2 LUE cellulitis.

## 2024-01-10 NOTE — PROGRESS NOTE ADULT - ATTENDING COMMENTS
Pt is 69 yo woman with history of Breast Ca, s;p mastectomy, RT and CT, with left arm lymphedema, s/p previous episode of left arm cellulitis, now comes with swallen, tender and erythematous left arm. Pt failed one day of keflex therapy  ---pt started on Vanc in addition to Ancef on this admission. Will follow closely. Left arm elevation  ---cont anastrozole for breast ca suppression Pt is 67 yo woman with history of Breast Ca, s;p mastectomy, RT and CT, with left arm lymphedema, s/p previous episode of left arm cellulitis, now comes with swallen, tender and erythematous left arm. Pt failed one day of keflex therapy  ---pt started on Vanc in addition to Ancef on this admission. Will follow closely. Left arm elevation  ---cont anastrozole for breast ca suppression

## 2024-01-10 NOTE — PROGRESS NOTE ADULT - PROBLEM SELECTOR PLAN 4
Na 133 on admission. Patient w/ sepsis on admission, likely 2/2 dehydration.  - continue to trend Na  - f/u urine studies Na 133 on admission. Patient w/ sepsis on admission, likely 2/2 dehydration.  Na 143  - continue to trend Na  - f/u urine studies

## 2024-01-10 NOTE — H&P ADULT - HISTORY OF PRESENT ILLNESS
68F w/ PMHx L breast ca s/p neoadjuvant chemo, b/l mastectomy (2019), XRT (2019), currently on anastrazole (follows w/ Dr. Carlin), chronic LUE lymphedema (s/p lymph node removal in 2019), prior admission for LUE cellulitis (7/31-8/2/23), now presenting with erythema and warmth to [facility] She reports yesterday she started to feel pain in her left arm, followed by erythema today. She went to her oncologist Dr. Carlin today, and Dr. Carlin demarcated her erythema and started her on Cephalexin 500mg Q6hrs for the cellulitis. While at home, the patient noticed increasing erythema past the demarcation so she presented to the ER for further evaluation. She denies trauma to the arm and hand. She says her left upper extremity at baseline is swollen since her b/l mastectomy they removed the lymph nodes on the left side and she has had chronic LUE lyphedema since then. She has had multiple infections of cellulitis since the surgery, her most recent was the admission in July 2023 at [facility]. She notes she had a recent infection with human metapneumovirus and was given prednisone for 5 days (12/21-25) for treatment from urgent care, and that might have weakened her immune system to cause her to have another cellulitis. She still has a cough from that infection, but did not have recent feeling of fever and chills until today. Denies chest pain, headache, abd pain, nausea, vomiting, and diarrhea.    In the ED:  Initial vital signs: /72, , RR 20, Temp 102 degrees F, Sp02 95% on room air  Labs significant for: WBC 15.23, Sodium 133, Glucose 145, UA w/ trace LE, moderate bacteria, and 38 RBC   Interventions: Tylenol 1g x1, Vanc 1250mg X1, NS 1700mL x1  Consults: None 68F w/ PMHx L breast ca s/p neoadjuvant chemo, b/l mastectomy (2019), XRT (2019), currently on anastrazole (follows w/ Dr. Carlin), chronic LUE lymphedema (s/p lymph node removal in 2019), prior admission for LUE cellulitis (7/31-8/2/23), now presenting with erythema and warmth to [facility]. She reports yesterday she started to feel pain in her left arm, followed by erythema today. She went to her oncologist Dr. Carlin today, and Dr. Carlin demarcated her erythema and started her on Cephalexin 500mg Q6hrs for the cellulitis. While at home, the patient noticed increasing erythema past the demarcation so she presented to the ER for further evaluation. She denies trauma to the arm and hand. She says her left upper extremity at baseline is swollen since her b/l mastectomy they removed the lymph nodes on the left side and she has had chronic LUE lyphedema since then. She has had multiple infections of cellulitis since the surgery, her most recent was the admission in July 2023 at [facility]. She notes she had a recent infection with human metapneumovirus and was given prednisone for 5 days (12/21-25) for treatment from urgent care, and that might have weakened her immune system to cause her to have another cellulitis. She still has a cough from that infection, but did not have recent feeling of fever and chills until today. Denies chest pain, headache, abd pain, nausea, vomiting, and diarrhea.    In the ED:  Initial vital signs: /72, , RR 20, Temp 102 degrees F, Sp02 95% on room air  Labs significant for: WBC 15.23, Sodium 133, Glucose 145, UA w/ trace LE, moderate bacteria, and 38 RBC   Interventions: Tylenol 1g x1, Vanc 1250mg X1, NS 1700mL x1  Consults: None 68F w/ PMHx L breast ca s/p neoadjuvant chemo, b/l mastectomy (2019), XRT (2019), currently on anastrazole (follows w/ Dr. Carlin), chronic LUE lymphedema (s/p lymph node removal in 2019), prior admission for LUE cellulitis (7/31-8/2/23), now presenting with erythema and warmth to [facility] She reports yesterday she started to feel pain in her left arm, followed by erythema today. She went to her oncologist Dr. Carlin today, and Dr. Carlin demarcated her erythema and started her on Cephalexin 500mg Q6hrs for the cellulitis. While at home, the patient noticed increasing erythema past the demarcation so she presented to the ER for further evaluation. She denies trauma to the arm and hand. She says her left upper extremity at baseline is swollen since her b/l mastectomy they removed the lymph nodes on the left side and she has had chronic LUE lyphedema since then. She has had multiple infections of cellulitis since the surgery, her most recent was the admission in July 2023 at [facility]. She notes she had a recent infection with human metapneumovirus and was given prednisone for 5 days (12/21-25) for treatment from urgent care, and that might have weakened her immune system to cause her to have another cellulitis. She still has a cough from that infection, but did not have recent feeling of fever and chills until today. Denies chest pain, headache, abd pain, nausea, vomiting, and diarrhea.    In the ED:  Initial vital signs: /72, , RR 20, Temp 102 degrees F, Sp02 95% on room air  Labs significant for: WBC 15.23, Sodium 133, Glucose 145, UA w/ trace LE, moderate bacteria, and 38 RBC   Interventions: Tylenol 1g x1, Vanc 1250mg X1, NS 1700mL x1  CXR: No acute findings.  Consults: None 68F w/ PMHx L breast ca s/p neoadjuvant chemo, b/l mastectomy (2019), XRT (2019), currently on anastrazole (follows w/ Dr. Carlin), chronic LUE lymphedema (s/p lymph node removal in 2019), prior admission for LUE cellulitis (7/31-8/2/23), now presenting with erythema and warmth to [facility]. She reports yesterday she started to feel pain in her left arm, followed by erythema today. She went to her oncologist Dr. Carlin today, and Dr. Carlin demarcated her erythema and started her on Cephalexin 500mg Q6hrs for the cellulitis. While at home, the patient noticed increasing erythema past the demarcation so she presented to the ER for further evaluation. She denies trauma to the arm and hand. She says her left upper extremity at baseline is swollen since her b/l mastectomy they removed the lymph nodes on the left side and she has had chronic LUE lyphedema since then. She has had multiple infections of cellulitis since the surgery, her most recent was the admission in July 2023 at [facility]. She notes she had a recent infection with human metapneumovirus and was given prednisone for 5 days (12/21-25) for treatment from urgent care, and that might have weakened her immune system to cause her to have another cellulitis. She still has a cough from that infection, but did not have recent feeling of fever and chills until today. Denies chest pain, headache, abd pain, nausea, vomiting, and diarrhea.    In the ED:  Initial vital signs: /72, , RR 20, Temp 102 degrees F, Sp02 95% on room air  Labs significant for: WBC 15.23, Sodium 133, Glucose 145, UA w/ trace LE, moderate bacteria, and 38 RBC   Interventions: Tylenol 1g x1, Vanc 1250mg X1, NS 1700mL x1  CXR: No acute findings.  Consults: None

## 2024-01-10 NOTE — PROGRESS NOTE ADULT - PROBLEM SELECTOR PLAN 6
Home med: atorvastatin 20mg qd  - c/w atorvastatin 20mg qd Home med: atorvastatin 20mg qd  - Continue atorvastatin 20mg qd

## 2024-01-10 NOTE — PROGRESS NOTE ADULT - SUBJECTIVE AND OBJECTIVE BOX
68y BRCA1+ (CHEK2 VUS) female with hx locally advanced breast cancer diagnosed in 2018, ER+, KS+, Foq6Xrs 2+ s/p neoadjuvant DD AC> 11/12 taxol> bilateral mastectomy with NUBIA/BSO>XRT>AI      Hx of recurrent cellulitis of LUE 2/20, 5/20, 2/21, 11/21, 12/22, 7/23  Presented to the office on 1/9/24 with LUE erythema, edema, and calor from fingers to bicepital region  No fevers or chills at that time  No known injury or source of entry  She was prescribed Keflix but developed chills and worsen erythema in the evening for which she presented to the ER    She was seen this morning in the ER  feeling better  no further chills  s/p vanco      MEDICATIONS  (STANDING):  anastrozole 1 milliGRAM(s) Oral daily  atorvastatin 20 milliGRAM(s) Oral at bedtime  ceFAZolin   IVPB 2000 milliGRAM(s) IV Intermittent every 8 hours  enoxaparin Injectable 40 milliGRAM(s) SubCutaneous every 24 hours  vancomycin  IVPB 1250 milliGRAM(s) IV Intermittent every 12 hours    MEDICATIONS  (PRN):  acetaminophen     Tablet .. 650 milliGRAM(s) Oral every 6 hours PRN Temp greater or equal to 38C (100.4F), Mild Pain (1 - 3)    ICU Vital Signs Last 24 Hrs  T(C): 36.4 (10 Darci 2024 16:13), Max: 36.9 (10 Darci 2024 06:27)  T(F): 97.6 (10 Darci 2024 16:13), Max: 98.4 (10 Darci 2024 06:27)  HR: 96 (10 Darci 2024 16:13) (88 - 107)  BP: 134/78 (10 Darci 2024 16:13) (110/73 - 146/87)  BP(mean): 98 (10 Darci 2024 09:09) (98 - 98)  ABP: --  ABP(mean): --  RR: 17 (10 Darci 2024 16:13) (16 - 18)  SpO2: 95% (10 Darci 2024 16:13) (94% - 96%)    O2 Parameters below as of 10 Darci 2024 16:13  Patient On (Oxygen Delivery Method): room air        Gen A&OX3  Skin no jaundice  HEENT moist mucosal membranes  Lung no accessory use, breathing comfortably  Ext LUE edema with erythema and calor extending proximally from marked region from 1/9/24                          12.8   13.72 )-----------( 170      ( 10 Darci 2024 05:30 )             36.6     01-10    143  |  111<H>  |  14  ----------------------------<  122<H>  3.7   |  21<L>  |  0.64    Ca    8.8      10 Darci 2024 05:30  Phos  3.0     01-10  Mg     2.0     01-10    TPro  6.0  /  Alb  3.4  /  TBili  0.8  /  DBili  x   /  AST  15  /  ALT  13  /  AlkPhos  83  01-10      A/P Hx of breast cancer, s/p bilateral mastectomy cb post op infection in 2019, hx of LUE lymphedema and recurrent cellulitis presenting on 1/9/24 with rapidly progressive LUE cellulitis    Receiving broad spectrum abx with improvement in systemic symptoms  Would continue to monitor extenr of cellulitis and have low threshold to expand abx if progressing  Given that she has had multiple recurrences, would consider ID consult to explore if there are options to prevent future episodes.   Continues anastrozole  Receiving lovenox for DVT prophylaxis    Thank you    Jody Carlin MD  [phone]   68y BRCA1+ (CHEK2 VUS) female with hx locally advanced breast cancer diagnosed in 2018, ER+, MS+, Bag3Bml 2+ s/p neoadjuvant DD AC> 11/12 taxol> bilateral mastectomy with NUBIA/BSO>XRT>AI      Hx of recurrent cellulitis of LUE 2/20, 5/20, 2/21, 11/21, 12/22, 7/23  Presented to the office on 1/9/24 with LUE erythema, edema, and calor from fingers to bicepital region  No fevers or chills at that time  No known injury or source of entry  She was prescribed Keflix but developed chills and worsen erythema in the evening for which she presented to the ER    She was seen this morning in the ER  feeling better  no further chills  s/p vanco      MEDICATIONS  (STANDING):  anastrozole 1 milliGRAM(s) Oral daily  atorvastatin 20 milliGRAM(s) Oral at bedtime  ceFAZolin   IVPB 2000 milliGRAM(s) IV Intermittent every 8 hours  enoxaparin Injectable 40 milliGRAM(s) SubCutaneous every 24 hours  vancomycin  IVPB 1250 milliGRAM(s) IV Intermittent every 12 hours    MEDICATIONS  (PRN):  acetaminophen     Tablet .. 650 milliGRAM(s) Oral every 6 hours PRN Temp greater or equal to 38C (100.4F), Mild Pain (1 - 3)    ICU Vital Signs Last 24 Hrs  T(C): 36.4 (10 Darci 2024 16:13), Max: 36.9 (10 Darci 2024 06:27)  T(F): 97.6 (10 Darci 2024 16:13), Max: 98.4 (10 Darci 2024 06:27)  HR: 96 (10 Darci 2024 16:13) (88 - 107)  BP: 134/78 (10 Darci 2024 16:13) (110/73 - 146/87)  BP(mean): 98 (10 Darci 2024 09:09) (98 - 98)  ABP: --  ABP(mean): --  RR: 17 (10 Darci 2024 16:13) (16 - 18)  SpO2: 95% (10 Darci 2024 16:13) (94% - 96%)    O2 Parameters below as of 10 Darci 2024 16:13  Patient On (Oxygen Delivery Method): room air        Gen A&OX3  Skin no jaundice  HEENT moist mucosal membranes  Lung no accessory use, breathing comfortably  Ext LUE edema with erythema and calor extending proximally from marked region from 1/9/24                          12.8   13.72 )-----------( 170      ( 10 Darci 2024 05:30 )             36.6     01-10    143  |  111<H>  |  14  ----------------------------<  122<H>  3.7   |  21<L>  |  0.64    Ca    8.8      10 Darci 2024 05:30  Phos  3.0     01-10  Mg     2.0     01-10    TPro  6.0  /  Alb  3.4  /  TBili  0.8  /  DBili  x   /  AST  15  /  ALT  13  /  AlkPhos  83  01-10      A/P Hx of breast cancer, s/p bilateral mastectomy cb post op infection in 2019, hx of LUE lymphedema and recurrent cellulitis presenting on 1/9/24 with rapidly progressive LUE cellulitis    Receiving broad spectrum abx with improvement in systemic symptoms  Would continue to monitor extenr of cellulitis and have low threshold to expand abx if progressing  Given that she has had multiple recurrences, would consider ID consult to explore if there are options to prevent future episodes.   Continues anastrozole  Receiving lovenox for DVT prophylaxis    Thank you    Jody Carlin MD  [phone]

## 2024-01-10 NOTE — H&P ADULT - PROBLEM SELECTOR PLAN 5
Home med: atorvastatin 20mg qd  - c/w atorvastatin 20mg qd S/p chemo and b/l mastectomy in 2019. Oncologist:  Dr. Carlin.  -c/w anastrazole 1mg PO qd

## 2024-01-10 NOTE — PROGRESS NOTE ADULT - PROBLEM SELECTOR PLAN 1
Patient w/ leukocytosis 15.23, tachycardia w/ , meeting 2/4 SIRS criteria for sepsis w/ source of infection being LUE cellulitis. S/p Vanc 1500mg qd and NS 1700ml in ED.  - f/u BCx  - start Cefazolin 2g Q8h for non-purulent cellulitis  - continue to monitor erythema Patient w/ leukocytosis 15.23, tachycardia w/ , meeting 2/4 SIRS criteria for sepsis w/ source of infection being LUE cellulitis. S/p Vanc 1500mg qd and NS 1700ml in ED.  - BCx preliminary negative  - Continue Cefazolin 2g Q8h for non-purulent cellulitis  - Start vancomycin 1.25 Q12h  - continue to monitor erythema Patient w/ leukocytosis 15.23, tachycardia w/ , meeting 2/4 SIRS criteria for sepsis w/ source of infection being LUE cellulitis. S/p Vanc 1500mg qd and NS 1700ml in ED.  - BCx preliminary negative  - Continue Cefazolin 2g Q8h for non-purulent cellulitis  - Start vancomycin 1.25 Q12h for non-purulent cellulitis  - continue to monitor erythema

## 2024-01-10 NOTE — H&P ADULT - NSHPLABSRESULTS_GEN_ALL_CORE
LABS:                         14.4   15.23 )-----------( 185      ( 2024 20:29 )             41.3         133<L>  |  99  |  15  ----------------------------<  145<H>  3.7   |  23  |  0.75    Ca    9.7      2024 20:29        Urinalysis Basic - ( 2024 21:11 )    Color: Yellow / Appearance: Clear / S.009 / pH: x  Gluc: x / Ketone: Negative mg/dL  / Bili: Negative / Urobili: 0.2 mg/dL   Blood: x / Protein: Negative mg/dL / Nitrite: Negative   Leuk Esterase: Trace / RBC: 38 /HPF / WBC 0 /HPF   Sq Epi: x / Non Sq Epi: 0 /HPF / Bacteria: Moderate /HPF        Lactate, Blood: 2.0 mmol/L ( @ 20:29)      RADIOLOGY, EKG & ADDITIONAL TESTS: Reviewed.

## 2024-01-10 NOTE — H&P ADULT - PROBLEM SELECTOR PLAN 6
F: S/p 1700ml fluids  E: replete as necessary  N: regular diet  DVT: lovenox  Dispo: [facility] Home med: atorvastatin 20mg qd  - c/w atorvastatin 20mg qd

## 2024-01-10 NOTE — H&P ADULT - PROBLEM SELECTOR PLAN 2
Patient presenting w/ erythema and warmth to LUE with fever and chills. No purulence or areas of opening on the arm. Saw her oncologist, Dr. Carlin today who demarcated the area and recommended cephalexin 500mg Q6hrs for cellulitis. She went home and had increasing erythema up her arm then presented to ED. S/p Vanc 1250mg x1 in the ED.   - start Cefazolin 2g Q8h for non-purulent cellulitis  - f/u BCx  - continue to monitor erythema, currently demarcated on LUE Patient presenting w/ erythema and warmth to LUE with fever and chills. No purulence or areas of opening on the arm. Saw her oncologist, Dr. Carlin today who demarcated the area and recommended cephalexin 500mg Q6hrs for cellulitis. She went home and had increasing erythema up her arm then presented to ED. S/p Vanc 1250mg x1 in the ED.   - start Cefazolin 2g Q8h for non-purulent cellulitis  - f/u BCx  - continue to monitor erythema, currently demarcated on LUE  - compartment checks due to swelling  - LUE dopplers

## 2024-01-10 NOTE — PROGRESS NOTE ADULT - PROBLEM SELECTOR PLAN 5
S/p chemo and b/l mastectomy in 2019. Oncologist:  Dr. Carlin.  -c/w anastrazole 1mg PO qd S/p chemo and b/l mastectomy in 2019. Oncologist:  Dr. Carlin.  - Continue anastrazole 1mg PO qd

## 2024-01-10 NOTE — H&P ADULT - ATTENDING COMMENTS
#Sepsis: 2/2 LUE cellulitis. Non purulent. ROM intact, compartments wnl. Cont to monitor. c/w cefazolin, f/up blood cx    #LUE cellulitis. Plan as above. Serial exams, compartment checks.

## 2024-01-11 LAB
ALBUMIN SERPL ELPH-MCNC: 3 G/DL — LOW (ref 3.3–5)
ALBUMIN SERPL ELPH-MCNC: 3 G/DL — LOW (ref 3.3–5)
ALP SERPL-CCNC: 78 U/L — SIGNIFICANT CHANGE UP (ref 40–120)
ALP SERPL-CCNC: 78 U/L — SIGNIFICANT CHANGE UP (ref 40–120)
ALT FLD-CCNC: 11 U/L — SIGNIFICANT CHANGE UP (ref 10–45)
ALT FLD-CCNC: 11 U/L — SIGNIFICANT CHANGE UP (ref 10–45)
ANION GAP SERPL CALC-SCNC: 10 MMOL/L — SIGNIFICANT CHANGE UP (ref 5–17)
ANION GAP SERPL CALC-SCNC: 10 MMOL/L — SIGNIFICANT CHANGE UP (ref 5–17)
AST SERPL-CCNC: 14 U/L — SIGNIFICANT CHANGE UP (ref 10–40)
AST SERPL-CCNC: 14 U/L — SIGNIFICANT CHANGE UP (ref 10–40)
BASOPHILS # BLD AUTO: 0.03 K/UL — SIGNIFICANT CHANGE UP (ref 0–0.2)
BASOPHILS # BLD AUTO: 0.03 K/UL — SIGNIFICANT CHANGE UP (ref 0–0.2)
BASOPHILS NFR BLD AUTO: 0.3 % — SIGNIFICANT CHANGE UP (ref 0–2)
BASOPHILS NFR BLD AUTO: 0.3 % — SIGNIFICANT CHANGE UP (ref 0–2)
BILIRUB SERPL-MCNC: 0.4 MG/DL — SIGNIFICANT CHANGE UP (ref 0.2–1.2)
BILIRUB SERPL-MCNC: 0.4 MG/DL — SIGNIFICANT CHANGE UP (ref 0.2–1.2)
BUN SERPL-MCNC: 9 MG/DL — SIGNIFICANT CHANGE UP (ref 7–23)
BUN SERPL-MCNC: 9 MG/DL — SIGNIFICANT CHANGE UP (ref 7–23)
CALCIUM SERPL-MCNC: 8.7 MG/DL — SIGNIFICANT CHANGE UP (ref 8.4–10.5)
CALCIUM SERPL-MCNC: 8.7 MG/DL — SIGNIFICANT CHANGE UP (ref 8.4–10.5)
CHLORIDE SERPL-SCNC: 108 MMOL/L — SIGNIFICANT CHANGE UP (ref 96–108)
CHLORIDE SERPL-SCNC: 108 MMOL/L — SIGNIFICANT CHANGE UP (ref 96–108)
CO2 SERPL-SCNC: 24 MMOL/L — SIGNIFICANT CHANGE UP (ref 22–31)
CO2 SERPL-SCNC: 24 MMOL/L — SIGNIFICANT CHANGE UP (ref 22–31)
CREAT SERPL-MCNC: 0.6 MG/DL — SIGNIFICANT CHANGE UP (ref 0.5–1.3)
CREAT SERPL-MCNC: 0.6 MG/DL — SIGNIFICANT CHANGE UP (ref 0.5–1.3)
CULTURE RESULTS: NO GROWTH — SIGNIFICANT CHANGE UP
CULTURE RESULTS: NO GROWTH — SIGNIFICANT CHANGE UP
EGFR: 98 ML/MIN/1.73M2 — SIGNIFICANT CHANGE UP
EGFR: 98 ML/MIN/1.73M2 — SIGNIFICANT CHANGE UP
EOSINOPHIL # BLD AUTO: 0.31 K/UL — SIGNIFICANT CHANGE UP (ref 0–0.5)
EOSINOPHIL # BLD AUTO: 0.31 K/UL — SIGNIFICANT CHANGE UP (ref 0–0.5)
EOSINOPHIL NFR BLD AUTO: 3.3 % — SIGNIFICANT CHANGE UP (ref 0–6)
EOSINOPHIL NFR BLD AUTO: 3.3 % — SIGNIFICANT CHANGE UP (ref 0–6)
GLUCOSE SERPL-MCNC: 153 MG/DL — HIGH (ref 70–99)
GLUCOSE SERPL-MCNC: 153 MG/DL — HIGH (ref 70–99)
HCT VFR BLD CALC: 37 % — SIGNIFICANT CHANGE UP (ref 34.5–45)
HCT VFR BLD CALC: 37 % — SIGNIFICANT CHANGE UP (ref 34.5–45)
HGB BLD-MCNC: 12.8 G/DL — SIGNIFICANT CHANGE UP (ref 11.5–15.5)
HGB BLD-MCNC: 12.8 G/DL — SIGNIFICANT CHANGE UP (ref 11.5–15.5)
IMM GRANULOCYTES NFR BLD AUTO: 0.4 % — SIGNIFICANT CHANGE UP (ref 0–0.9)
IMM GRANULOCYTES NFR BLD AUTO: 0.4 % — SIGNIFICANT CHANGE UP (ref 0–0.9)
LYMPHOCYTES # BLD AUTO: 1.36 K/UL — SIGNIFICANT CHANGE UP (ref 1–3.3)
LYMPHOCYTES # BLD AUTO: 1.36 K/UL — SIGNIFICANT CHANGE UP (ref 1–3.3)
LYMPHOCYTES # BLD AUTO: 14.5 % — SIGNIFICANT CHANGE UP (ref 13–44)
LYMPHOCYTES # BLD AUTO: 14.5 % — SIGNIFICANT CHANGE UP (ref 13–44)
MAGNESIUM SERPL-MCNC: 2 MG/DL — SIGNIFICANT CHANGE UP (ref 1.6–2.6)
MAGNESIUM SERPL-MCNC: 2 MG/DL — SIGNIFICANT CHANGE UP (ref 1.6–2.6)
MCHC RBC-ENTMCNC: 33.2 PG — SIGNIFICANT CHANGE UP (ref 27–34)
MCHC RBC-ENTMCNC: 33.2 PG — SIGNIFICANT CHANGE UP (ref 27–34)
MCHC RBC-ENTMCNC: 34.6 GM/DL — SIGNIFICANT CHANGE UP (ref 32–36)
MCHC RBC-ENTMCNC: 34.6 GM/DL — SIGNIFICANT CHANGE UP (ref 32–36)
MCV RBC AUTO: 95.9 FL — SIGNIFICANT CHANGE UP (ref 80–100)
MCV RBC AUTO: 95.9 FL — SIGNIFICANT CHANGE UP (ref 80–100)
MONOCYTES # BLD AUTO: 0.85 K/UL — SIGNIFICANT CHANGE UP (ref 0–0.9)
MONOCYTES # BLD AUTO: 0.85 K/UL — SIGNIFICANT CHANGE UP (ref 0–0.9)
MONOCYTES NFR BLD AUTO: 9.1 % — SIGNIFICANT CHANGE UP (ref 2–14)
MONOCYTES NFR BLD AUTO: 9.1 % — SIGNIFICANT CHANGE UP (ref 2–14)
NEUTROPHILS # BLD AUTO: 6.76 K/UL — SIGNIFICANT CHANGE UP (ref 1.8–7.4)
NEUTROPHILS # BLD AUTO: 6.76 K/UL — SIGNIFICANT CHANGE UP (ref 1.8–7.4)
NEUTROPHILS NFR BLD AUTO: 72.4 % — SIGNIFICANT CHANGE UP (ref 43–77)
NEUTROPHILS NFR BLD AUTO: 72.4 % — SIGNIFICANT CHANGE UP (ref 43–77)
NRBC # BLD: 0 /100 WBCS — SIGNIFICANT CHANGE UP (ref 0–0)
NRBC # BLD: 0 /100 WBCS — SIGNIFICANT CHANGE UP (ref 0–0)
PHOSPHATE SERPL-MCNC: 3.3 MG/DL — SIGNIFICANT CHANGE UP (ref 2.5–4.5)
PHOSPHATE SERPL-MCNC: 3.3 MG/DL — SIGNIFICANT CHANGE UP (ref 2.5–4.5)
PLATELET # BLD AUTO: 161 K/UL — SIGNIFICANT CHANGE UP (ref 150–400)
PLATELET # BLD AUTO: 161 K/UL — SIGNIFICANT CHANGE UP (ref 150–400)
POTASSIUM SERPL-MCNC: 3.6 MMOL/L — SIGNIFICANT CHANGE UP (ref 3.5–5.3)
POTASSIUM SERPL-MCNC: 3.6 MMOL/L — SIGNIFICANT CHANGE UP (ref 3.5–5.3)
POTASSIUM SERPL-SCNC: 3.6 MMOL/L — SIGNIFICANT CHANGE UP (ref 3.5–5.3)
POTASSIUM SERPL-SCNC: 3.6 MMOL/L — SIGNIFICANT CHANGE UP (ref 3.5–5.3)
PROT SERPL-MCNC: 6 G/DL — SIGNIFICANT CHANGE UP (ref 6–8.3)
PROT SERPL-MCNC: 6 G/DL — SIGNIFICANT CHANGE UP (ref 6–8.3)
RBC # BLD: 3.86 M/UL — SIGNIFICANT CHANGE UP (ref 3.8–5.2)
RBC # BLD: 3.86 M/UL — SIGNIFICANT CHANGE UP (ref 3.8–5.2)
RBC # FLD: 13.2 % — SIGNIFICANT CHANGE UP (ref 10.3–14.5)
RBC # FLD: 13.2 % — SIGNIFICANT CHANGE UP (ref 10.3–14.5)
SODIUM SERPL-SCNC: 142 MMOL/L — SIGNIFICANT CHANGE UP (ref 135–145)
SODIUM SERPL-SCNC: 142 MMOL/L — SIGNIFICANT CHANGE UP (ref 135–145)
SPECIMEN SOURCE: SIGNIFICANT CHANGE UP
SPECIMEN SOURCE: SIGNIFICANT CHANGE UP
VANCOMYCIN TROUGH SERPL-MCNC: 9.3 UG/ML — LOW (ref 10–20)
VANCOMYCIN TROUGH SERPL-MCNC: 9.3 UG/ML — LOW (ref 10–20)
WBC # BLD: 9.35 K/UL — SIGNIFICANT CHANGE UP (ref 3.8–10.5)
WBC # BLD: 9.35 K/UL — SIGNIFICANT CHANGE UP (ref 3.8–10.5)
WBC # FLD AUTO: 9.35 K/UL — SIGNIFICANT CHANGE UP (ref 3.8–10.5)
WBC # FLD AUTO: 9.35 K/UL — SIGNIFICANT CHANGE UP (ref 3.8–10.5)

## 2024-01-11 PROCEDURE — 99233 SBSQ HOSP IP/OBS HIGH 50: CPT | Mod: GC

## 2024-01-11 PROCEDURE — 99222 1ST HOSP IP/OBS MODERATE 55: CPT

## 2024-01-11 RX ORDER — VANCOMYCIN HCL 1 G
1250 VIAL (EA) INTRAVENOUS EVERY 12 HOURS
Refills: 0 | Status: DISCONTINUED | OUTPATIENT
Start: 2024-01-11 | End: 2024-01-12

## 2024-01-11 RX ADMIN — ENOXAPARIN SODIUM 40 MILLIGRAM(S): 100 INJECTION SUBCUTANEOUS at 12:40

## 2024-01-11 RX ADMIN — Medication 100 MILLIGRAM(S): at 12:40

## 2024-01-11 RX ADMIN — Medication 100 MILLIGRAM(S): at 20:33

## 2024-01-11 RX ADMIN — Medication 100 MILLIGRAM(S): at 03:42

## 2024-01-11 RX ADMIN — ANASTROZOLE 1 MILLIGRAM(S): 1 TABLET ORAL at 12:40

## 2024-01-11 RX ADMIN — Medication 166.67 MILLIGRAM(S): at 06:39

## 2024-01-11 RX ADMIN — ATORVASTATIN CALCIUM 20 MILLIGRAM(S): 80 TABLET, FILM COATED ORAL at 23:01

## 2024-01-11 NOTE — PROGRESS NOTE ADULT - SUBJECTIVE AND OBJECTIVE BOX
O/N Events: STACIA    Subjective/ROS: Patient seen and examined at bedside. Erythema mildly improved and mildly reduced swelling. Arm elevated on 2 pillows.     VITALS  Vital Signs Last 24 Hrs  T(C): 37 (11 Jan 2024 15:51), Max: 37.1 (10 Darci 2024 21:50)  T(F): 98.6 (11 Jan 2024 15:51), Max: 98.7 (10 Darci 2024 21:50)  HR: 91 (11 Jan 2024 15:51) (87 - 95)  BP: 124/82 (11 Jan 2024 15:51) (124/82 - 150/79)  BP(mean): --  RR: 18 (11 Jan 2024 15:51) (17 - 18)  SpO2: 95% (11 Jan 2024 15:51) (94% - 95%)    Parameters below as of 11 Jan 2024 15:51  Patient On (Oxygen Delivery Method): room air    CAPILLARY BLOOD GLUCOSE      PHYSICAL EXAM  General: NAD  Head: pupils reactive  Neck: Supple; no JVD  Respiratory: CTAB; no wheezes/rales/rhonchi  Cardiovascular: Regular rhythm/rate; S1/S2+, no murmurs, rubs gallops   Gastrointestinal: Soft; NTND; bowel sounds normal and present  Extremities: WWP; LUE swelling and erythema  Neurological: A&Ox3, CNII-XII grossly intact; no obvious focal deficits    MEDICATIONS  (STANDING):  anastrozole 1 milliGRAM(s) Oral daily  atorvastatin 20 milliGRAM(s) Oral at bedtime  ceFAZolin   IVPB 2000 milliGRAM(s) IV Intermittent every 8 hours  enoxaparin Injectable 40 milliGRAM(s) SubCutaneous every 24 hours    MEDICATIONS  (PRN):  acetaminophen     Tablet .. 650 milliGRAM(s) Oral every 6 hours PRN Temp greater or equal to 38C (100.4F), Mild Pain (1 - 3)      dust (Sneezing)  No Known Drug Allergies      LABS                        12.8   9.35  )-----------( 161      ( 11 Jan 2024 08:40 )             37.0     01-11    142  |  108  |  9   ----------------------------<  153<H>  3.6   |  24  |  0.60    Ca    8.7      11 Jan 2024 08:40  Phos  3.3     01-11  Mg     2.0     01-11    TPro  6.0  /  Alb  3.0<L>  /  TBili  0.4  /  DBili  x   /  AST  14  /  ALT  11  /  AlkPhos  78  01-11      Urinalysis Basic - ( 11 Jan 2024 08:40 )    Color: x / Appearance: x / SG: x / pH: x  Gluc: 153 mg/dL / Ketone: x  / Bili: x / Urobili: x   Blood: x / Protein: x / Nitrite: x   Leuk Esterase: x / RBC: x / WBC x   Sq Epi: x / Non Sq Epi: x / Bacteria: x              IMAGING/EKG/ETC

## 2024-01-11 NOTE — PROGRESS NOTE ADULT - PROBLEM SELECTOR PLAN 2
Patient presenting w/ erythema and warmth to LUE with fever and chills. No purulence or areas of opening on the arm. Saw her oncologist, Dr. Carlin today who demarcated the area and recommended cephalexin 500mg Q6hrs for cellulitis. She went home and had increasing erythema up her arm then presented to ED. S/p Vanc 1250mg x1 in the ED. DUS negative.  - BCx preliminary negative  - Continue Cefazolin 2g Q8h and vanc 1.25 Q12  - continue to monitor erythema, currently demarcated on LUE  - compartment checks due to swelling

## 2024-01-11 NOTE — PROGRESS NOTE ADULT - PROBLEM SELECTOR PLAN 4
Na 133 on admission. Patient w/ sepsis on admission, likely 2/2 dehydration.  Na 143  - continue to trend Na

## 2024-01-11 NOTE — PROGRESS NOTE ADULT - ATTENDING COMMENTS
Ms. Julian Bernstein is a 68/F with breast ca s/p neoadjuvant chemotherapy, b/l mastectomy (2019), XRT (2019) currently on anastrazole and followed by Dr. Carlin of oncology of [facility] who presented with left upper extremity erythema and warmth and admitted for sepsis 2/2 to BRODERICK cellulitis.    She reports clinical improvement and showed me photos of her left UE prior to admission. She has less arm swelling and erythema and can make a fist now which she cannot do prior to admission. Continue current IV antibiotic regimen and follow cultures. ID consultation. MRSA screen. Patient properly advised and educated. Ms. Julian Bernstein is a 68/F with breast ca s/p neoadjuvant chemotherapy, b/l mastectomy (2019), XRT (2019) currently on anastrazole and followed by Dr. Carlin of oncology of [facility] who presented with left upper extremity erythema and warmth and admitted for sepsis 2/2 to BRODERICK cellulitis.    She reports clinical improvement and showed me photos of her left UE prior to admission. She has less arm swelling and erythema and can make a fist now which she cannot do prior to admission. Continue current IV antibiotic regimen and follow cultures. ID consultation. MRSA screen. Patient properly advised and educated.

## 2024-01-11 NOTE — CONSULT NOTE ADULT - ATTENDING COMMENTS
Agree with above.   [place] [facility] [place]ars to be improving.  Most likely pathogens are MRSA, MSSA and group A strep.  Vancomycin and Cefazolin are covering these well. Agree with above.   Patient with cellulitis in [facility] [place].  Appears to be improving.  Most likely pathogens are MRSA, MSSA and group A strep.  Vancomycin and Cefazolin are covering these well. Agree with above.   Patient with cellulitis in [place].  Appears to be improving.  Most likely pathogens are MRSA, MSSA and group A strep.  Vancomycin and Cefazolin are covering these well.  Can likely switch to oral antibiotics tomorrow.  To prevent recurrence we discussed decolonization vs chronic suppression.  I prefer attempt at decolonization as chronic suppression means indefinite antibiotics which have significant risks/side effects.  If decolonization does not work, chronic suppression can be considered.  This can be discussed during outpatient follow up.

## 2024-01-11 NOTE — PROGRESS NOTE ADULT - SUBJECTIVE AND OBJECTIVE BOX
68y BRCA1+ (CHEK2 VUS) female with hx locally advanced breast cancer diagnosed in 2018, ER+, NM+, [id number] 2+ s/p neoadjuvant DD AC> 11/12 taxol> bilateral mastectomy with NUBIA/BSO>XRT>AI      Hx of recurrent cellulitis of LUE 2/20, 5/20, 2/21, 11/21, 12/22, 7/23  Presented to the office on 1/9/24 with LUE erythema, edema, and calor from fingers to bicepital region  No fevers or chills at that time  No known injury or source of entry  She was prescribed Keflix but developed chills and worsen erythema in the evening for which she presented to the ER    Less discomfort, erythema and calor today  feeling better  no further chills      MEDICATIONS  (STANDING):  anastrozole 1 milliGRAM(s) Oral daily  atorvastatin 20 milliGRAM(s) Oral at bedtime  ceFAZolin   IVPB 2000 milliGRAM(s) IV Intermittent every 8 hours  enoxaparin Injectable 40 milliGRAM(s) SubCutaneous every 24 hours    MEDICATIONS  (PRN):  acetaminophen     Tablet .. 650 milliGRAM(s) Oral every 6 hours PRN Temp greater or equal to 38C (100.4F), Mild Pain (1 - 3)      ICU Vital Signs Last 24 Hrs  T(C): 36.8 (11 Jan 2024 06:24), Max: 37.1 (10 Darci 2024 21:50)  T(F): 98.3 (11 Jan 2024 06:24), Max: 98.7 (10 Darci 2024 21:50)  HR: 87 (11 Jan 2024 06:24) (87 - 97)  BP: 150/79 (11 Jan 2024 06:24) (128/79 - 150/79)  BP(mean): 98 (10 Darci 2024 09:09) (98 - 98)  ABP: --  ABP(mean): --  RR: 17 (11 Jan 2024 06:24) (16 - 18)  SpO2: 94% (11 Jan 2024 06:24) (94% - 95%)    O2 Parameters below as of 11 Jan 2024 06:24  Patient On (Oxygen Delivery Method): room air    Gen A&OX3  Skin no jaundice  HEENT moist mucosal membranes  Lung no accessory use, breathing comfortably  Ext LUE edema with erythema and calor extending proximally, overall less erythema and calor, no further extension                           12.8   13.72 )-----------( 170      ( 10 Darci 2024 05:30 )             36.6                    01-10    143  |  111<H>  |  14  ----------------------------<  122<H>  3.7   |  21<L>  |  0.64    Ca    8.8      10 Darci 2024 05:30  Phos  3.0     01-10  Mg     2.0     01-10    TPro  6.0  /  Alb  3.4  /  TBili  0.8  /  DBili  x   /  AST  15  /  ALT  13  /  AlkPhos  83  01-10      A/P Hx of breast cancer, s/p bilateral mastectomy cb post op infection in 2019, hx of LUE lymphedema and recurrent cellulitis presenting on 1/9/24 with rapidly progressive LUE cellulitis    Receiving broad spectrum abx with improvement in systemic symptoms  Would continue to monitor extenr of cellulitis and have low threshold to expand abx if progressing  Given that she has had multiple recurrences, would consider ID consult to explore if there are options to prevent future episodes.   Clinically improving today  Continues anastrozole  Receiving lovenox for DVT prophylaxis    Thank you    Jody Carlin MD  [phone]   68y BRCA1+ (CHEK2 VUS) female with hx locally advanced breast cancer diagnosed in 2018, ER+, IA+, [id number] 2+ s/p neoadjuvant DD AC> 11/12 taxol> bilateral mastectomy with NUBIA/BSO>XRT>AI      Hx of recurrent cellulitis of LUE 2/20, 5/20, 2/21, 11/21, 12/22, 7/23  Presented to the office on 1/9/24 with LUE erythema, edema, and calor from fingers to bicepital region  No fevers or chills at that time  No known injury or source of entry  She was prescribed Keflix but developed chills and worsen erythema in the evening for which she presented to the ER    Less discomfort, erythema and calor today  feeling better  no further chills      MEDICATIONS  (STANDING):  anastrozole 1 milliGRAM(s) Oral daily  atorvastatin 20 milliGRAM(s) Oral at bedtime  ceFAZolin   IVPB 2000 milliGRAM(s) IV Intermittent every 8 hours  enoxaparin Injectable 40 milliGRAM(s) SubCutaneous every 24 hours    MEDICATIONS  (PRN):  acetaminophen     Tablet .. 650 milliGRAM(s) Oral every 6 hours PRN Temp greater or equal to 38C (100.4F), Mild Pain (1 - 3)      ICU Vital Signs Last 24 Hrs  T(C): 36.8 (11 Jan 2024 06:24), Max: 37.1 (10 Darci 2024 21:50)  T(F): 98.3 (11 Jan 2024 06:24), Max: 98.7 (10 Darci 2024 21:50)  HR: 87 (11 Jan 2024 06:24) (87 - 97)  BP: 150/79 (11 Jan 2024 06:24) (128/79 - 150/79)  BP(mean): 98 (10 Darci 2024 09:09) (98 - 98)  ABP: --  ABP(mean): --  RR: 17 (11 Jan 2024 06:24) (16 - 18)  SpO2: 94% (11 Jan 2024 06:24) (94% - 95%)    O2 Parameters below as of 11 Jan 2024 06:24  Patient On (Oxygen Delivery Method): room air    Gen A&OX3  Skin no jaundice  HEENT moist mucosal membranes  Lung no accessory use, breathing comfortably  Ext LUE edema with erythema and calor extending proximally, overall less erythema and calor, no further extension                           12.8   13.72 )-----------( 170      ( 10 Darci 2024 05:30 )             36.6                    01-10    143  |  111<H>  |  14  ----------------------------<  122<H>  3.7   |  21<L>  |  0.64    Ca    8.8      10 Darci 2024 05:30  Phos  3.0     01-10  Mg     2.0     01-10    TPro  6.0  /  Alb  3.4  /  TBili  0.8  /  DBili  x   /  AST  15  /  ALT  13  /  AlkPhos  83  01-10      A/P Hx of breast cancer, s/p bilateral mastectomy cb post op infection in 2019, hx of LUE lymphedema and recurrent cellulitis presenting on 1/9/24 with rapidly progressive LUE cellulitis    Receiving broad spectrum abx with improvement in systemic symptoms  Would continue to monitor extenr of cellulitis and have low threshold to expand abx if progressing  Given that she has had multiple recurrences, would consider ID consult to explore if there are options to prevent future episodes.   Clinically improving today  Continues anastrozole  Receiving lovenox for DVT prophylaxis    Thank you    Jody Carlin MD  [phone]

## 2024-01-11 NOTE — PROGRESS NOTE ADULT - PROBLEM SELECTOR PLAN 1
Patient w/ leukocytosis 15.23, tachycardia w/ , meeting 2/4 SIRS criteria for sepsis w/ source of infection being LUE cellulitis. S/p Vanc 1500mg qd and NS 1700ml in ED.  - BCx preliminary negative  - Continue Cefazolin 2g Q8h for non-purulent cellulitis  - c/w vancomycin 1.25 Q12h for non-purulent cellulitis, f/u trough  - continue to monitor erythema

## 2024-01-11 NOTE — PROGRESS NOTE ADULT - PROBLEM SELECTOR PLAN 7
F: S/p 1700ml fluids  E: replete as necessary  N: regular diet  DVT: lovenox  Dispo: RMF
F: S/p 1700ml fluids  E: replete as necessary  N: regular diet  DVT: lovenox  Dispo: RMF

## 2024-01-11 NOTE — CONSULT NOTE ADULT - ASSESSMENT
68F w/ PMHx L breast ca s/p neoadjuvant chemo, b/l mastectomy (2019), XRT (2019), currently on anastrazole (follows w/ Dr. Carlin), chronic LUE lymphedema (s/p lymph node removal in 2019), prior admission for LUE cellulitis (7/31-8/2/23), now presenting with erythema and warmth to LUE. Patient w/ recurrent cellulitis in the same anatomical location (LUE) 2/2 chronic lymphedema s/p lymphnode resection. Per patient she gets approximately 2 episodes of cellulitis/year, with each flare requiring longer hospitalization stays. Last infection was in August 2023 and was admitted and received Cefazolin 1g q8. Per guidelines patient may qualify for suppressive antibiotic therapy for cellulitis given that she has recurrent cellulitis iso supportive therapy.     Recommendation.  1) Continue with Cefazolin 2g q8h  2) Follow up Vancomycin trough and re-dose vancomycin as appropriate  3) Pending final recs for discharge  4) Follow up with Dr. Jaffe outpatient.  68F w/ PMHx L breast ca s/p neoadjuvant chemo, b/l mastectomy (2019), XRT (2019), currently on anastrazole (follows w/ Dr. Carlin), chronic LUE lymphedema (s/p lymph node removal in 2019), prior admission for LUE cellulitis (7/31-8/2/23), now presenting with erythema and warmth to LUE. Patient w/ recurrent cellulitis in the same anatomical location (LUE) 2/2 chronic lymphedema s/p lymphnode resection. Per patient she gets approximately 2 episodes of cellulitis/year, with each flare requiring longer hospitalization stays. Last infection was in August 2023 and was admitted and received Cefazolin 1g q8. Patient may qualify for suppressive antibiotic therapy for cellulitis given that she has recurrent cellulitis even with outpatient lymphedema physiotherapy.     Recommendation.  1) Continue with Cefazolin 2g q8h  2) Follow up Vancomycin trough and re-dose vancomycin as appropriate  3) Pending final recs for discharge  4) Follow up with Dr. Jaffe outpatient.

## 2024-01-11 NOTE — CONSULT NOTE ADULT - SUBJECTIVE AND OBJECTIVE BOX
*****INCOMPLETE NOTE*****    INTERVAL HPI/OVERNIGHT EVENTS:    SUBJECTIVE: Pt seen and examined at bedside. STACIA.   Denies f/c/n/v/d, abd pain, SOB, CP,  sxs,     ANTIBIOTICS/RELEVANT:    MEDICATIONS  (STANDING):  anastrozole 1 milliGRAM(s) Oral daily  atorvastatin 20 milliGRAM(s) Oral at bedtime  ceFAZolin   IVPB 2000 milliGRAM(s) IV Intermittent every 8 hours  enoxaparin Injectable 40 milliGRAM(s) SubCutaneous every 24 hours    MEDICATIONS  (PRN):  acetaminophen     Tablet .. 650 milliGRAM(s) Oral every 6 hours PRN Temp greater or equal to 38C (100.4F), Mild Pain (1 - 3)      Vital Signs Last 24 Hrs  T(C): 36.8 (11 Jan 2024 06:24), Max: 37.1 (10 Darci 2024 21:50)  T(F): 98.3 (11 Jan 2024 06:24), Max: 98.7 (10 Darci 2024 21:50)  HR: 87 (11 Jan 2024 06:24) (87 - 96)  BP: 150/79 (11 Jan 2024 06:24) (128/79 - 150/79)  BP(mean): --  RR: 17 (11 Jan 2024 06:24) (16 - 17)  SpO2: 94% (11 Jan 2024 06:24) (94% - 95%)    Parameters below as of 11 Jan 2024 06:24  Patient On (Oxygen Delivery Method): room air        PHYSICAL EXAM:  General: in no acute distress, non toxic appearing, speaking in full sentences  Eyes: EOMI intact bilaterally. Anicteric sclerae, moist conjunctivae  HENT: Moist mucous membranes  Neck: Trachea midline, supple  Lungs: CTA B/L. No wheezes, rales, or rhonchi  Cardiovascular: RRR. No murmurs, rubs, or gallops  Abdomen: +BS Soft, non-tender non-distended; No rebound or guarding  Vasc: Rad and DP intact and equal b/l   Extremities: WWP, No clubbing, cyanosis or edema  Neurological: Alert and oriented  Skin: Warm and dry. No obvious rash     LABS:                        12.8   9.35  )-----------( 161      ( 11 Jan 2024 08:40 )             37.0     01-11    142  |  108  |  9   ----------------------------<  153<H>  3.6   |  24  |  0.60    Ca    8.7      11 Jan 2024 08:40  Phos  3.3     01-11  Mg     2.0     01-11    TPro  6.0  /  Alb  3.0<L>  /  TBili  0.4  /  DBili  x   /  AST  14  /  ALT  11  /  AlkPhos  78  01-11      Urinalysis Basic - ( 11 Jan 2024 08:40 )    Color: x / Appearance: x / SG: x / pH: x  Gluc: 153 mg/dL / Ketone: x  / Bili: x / Urobili: x   Blood: x / Protein: x / Nitrite: x   Leuk Esterase: x / RBC: x / WBC x   Sq Epi: x / Non Sq Epi: x / Bacteria: x        MICROBIOLOGY:    RADIOLOGY & ADDITIONAL STUDIES: *****INCOMPLETE NOTE*****  HPI: 68F w/ PMHx L breast ca s/p neoadjuvant chemo, b/l mastectomy (2019), XRT (2019), currently on anastrazole (follows w/ Dr. Carlin), chronic LUE lymphedema (s/p lymph node removal in 2019), prior admission for LUE cellulitis (7/31-8/2/23), now presenting with erythema and warmth to E. She reports yesterday she started to feel pain in her left arm, followed by erythema today. She went to her oncologist Dr. Carlin today, and Dr. Carlin demarcated her erythema and started her on Cephalexin 500mg Q6hrs for the cellulitis. While at home, the patient noticed increasing erythema past the demarcation so she presented to the ER for further evaluation. She denies trauma to the arm and hand. She says her left upper extremity at baseline is swollen since her b/l mastectomy they removed the lymph nodes on the left side and she has had chronic LUE lyphedema since then. She has had multiple infections of cellulitis since the surgery, her most recent was the admission in July 2023 at [facility]. She notes she had a recent infection with human metapneumovirus and was given prednisone for 5 days (12/21-25) for treatment from urgent care, and that might have weakened her immune system to cause her to have another cellulitis. She still has a cough from that infection, but did not have recent feeling of fever and chills until today. Denies chest pain, headache, abd pain, nausea, vomiting, and diarrhea.    INTERVAL HPI/OVERNIGHT EVENTS: States she had about 6 recurrences for her LUE cellulitis, on averages gets ~2 episodes of cellulits a year to the same location. Sees PT for her chronic lymphedema. States she doubled dose on her keflex at 3pm 1/9/23 and realized by 7pm on the same day that her redness was spreading.     SUBJECTIVE: Pt seen and examined at bedside. Reports swelling improved and redness improved.      ANTIBIOTICS/RELEVANT: Received Vanc 1250mg q12 [id number] and IV cefazolin 2g q8 [id number]      MEDICATIONS  (STANDING):  anastrozole 1 milliGRAM(s) Oral daily  atorvastatin 20 milliGRAM(s) Oral at bedtime  ceFAZolin   IVPB 2000 milliGRAM(s) IV Intermittent every 8 hours  enoxaparin Injectable 40 milliGRAM(s) SubCutaneous every 24 hours    MEDICATIONS  (PRN):  acetaminophen     Tablet .. 650 milliGRAM(s) Oral every 6 hours PRN Temp greater or equal to 38C (100.4F), Mild Pain (1 - 3)      Vital Signs Last 24 Hrs  T(C): 36.8 (11 Jan 2024 06:24), Max: 37.1 (10 Darci 2024 21:50)  T(F): 98.3 (11 Jan 2024 06:24), Max: 98.7 (10 Darci 2024 21:50)  HR: 87 (11 Jan 2024 06:24) (87 - 96)  BP: 150/79 (11 Jan 2024 06:24) (128/79 - 150/79)  BP(mean): --  RR: 17 (11 Jan 2024 06:24) (16 - 17)  SpO2: 94% (11 Jan 2024 06:24) (94% - 95%)    Parameters below as of 11 Jan 2024 06:24  Patient On (Oxygen Delivery Method): room air        PHYSICAL EXAM:  General: In no acute distress, non toxic appearing, speaking in full sentences  Vasc: Rad and DP intact and equal b/l   Extremities: LUE swelling and erythema w/ marked borders. Erythema regressing however still warm compared to contralateral arm.  Neurological: Alert and oriented x3. Peripheral sensation intact b/l and equal.  Skin: Warm and dry. No obvious rash     LABS:                        12.8   9.35  )-----------( 161      ( 11 Jan 2024 08:40 )             37.0     01-11    142  |  108  |  9   ----------------------------<  153<H>  3.6   |  24  |  0.60    Ca    8.7      11 Jan 2024 08:40  Phos  3.3     01-11  Mg     2.0     01-11    TPro  6.0  /  Alb  3.0<L>  /  TBili  0.4  /  DBili  x   /  AST  14  /  ALT  11  /  AlkPhos  78  01-11      Urinalysis Basic - ( 11 Jan 2024 08:40 )    Color: x / Appearance: x / SG: x / pH: x  Gluc: 153 mg/dL / Ketone: x  / Bili: x / Urobili: x   Blood: x / Protein: x / Nitrite: x   Leuk Esterase: x / RBC: x / WBC x   Sq Epi: x / Non Sq Epi: x / Bacteria: x        MICROBIOLOGY:    RADIOLOGY & ADDITIONAL STUDIES: *****INCOMPLETE NOTE*****  HPI: 68F w/ PMHx L breast ca s/p neoadjuvant chemo, b/l mastectomy (2019), XRT (2019), currently on anastrazole (follows w/ Dr. Carlin), chronic LUE lymphedema (s/p lymph node removal in 2019), prior admission for LUE cellulitis (7/31-8/2/23), now presenting with erythema and warmth to E. She reports yesterday she started to feel pain in her left arm, followed by erythema today. She went to her oncologist Dr. Carlin today, and Dr. Carlin demarcated her erythema and started her on Cephalexin 500mg Q6hrs for the cellulitis. While at home, the patient noticed increasing erythema past the demarcation so she presented to the ER for further evaluation. She denies trauma to the arm and hand. She says her left upper extremity at baseline is swollen since her b/l mastectomy they removed the lymph nodes on the left side and she has had chronic LUE lyphedema since then. She has had multiple infections of cellulitis since the surgery, her most recent was the admission in July 2023 at [facility]. She notes she had a recent infection with human metapneumovirus and was given prednisone for 5 days (12/21-25) for treatment from urgent care, and that might have weakened her immune system to cause her to have another cellulitis. She still has a cough from that infection, but did not have recent feeling of fever and chills until today. Denies chest pain, headache, abd pain, nausea, vomiting, and diarrhea.    INTERVAL HPI/OVERNIGHT EVENTS: States she had about 6 recurrences for her LUE cellulitis, on averages gets ~2 episodes of cellulits a year to the same location. Sees PT for her chronic lymphedema. States she doubled dose on her keflex at 3pm 1/9/23 and realized by 7pm on the same day that her redness was spreading.     SUBJECTIVE: Pt seen and examined at bedside. Reports swelling improved and redness improved.      ANTIBIOTICS/RELEVANT: Received Vanc 1250mg q12 q8ngite and IV cefazolin 2g q8 [id number]      MEDICATIONS  (STANDING):  anastrozole 1 milliGRAM(s) Oral daily  atorvastatin 20 milliGRAM(s) Oral at bedtime  ceFAZolin   IVPB 2000 milliGRAM(s) IV Intermittent every 8 hours  enoxaparin Injectable 40 milliGRAM(s) SubCutaneous every 24 hours    MEDICATIONS  (PRN):  acetaminophen     Tablet .. 650 milliGRAM(s) Oral every 6 hours PRN Temp greater or equal to 38C (100.4F), Mild Pain (1 - 3)      Vital Signs Last 24 Hrs  T(C): 36.8 (11 Jan 2024 06:24), Max: 37.1 (10 Darci 2024 21:50)  T(F): 98.3 (11 Jan 2024 06:24), Max: 98.7 (10 Darci 2024 21:50)  HR: 87 (11 Jan 2024 06:24) (87 - 96)  BP: 150/79 (11 Jan 2024 06:24) (128/79 - 150/79)  BP(mean): --  RR: 17 (11 Jan 2024 06:24) (16 - 17)  SpO2: 94% (11 Jan 2024 06:24) (94% - 95%)    Parameters below as of 11 Jan 2024 06:24  Patient On (Oxygen Delivery Method): room air        PHYSICAL EXAM:  General: In no acute distress, non toxic appearing, speaking in full sentences  Vasc: Rad and DP intact and equal b/l   Extremities: LUE swelling and erythema w/ marked borders. Erythema regressing however still warm compared to contralateral arm.  Neurological: Alert and oriented x3. Peripheral sensation intact b/l and equal.  Skin: Warm and dry. No obvious rash     LABS:                        12.8   9.35  )-----------( 161      ( 11 Jan 2024 08:40 )             37.0     01-11    142  |  108  |  9   ----------------------------<  153<H>  3.6   |  24  |  0.60    Ca    8.7      11 Jan 2024 08:40  Phos  3.3     01-11  Mg     2.0     01-11    TPro  6.0  /  Alb  3.0<L>  /  TBili  0.4  /  DBili  x   /  AST  14  /  ALT  11  /  AlkPhos  78  01-11      Urinalysis Basic - ( 11 Jan 2024 08:40 )    Color: x / Appearance: x / SG: x / pH: x  Gluc: 153 mg/dL / Ketone: x  / Bili: x / Urobili: x   Blood: x / Protein: x / Nitrite: x   Leuk Esterase: x / RBC: x / WBC x   Sq Epi: x / Non Sq Epi: x / Bacteria: x        MICROBIOLOGY:    RADIOLOGY & ADDITIONAL STUDIES: *****INCOMPLETE NOTE*****  HPI: 68F w/ PMHx L breast ca s/p neoadjuvant chemo, b/l mastectomy (2019), XRT (2019), currently on anastrazole (follows w/ Dr. Carlin), chronic LUE lymphedema (s/p lymph node removal in 2019), prior admission for LUE cellulitis (7/31-8/2/23), now presenting with erythema and warmth to E. She reports yesterday she started to feel pain in her left arm, followed by erythema today. She went to her oncologist Dr. Carlin today, and Dr. Carlin demarcated her erythema and started her on Cephalexin 500mg Q6hrs for the cellulitis. While at home, the patient noticed increasing erythema past the demarcation so she presented to the ER for further evaluation. She denies trauma to the arm and hand. She says her left upper extremity at baseline is swollen since her b/l mastectomy they removed the lymph nodes on the left side and she has had chronic LUE lyphedema since then. She has had multiple infections of cellulitis since the surgery, her most recent was the admission in July 2023 at [facility]. She notes she had a recent infection with human metapneumovirus and was given prednisone for 5 days (12/21-25) for treatment from urgent care, and that might have weakened her immune system to cause her to have another cellulitis. She still has a cough from that infection, but did not have recent feeling of fever and chills until today. Denies chest pain, headache, abd pain, nausea, vomiting, and diarrhea.    INTERVAL HPI/OVERNIGHT EVENTS: States she had about 6 recurrences for her LUE cellulitis, on averages gets ~2 episodes of cellulits a year to the same location. Sees PT for her chronic lymphedema. States she only took 2 doses of her keflex (Took both simultaneously) on 1/9/24 at 3pm and at 7pm the same day realized that her redness was spreading.    SUBJECTIVE: Pt seen and examined at bedside. Reports swelling improved and redness improved.      ANTIBIOTICS/RELEVANT: Received Vanc 1250mg q12 [id number] and IV cefazolin 2g q8 [id number]      MEDICATIONS  (STANDING):  anastrozole 1 milliGRAM(s) Oral daily  atorvastatin 20 milliGRAM(s) Oral at bedtime  ceFAZolin   IVPB 2000 milliGRAM(s) IV Intermittent every 8 hours  enoxaparin Injectable 40 milliGRAM(s) SubCutaneous every 24 hours    MEDICATIONS  (PRN):  acetaminophen     Tablet .. 650 milliGRAM(s) Oral every 6 hours PRN Temp greater or equal to 38C (100.4F), Mild Pain (1 - 3)      Vital Signs Last 24 Hrs  T(C): 36.8 (11 Jan 2024 06:24), Max: 37.1 (10 Darci 2024 21:50)  T(F): 98.3 (11 Jan 2024 06:24), Max: 98.7 (10 Darci 2024 21:50)  HR: 87 (11 Jan 2024 06:24) (87 - 96)  BP: 150/79 (11 Jan 2024 06:24) (128/79 - 150/79)  BP(mean): --  RR: 17 (11 Jan 2024 06:24) (16 - 17)  SpO2: 94% (11 Jan 2024 06:24) (94% - 95%)    Parameters below as of 11 Jan 2024 06:24  Patient On (Oxygen Delivery Method): room air        PHYSICAL EXAM:  General: In no acute distress, non toxic appearing, speaking in full sentences  Vasc: Rad and DP intact and equal b/l   Extremities: LUE swelling and erythema w/ marked borders. Erythema regressing however still warm compared to contralateral arm.  Neurological: Alert and oriented x3. Peripheral sensation intact b/l and equal.  Skin: Warm and dry. No obvious rash     LABS:                        12.8   9.35  )-----------( 161      ( 11 Jan 2024 08:40 )             37.0     01-11    142  |  108  |  9   ----------------------------<  153<H>  3.6   |  24  |  0.60    Ca    8.7      11 Jan 2024 08:40  Phos  3.3     01-11  Mg     2.0     01-11    TPro  6.0  /  Alb  3.0<L>  /  TBili  0.4  /  DBili  x   /  AST  14  /  ALT  11  /  AlkPhos  78  01-11      Urinalysis Basic - ( 11 Jan 2024 08:40 )    Color: x / Appearance: x / SG: x / pH: x  Gluc: 153 mg/dL / Ketone: x  / Bili: x / Urobili: x   Blood: x / Protein: x / Nitrite: x   Leuk Esterase: x / RBC: x / WBC x   Sq Epi: x / Non Sq Epi: x / Bacteria: x        MICROBIOLOGY:    RADIOLOGY & ADDITIONAL STUDIES: *****INCOMPLETE NOTE*****  HPI: 68F w/ PMHx L breast ca s/p neoadjuvant chemo, b/l mastectomy (2019), XRT (2019), currently on anastrazole (follows w/ Dr. Carlin), chronic LUE lymphedema (s/p lymph node removal in 2019), prior admission for LUE cellulitis (7/31-8/2/23), now presenting with erythema and warmth to E. She reports yesterday she started to feel pain in her left arm, followed by erythema today. She went to her oncologist Dr. Carlin today, and Dr. Carlin demarcated her erythema and started her on Cephalexin 500mg Q6hrs for the cellulitis. While at home, the patient noticed increasing erythema past the demarcation so she presented to the ER for further evaluation. She denies trauma to the arm and hand. She says her left upper extremity at baseline is swollen since her b/l mastectomy they removed the lymph nodes on the left side and she has had chronic LUE lyphedema since then. She has had multiple infections of cellulitis since the surgery, her most recent was the admission in July 2023 at [facility]. She notes she had a recent infection with human metapneumovirus and was given prednisone for 5 days (12/21-25) for treatment from urgent care, and that might have weakened her immune system to cause her to have another cellulitis. She still has a cough from that infection, but did not have recent feeling of fever and chills until today. Denies chest pain, headache, abd pain, nausea, vomiting, and diarrhea.    INTERVAL HPI/OVERNIGHT EVENTS: States she had about 6 recurrences for her LUE cellulitis, on averages gets ~2 episodes of cellulits a year to the same location. Sees PT for her chronic lymphedema. States she only took 2 doses of her keflex (Took both simultaneously) on 1/9/24 at 3pm and at 7pm the same day realized that her redness was spreading.    SUBJECTIVE: Pt seen and examined at bedside. Reports swelling improved and redness improved.      ANTIBIOTICS/RELEVANT: Received Vanc 1250mg q12 [id number] and IV cefazolin 2g q8 [id number]      MEDICATIONS  (STANDING):  anastrozole 1 milliGRAM(s) Oral daily  atorvastatin 20 milliGRAM(s) Oral at bedtime  ceFAZolin   IVPB 2000 milliGRAM(s) IV Intermittent every 8 hours  enoxaparin Injectable 40 milliGRAM(s) SubCutaneous every 24 hours    MEDICATIONS  (PRN):  acetaminophen     Tablet .. 650 milliGRAM(s) Oral every 6 hours PRN Temp greater or equal to 38C (100.4F), Mild Pain (1 - 3)      Vital Signs Last 24 Hrs  T(C): 36.8 (11 Jan 2024 06:24), Max: 37.1 (10 Darci 2024 21:50)  T(F): 98.3 (11 Jan 2024 06:24), Max: 98.7 (10 Darci 2024 21:50)  HR: 87 (11 Jan 2024 06:24) (87 - 96)  BP: 150/79 (11 Jan 2024 06:24) (128/79 - 150/79)  BP(mean): --  RR: 17 (11 Jan 2024 06:24) (16 - 17)  SpO2: 94% (11 Jan 2024 06:24) (94% - 95%)    Parameters below as of 11 Jan 2024 06:24  Patient On (Oxygen Delivery Method): room air        PHYSICAL EXAM:  General: In no acute distress, non toxic appearing, speaking in full sentences  Vasc: Rad and DP intact and equal b/l   Extremities: LUE swelling and erythema w/ marked borders. Erythema regressing however still warm compared to contralateral arm.  Neurological: Alert and oriented x3. Peripheral sensation intact b/l and equal.  Skin: Warm and dry. No obvious rash     LABS:                        12.8   9.35  )-----------( 161      ( 11 Jan 2024 08:40 )             37.0     01-11    142  |  108  |  9   ----------------------------<  153<H>  3.6   |  24  |  0.60    Ca    8.7      11 Jan 2024 08:40  Phos  3.3     01-11  Mg     2.0     01-11    TPro  6.0  /  Alb  3.0<L>  /  TBili  0.4  /  DBili  x   /  AST  14  /  ALT  11  /  AlkPhos  78  01-11      Urinalysis Basic - ( 11 Jan 2024 08:40 )    Color: x / Appearance: x / SG: x / pH: x  Gluc: 153 mg/dL / Ketone: x  / Bili: x / Urobili: x   Blood: x / Protein: x / Nitrite: x   Leuk Esterase: x / RBC: x / WBC x   Sq Epi: x / Non Sq Epi: x / Bacteria: x        MICROBIOLOGY:    RADIOLOGY & ADDITIONAL STUDIES: *****INCOMPLETE NOTE*****  HPI: 68F w/ PMHx L breast ca s/p neoadjuvant chemo, b/l mastectomy (2019), XRT (2019), currently on anastrazole (follows w/ Dr. Carlin), chronic LUE lymphedema (s/p lymph node removal in 2019), prior admission for LUE cellulitis (7/31-8/2/23), now presenting with erythema and warmth to E. She reports yesterday she started to feel pain in her left arm, followed by erythema today. She went to her oncologist Dr. Carlin today, and Dr. Carlin demarcated her erythema and started her on Cephalexin 500mg Q6hrs for the cellulitis. While at home, the patient noticed increasing erythema past the demarcation so she presented to the ER for further evaluation. She denies trauma to the arm and hand. She says her left upper extremity at baseline is swollen since her b/l mastectomy they removed the lymph nodes on the left side and she has had chronic LUE lyphedema since then. She has had multiple infections of cellulitis since the surgery, her most recent was the admission in July 2023 at [facility]. She notes she had a recent infection with human metapneumovirus and was given prednisone for 5 days (12/21-25) for treatment from urgent care, and that might have weakened her immune system to cause her to have another cellulitis. She still has a cough from that infection, but did not have recent feeling of fever and chills until today. Denies chest pain, headache, abd pain, nausea, vomiting, and diarrhea.    INTERVAL HPI/OVERNIGHT EVENTS: States she had about ~6 recurrences for her LUE cellulitis, on averages gets ~2 episodes of cellulitis a year to the same location. Sees PT for her chronic lymphedema. States she only took 2 doses of her keflex (Took both simultaneously) on 1/9/24 at 3pm and at 7pm the same day realized that her redness was spreading.    SUBJECTIVE: Pt seen and examined at bedside. Reports swelling improved and redness improved.      ANTIBIOTICS/RELEVANT: Received Vanc 1250mg q12 [id number] and IV cefazolin 2g q8 [id number]      MEDICATIONS  (STANDING):  anastrozole 1 milliGRAM(s) Oral daily  atorvastatin 20 milliGRAM(s) Oral at bedtime  ceFAZolin   IVPB 2000 milliGRAM(s) IV Intermittent every 8 hours  enoxaparin Injectable 40 milliGRAM(s) SubCutaneous every 24 hours    MEDICATIONS  (PRN):  acetaminophen     Tablet .. 650 milliGRAM(s) Oral every 6 hours PRN Temp greater or equal to 38C (100.4F), Mild Pain (1 - 3)      Vital Signs Last 24 Hrs  T(C): 36.8 (11 Jan 2024 06:24), Max: 37.1 (10 Darci 2024 21:50)  T(F): 98.3 (11 Jan 2024 06:24), Max: 98.7 (10 Darci 2024 21:50)  HR: 87 (11 Jan 2024 06:24) (87 - 96)  BP: 150/79 (11 Jan 2024 06:24) (128/79 - 150/79)  BP(mean): --  RR: 17 (11 Jan 2024 06:24) (16 - 17)  SpO2: 94% (11 Jan 2024 06:24) (94% - 95%)    Parameters below as of 11 Jan 2024 06:24  Patient On (Oxygen Delivery Method): room air        PHYSICAL EXAM:  General: In no acute distress, non toxic appearing, speaking in full sentences  Vasc: Rad and DP intact and equal b/l   Extremities: LUE swelling and erythema w/ marked borders. Erythema regressing however still warm compared to contralateral arm.  Neurological: Alert and oriented x3. Peripheral sensation intact b/l and equal.  Skin: Warm and dry. No obvious rash     LABS:                        12.8   9.35  )-----------( 161      ( 11 Jan 2024 08:40 )             37.0     01-11    142  |  108  |  9   ----------------------------<  153<H>  3.6   |  24  |  0.60    Ca    8.7      11 Jan 2024 08:40  Phos  3.3     01-11  Mg     2.0     01-11    TPro  6.0  /  Alb  3.0<L>  /  TBili  0.4  /  DBili  x   /  AST  14  /  ALT  11  /  AlkPhos  78  01-11      Urinalysis Basic - ( 11 Jan 2024 08:40 )    Color: x / Appearance: x / SG: x / pH: x  Gluc: 153 mg/dL / Ketone: x  / Bili: x / Urobili: x   Blood: x / Protein: x / Nitrite: x   Leuk Esterase: x / RBC: x / WBC x   Sq Epi: x / Non Sq Epi: x / Bacteria: x        MICROBIOLOGY:    RADIOLOGY & ADDITIONAL STUDIES: *****INCOMPLETE NOTE*****  HPI: 68F w/ PMHx L breast ca s/p neoadjuvant chemo, b/l mastectomy (2019), XRT (2019), currently on anastrazole (follows w/ Dr. Carlin), chronic LUE lymphedema (s/p lymph node removal in 2019), prior admission for LUE cellulitis (7/31-8/2/23), now presenting with erythema and warmth to E. She reports yesterday she started to feel pain in her left arm, followed by erythema today. She went to her oncologist Dr. Carlin today, and Dr. Carlin demarcated her erythema and started her on Cephalexin 500mg Q6hrs for the cellulitis. While at home, the patient noticed increasing erythema past the demarcation so she presented to the ER for further evaluation. She denies trauma to the arm and hand. She says her left upper extremity at baseline is swollen since her b/l mastectomy they removed the lymph nodes on the left side and she has had chronic LUE lyphedema since then. She has had multiple infections of cellulitis since the surgery, her most recent was the admission in July 2023 at [facility]. She notes she had a recent infection with human metapneumovirus and was given prednisone for 5 days (12/21-25) for treatment from urgent care, and that might have weakened her immune system to cause her to have another cellulitis. She still has a cough from that infection, but did not have recent feeling of fever and chills until today. Denies chest pain, headache, abd pain, nausea, vomiting, and diarrhea.    INTERVAL HPI/OVERNIGHT EVENTS: States she had about ~6 recurrences for her LUE cellulitis, on averages gets ~2 episodes of cellulitis a year to the same location. Sees PT for her chronic lymphedema. States she only took 2 doses of her keflex (Took both simultaneously) on 1/9/24 at 3pm and at 7pm the same day realized that her redness was spreading.    SUBJECTIVE: Pt seen and examined at bedside. Reports swelling improved and redness improved.      ANTIBIOTICS/RELEVANT: Received Vanc 1250mg q12 [id number] and IV cefazolin 2g q8 [id number]      MEDICATIONS  (STANDING):  anastrozole 1 milliGRAM(s) Oral daily  atorvastatin 20 milliGRAM(s) Oral at bedtime  ceFAZolin   IVPB 2000 milliGRAM(s) IV Intermittent every 8 hours  enoxaparin Injectable 40 milliGRAM(s) SubCutaneous every 24 hours    MEDICATIONS  (PRN):  acetaminophen     Tablet .. 650 milliGRAM(s) Oral every 6 hours PRN Temp greater or equal to 38C (100.4F), Mild Pain (1 - 3)      Vital Signs Last 24 Hrs  T(C): 36.8 (11 Jan 2024 06:24), Max: 37.1 (10 Darci 2024 21:50)  T(F): 98.3 (11 Jan 2024 06:24), Max: 98.7 (10 Darci 2024 21:50)  HR: 87 (11 Jan 2024 06:24) (87 - 96)  BP: 150/79 (11 Jan 2024 06:24) (128/79 - 150/79)  BP(mean): --  RR: 17 (11 Jan 2024 06:24) (16 - 17)  SpO2: 94% (11 Jan 2024 06:24) (94% - 95%)    Parameters below as of 11 Jan 2024 06:24  Patient On (Oxygen Delivery Method): room air        PHYSICAL EXAM:  General: In no acute distress, non toxic appearing, speaking in full sentences  Vasc: Rad and DP intact and equal b/l   Extremities: LUE swelling and erythema w/ marked borders. Erythema regressing however still warm compared to contralateral arm.  Neurological: Alert and oriented x3. Peripheral sensation intact b/l and equal.  Skin: Warm and dry. No obvious rash     LABS:                        12.8   9.35  )-----------( 161      ( 11 Jan 2024 08:40 )             37.0     01-11    142  |  108  |  9   ----------------------------<  153<H>  3.6   |  24  |  0.60    Ca    8.7      11 Jan 2024 08:40  Phos  3.3     01-11  Mg     2.0     01-11    TPro  6.0  /  Alb  3.0<L>  /  TBili  0.4  /  DBili  x   /  AST  14  /  ALT  11  /  AlkPhos  78  01-11      Urinalysis Basic - ( 11 Jan 2024 08:40 )    Color: x / Appearance: x / SG: x / pH: x  Gluc: 153 mg/dL / Ketone: x  / Bili: x / Urobili: x   Blood: x / Protein: x / Nitrite: x   Leuk Esterase: x / RBC: x / WBC x   Sq Epi: x / Non Sq Epi: x / Bacteria: x        MICROBIOLOGY:    RADIOLOGY & ADDITIONAL STUDIES: HPI: 68F w/ PMHx L breast ca s/p neoadjuvant chemo, b/l mastectomy (2019), XRT (2019), currently on anastrazole (follows w/ Dr. Carlin), chronic LUE lymphedema (s/p lymph node removal in 2019), prior admission for LUE cellulitis (7/31-8/2/23), now presenting with erythema and warmth to [facility]. She reports yesterday she started to feel pain in her left arm, followed by erythema today. She went to her oncologist Dr. Carlin today, and Dr. Carlin demarcated her erythema and started her on Cephalexin 500mg Q6hrs for the cellulitis. While at home, the patient noticed increasing erythema past the demarcation so she presented to the ER for further evaluation. She denies trauma to the arm and hand. She says her left upper extremity at baseline is swollen since her b/l mastectomy they removed the lymph nodes on the left side and she has had chronic LUE lyphedema since then. She has had multiple infections of cellulitis since the surgery, her most recent was the admission in July 2023 at [facility]. She notes she had a recent infection with human metapneumovirus and was given prednisone for 5 days (12/21-25) for treatment from urgent care, and that might have weakened her immune system to cause her to have another cellulitis. She still has a cough from that infection, but did not have recent feeling of fever and chills until today. Denies chest pain, headache, abd pain, nausea, vomiting, and diarrhea.    INTERVAL HPI/OVERNIGHT EVENTS: States she had about ~6 recurrences for her LUE cellulitis, on averages gets ~2 episodes of cellulitis a year to the same location. Sees PT for her chronic lymphedema. States she only took 2 doses of her keflex (Took both simultaneously) on 1/9/24 at 3pm and at 7pm the same day realized that her redness was spreading.    SUBJECTIVE: Pt seen and examined at bedside. Reports swelling improved and redness improved.      ANTIBIOTICS/RELEVANT: Received Vanc 1250mg q12 [id number] and IV cefazolin 2g q8 [id number]      MEDICATIONS  (STANDING):  anastrozole 1 milliGRAM(s) Oral daily  atorvastatin 20 milliGRAM(s) Oral at bedtime  ceFAZolin   IVPB 2000 milliGRAM(s) IV Intermittent every 8 hours  enoxaparin Injectable 40 milliGRAM(s) SubCutaneous every 24 hours    MEDICATIONS  (PRN):  acetaminophen     Tablet .. 650 milliGRAM(s) Oral every 6 hours PRN Temp greater or equal to 38C (100.4F), Mild Pain (1 - 3)      Vital Signs Last 24 Hrs  T(C): 36.8 (11 Jan 2024 06:24), Max: 37.1 (10 Darci 2024 21:50)  T(F): 98.3 (11 Jan 2024 06:24), Max: 98.7 (10 Darci 2024 21:50)  HR: 87 (11 Jan 2024 06:24) (87 - 96)  BP: 150/79 (11 Jan 2024 06:24) (128/79 - 150/79)  BP(mean): --  RR: 17 (11 Jan 2024 06:24) (16 - 17)  SpO2: 94% (11 Jan 2024 06:24) (94% - 95%)    Parameters below as of 11 Jan 2024 06:24  Patient On (Oxygen Delivery Method): room air        PHYSICAL EXAM:  General: In no acute distress, non toxic appearing, speaking in full sentences  Vasc: Rad and DP intact and equal b/l   Extremities: LUE swelling and erythema w/ marked borders. Erythema regressing however still warm compared to contralateral arm.  Neurological: Alert and oriented x3. Peripheral sensation intact b/l and equal.  Skin: Warm and dry. No obvious rash     LABS:                        12.8   9.35  )-----------( 161      ( 11 Jan 2024 08:40 )             37.0     01-11    142  |  108  |  9   ----------------------------<  153<H>  3.6   |  24  |  0.60    Ca    8.7      11 Jan 2024 08:40  Phos  3.3     01-11  Mg     2.0     01-11    TPro  6.0  /  Alb  3.0<L>  /  TBili  0.4  /  DBili  x   /  AST  14  /  ALT  11  /  AlkPhos  78  01-11      Urinalysis Basic - ( 11 Jan 2024 08:40 )    Color: x / Appearance: x / SG: x / pH: x  Gluc: 153 mg/dL / Ketone: x  / Bili: x / Urobili: x   Blood: x / Protein: x / Nitrite: x   Leuk Esterase: x / RBC: x / WBC x   Sq Epi: x / Non Sq Epi: x / Bacteria: x        MICROBIOLOGY:    RADIOLOGY & ADDITIONAL STUDIES: HPI: 68F w/ PMHx L breast ca s/p neoadjuvant chemo, b/l mastectomy (2019), XRT (2019), currently on anastrazole (follows w/ Dr. Carlin), chronic LUE lymphedema (s/p lymph node removal in 2019), prior admission for LUE cellulitis (7/31-8/2/23), now presenting with erythema and warmth to [facility]. She reports yesterday she started to feel pain in her left arm, followed by erythema today. She went to her oncologist Dr. Carlin today, and Dr. Carlin demarcated her erythema and started her on Cephalexin 500mg Q6hrs for the cellulitis. While at home, the patient noticed increasing erythema past the demarcation so she presented to the ER for further evaluation. She denies trauma to the arm and hand. She says her left upper extremity at baseline is swollen since her b/l mastectomy they removed the lymph nodes on the left side and she has had chronic LUE lyphedema since then. She has had multiple infections of cellulitis since the surgery, her most recent was the admission in July 2023 at [facility]. She notes she had a recent infection with human metapneumovirus and was given prednisone for 5 days (12/21-25) for treatment from urgent care, and that might have weakened her immune system to cause her to have another cellulitis. She still has a cough from that infection, but did not have recent feeling of fever and chills until today. Denies chest pain, headache, abd pain, nausea, vomiting, and diarrhea.    INTERVAL HPI/OVERNIGHT EVENTS: States she had about ~6 recurrences for her LUE cellulitis, on averages gets ~2 episodes of cellulitis a year to the same location. Sees PT for her chronic lymphedema. States she only took 2 doses of her keflex (Took both simultaneously) on 1/9/24 at 3pm and at 7pm the same day realized that her redness was spreading.    SUBJECTIVE: Pt seen and examined at bedside. Reports swelling improved and redness improved.      ANTIBIOTICS/RELEVANT: Received Vanc 1250mg q12 [id number] and IV cefazolin 2g q8 [id number]      MEDICATIONS  (STANDING):  anastrozole 1 milliGRAM(s) Oral daily  atorvastatin 20 milliGRAM(s) Oral at bedtime  ceFAZolin   IVPB 2000 milliGRAM(s) IV Intermittent every 8 hours  enoxaparin Injectable 40 milliGRAM(s) SubCutaneous every 24 hours    MEDICATIONS  (PRN):  acetaminophen     Tablet .. 650 milliGRAM(s) Oral every 6 hours PRN Temp greater or equal to 38C (100.4F), Mild Pain (1 - 3)      Vital Signs Last 24 Hrs  T(C): 36.8 (11 Jan 2024 06:24), Max: 37.1 (10 Darci 2024 21:50)  T(F): 98.3 (11 Jan 2024 06:24), Max: 98.7 (10 Darci 2024 21:50)  HR: 87 (11 Jan 2024 06:24) (87 - 96)  BP: 150/79 (11 Jan 2024 06:24) (128/79 - 150/79)  BP(mean): --  RR: 17 (11 Jan 2024 06:24) (16 - 17)  SpO2: 94% (11 Jan 2024 06:24) (94% - 95%)    Parameters below as of 11 Jan 2024 06:24  Patient On (Oxygen Delivery Method): room air        PHYSICAL EXAM:  General: In no acute distress, non toxic appearing, speaking in full sentences  Vasc: Rad and DP intact and equal b/l   Extremities: LUE swelling and erythema w/ marked borders. Erythema regressing however still warm compared to contralateral arm.  Neurological: Alert and oriented x3. Peripheral sensation intact b/l and equal.  Skin: Warm and dry. No obvious rash     LABS:                        12.8   9.35  )-----------( 161      ( 11 Jan 2024 08:40 )             37.0     01-11    142  |  108  |  9   ----------------------------<  153<H>  3.6   |  24  |  0.60    Ca    8.7      11 Jan 2024 08:40  Phos  3.3     01-11  Mg     2.0     01-11    TPro  6.0  /  Alb  3.0<L>  /  TBili  0.4  /  DBili  x   /  AST  14  /  ALT  11  /  AlkPhos  78  01-11      Urinalysis Basic - ( 11 Jan 2024 08:40 )    Color: x / Appearance: x / SG: x / pH: x  Gluc: 153 mg/dL / Ketone: x  / Bili: x / Urobili: x   Blood: x / Protein: x / Nitrite: x   Leuk Esterase: x / RBC: x / WBC x   Sq Epi: x / Non Sq Epi: x / Bacteria: x        MICROBIOLOGY:    RADIOLOGY & ADDITIONAL STUDIES:

## 2024-01-11 NOTE — PROGRESS NOTE ADULT - PROBLEM SELECTOR PLAN 3
Patient w/ chronic lymphedema of LUE since b/l mastectomy in 2019 w/ multiple episodes of cellulitis in the arm since surgery. Also w/ baseline swelling due to lymphedema. Patient has a machine that helps compress her arm and help with lymphedema.   - MARGARITA
Patient w/ chronic lymphedema of LUE since b/l mastectomy in 2019 w/ multiple episodes of cellulitis in the arm since surgery. Also w/ baseline swelling due to lymphedema. Patient has a machine that helps compress her arm and help with lymphedema.   - MARGARITA

## 2024-01-12 ENCOUNTER — TRANSCRIPTION ENCOUNTER (OUTPATIENT)
Age: 69
End: 2024-01-12

## 2024-01-12 VITALS
TEMPERATURE: 98 F | RESPIRATION RATE: 16 BRPM | DIASTOLIC BLOOD PRESSURE: 84 MMHG | SYSTOLIC BLOOD PRESSURE: 152 MMHG | OXYGEN SATURATION: 96 % | HEART RATE: 83 BPM

## 2024-01-12 LAB
ANION GAP SERPL CALC-SCNC: 8 MMOL/L — SIGNIFICANT CHANGE UP (ref 5–17)
ANION GAP SERPL CALC-SCNC: 8 MMOL/L — SIGNIFICANT CHANGE UP (ref 5–17)
BUN SERPL-MCNC: 12 MG/DL — SIGNIFICANT CHANGE UP (ref 7–23)
BUN SERPL-MCNC: 12 MG/DL — SIGNIFICANT CHANGE UP (ref 7–23)
CALCIUM SERPL-MCNC: 9.2 MG/DL — SIGNIFICANT CHANGE UP (ref 8.4–10.5)
CALCIUM SERPL-MCNC: 9.2 MG/DL — SIGNIFICANT CHANGE UP (ref 8.4–10.5)
CHLORIDE SERPL-SCNC: 108 MMOL/L — SIGNIFICANT CHANGE UP (ref 96–108)
CHLORIDE SERPL-SCNC: 108 MMOL/L — SIGNIFICANT CHANGE UP (ref 96–108)
CO2 SERPL-SCNC: 25 MMOL/L — SIGNIFICANT CHANGE UP (ref 22–31)
CO2 SERPL-SCNC: 25 MMOL/L — SIGNIFICANT CHANGE UP (ref 22–31)
CREAT SERPL-MCNC: 0.65 MG/DL — SIGNIFICANT CHANGE UP (ref 0.5–1.3)
CREAT SERPL-MCNC: 0.65 MG/DL — SIGNIFICANT CHANGE UP (ref 0.5–1.3)
EGFR: 96 ML/MIN/1.73M2 — SIGNIFICANT CHANGE UP
EGFR: 96 ML/MIN/1.73M2 — SIGNIFICANT CHANGE UP
GLUCOSE SERPL-MCNC: 129 MG/DL — HIGH (ref 70–99)
GLUCOSE SERPL-MCNC: 129 MG/DL — HIGH (ref 70–99)
MAGNESIUM SERPL-MCNC: 2.1 MG/DL — SIGNIFICANT CHANGE UP (ref 1.6–2.6)
MAGNESIUM SERPL-MCNC: 2.1 MG/DL — SIGNIFICANT CHANGE UP (ref 1.6–2.6)
MRSA PCR RESULT.: NEGATIVE — SIGNIFICANT CHANGE UP
MRSA PCR RESULT.: NEGATIVE — SIGNIFICANT CHANGE UP
PHOSPHATE SERPL-MCNC: 4.7 MG/DL — HIGH (ref 2.5–4.5)
PHOSPHATE SERPL-MCNC: 4.7 MG/DL — HIGH (ref 2.5–4.5)
POTASSIUM SERPL-MCNC: 3.8 MMOL/L — SIGNIFICANT CHANGE UP (ref 3.5–5.3)
POTASSIUM SERPL-MCNC: 3.8 MMOL/L — SIGNIFICANT CHANGE UP (ref 3.5–5.3)
POTASSIUM SERPL-SCNC: 3.8 MMOL/L — SIGNIFICANT CHANGE UP (ref 3.5–5.3)
POTASSIUM SERPL-SCNC: 3.8 MMOL/L — SIGNIFICANT CHANGE UP (ref 3.5–5.3)
S AUREUS DNA NOSE QL NAA+PROBE: POSITIVE
S AUREUS DNA NOSE QL NAA+PROBE: POSITIVE
SODIUM SERPL-SCNC: 141 MMOL/L — SIGNIFICANT CHANGE UP (ref 135–145)
SODIUM SERPL-SCNC: 141 MMOL/L — SIGNIFICANT CHANGE UP (ref 135–145)

## 2024-01-12 PROCEDURE — 84100 ASSAY OF PHOSPHORUS: CPT

## 2024-01-12 PROCEDURE — 80048 BASIC METABOLIC PNL TOTAL CA: CPT

## 2024-01-12 PROCEDURE — 87641 MR-STAPH DNA AMP PROBE: CPT

## 2024-01-12 PROCEDURE — 87637 SARSCOV2&INF A&B&RSV AMP PRB: CPT

## 2024-01-12 PROCEDURE — 81001 URINALYSIS AUTO W/SCOPE: CPT

## 2024-01-12 PROCEDURE — 85025 COMPLETE CBC W/AUTO DIFF WBC: CPT

## 2024-01-12 PROCEDURE — 83605 ASSAY OF LACTIC ACID: CPT

## 2024-01-12 PROCEDURE — 93971 EXTREMITY STUDY: CPT

## 2024-01-12 PROCEDURE — 83735 ASSAY OF MAGNESIUM: CPT

## 2024-01-12 PROCEDURE — 36415 COLL VENOUS BLD VENIPUNCTURE: CPT

## 2024-01-12 PROCEDURE — 80202 ASSAY OF VANCOMYCIN: CPT

## 2024-01-12 PROCEDURE — 80053 COMPREHEN METABOLIC PANEL: CPT

## 2024-01-12 PROCEDURE — 87040 BLOOD CULTURE FOR BACTERIA: CPT

## 2024-01-12 PROCEDURE — 87086 URINE CULTURE/COLONY COUNT: CPT

## 2024-01-12 PROCEDURE — 87640 STAPH A DNA AMP PROBE: CPT

## 2024-01-12 PROCEDURE — 96374 THER/PROPH/DIAG INJ IV PUSH: CPT

## 2024-01-12 PROCEDURE — 71045 X-RAY EXAM CHEST 1 VIEW: CPT

## 2024-01-12 PROCEDURE — 99285 EMERGENCY DEPT VISIT HI MDM: CPT

## 2024-01-12 PROCEDURE — 99232 SBSQ HOSP IP/OBS MODERATE 35: CPT

## 2024-01-12 RX ORDER — CHLORHEXIDINE GLUCONATE 213 G/1000ML
1 SOLUTION TOPICAL
Qty: 1 | Refills: 0
Start: 2024-01-12 | End: 2024-01-16

## 2024-01-12 RX ORDER — MUPIROCIN 20 MG/G
1 OINTMENT TOPICAL
Qty: 1 | Refills: 0
Start: 2024-01-12 | End: 2024-01-16

## 2024-01-12 RX ORDER — VANCOMYCIN HCL 1 G
1250 VIAL (EA) INTRAVENOUS EVERY 12 HOURS
Refills: 0 | Status: COMPLETED | OUTPATIENT
Start: 2024-01-12 | End: 2024-01-12

## 2024-01-12 RX ORDER — VANCOMYCIN HCL 1 G
1250 VIAL (EA) INTRAVENOUS ONCE
Refills: 0 | Status: COMPLETED | OUTPATIENT
Start: 2024-01-12 | End: 2024-01-12

## 2024-01-12 RX ORDER — CHLORHEXIDINE GLUCONATE 213 G/1000ML
1 SOLUTION TOPICAL
Qty: 30 | Refills: 3
Start: 2024-01-12 | End: 2024-05-10

## 2024-01-12 RX ORDER — MUPIROCIN 20 MG/G
1 OINTMENT TOPICAL
Qty: 1 | Refills: 1
Start: 2024-01-12

## 2024-01-12 RX ADMIN — ENOXAPARIN SODIUM 40 MILLIGRAM(S): 100 INJECTION SUBCUTANEOUS at 12:05

## 2024-01-12 RX ADMIN — Medication 100 MILLIGRAM(S): at 04:31

## 2024-01-12 RX ADMIN — ANASTROZOLE 1 MILLIGRAM(S): 1 TABLET ORAL at 12:05

## 2024-01-12 RX ADMIN — Medication 166.67 MILLIGRAM(S): at 07:35

## 2024-01-12 NOTE — PROGRESS NOTE ADULT - SUBJECTIVE AND OBJECTIVE BOX
68y BRCA1+ (CHEK2 VUS) female with hx locally advanced breast cancer diagnosed in 2018, ER+, MN+, [id number] 2+ s/p neoadjuvant DD AC> 11/12 taxol> bilateral mastectomy with NUBIA/BSO>XRT>AI      Hx of recurrent cellulitis of LUE 2/20, 5/20, 2/21, 11/21, 12/22, 7/23  Presented to the office on 1/9/24 with LUE erythema, edema, and calor from fingers to bicepital region  No fevers or chills at that time  No known injury or source of entry  She was prescribed Keflix but developed chills and worsen erythema in the evening for which she presented to the ER    continues to feel better today  decreased pain and erythema  continues abx  seen by ID yesterday    MEDICATIONS  (STANDING):  anastrozole 1 milliGRAM(s) Oral daily  atorvastatin 20 milliGRAM(s) Oral at bedtime  ceFAZolin   IVPB 2000 milliGRAM(s) IV Intermittent every 8 hours  enoxaparin Injectable 40 milliGRAM(s) SubCutaneous every 24 hours    MEDICATIONS  (PRN):  acetaminophen     Tablet .. 650 milliGRAM(s) Oral every 6 hours PRN Temp greater or equal to 38C (100.4F), Mild Pain (1 - 3)    ICU Vital Signs Last 24 Hrs  T(C): 36.8 (12 Jan 2024 06:42), Max: 37 (11 Jan 2024 15:51)  T(F): 98.2 (12 Jan 2024 06:42), Max: 98.6 (11 Jan 2024 15:51)  HR: 83 (12 Jan 2024 06:42) (83 - 91)  BP: 152/84 (12 Jan 2024 06:42) (124/82 - 152/84)  BP(mean): --  ABP: --  ABP(mean): --  RR: 16 (12 Jan 2024 06:42) (16 - 18)  SpO2: 96% (12 Jan 2024 06:42) (95% - 98%)  O2 Parameters below as of 12 Jan 2024 06:42  Patient On (Oxygen Delivery Method): room air      Gen A&OX3  Skin no jaundice  HEENT moist mucosal membranes  Lung no accessory use, breathing comfortably  Ext LUE edema with erythema and calor extending proximally, overall less erythema and calor, no further extension                           12.8   13.72 )-----------( 170      ( 10 Darci 2024 05:30 )             36.6                           12.8   9.35  )-----------( 161      ( 11 Jan 2024 08:40 )             37.0                    01-10    143  |  111<H>  |  14  ----------------------------<  122<H>  3.7   |  21<L>  |  0.64    Ca    8.8      10 Darci 2024 05:30  Phos  3.0     01-10  Mg     2.0     01-10    TPro  6.0  /  Alb  3.4  /  TBili  0.8  /  DBili  x   /  AST  15  /  ALT  13  /  AlkPhos  83  01-10      A/P Hx of breast cancer, s/p bilateral mastectomy cb post op infection in 2019, hx of LUE lymphedema and recurrent cellulitis presenting on 1/9/24 with rapidly progressive LUE cellulitis    Receiving broad spectrum abx with cefazolin and vanco  appreciate ID recommendations  patient will follow up with ID as outpatient  Clinically improving today  WBC decreasing  Continues anastrozole  Receiving lovenox for DVT prophylaxis  To follow up with me as outpatient in 1-2 weeks     Thank you    Jody Carlin MD  [phone]   68y BRCA1+ (CHEK2 VUS) female with hx locally advanced breast cancer diagnosed in 2018, ER+, MD+, [id number] 2+ s/p neoadjuvant DD AC> 11/12 taxol> bilateral mastectomy with NUBIA/BSO>XRT>AI      Hx of recurrent cellulitis of LUE 2/20, 5/20, 2/21, 11/21, 12/22, 7/23  Presented to the office on 1/9/24 with LUE erythema, edema, and calor from fingers to bicepital region  No fevers or chills at that time  No known injury or source of entry  She was prescribed Keflix but developed chills and worsen erythema in the evening for which she presented to the ER    continues to feel better today  decreased pain and erythema  continues abx  seen by ID yesterday    MEDICATIONS  (STANDING):  anastrozole 1 milliGRAM(s) Oral daily  atorvastatin 20 milliGRAM(s) Oral at bedtime  ceFAZolin   IVPB 2000 milliGRAM(s) IV Intermittent every 8 hours  enoxaparin Injectable 40 milliGRAM(s) SubCutaneous every 24 hours    MEDICATIONS  (PRN):  acetaminophen     Tablet .. 650 milliGRAM(s) Oral every 6 hours PRN Temp greater or equal to 38C (100.4F), Mild Pain (1 - 3)    ICU Vital Signs Last 24 Hrs  T(C): 36.8 (12 Jan 2024 06:42), Max: 37 (11 Jan 2024 15:51)  T(F): 98.2 (12 Jan 2024 06:42), Max: 98.6 (11 Jan 2024 15:51)  HR: 83 (12 Jan 2024 06:42) (83 - 91)  BP: 152/84 (12 Jan 2024 06:42) (124/82 - 152/84)  BP(mean): --  ABP: --  ABP(mean): --  RR: 16 (12 Jan 2024 06:42) (16 - 18)  SpO2: 96% (12 Jan 2024 06:42) (95% - 98%)  O2 Parameters below as of 12 Jan 2024 06:42  Patient On (Oxygen Delivery Method): room air      Gen A&OX3  Skin no jaundice  HEENT moist mucosal membranes  Lung no accessory use, breathing comfortably  Ext LUE edema with erythema and calor extending proximally, overall less erythema and calor, no further extension                           12.8   13.72 )-----------( 170      ( 10 Darci 2024 05:30 )             36.6                           12.8   9.35  )-----------( 161      ( 11 Jan 2024 08:40 )             37.0                    01-10    143  |  111<H>  |  14  ----------------------------<  122<H>  3.7   |  21<L>  |  0.64    Ca    8.8      10 Darci 2024 05:30  Phos  3.0     01-10  Mg     2.0     01-10    TPro  6.0  /  Alb  3.4  /  TBili  0.8  /  DBili  x   /  AST  15  /  ALT  13  /  AlkPhos  83  01-10      A/P Hx of breast cancer, s/p bilateral mastectomy cb post op infection in 2019, hx of LUE lymphedema and recurrent cellulitis presenting on 1/9/24 with rapidly progressive LUE cellulitis    Receiving broad spectrum abx with cefazolin and vanco  appreciate ID recommendations  patient will follow up with ID as outpatient  Clinically improving today  WBC decreasing  Continues anastrozole  Receiving lovenox for DVT prophylaxis  To follow up with me as outpatient in 1-2 weeks     Thank you    Jody Carlin MD  [phone]

## 2024-01-12 NOTE — DISCHARGE NOTE PROVIDER - CARE PROVIDERS DIRECT ADDRESSES
,[email or web address]

## 2024-01-12 NOTE — DISCHARGE NOTE NURSING/CASE MANAGEMENT/SOCIAL WORK - NSDCPEFALRISK_GEN_ALL_CORE
For information on Fall & Injury Prevention, visit: [email or web address] OR  [email or web address] OR  https://www.cdc.gov/steadi/patient.html For information on Fall & Injury Prevention, visit: [email or web address] OR  [email or web address] OR  https://www.cdc.gov/steadi/patient.html

## 2024-01-12 NOTE — PROGRESS NOTE ADULT - SUBJECTIVE AND OBJECTIVE BOX
INFECTIOUS DISEASES CONSULT FOLLOW-UP NOTE    INTERVAL HPI/OVERNIGHT EVENTS:  Reports swelling improved and redness improved.Denies Fever/Chills, HA, CP, SOB, n/v, changes in bowel/urinary habits.       ROS:   Constitutional, eyes, ENT, cardiovascular, respiratory, gastrointestinal, genitourinary, integumentary, neurological, psychiatric and heme/lymph are otherwise negative other than noted above       ANTIBIOTICS/RELEVANT:    MEDICATIONS  (STANDING):  anastrozole 1 milliGRAM(s) Oral daily  atorvastatin 20 milliGRAM(s) Oral at bedtime  ceFAZolin   IVPB 2000 milliGRAM(s) IV Intermittent every 8 hours  enoxaparin Injectable 40 milliGRAM(s) SubCutaneous every 24 hours    MEDICATIONS  (PRN):  acetaminophen     Tablet .. 650 milliGRAM(s) Oral every 6 hours PRN Temp greater or equal to 38C (100.4F), Mild Pain (1 - 3)        Vital Signs Last 24 Hrs  T(C): 36.8 (12 Jan 2024 06:42), Max: 37 (11 Jan 2024 15:51)  T(F): 98.2 (12 Jan 2024 06:42), Max: 98.6 (11 Jan 2024 15:51)  HR: 83 (12 Jan 2024 06:42) (83 - 91)  BP: 152/84 (12 Jan 2024 06:42) (124/82 - 152/84)  BP(mean): --  RR: 16 (12 Jan 2024 06:42) (16 - 18)  SpO2: 96% (12 Jan 2024 06:42) (95% - 98%)    Parameters below as of 12 Jan 2024 06:42  Patient On (Oxygen Delivery Method): room air        PHYSICAL EXAM:  General: In no acute distress, non toxic appearing, speaking in full sentences  Vasc: Rad and DP intact and equal b/l   Extremities: LUE swelling and erythema w/ marked borders. Erythema significantly   Neurological: Alert and oriented x3. Peripheral sensation intact b/l and equal.  Skin: Warm and dry. No obvious rash           LABS:                        12.8   9.35  )-----------( 161      ( 11 Jan 2024 08:40 )             37.0     01-12    141  |  108  |  12  ----------------------------<  129<H>  3.8   |  25  |  0.65    Ca    9.2      12 Jan 2024 07:32  Phos  4.7     01-12  Mg     2.1     01-12    TPro  6.0  /  Alb  3.0<L>  /  TBili  0.4  /  DBili  x   /  AST  14  /  ALT  11  /  AlkPhos  78  01-11      Urinalysis Basic - ( 12 Jan 2024 07:32 )    Color: x / Appearance: x / SG: x / pH: x  Gluc: 129 mg/dL / Ketone: x  / Bili: x / Urobili: x   Blood: x / Protein: x / Nitrite: x   Leuk Esterase: x / RBC: x / WBC x   Sq Epi: x / Non Sq Epi: x / Bacteria: x        MICROBIOLOGY:      RADIOLOGY & ADDITIONAL STUDIES:  Reviewed

## 2024-01-12 NOTE — DISCHARGE NOTE PROVIDER - NSDCCPCAREPLAN_GEN_ALL_CORE_FT
PRINCIPAL DISCHARGE DIAGNOSIS  Diagnosis: Sepsis  Assessment and Plan of Treatment: You presented with sepsis due to left arm cellulitis. Common symptoms of cellulitis is dull pain, tenderness, swelling, warmth, and redness in an area of the skin. These symptoms may often worsen before they get better. You initially had an elevated white blood cell count and heart rate. This was all due to your infection. You were treated with IV antibiotics. Infectious disease was consulted to help us manage your infection.   **************DISCHARGE INSTRUCTIONS**************  1. Continue cefadroxil 500 every 12 hours  x7 days  2. Continue doxycycline 100 every 12 hours x7 days  3. Follow up with Dr Jaffe in 2-3 weeks  4. Please start MRSA decolonization treatment after completing active antibiotic treatment. Rub mupiricin 2% nasal ointment to each of your nostrils twice a day for 5 days. Taking a shower or bath using a special soap (4% Chlorhexidine) or(Hibiclens) once a day for up to 5 days while you are using the nasal ointment. Please include this in discharge instructions for patient.         SECONDARY DISCHARGE DIAGNOSES  Diagnosis: Cellulitis  Assessment and Plan of Treatment: - Try to prevent cuts, scrapes, or other injuries to your skin. Cellulitis most often occurs where there is a break in the skin.  - If you get a scrape, cut, mild burn, or bite, wash the wound with clean water as soon as you can to help avoid infection.   - Keep arm elevated to reduce swelling

## 2024-01-12 NOTE — PROGRESS NOTE ADULT - REASON FOR ADMISSION
sepsis 2/2 LUE cellulitis

## 2024-01-12 NOTE — DISCHARGE NOTE PROVIDER - HOSPITAL COURSE
68F w/ PMHx L breast ca s/p neoadjuvant chemo, b/l mastectomy (2019), XRT (2019), currently on anastrazole (follows w/ Dr. Carlin), chronic LUE lymphedema (s/p lymph node removal in 2019), prior admission for LUE cellulitis (7/31-8/2/23), now presenting with erythema and warmth to LUE, admitted for sepsis 2/2 LUE cellulitis.     Problem List:   1. Sepsis 2/2 cellulitis  Patient w/ leukocytosis 15.23, tachycardia w/ , meeting 2/4 SIRS criteria for sepsis w/ source of infection being LUE cellulitis. S/p Vanc 1500mg qd and NS 1700ml in ED. Patient was started on IV abx cefazolin 2g and vancomycin 1.25g, transitioned to PO abx on discharge per ID recommendations. Erythema and swelling improving.   - Start cefadroxil 500 q 12hrs and doxycycline 100 q 12hrs for 7 day course. Covering most likely pathogens are MRSA, MSSA and group A strep.  - MRSA decolonization tx after 7 day abx course  - outpatient ID followup    Patient was discharged to: Home    New medications: cefadroxil, doxycyclin, mupirocin ointment, hibiclens/chlorhexidine wipes  Changes to old medications: None  Medications that were stopped: None    Items to follow up as outpatient: Infectious disease, PCP    Physical exam at the time of discharge:   General: NAD  Head: NC/AT; MMM; PERRL; EOMI;  Neck: Supple; no JVD  Respiratory: CTAB; no wheezes/rales/rhonchi  Cardiovascular: Regular rhythm/rate; S1/S2+, no murmurs, rubs gallops   Gastrointestinal: Soft; NTND; bowel sounds normal and present  Extremities: WWP; LUE edema and erythema  Neurological: A&Ox3, CNII-XII grossly intact; no obvious focal deficits     68F w/ PMHx L breast ca s/p neoadjuvant chemo, b/l mastectomy (2019), XRT (2019), currently on anastrazole (follows w/ Dr. Carlin), chronic LUE lymphedema (s/p lymph node removal in 2019), prior admission for LUE cellulitis (7/31-8/2/23), now presenting with erythema and warmth to LUE, admitted for sepsis 2/2 LUE cellulitis.     Problem List:   1. Sepsis 2/2 cellulitis  Patient w/ leukocytosis 15.23, tachycardia w/ , meeting 2/4 SIRS criteria for sepsis w/ source of infection being LUE cellulitis. S/p Vanc 1500mg qd and NS 1700ml in ED. Patient was started on IV abx cefazolin 2g and vancomycin 1.25g, transitioned to PO abx on discharge per ID recommendations. Erythema and swelling improving.   - Start cefadroxil 500 q 12hrs and doxycycline 100 q 12hrs for 7 day course. Covering most likely pathogens are MRSA, MSSA and group A strep. MRSA swab sent.  - MRSA decolonization tx after 7 day abx course  - outpatient ID followup    Patient was discharged to: Home    New medications: cefadroxil, doxycyclin, mupirocin ointment, hibiclens/chlorhexidine wipes  Changes to old medications: None  Medications that were stopped: None    Items to follow up as outpatient: Infectious disease, PCP    Physical exam at the time of discharge:   General: NAD  Head: NC/AT; MMM; PERRL; EOMI;  Neck: Supple; no JVD  Respiratory: CTAB; no wheezes/rales/rhonchi  Cardiovascular: Regular rhythm/rate; S1/S2+, no murmurs, rubs gallops   Gastrointestinal: Soft; NTND; bowel sounds normal and present  Extremities: WWP; LUE edema and erythema  Neurological: A&Ox3, CNII-XII grossly intact; no obvious focal deficits     68F w/ PMHx L breast ca s/p neoadjuvant chemo, b/l mastectomy (2019), XRT (2019), currently on anastrazole (follows w/ Dr. Carlin), chronic LUE lymphedema (s/p lymph node removal in 2019), prior admission for LUE cellulitis (7/31-8/2/23), now presenting with erythema and warmth to LUE, admitted for sepsis 2/2 LUE cellulitis.     Problem List:   1. Sepsis 2/2 cellulitis  Patient w/ leukocytosis 15.23, tachycardia w/ , meeting 2/4 SIRS criteria for sepsis w/ source of infection being LUE cellulitis. S/p Vanc 1500mg qd and NS 1700ml in ED. Patient was started on IV abx cefazolin 2g and vancomycin 1.25g, transitioned to PO abx on discharge per ID recommendations. Erythema and swelling improving.   - Start cefadroxil 500 q 12hrs and doxycycline 100 q 12hrs for 7 day course. Covering most likely pathogens are MRSA, MSSA and group A strep. MRSA swab sent.  - MRSA decolonization tx after 7 day abx course  - outpatient ID followup    Patient was discharged to: Home    New medications: cefadroxil, doxycyclin, mupirocin ointment, hibiclens/chlorhexidine wipes  Changes to old medications: None  Medications that were stopped: None    Items to follow up as outpatient: Infectious disease, PCP    Ms. Julian Bernstein feels well today and would like to go home. She reports marked improvement of her left arm. She remains afebrile. Leucocytosis resolved. Blood cultures remain negative to date. ID recommended transition to PO antibiotics and decolonization and cleared her for discharge with outpatient follow up. She was properly advised and educated about her condition, medication and side effects and importance of adherence and follow up. All of her questions were answered in detail.     Total time spent on discharge: 42 mins    Physical exam at the time of discharge:   General: NAD  Head: NC/AT; MMM; PERRL; EOMI;  Neck: Supple; no JVD  Respiratory: CTAB; no wheezes/rales/rhonchi  Cardiovascular: Regular rhythm/rate; S1/S2+, no murmurs, rubs gallops   Gastrointestinal: Soft; NTND; bowel sounds normal and present  Extremities: WWP; LUE edema and erythema  Neurological: A&Ox3, CNII-XII grossly intact; no obvious focal deficits

## 2024-01-12 NOTE — DISCHARGE NOTE PROVIDER - CARE PROVIDER_API CALL
Zackary Jaffe  Infectious Disease  [address]  [place] [address]  Phone: [phone]  Fax: [phone]  Follow Up Time: 2 weeks   Zackary Jaffe  Infectious Disease  [address]  [place] [address]  Phone: [phone]  Fax: [phone]  Follow Up Time: 2 weeks

## 2024-01-12 NOTE — PROGRESS NOTE ADULT - ATTENDING COMMENTS
Please refer to the discharge narrative from 1/12/2024 for details.    Ms. Julian Bernstein was seen and examined and reported no new complaints. Her left arm swelling is markedly improved and she is able to make a first. No fever, chills, diarrhea, worsening arm pain or reported adverse reactions to medications. On Examination she is alert and oriented x 3 and not in distress. Moist oral mucosa, supple neck, no JVD. Clear breath sounds. NRRR with normal S1 and S2. Abdomen soft and nontender with NABS.  LUE edema and erythema much improved. No new focal neurologic deficits. Labs and other studies reviewed. ID recommendations noted. Patient transitioned to PO antibiotics per ID recommendations and cleared for discharge with outpatient follow up and instructions. She was properly advised and educated about her condition, medications and side effects and importance of adherence and follow up.    Sepsis 2/2 LUE cellulitis, resolved  LUE cellulitis, improved

## 2024-01-12 NOTE — PROGRESS NOTE ADULT - ASSESSMENT
68F w/ PMHx L breast ca s/p neoadjuvant chemo, b/l mastectomy (2019), XRT (2019), currently on anastrazole (follows w/ Dr. Carlin), chronic LUE lymphedema (s/p lymph node removal in 2019), prior admission for LUE cellulitis (7/31-8/2/23), now presenting with erythema and warmth to LUE. Patient w/ recurrent cellulitis in the same anatomical location (LUE) 2/2 chronic lymphedema s/p lymphnode resection. Per patient she gets approximately 2 episodes of cellulitis/year, with each flare requiring longer hospitalization stays. Last infection was in August 2023 and was admitted and received Cefazolin 1g q8. Patient may qualify for suppressive antibiotic therapy for cellulitis given that she has recurrent cellulitis even with outpatient lymphedema physiotherapy.     Recommendation.  1) Stop Cefazolin 2g q8hrs and vancomycin 1250 q 12hrs  2) Start cefadroxil 500 q 12hrs and doxycycline 100 q 12hrs for 7 day course. Covering most likely pathogens are MRSA, MSSA and group A strep.  3) Recommend MRSA swab  4) Follow up with Dr. Jaffe outpatient in 2 to 3 weeks  5) To prevent recurrence we discussed decolonization vs chronic suppression. Patient would like to attempt decolonization first. If it fails, will attempt chronic suppression.  6) Please start MRSA decolonization treatment after completing active antibiotic treatment. Rub mupiricin 2% nasal ointment to each of your nostrils twice a day for 5 days. Taking a shower or bath using a special soap (4% Chlorhexidine) or(Hibiclens) once a day for up to 5 days while you are using the nasal ointment. Please include this in discharge instructions for patient.     ID will sign off. 
68F w/ PMHx L breast ca s/p neoadjuvant chemo, b/l mastectomy (2019), XRT (2019), currently on anastrazole (follows w/ Dr. Carlin), chronic LUE lymphedema (s/p lymph node removal in 2019), prior admission for LUE cellulitis (7/31-8/2/23), now presenting with erythema and warmth to LUE, admitted for sepsis 2/2 LUE cellulitis. 
68F w/ PMHx L breast ca s/p neoadjuvant chemo, b/l mastectomy (2019), XRT (2019), currently on anastrazole (follows w/ Dr. Carlin), chronic LUE lymphedema (s/p lymph node removal in 2019), prior admission for LUE cellulitis (7/31-8/2/23), now presenting with erythema and warmth to LUE, admitted for sepsis 2/2 LUE cellulitis.

## 2024-01-12 NOTE — DISCHARGE NOTE PROVIDER - NSDCMRMEDTOKEN_GEN_ALL_CORE_FT
anastrozole 1 mg oral tablet: 1 orally once a day  atorvastatin 20 mg oral tablet: 1 tab(s) orally once a day   anastrozole 1 mg oral tablet: 1 orally once a day  atorvastatin 20 mg oral tablet: 1 tab(s) orally once a day  cefadroxil 500 mg oral capsule: 1 cap(s) orally 2 times a day Last dose on 1/16  doxycycline hyclate 100 mg oral tablet: 1 tab(s) orally 2 times a day Last dose on 1/16   anastrozole 1 mg oral tablet: 1 orally once a day  atorvastatin 20 mg oral tablet: 1 tab(s) orally once a day  cefadroxil 500 mg oral capsule: 1 cap(s) orally 2 times a day Last dose on 1/16  chlorhexidine 4% topical soap: Apply topically to affected area once a day Please start MRSA decolonization treatment after completing active antibiotic treatment. Rub mupiricin 2% nasal ointment to each of your nostrils twice a day for 5 days. Taking a shower or bath using a special soap (4% Chlorhexidine) or(Hibiclens) once a day for up to 5 days while you are using the nasal ointment. Please include this in discharge instructions for patient.  doxycycline hyclate 100 mg oral tablet: 1 tab(s) orally 2 times a day Last dose on 1/16  mupirocin 2% topical kit: 1 application in each nostril 2 times a day

## 2024-01-12 NOTE — DISCHARGE NOTE PROVIDER - PROVIDER TOKENS
PROVIDER:[TOKEN:[[id number]],FOLLOWUP:[2 weeks]] PROVIDER:[TOKEN:[[id number]],FOLLOWUP:[2 weeks]]

## 2024-01-12 NOTE — DISCHARGE NOTE NURSING/CASE MANAGEMENT/SOCIAL WORK - PATIENT PORTAL LINK FT
You can access the FollowMyHealth Patient Portal offered by [facility] by registering at the following website: [email or web address]. By joining [employer]’s FollowMyHealth portal, you will also be able to view your health information using other applications (apps) compatible with our system. You can access the FollowMyHealth Patient Portal offered by [facility] by registering at the following website: [email or web address]. By joining [employer]’s FollowMyHealth portal, you will also be able to view your health information using other applications (apps) compatible with our system.

## 2024-01-12 NOTE — PROGRESS NOTE ADULT - ATTENDING COMMENTS
On exam, her left arm is clinically much improved.  Appropriate to transition to oral antibiotics.  Can stop Cefazolin and Vancomycin.  Start Doxycycline 100 mg PO q12 + Cefadroxil 500 mg PO q12 x 7 days.  After she completes this will attempt decolonization:  Mupirocin to bilateral nares BID + chlorhexadine bath, both for 5 days.  If decolonization does not work we can attempt chronic oral suppression as a last resort.  She can follow up with me as outpatient

## 2024-01-14 LAB
CULTURE RESULTS: SIGNIFICANT CHANGE UP
SPECIMEN SOURCE: SIGNIFICANT CHANGE UP

## 2024-01-18 DIAGNOSIS — E78.5 HYPERLIPIDEMIA, UNSPECIFIED: ICD-10-CM

## 2024-01-18 DIAGNOSIS — Z96.641 PRESENCE OF RIGHT ARTIFICIAL HIP JOINT: ICD-10-CM

## 2024-01-18 DIAGNOSIS — C50.912 MALIGNANT NEOPLASM OF UNSPECIFIED SITE OF LEFT FEMALE BREAST: ICD-10-CM

## 2024-01-18 DIAGNOSIS — A41.9 SEPSIS, UNSPECIFIED ORGANISM: ICD-10-CM

## 2024-01-18 DIAGNOSIS — I97.2 POSTMASTECTOMY LYMPHEDEMA SYNDROME: ICD-10-CM

## 2024-01-18 DIAGNOSIS — L03.114 CELLULITIS OF LEFT UPPER LIMB: ICD-10-CM

## 2024-01-18 DIAGNOSIS — E87.1 HYPO-OSMOLALITY AND HYPONATREMIA: ICD-10-CM

## 2024-01-18 DIAGNOSIS — Z92.3 PERSONAL HISTORY OF IRRADIATION: ICD-10-CM

## 2024-01-18 DIAGNOSIS — Z90.13 ACQUIRED ABSENCE OF BILATERAL BREASTS AND NIPPLES: ICD-10-CM

## 2024-01-18 DIAGNOSIS — Z17.0 ESTROGEN RECEPTOR POSITIVE STATUS [ER+]: ICD-10-CM

## 2024-01-18 DIAGNOSIS — Z11.52 ENCOUNTER FOR SCREENING FOR COVID-19: ICD-10-CM

## 2024-01-18 DIAGNOSIS — G57.93 UNSPECIFIED MONONEUROPATHY OF BILATERAL LOWER LIMBS: ICD-10-CM

## 2024-01-31 ENCOUNTER — APPOINTMENT (OUTPATIENT)
Dept: INFECTIOUS DISEASE | Facility: CLINIC | Age: 69
End: 2024-01-31
Payer: MEDICARE

## 2024-01-31 VITALS
OXYGEN SATURATION: 98 % | HEIGHT: 64 IN | TEMPERATURE: 97 F | WEIGHT: 189 LBS | SYSTOLIC BLOOD PRESSURE: 144 MMHG | HEART RATE: 97 BPM | BODY MASS INDEX: 32.27 KG/M2 | DIASTOLIC BLOOD PRESSURE: 84 MMHG

## 2024-01-31 PROCEDURE — 99213 OFFICE O/P EST LOW 20 MIN: CPT

## 2024-01-31 RX ORDER — ATORVASTATIN CALCIUM 80 MG/1
TABLET, FILM COATED ORAL
Refills: 0 | Status: ACTIVE | COMMUNITY

## 2024-01-31 RX ORDER — SIMVASTATIN 80 MG/1
TABLET, FILM COATED ORAL
Refills: 0 | Status: COMPLETED | COMMUNITY
End: 2024-01-31

## 2024-01-31 NOTE — PHYSICAL EXAM
[General Appearance - Alert] : alert [Sclera] : the sclera and conjunctiva were normal [Outer Ear] : the ears and nose were normal in appearance [Heart Rate And Rhythm] : heart rate was normal and rhythm regular [FreeTextEntry1] : left arm with lymphedema, no warmth, tenderness, erythema [Abdomen Soft] : soft [No Palpable Adenopathy] : no palpable adenopathy [Musculoskeletal - Swelling] : no joint swelling [] : no rash [Motor Exam] : the motor exam was normal [Oriented To Time, Place, And Person] : oriented to person, place, and time

## 2024-01-31 NOTE — HISTORY OF PRESENT ILLNESS
[FreeTextEntry1] : 68F w/ PMHx L breast ca s/p neoadjuvant chemo, b/l mastectomy (2019), XRT (2019), currently on anastrazole (follows w/ Dr. Carlin), chronic LUE lymphedema (s/p lymph node removal in 2019) presented to [facility] in January 2024 for recurrent cellulitis of the LUE.  She was on IV antibiotics with improvement and then discharged with PO doxycycline and PO cefadroxil which she completed taking with resolution of infection.  It was recommended she do trial of Mupirocin + hibiclens which she is currently doing for decolonization.  She also presents today asking about amoxicillin prior to dental procedure.

## 2024-02-01 LAB
BASOPHILS # BLD AUTO: 0.04 K/UL
BASOPHILS NFR BLD AUTO: 0.8 %
CRP SERPL-MCNC: <3 MG/L
EOSINOPHIL # BLD AUTO: 0.29 K/UL
EOSINOPHIL NFR BLD AUTO: 5.5 %
ERYTHROCYTE [SEDIMENTATION RATE] IN BLOOD BY WESTERGREN METHOD: 22 MM/HR
HCT VFR BLD CALC: 43.1 %
HGB BLD-MCNC: 14.8 G/DL
IMM GRANULOCYTES NFR BLD AUTO: 0.2 %
LYMPHOCYTES # BLD AUTO: 1.45 K/UL
LYMPHOCYTES NFR BLD AUTO: 27.4 %
MAN DIFF?: NORMAL
MCHC RBC-ENTMCNC: 33 PG
MCHC RBC-ENTMCNC: 34.3 GM/DL
MCV RBC AUTO: 96 FL
MONOCYTES # BLD AUTO: 0.72 K/UL
MONOCYTES NFR BLD AUTO: 13.6 %
NEUTROPHILS # BLD AUTO: 2.78 K/UL
NEUTROPHILS NFR BLD AUTO: 52.5 %
PLATELET # BLD AUTO: 235 K/UL
RBC # BLD: 4.49 M/UL
RBC # FLD: 13 %
WBC # FLD AUTO: 5.29 K/UL

## 2024-04-24 ENCOUNTER — APPOINTMENT (OUTPATIENT)
Dept: INFECTIOUS DISEASE | Facility: CLINIC | Age: 69
End: 2024-04-24
Payer: MEDICARE

## 2024-04-24 VITALS
HEART RATE: 63 BPM | RESPIRATION RATE: 16 BRPM | OXYGEN SATURATION: 97 % | BODY MASS INDEX: 31.24 KG/M2 | SYSTOLIC BLOOD PRESSURE: 120 MMHG | WEIGHT: 183 LBS | HEIGHT: 64 IN | DIASTOLIC BLOOD PRESSURE: 75 MMHG | TEMPERATURE: 97.1 F

## 2024-04-24 DIAGNOSIS — L03.90 CELLULITIS, UNSPECIFIED: ICD-10-CM

## 2024-04-24 PROCEDURE — 99213 OFFICE O/P EST LOW 20 MIN: CPT

## 2024-04-24 RX ORDER — MUPIROCIN 20 MG/G
2 OINTMENT TOPICAL
Qty: 1 | Refills: 3 | Status: ACTIVE | COMMUNITY
Start: 2024-04-24 | End: 1900-01-01

## 2024-04-24 NOTE — HISTORY OF PRESENT ILLNESS
[FreeTextEntry1] : 68F w/ PMHx L breast ca s/p neoadjuvant chemo, b/l mastectomy (2019), XRT (2019), currently on anastrazole (follows w/ Dr. Carlin), chronic LUE lymphedema (s/p lymph node removal in 2019) presented to [facility] in January 2024 for recurrent cellulitis of the LUE.  She was on IV antibiotics with improvement and then discharged with PO doxycycline and PO cefadroxil which she completed taking with resolution of infection.  She has been doing Mupirocin + hibiclens for decolonization. She does this on days 1-5 of each month. Is also intermittently using hibiclens to wash L arm. Has not had recurrence of cellulitis.

## 2024-04-24 NOTE — PHYSICAL EXAM
[General Appearance - Alert] : alert [Sclera] : the sclera and conjunctiva were normal [Outer Ear] : the ears and nose were normal in appearance [Heart Rate And Rhythm] : heart rate was normal and rhythm regular [Edema] : there was no peripheral edema [Bowel Sounds] : normal bowel sounds [FreeTextEntry1] : left arm with edema, no redness, tenderness, warmth [Musculoskeletal - Swelling] : no joint swelling [] : no rash [Motor Exam] : the motor exam was normal [Oriented To Time, Place, And Person] : oriented to person, place, and time

## 2024-06-14 ENCOUNTER — NON-APPOINTMENT (OUTPATIENT)
Age: 69
End: 2024-06-14

## 2024-06-14 ENCOUNTER — EMERGENCY (EMERGENCY)
Facility: HOSPITAL | Age: 69
LOS: 1 days | Discharge: HOME | End: 2024-06-14
Attending: EMERGENCY MEDICINE | Admitting: EMERGENCY MEDICINE
Payer: MEDICARE

## 2024-06-14 VITALS
SYSTOLIC BLOOD PRESSURE: 116 MMHG | RESPIRATION RATE: 18 BRPM | TEMPERATURE: 98 F | WEIGHT: 182.1 LBS | DIASTOLIC BLOOD PRESSURE: 79 MMHG | OXYGEN SATURATION: 94 % | HEART RATE: 91 BPM

## 2024-06-14 VITALS
HEART RATE: 87 BPM | SYSTOLIC BLOOD PRESSURE: 125 MMHG | TEMPERATURE: 98 F | RESPIRATION RATE: 18 BRPM | OXYGEN SATURATION: 98 % | DIASTOLIC BLOOD PRESSURE: 77 MMHG

## 2024-06-14 DIAGNOSIS — I89.0 LYMPHEDEMA, NOT ELSEWHERE CLASSIFIED: ICD-10-CM

## 2024-06-14 DIAGNOSIS — Z41.9 ENCOUNTER FOR PROCEDURE FOR PURPOSES OTHER THAN REMEDYING HEALTH STATE, UNSPECIFIED: Chronic | ICD-10-CM

## 2024-06-14 DIAGNOSIS — L03.114 CELLULITIS OF LEFT UPPER LIMB: ICD-10-CM

## 2024-06-14 DIAGNOSIS — Z96.641 PRESENCE OF RIGHT ARTIFICIAL HIP JOINT: Chronic | ICD-10-CM

## 2024-06-14 DIAGNOSIS — Z98.890 OTHER SPECIFIED POSTPROCEDURAL STATES: Chronic | ICD-10-CM

## 2024-06-14 DIAGNOSIS — Z87.2 PERSONAL HISTORY OF DISEASES OF THE SKIN AND SUBCUTANEOUS TISSUE: ICD-10-CM

## 2024-06-14 DIAGNOSIS — Z91.09 OTHER ALLERGY STATUS, OTHER THAN TO DRUGS AND BIOLOGICAL SUBSTANCES: ICD-10-CM

## 2024-06-14 LAB
ALBUMIN SERPL ELPH-MCNC: 4.4 G/DL — SIGNIFICANT CHANGE UP (ref 3.3–5)
ALP SERPL-CCNC: 132 U/L — HIGH (ref 40–120)
ALT FLD-CCNC: 18 U/L — SIGNIFICANT CHANGE UP (ref 10–45)
ANION GAP SERPL CALC-SCNC: 13 MMOL/L — SIGNIFICANT CHANGE UP (ref 5–17)
AST SERPL-CCNC: 19 U/L — SIGNIFICANT CHANGE UP (ref 10–40)
BASOPHILS # BLD AUTO: 0.03 K/UL — SIGNIFICANT CHANGE UP (ref 0–0.2)
BASOPHILS NFR BLD AUTO: 0.3 % — SIGNIFICANT CHANGE UP (ref 0–2)
BILIRUB SERPL-MCNC: 0.7 MG/DL — SIGNIFICANT CHANGE UP (ref 0.2–1.2)
BUN SERPL-MCNC: 18 MG/DL — SIGNIFICANT CHANGE UP (ref 7–23)
CALCIUM SERPL-MCNC: 10 MG/DL — SIGNIFICANT CHANGE UP (ref 8.4–10.5)
CHLORIDE SERPL-SCNC: 100 MMOL/L — SIGNIFICANT CHANGE UP (ref 96–108)
CO2 SERPL-SCNC: 26 MMOL/L — SIGNIFICANT CHANGE UP (ref 22–31)
CREAT SERPL-MCNC: 0.77 MG/DL — SIGNIFICANT CHANGE UP (ref 0.5–1.3)
EGFR: 84 ML/MIN/1.73M2 — SIGNIFICANT CHANGE UP
EOSINOPHIL # BLD AUTO: 0.15 K/UL — SIGNIFICANT CHANGE UP (ref 0–0.5)
EOSINOPHIL NFR BLD AUTO: 1.7 % — SIGNIFICANT CHANGE UP (ref 0–6)
GLUCOSE SERPL-MCNC: 128 MG/DL — HIGH (ref 70–99)
HCT VFR BLD CALC: 43.4 % — SIGNIFICANT CHANGE UP (ref 34.5–45)
HGB BLD-MCNC: 15.1 G/DL — SIGNIFICANT CHANGE UP (ref 11.5–15.5)
IMM GRANULOCYTES NFR BLD AUTO: 0.3 % — SIGNIFICANT CHANGE UP (ref 0–0.9)
LACTATE SERPL-SCNC: 1.6 MMOL/L — SIGNIFICANT CHANGE UP (ref 0.5–2)
LYMPHOCYTES # BLD AUTO: 1 K/UL — SIGNIFICANT CHANGE UP (ref 1–3.3)
LYMPHOCYTES # BLD AUTO: 11.5 % — LOW (ref 13–44)
MCHC RBC-ENTMCNC: 33.5 PG — SIGNIFICANT CHANGE UP (ref 27–34)
MCHC RBC-ENTMCNC: 34.8 GM/DL — SIGNIFICANT CHANGE UP (ref 32–36)
MCV RBC AUTO: 96.2 FL — SIGNIFICANT CHANGE UP (ref 80–100)
MONOCYTES # BLD AUTO: 0.59 K/UL — SIGNIFICANT CHANGE UP (ref 0–0.9)
MONOCYTES NFR BLD AUTO: 6.8 % — SIGNIFICANT CHANGE UP (ref 2–14)
NEUTROPHILS # BLD AUTO: 6.93 K/UL — SIGNIFICANT CHANGE UP (ref 1.8–7.4)
NEUTROPHILS NFR BLD AUTO: 79.4 % — HIGH (ref 43–77)
NRBC # BLD: 0 /100 WBCS — SIGNIFICANT CHANGE UP (ref 0–0)
PLATELET # BLD AUTO: 177 K/UL — SIGNIFICANT CHANGE UP (ref 150–400)
POTASSIUM SERPL-MCNC: 3.7 MMOL/L — SIGNIFICANT CHANGE UP (ref 3.5–5.3)
POTASSIUM SERPL-SCNC: 3.7 MMOL/L — SIGNIFICANT CHANGE UP (ref 3.5–5.3)
PROT SERPL-MCNC: 7.6 G/DL — SIGNIFICANT CHANGE UP (ref 6–8.3)
RBC # BLD: 4.51 M/UL — SIGNIFICANT CHANGE UP (ref 3.8–5.2)
RBC # FLD: 12.7 % — SIGNIFICANT CHANGE UP (ref 10.3–14.5)
SODIUM SERPL-SCNC: 139 MMOL/L — SIGNIFICANT CHANGE UP (ref 135–145)
WBC # BLD: 8.73 K/UL — SIGNIFICANT CHANGE UP (ref 3.8–10.5)
WBC # FLD AUTO: 8.73 K/UL — SIGNIFICANT CHANGE UP (ref 3.8–10.5)

## 2024-06-14 PROCEDURE — 80053 COMPREHEN METABOLIC PANEL: CPT

## 2024-06-14 PROCEDURE — 99284 EMERGENCY DEPT VISIT MOD MDM: CPT | Mod: 25

## 2024-06-14 PROCEDURE — 36415 COLL VENOUS BLD VENIPUNCTURE: CPT

## 2024-06-14 PROCEDURE — 99285 EMERGENCY DEPT VISIT HI MDM: CPT

## 2024-06-14 PROCEDURE — 83605 ASSAY OF LACTIC ACID: CPT

## 2024-06-14 PROCEDURE — 85025 COMPLETE CBC W/AUTO DIFF WBC: CPT

## 2024-06-14 PROCEDURE — 99284 EMERGENCY DEPT VISIT MOD MDM: CPT | Mod: GC

## 2024-06-14 PROCEDURE — 87040 BLOOD CULTURE FOR BACTERIA: CPT

## 2024-06-14 RX ORDER — CEPHALEXIN 500 MG
1 CAPSULE ORAL
Qty: 40 | Refills: 0
Start: 2024-06-14 | End: 2024-06-23

## 2024-06-14 RX ORDER — CEFAZOLIN SODIUM 1 G
2000 VIAL (EA) INJECTION ONCE
Refills: 0 | Status: COMPLETED | OUTPATIENT
Start: 2024-06-14 | End: 2024-06-14

## 2024-06-14 RX ORDER — VANCOMYCIN HCL 1 G
1250 VIAL (EA) INTRAVENOUS ONCE
Refills: 0 | Status: DISCONTINUED | OUTPATIENT
Start: 2024-06-14 | End: 2024-06-17

## 2024-06-14 NOTE — ED PROVIDER NOTE - PATIENT PORTAL LINK FT
You can access the FollowMyHealth Patient Portal offered by [facility] by registering at the following website: [email or web address]. By joining [employer]’s FollowMyHealth portal, you will also be able to view your health information using other applications (apps) compatible with our system.

## 2024-06-14 NOTE — ED PROVIDER NOTE - OBJECTIVE STATEMENT
68F PMH L breast ca s/p neoadjuvant chemo, b/l mastectomy (2019), XRT (2019), currently on anastrazole (follows w/ Dr. Carlin), chronic LUE lymphedema (s/p lymph node removal in 2019) w/ recurrent cellulitis, p/w LUE redness. She first noticed the redness last night ~2200, similar to prior episodes of cellulitis. She has antibiotics at home because of her hx, began taking keflex 500mg and bactrim DS last night. Followed up w/ Dr. Carlin this morning and referred to ED. Her LUE swelling is unchanged. No other systemic symptoms.   Denies fatigue, weakness/numbness, f/c, SOB/CP, URI symptoms, NVD, abd pain.

## 2024-06-14 NOTE — ED PROVIDER NOTE - PHYSICAL EXAMINATION
Blanching erythema/warmth to LUE - dorsum of hand to distal arm. non-pitting edema (at baseline per pt).   normal cap refill, no bony ttp. FROM all fingers/wrist. Sensation intact. Normal adduction/abduction. Normal finger opposition. Strength 5/5. No crepitus, firmness, induration, fluctuance. No obvious skin breaks.

## 2024-06-14 NOTE — ED PROVIDER NOTE - NSFOLLOWUPINSTRUCTIONS_ED_ALL_ED_FT
Can take tylenol 650mg every 6hrs as needed for pain.    Take antibiotics as prescribed: Bactrim DS 1 tab twice a day for 10 days. Keflex 500mg4 times a day for 10 days.     Take daily pictures of affected area.    Stay well hydrated.    Return for fevers, persistent vomit, uncontrolled pain, worsening breathing, worsening lightheaded, spreading redness, worsening swelling.    Follow up with primary doctor within 1-2 days.     Cellulitis    Cellulitis is a skin infection caused by bacteria. This condition occurs most often in the arms and lower legs but can occur anywhere over the body. Symptoms include redness, swelling, warm skin, tenderness, and chills/fever. If you were prescribed an antibiotic medicine, take it as told by your health care provider. Do not stop taking the antibiotic even if you start to feel better.    SEEK IMMEDIATE MEDICAL CARE IF YOU HAVE ANY OF THE FOLLOWING SYMPTOMS: worsening fever, red streaks coming from affected area, vomiting or diarrhea, or dizziness/lightheadedness.

## 2024-06-14 NOTE — ED PROVIDER NOTE - CLINICAL SUMMARY MEDICAL DECISION MAKING FREE TEXT BOX
68F PMH L breast ca s/p neoadjuvant chemo, b/l mastectomy (2019), XRT (2019), currently on anastrazole (follows w/ Dr. Carlin), chronic LUE lymphedema (s/p lymph node removal in 2019) w/ recurrent cellulitis, p/w LUE redness. She first noticed the redness last night ~2200, similar to prior episodes of cellulitis. She has antibiotics at home because of her hx, began taking keflex 500mg and bactrim DS last night. Followed up w/ Dr. Carlin this morning and referred to ED. Her LUE swelling is unchanged. No other systemic symptoms.   Vitals wnl, exam as above. Well appearing.   ddx: Likely recurrent cellulitis. No systemic symptoms.   While pt has required admission several times in the past for similar issue, pt states that she has had episodes where it resolved w/ just oral antibiotics.   Will attempt to contact Dr. Carlin, will reassess.

## 2024-06-14 NOTE — CONSULT NOTE ADULT - ATTENDING COMMENTS
67 yo F with L breast CA s/p neoadjuvant chemo s/p B mastectomy & XRT (2019), chronic LUE lymphedema, ~7 bouts of LUE cellulitis including 2 prior admissions with LUE cellulitis.  Most recently, she had been admitted in 1/2024 with LUE cellulitis.  She was treated with vanc and cefazolin.  She was given mupirocin and Hibiclens 5d/month to attempt decolonization.  She nicked a small area on her R 2nd finger.  Last night, she began noticing recurrence of erythema spreading up her arm.  She started cephalexin 500 mg po q6h and TMP/SX DS q12h.  This morning, she saw Dr. Carlin, who outlined the area of cellulitis and sent her to the ED, where she was afebrile, WBC 8.2 with 79% PMNs, CMP WNL except alk phos 132.  Per Ms. Bernstein, her arm edema is at baseline, she thinks the erythema may be getting slightly better.  No fever, chills.  On exam, she is well-appearing, exam notable for edema and erythema of lower arm from above wrist to upper arm, appears somewhat regressed within marking, warmth tracks with erythema. Imp:  recurrence of cellulitis, more likely Strep than Staph.  Would give vancomycin 1 g IV X 1 and cephalexin 500 mg po q6h.  Instructed Ms. Bernstein to call back if she is not continuing to improve or if she worsens or has fever. Will have NP [place] f/u with her on 6/17 for Dr. Jaffe.  Discussed with Dr. Carlin.

## 2024-06-14 NOTE — ED ADULT TRIAGE NOTE - CHIEF COMPLAINT QUOTE
+ LUE swelling with ext hx cellulitis to extremity  PMH L breast CA 2019. Cephalexin and Bactrim started last night, under onc care.

## 2024-06-14 NOTE — ED PROVIDER NOTE - NSICDXPASTMEDICALHX_GEN_ALL_CORE_FT
present and adequate PAST MEDICAL HISTORY:  Breast cancer left  s/p chemotherapy    Hyperlipidemia     Neuropathy both feet    Osteoarthritis     SOB (shortness of breath)

## 2024-06-14 NOTE — CONSULT NOTE ADULT - SUBJECTIVE AND OBJECTIVE BOX
Consultation Requested by:    Patient is a 68y old  Female who presents with a chief complaint of cellulitis  HPI:  68 yoF with a PMHx of L breast CA sp neoadjuvant chemo, b/l mastectomy 2019, XRT 2019, on anastrozole, follows w Dr Carlin, chronic LUE lymphedema, recurrent cellulitis presents for cellulitis.   Pt reports LUE redness and warmth began last night around 10pm, since she gets recurrent cellulitis she has keflex and bactrim at home, so she took 1 dose of each medication.   Went to see her heme/onc physician Dr Carlin today who looked at her arm and referred her to the ED.   Follows w Dr Jaffe as outpatient.   Pt reports no increase in baseline swelling of LUE from chronic lymphedema, only change was redness and warmth. No fever, chills. All other ROS neg    Allergies    dust (Sneezing)  No Known Drug Allergies    Intolerances      Antimicrobials:  vancomycin  IVPB. 1250 milliGRAM(s) IV Intermittent once      Other Medications:      FAMILY HISTORY:    PAST MEDICAL & SURGICAL HISTORY:  Hyperlipidemia      Osteoarthritis      Breast cancer  left  s/p chemotherapy      SOB (shortness of breath)      Neuropathy  both feet      History of elbow surgery  left      Elective surgery  left shoulder surgery      S/P hip replacement, right      Surgery, elective  right chest port        SOCIAL HISTORY:    IMMUNIZATIONS  [] Up to Date		[] Not Up to Date:  Recent Immunizations:	[] No	[] Yes:    Daily     Daily   Head Circumference:  Vital Signs Last 24 Hrs  T(C): 36.8 (14 Jun 2024 15:38), Max: 36.8 (14 Jun 2024 10:31)  T(F): 98.3 (14 Jun 2024 15:38), Max: 98.3 (14 Jun 2024 15:38)  HR: 87 (14 Jun 2024 15:38) (76 - 91)  BP: 125/77 (14 Jun 2024 15:38) (105/61 - 125/77)  BP(mean): --  RR: 18 (14 Jun 2024 15:38) (18 - 18)  SpO2: 98% (14 Jun 2024 15:38) (94% - 98%)    Parameters below as of 14 Jun 2024 15:38  Patient On (Oxygen Delivery Method): room air        PHYSICAL EXAM  CONSTITUTIONAL: Well groomed, no apparent distress  EYES: EOMI, No conjunctival or scleral injection, non-icteric  ENMT: Oral mucosa with moist membranes. Normal dentition; no pharyngeal injection or exudates  RESP: No respiratory distress, no use of accessory muscles; CTA b/l, no WRR  CV: RRR, +S1S2, no MRG; no JVD; no peripheral edema  GI: Soft, NT, ND, no rebound, no guarding; no palpable masses; no hepatosplenomegaly; no hernia palpated  MSK: LUE swelling from fingertips to shoulder, erythematous, warm, area of erythema/warmth receding from lines drawn in outpatient clinic  PSYCH: Appropriate insight/judgment; A+O x 3, mood and affect appropriate, recent/remote memory intact    Lab Results:                        15.1   8.73  )-----------( 177      ( 14 Jun 2024 11:36 )             43.4     06-14    139  |  100  |  18  ----------------------------<  128<H>  3.7   |  26  |  0.77    Ca    10.0      14 Jun 2024 11:36    TPro  7.6  /  Alb  4.4  /  TBili  0.7  /  DBili  x   /  AST  19  /  ALT  18  /  AlkPhos  132<H>  06-14    LIVER FUNCTIONS - ( 14 Jun 2024 11:36 )  Alb: 4.4 g/dL / Pro: 7.6 g/dL / ALK PHOS: 132 U/L / ALT: 18 U/L / AST: 19 U/L / GGT: x             Urinalysis Basic - ( 14 Jun 2024 11:36 )    Color: x / Appearance: x / SG: x / pH: x  Gluc: 128 mg/dL / Ketone: x  / Bili: x / Urobili: x   Blood: x / Protein: x / Nitrite: x   Leuk Esterase: x / RBC: x / WBC x   Sq Epi: x / Non Sq Epi: x / Bacteria: x

## 2024-06-14 NOTE — ED ADULT NURSE NOTE - OBJECTIVE STATEMENT
68 y.o. Female hx of breast CA with double mastectomy, lymphedema with hx of recurrent cellulitis infection on LUE presents to ED for redness x1 day to LUE. Pt noted she had irritation to her L third finger yesterday and she wrapped it in neosporin gauze overnight, no visible cut/abrasion to finger but is red and swollen on lateral fingertip. This morning she woke up and her L arm was very red. Pt eval by her oncologist today and had blood work performed which she states came back unremarkable. LUE was marked to note redness progression and sent to ER. PT denies cp, sob, f/c, pain to LUE. Pt started keflex and bactrim last night from home prescription she had. Pt denies increased swelling to LUE. AOX4, speaking full sentences without difficulty. Patient not in active cardiac or respiratory distress, no noted neurologic deficits.  No obvious trauma/injury/deformity noted. Patient oriented to ED area. Was admitted in July 2023 and January 2024 for IV ABX r/t cellulitis infection.

## 2024-06-14 NOTE — ED PROVIDER NOTE - PROGRESS NOTE DETAILS
Klepfish: D/w Dr. Carlin, given pt's hx, requesting ID. ID consulted. Will check basic labs. Updated pt. Klepfish: L shift, alk phos 132, other labs grossly wnl. Pt remains stable. ID recs pending. Kelsie: Evaluated by ID, recommending 1 dose IV ancef and vanc and outpt f/u on bactrim/keflex. Pt states she has enough bactrim. Remains well appearing w/ no systemic symptoms while in ED. Discussed importance of outpt follow up and return precautions. Clinically no indication for further emergent ED workup or hospitalization at this time. Stable for dc, outpt f/u.  Updated Dr. Carlin

## 2024-06-14 NOTE — ED ADULT NURSE NOTE - NSFALLUNIVINTERV_ED_ALL_ED
Bed/Stretcher in lowest position, wheels locked, appropriate side rails in place/Call bell, personal items and telephone in reach/Instruct patient to call for assistance before getting out of bed/chair/stretcher/Non-slip footwear applied when patient is off stretcher/[place] to call system/Physically safe environment - no spills, clutter or unnecessary equipment/Purposeful proactive rounding/Room/bathroom lighting operational, light cord in reach

## 2024-06-14 NOTE — CONSULT NOTE ADULT - ASSESSMENT
68 yoF with a PMHx of L breast CA sp neoadjuvant chemo, b/l mastectomy 2019, XRT 2019, on anastrozole, follows w Dr Carlin, chronic LUE lymphedema, recurrent cellulitis presents for cellulitis.     # LUE cellulitis  Pt w hx of recurrent cellulitis, presenting with LUE redness and warmth that began last night. Already took 1 dose of keflex and bactrim which she has at home as she gets recurrent episodes of cellulitis. Currently follows with Dr Jaffe outpatient and is undergoing decolonization. Seen by heme/onc provider outpatient who referred her to ED. She is afebrile, no leukocytosis. On exam, area of erythema and warmth has already begun to recede from lines drawn in outpatient clinic.   Recommendations:   - please administer 2g cefazolin IV x 1  - please administer 1250mg vancomycin IV x 1  - patient may be discharged home on po keflex and bactrim (she already has rx filled)

## 2024-06-17 ENCOUNTER — NON-APPOINTMENT (OUTPATIENT)
Age: 69
End: 2024-06-17

## 2024-06-28 ENCOUNTER — APPOINTMENT (OUTPATIENT)
Dept: GYNECOLOGIC ONCOLOGY | Facility: CLINIC | Age: 69
End: 2024-06-28

## 2024-07-18 ENCOUNTER — APPOINTMENT (OUTPATIENT)
Dept: BREAST CENTER | Facility: CLINIC | Age: 69
End: 2024-07-18
Payer: MEDICARE

## 2024-07-18 VITALS
HEART RATE: 76 BPM | HEIGHT: 63 IN | WEIGHT: 182 LBS | BODY MASS INDEX: 32.25 KG/M2 | DIASTOLIC BLOOD PRESSURE: 86 MMHG | SYSTOLIC BLOOD PRESSURE: 142 MMHG

## 2024-07-18 DIAGNOSIS — R92.8 OTHER ABNORMAL AND INCONCLUSIVE FINDINGS ON DIAGNOSTIC IMAGING OF BREAST: ICD-10-CM

## 2024-07-18 DIAGNOSIS — Z85.3 PERSONAL HISTORY OF MALIGNANT NEOPLASM OF BREAST: ICD-10-CM

## 2024-07-18 DIAGNOSIS — Z15.01 GENETIC SUSCEPTIBILITY TO MALIGNANT NEOPLASM OF BREAST: ICD-10-CM

## 2024-07-18 PROCEDURE — 99213 OFFICE O/P EST LOW 20 MIN: CPT

## 2024-07-18 RX ORDER — ATORVASTATIN CALCIUM 80 MG/1
TABLET, FILM COATED ORAL
Refills: 0 | Status: ACTIVE | COMMUNITY

## 2024-07-24 ENCOUNTER — APPOINTMENT (OUTPATIENT)
Dept: INFECTIOUS DISEASE | Facility: CLINIC | Age: 69
End: 2024-07-24

## 2024-07-30 ENCOUNTER — NON-APPOINTMENT (OUTPATIENT)
Age: 69
End: 2024-07-30

## 2024-08-06 ENCOUNTER — APPOINTMENT (OUTPATIENT)
Dept: PHYSICAL MEDICINE AND REHAB | Facility: CLINIC | Age: 69
End: 2024-08-06

## 2024-08-06 PROCEDURE — 99205 OFFICE O/P NEW HI 60 MIN: CPT

## 2024-08-06 NOTE — DATA REVIEWED
[FreeTextEntry1] : 01/11/2021 -- Left shoulder XR IMPRESSION: Internal rotation, external rotation, scapular Y view and axillary views of the left shoulder demonstrates degenerative change of the acromioclavicular joint. No fracture. No dislocation. Postsurgical changes overlying the left hemithorax.  Left shoulder US: 02/21/2021 Supraspinatus tendinosis and biceps tenosynovitis  7/18/2024 ([facility]) bilateral ultrasound: In the left breast at 3:00 2 cm from nipple is a stable cystic area, most compatible with fat necrosis, measuring 1.2 x 0.4 x 1.1 cm, stable, previous measuring 1.5 x 0.4 x 1.2 cm. In the left breast at 7:00 7 cm nipple there is a 1.6 x 1.0 x 4.1 cm mixed solid and cystic mass. Although this probably is related to scarring and fat necrosis, it was not documented on previous ultrasound therefore recommend tissue sampling. BI-RADS 4.

## 2024-08-06 NOTE — HISTORY OF PRESENT ILLNESS
[FreeTextEntry1] : Ms. RADHA LEMONS is a 68 year old female with history of LEFT-sided breast cancer (BRCA 1+, IDC w/ DCIS) s/p neoadjuvant chemotherapy followed by bilateral mastectomy with NUBIA recon and BSO in 2019 (total 29 LN removed), s/p XRT completed in 2019 (Dr. De Leon), currently on Anastrozole managed by Dr. Carlin. She is referred to PM&R for chronic lymphedema management and LUE mobility impairments.   Pertinent PMHx: HLD --------------------------------------------------- 2024 -- Initial Evaluation: * LUE lymphedema - Currently using a lymphedema pump, seeing PT for lymphedema therapy. She has had lymphedema since  following her treatments. States symptoms fluctuate. In the last year she has experienced recurrent bouts of LUE lymphedema associated cellulitis requiring hospitalization for IV antibiotics. Most recently developed cellulitis in . She follows with ID and has antibiotics on hand in case of cellulitis recurrence. She has concern about her current pump ad she believes it something in the sleeve poked her. She is hesitant to use it. The lymphedema pump in from .  Pain is more or less controlled but has occasional tightness of her arm. * LUE shoulder pain/ stiffness - reports history of arthritis and shoulder impingement. She saw Dr. Byers (Ortho) in  for repeat shoulder injection. Prior to that she needed GHJ injections to be able to tolerate arm positioning for MRI.  She continues to report pain in her shoulder with overhead and reaching activities. Pain ranges from 5-8/10. She is enrolled in PT and doing gentle ROM at home. She is right handed and able to compensate for ADLs. --------------------------------------------------- Functional Performance Status: - KPS: 90 - ECO - ADLs/ iADLs: Independent - Mobility: Independent - Physical Activities: walking, ADLs

## 2024-08-06 NOTE — ASSESSMENT
[FreeTextEntry1] : 68 year old with history of LEFT-sided breast cancer (BRCA+) s/p neoadjuvant chemo, bilateral mastectomies with NUBIA recon and LN removal, XRT, on Anastrozole who presents for initial evaluation for chronic lymphedema management.   Problems / Impression: * Chronic lymphedema - Stage III; hx of recurrent cellulitis in the last year (follows with ID).  * LUE impaired shoulder ROM/ pain - consistent with shoulder impingement syndrome in the setting of AC joint arthritis and RTC tendinosis. s/p shoulder injections with Ortho  Plan/ Recommendations: - Reviewed prior provider notes as well as relevant imaging mentioned above - Discussed current symptoms, potential etiologies, and management to date, and anticipated rehab course. - Imaging/ Work-up:   > Doppler LUE to rule out DVT given recurrent cellulitis and fluctuating symptoms - Therapy:   > Currently enrolled in lymphedema PT locally at [facility]. Not able to tolerate bandage work.  - Medications:   > Continue Tylenol / Advil for shoulder pain. - May benefit from repeat shoulder injection. Will reach out to ID to inquire if she will need ppx antibiotics given cellulitis hx. If okay to pursue injections, can refer her back to Dr. Byers. - Provided script for adjustable velcro compression sleeve (Circaid Juxtafit) - Continue lymphedema pump sessions daily or every other day. Encouraged her to reach out to pump rep to evaluate fit and settings - Education/ Counseling: Educated on red flag signs/ symptoms for which she would need to seek immediate medical attention including, but not limited to, increased drainage, erythema, fevers or chills. Provided lymphedema education. - Follow-up: 2 months   The patient expressed verbal understanding and is in agreement with the plan of care. All of the patient's questions and concerns were addressed during today's visit.

## 2024-08-06 NOTE — PHYSICAL EXAM
[FreeTextEntry1] : Gen: Patient is A&O x 3, NAD HEENT: EOMI, hearing grossly normal Resp: regular, non-labored Abd: No visible distension   Spine:   Inspection: Protracted shoulders. Slightly increased thoracic kyphosis. No periscapular atrophy. No scapulothoracic dyskinesia or winging.    Palpation: No tenderness to palpation of midline structures; bilateral rhomboids, cervical paraspinals and bilateral trapezius Non TTP   ROM: full and pain-free.    Breast: b/l NUBIA flaps intact. non-TTP   Lymph: LUE edema   Upper arm: 37 cm   Forearm: 32 cm   Wrist: 23 cm    Extremities:    Inspection: Normal bulk with no evidence of atrophy of extremities    ROM: decreased LUE shoulder ROM in ER (60)/ abduction (110) / flexion (160)    Palpation: No pain with palpation of joints and soft tissues. No pain with palpation along clavicle, GHJ, AC joint.    Special tests:      Shoulder: (+) Neer's, (+) Hawkin's, (-) Empty Can, (+) Speed's  Neuro:   Sensation: grossly intact to light touch   Strength: Demonstrates sit to stand without use of hands. Grossly 5/5 throughout.   Functional:   Gait: normal step length, non-antalgic, well-compensated

## 2024-10-10 ENCOUNTER — APPOINTMENT (OUTPATIENT)
Dept: PHYSICAL MEDICINE AND REHAB | Facility: CLINIC | Age: 69
End: 2024-10-10

## 2024-10-25 ENCOUNTER — NON-APPOINTMENT (OUTPATIENT)
Age: 69
End: 2024-10-25

## 2024-11-01 ENCOUNTER — APPOINTMENT (OUTPATIENT)
Dept: GYNECOLOGIC ONCOLOGY | Facility: CLINIC | Age: 69
End: 2024-11-01
Payer: MEDICARE

## 2024-11-01 ENCOUNTER — NON-APPOINTMENT (OUTPATIENT)
Age: 69
End: 2024-11-01

## 2024-11-01 VITALS
HEIGHT: 64 IN | SYSTOLIC BLOOD PRESSURE: 112 MMHG | WEIGHT: 185 LBS | TEMPERATURE: 97.6 F | DIASTOLIC BLOOD PRESSURE: 73 MMHG | BODY MASS INDEX: 31.58 KG/M2 | OXYGEN SATURATION: 95 % | HEART RATE: 105 BPM

## 2024-11-01 DIAGNOSIS — Z12.4 ENCOUNTER FOR SCREENING FOR MALIGNANT NEOPLASM OF CERVIX: ICD-10-CM

## 2024-11-01 DIAGNOSIS — B37.2 CANDIDIASIS OF SKIN AND NAIL: ICD-10-CM

## 2024-11-01 PROCEDURE — 99459 PELVIC EXAMINATION: CPT

## 2024-11-01 PROCEDURE — 99215 OFFICE O/P EST HI 40 MIN: CPT

## 2024-11-01 RX ORDER — CLOTRIMAZOLE 10 MG/G
1 CREAM TOPICAL TWICE DAILY
Qty: 1 | Refills: 0 | Status: ACTIVE | COMMUNITY
Start: 2024-11-01 | End: 1900-01-01

## 2024-11-01 RX ORDER — CLOTRIMAZOLE 10 MG/G
1 CREAM TOPICAL 3 TIMES DAILY
Qty: 1 | Refills: 2 | Status: ACTIVE | COMMUNITY
Start: 2024-11-01 | End: 1900-01-01

## 2024-11-04 LAB — HPV HIGH+LOW RISK DNA PNL CVX: NOT DETECTED

## 2024-11-14 ENCOUNTER — TRANSCRIPTION ENCOUNTER (OUTPATIENT)
Age: 69
End: 2024-11-14

## 2025-02-07 ENCOUNTER — APPOINTMENT (OUTPATIENT)
Dept: GYNECOLOGIC ONCOLOGY | Facility: CLINIC | Age: 70
End: 2025-02-07
Payer: MEDICARE

## 2025-02-07 VITALS
DIASTOLIC BLOOD PRESSURE: 84 MMHG | SYSTOLIC BLOOD PRESSURE: 128 MMHG | TEMPERATURE: 97.2 F | HEIGHT: 64 IN | OXYGEN SATURATION: 95 % | BODY MASS INDEX: 31.58 KG/M2 | WEIGHT: 185 LBS | HEART RATE: 89 BPM

## 2025-02-07 DIAGNOSIS — Z15.02 GENETIC SUSCEPTIBILITY TO MALIGNANT NEOPLASM OF OVARY: ICD-10-CM

## 2025-02-07 PROCEDURE — 99214 OFFICE O/P EST MOD 30 MIN: CPT

## 2025-02-14 ENCOUNTER — APPOINTMENT (OUTPATIENT)
Dept: OPHTHALMOLOGY | Facility: CLINIC | Age: 70
End: 2025-02-14
Payer: MEDICARE

## 2025-02-14 ENCOUNTER — NON-APPOINTMENT (OUTPATIENT)
Age: 70
End: 2025-02-14

## 2025-02-14 PROCEDURE — 92004 COMPRE OPH EXAM NEW PT 1/>: CPT

## 2025-02-14 PROCEDURE — 92136 OPHTHALMIC BIOMETRY: CPT

## 2025-02-14 PROCEDURE — 92250 FUNDUS PHOTOGRAPHY W/I&R: CPT

## 2025-02-14 PROCEDURE — 92025 CPTRIZED CORNEAL TOPOGRAPHY: CPT

## 2025-03-03 ENCOUNTER — APPOINTMENT (OUTPATIENT)
Dept: RADIOLOGY | Facility: CLINIC | Age: 70
End: 2025-03-03

## 2025-03-03 PROCEDURE — 77080 DXA BONE DENSITY AXIAL: CPT

## 2025-06-10 ENCOUNTER — NON-APPOINTMENT (OUTPATIENT)
Age: 70
End: 2025-06-10

## 2025-06-12 ENCOUNTER — OUTPATIENT (OUTPATIENT)
Dept: OUTPATIENT SERVICES | Facility: HOSPITAL | Age: 70
LOS: 1 days | End: 2025-06-12
Payer: MEDICARE

## 2025-06-12 ENCOUNTER — APPOINTMENT (OUTPATIENT)
Dept: ORTHOPEDIC SURGERY | Facility: CLINIC | Age: 70
End: 2025-06-12
Payer: MEDICARE

## 2025-06-12 ENCOUNTER — RESULT REVIEW (OUTPATIENT)
Age: 70
End: 2025-06-12

## 2025-06-12 VITALS
HEART RATE: 90 BPM | HEIGHT: 64 IN | BODY MASS INDEX: 31.58 KG/M2 | WEIGHT: 185 LBS | DIASTOLIC BLOOD PRESSURE: 89 MMHG | OXYGEN SATURATION: 95 % | SYSTOLIC BLOOD PRESSURE: 132 MMHG

## 2025-06-12 DIAGNOSIS — Z41.9 ENCOUNTER FOR PROCEDURE FOR PURPOSES OTHER THAN REMEDYING HEALTH STATE, UNSPECIFIED: Chronic | ICD-10-CM

## 2025-06-12 DIAGNOSIS — Z96.641 PRESENCE OF RIGHT ARTIFICIAL HIP JOINT: Chronic | ICD-10-CM

## 2025-06-12 DIAGNOSIS — Z98.890 OTHER SPECIFIED POSTPROCEDURAL STATES: Chronic | ICD-10-CM

## 2025-06-12 PROBLEM — M70.70 ISCHIAL BURSITIS: Status: ACTIVE | Noted: 2025-06-12

## 2025-06-12 PROBLEM — M76.899 HAMSTRING TENDINITIS AT ORIGIN: Status: ACTIVE | Noted: 2025-06-12

## 2025-06-12 PROCEDURE — 76942 ECHO GUIDE FOR BIOPSY: CPT

## 2025-06-12 PROCEDURE — 73521 X-RAY EXAM HIPS BI 2 VIEWS: CPT

## 2025-06-12 PROCEDURE — 73522 X-RAY EXAM HIPS BI 3-4 VIEWS: CPT

## 2025-06-12 PROCEDURE — 73521 X-RAY EXAM HIPS BI 2 VIEWS: CPT | Mod: 26

## 2025-06-12 PROCEDURE — 99214 OFFICE O/P EST MOD 30 MIN: CPT | Mod: 25

## 2025-06-12 PROCEDURE — 20550 NJX 1 TENDON SHEATH/LIGAMENT: CPT

## 2025-06-12 RX ORDER — MELOXICAM 15 MG/1
15 TABLET ORAL DAILY
Qty: 30 | Refills: 1 | Status: ACTIVE | COMMUNITY
Start: 2025-06-12 | End: 1900-01-01

## 2025-06-12 RX ORDER — CYCLOBENZAPRINE HYDROCHLORIDE 5 MG/1
5 TABLET, FILM COATED ORAL
Qty: 30 | Refills: 0 | Status: ACTIVE | COMMUNITY
Start: 2025-06-12 | End: 1900-01-01

## 2025-06-24 NOTE — PATIENT PROFILE ADULT - NSPROPTRIGHTBILLOFRIGHTS_GEN_A_NUR
\"Your Diagnosis is:  Chest pain.      Return to the Emergency Department for Fever, increased shortness of breath, worsened chest pain, new worsened pain that radiates to the back, coughing up blood, leg swelling, passing out, or if symptoms worsen or for any other concerns.    Medications:  These are your new prescriptions: none   These medicines that you take now have been changed: no changes  Please refer to the medication section for instructions on how to take them.    Major procedures performed during your ED visit: none    Additional instructions:  You came in today for pain in your chest. We did blood work, chest xray and an EKG. We did not find the cause of your pain, but it does not appear to be something immediately life threatening at this time.          patient

## 2025-06-30 NOTE — ASU PATIENT PROFILE, ADULT - VISION (WITH CORRECTIVE LENSES IF THE PATIENT USUALLY WEARS THEM):
cataract, reading glasses/Partially impaired: cannot see medication labels or newsprint, but can see obstacles in path, and the surrounding layout; can count fingers at arm's length

## 2025-06-30 NOTE — ASU PATIENT PROFILE, ADULT - NS PREOP UNDERSTANDS INFO
No solid food/dairy/candy/gum after midnight; water allowed before 09:00am tomorrow; patient reminded to come with photo ID/insurance/credit card; dress in comfortable clothes; no jewelries/contact lens/valuable; no smoking/alcohol drinking/recreational drug use tonight; escort to have photo ID; address and callback number was given/yes

## 2025-06-30 NOTE — ASU PATIENT PROFILE, ADULT - FALL HARM RISK - UNIVERSAL INTERVENTIONS
Bed in lowest position, wheels locked, appropriate side rails in place/Call bell, personal items and telephone in reach/Instruct patient to call for assistance before getting out of bed or chair/Non-slip footwear when patient is out of bed/[place] to call system/Physically safe environment - no spills, clutter or unnecessary equipment/Purposeful Proactive Rounding/Room/bathroom lighting operational, light cord in reach

## 2025-06-30 NOTE — ASU PATIENT PROFILE, ADULT - NSICDXPASTMEDICALHX_GEN_ALL_CORE_FT
PAST MEDICAL HISTORY:  Breast cancer left  s/p chemotherapy    Hyperlipidemia     Neuropathy both feet during chemo    Osteoarthritis

## 2025-06-30 NOTE — ASU PATIENT PROFILE, ADULT - NSICDXPASTSURGICALHX_GEN_ALL_CORE_FT
Contacted patient for Transitions of Care Coordination  follow up. No valid contact phone number in patient's chart. Episode resolved. PAST SURGICAL HISTORY:  Elective surgery left shoulder surgery    History of elbow surgery left    S/P bilateral oophorectomy     S/P hip replacement, right     S/P mastectomy, bilateral     S/P radiation therapy 2019    Status post chemotherapy 2018    Surgery, elective right chest port, removed

## 2025-07-01 ENCOUNTER — TRANSCRIPTION ENCOUNTER (OUTPATIENT)
Age: 70
End: 2025-07-01

## 2025-07-01 ENCOUNTER — APPOINTMENT (OUTPATIENT)
Dept: OPHTHALMOLOGY | Facility: AMBULATORY SURGERY CENTER | Age: 70
End: 2025-07-01

## 2025-07-01 ENCOUNTER — OUTPATIENT (OUTPATIENT)
Dept: OUTPATIENT SERVICES | Facility: HOSPITAL | Age: 70
LOS: 1 days | Discharge: HOME | End: 2025-07-01
Payer: MEDICARE

## 2025-07-01 VITALS
TEMPERATURE: 98 F | SYSTOLIC BLOOD PRESSURE: 127 MMHG | OXYGEN SATURATION: 95 % | RESPIRATION RATE: 16 BRPM | DIASTOLIC BLOOD PRESSURE: 65 MMHG | HEART RATE: 79 BPM

## 2025-07-01 VITALS
DIASTOLIC BLOOD PRESSURE: 84 MMHG | TEMPERATURE: 98 F | HEART RATE: 82 BPM | WEIGHT: 180.34 LBS | SYSTOLIC BLOOD PRESSURE: 141 MMHG | HEIGHT: 64 IN | RESPIRATION RATE: 16 BRPM | OXYGEN SATURATION: 97 %

## 2025-07-01 DIAGNOSIS — Z96.641 PRESENCE OF RIGHT ARTIFICIAL HIP JOINT: Chronic | ICD-10-CM

## 2025-07-01 DIAGNOSIS — Z92.21 PERSONAL HISTORY OF ANTINEOPLASTIC CHEMOTHERAPY: Chronic | ICD-10-CM

## 2025-07-01 DIAGNOSIS — Z41.9 ENCOUNTER FOR PROCEDURE FOR PURPOSES OTHER THAN REMEDYING HEALTH STATE, UNSPECIFIED: Chronic | ICD-10-CM

## 2025-07-01 DIAGNOSIS — Z92.3 PERSONAL HISTORY OF IRRADIATION: Chronic | ICD-10-CM

## 2025-07-01 DIAGNOSIS — Z98.890 OTHER SPECIFIED POSTPROCEDURAL STATES: Chronic | ICD-10-CM

## 2025-07-01 DIAGNOSIS — Z90.722 ACQUIRED ABSENCE OF OVARIES, BILATERAL: Chronic | ICD-10-CM

## 2025-07-01 DIAGNOSIS — Z90.13 ACQUIRED ABSENCE OF BILATERAL BREASTS AND NIPPLES: Chronic | ICD-10-CM

## 2025-07-01 PROBLEM — R06.02 SHORTNESS OF BREATH: Chronic | Status: INACTIVE | Noted: 2019-01-08 | Resolved: 2025-06-30

## 2025-07-01 PROCEDURE — 66984 XCAPSL CTRC RMVL W/O ECP: CPT | Mod: LT

## 2025-07-01 DEVICE — LENS IOL CLAREON CCA0T0 23D
Type: IMPLANTABLE DEVICE | Site: LEFT | Status: NON-FUNCTIONAL
Removed: 2025-07-01

## 2025-07-01 RX ORDER — OFLOXACIN 3 MG/ML
1 SOLUTION OPHTHALMIC
Refills: 0 | Status: COMPLETED | OUTPATIENT
Start: 2025-07-01 | End: 2025-07-01

## 2025-07-01 RX ORDER — TROPICAMIDE
1 POWDER (GRAM) MISCELLANEOUS
Refills: 0 | Status: COMPLETED | OUTPATIENT
Start: 2025-07-01 | End: 2025-07-01

## 2025-07-01 RX ORDER — TETRACAINE HYDROCHLORIDE 5 MG/ML
1 SOLUTION OPHTHALMIC ONCE
Refills: 0 | Status: COMPLETED | OUTPATIENT
Start: 2025-07-01 | End: 2025-07-01

## 2025-07-01 RX ORDER — OXYCODONE HYDROCHLORIDE AND ACETAMINOPHEN 10; 325 MG/1; MG/1
1 TABLET ORAL
Refills: 0 | DISCHARGE

## 2025-07-01 RX ORDER — PHENYLEPHRINE HYDROCHLORIDE 25 MG/ML
1 SOLUTION OPHTHALMIC
Refills: 0 | Status: COMPLETED | OUTPATIENT
Start: 2025-07-01 | End: 2025-07-01

## 2025-07-01 RX ADMIN — Medication 1 DROP(S): at 11:35

## 2025-07-01 RX ADMIN — OFLOXACIN 1 DROP(S): 3 SOLUTION OPHTHALMIC at 11:35

## 2025-07-01 RX ADMIN — PHENYLEPHRINE HYDROCHLORIDE 1 DROP(S): 25 SOLUTION OPHTHALMIC at 11:35

## 2025-07-01 RX ADMIN — Medication 1 DROP(S): at 11:30

## 2025-07-01 RX ADMIN — OFLOXACIN 1 DROP(S): 3 SOLUTION OPHTHALMIC at 11:30

## 2025-07-01 RX ADMIN — TETRACAINE HYDROCHLORIDE 1 DROP(S): 5 SOLUTION OPHTHALMIC at 11:25

## 2025-07-01 RX ADMIN — PHENYLEPHRINE HYDROCHLORIDE 1 DROP(S): 25 SOLUTION OPHTHALMIC at 11:25

## 2025-07-01 RX ADMIN — OFLOXACIN 1 DROP(S): 3 SOLUTION OPHTHALMIC at 11:25

## 2025-07-01 RX ADMIN — PHENYLEPHRINE HYDROCHLORIDE 1 DROP(S): 25 SOLUTION OPHTHALMIC at 11:30

## 2025-07-01 RX ADMIN — Medication 1 DROP(S): at 11:25

## 2025-07-01 NOTE — ASU DISCHARGE PLAN (ADULT/PEDIATRIC) - NS MD DC FALL RISK RISK
For information on Fall & Injury Prevention, visit: [email or web address] OR  [email or web address] OR  https://www.cdc.gov/steadi/patient.html

## 2025-07-01 NOTE — PRE-ANESTHESIA EVALUATION ADULT - HEART RATE (BEATS/MIN)
Lidocaine given to Dr Rodriguez  
Rounding completed, pt appears comfortable at this time. bed in lowest position and locked. No other needs at this time, call light within reach. Will continue to monitor pt.     
82

## 2025-07-01 NOTE — OPERATIVE REPORT - OPERATIVE RPOSRT DETAILS
ASSISTANT SURGEON:  Arvin Lopez MD    DATE OF SURGERY:  07-01-25 @ 14:18    ANESTHESIA:  MAC/TOPICAL	    ESTIMATED BLOOD LOSS: < 1mL	    COMPLICATIONS:  none		    PREOPERATIVE DIAGNOSIS:  Nuclear Sclerotic Cataract, Left eye    POSTOPERATIVE DIAGNOSIS:  Nuclear Sclerotic Cataract, Left eye    OPERATIVE PROCEDURE:  Phacoemulsification with insertion of posterior chamber intraocular lens Left eye    LENS IMPLANT CCAOTO +23D    PROCEDURE:	  The patient was brought into the operating room and placed supine on the operating room table. After uneventful induction of neuroleptic anesthesia, tetracaine  was instilled into the operative eye achieving sufficient anesthesia.  The patient was then prepped and draped in the usual sterile fashion.  A wire lid speculum was then inserted into the operative eye giving a wide palpebral fissure.      A rolf knife was used to perform paracenteses through clear cornea at the 12 and 6 o’clock limbal positions.  The anterior chamber was irrigated with lidocaine 1% nonpreserved.  Viscoelastic was then used to maintain the anterior chamber.  A keratome was used to create a clear corneal incision just inside the temporal limbus.  A bent 25 gauge needle was then used to initiate an anterior capsulorhexis, which was completed with the use of capsulorhexis forceps.  Balanced salt solution was used to hydrodissect the nucleus.  The [employer] phacoemulsification unit was then used to completely phacoemulsify the nucleus, following which an aspirating handpiece was used to aspirate all cortical remnants from the capsular bag.  Viscoelastic was again used to reform the anterior chamber and capsular bag.      A new foldable posterior chamber intraocular lens was brought into the surgical field.  It was folded and directly injected into the capsular bag following which it was centered and secure.  All viscoelastic was then aspirated from the anterior segment using an aspirating handpiece.  All wounds were checked for leaks and there were none.   Tobramycin 0.3%/dexamethasone 0.1% solution was used to irrigate the surface of the eye.  The lid speculum was removed from the eye and a shield placed over the eye.      The patient tolerated the procedure well and went to the recovery area in good condition.      Juan David WARD MD was present for all aspects of the case.	    Juan David Lovell M.D.

## 2025-07-01 NOTE — ASU DISCHARGE PLAN (ADULT/PEDIATRIC) - FINANCIAL ASSISTANCE
[facility] provides services at a reduced cost to those who are determined to be eligible through [facility]’s financial assistance program. Information regarding [facility]’s financial assistance program can be found by going to [email or web address] or by calling [phone].

## 2025-07-02 ENCOUNTER — APPOINTMENT (OUTPATIENT)
Dept: OPHTHALMOLOGY | Facility: CLINIC | Age: 70
End: 2025-07-02
Payer: MEDICARE

## 2025-07-02 ENCOUNTER — NON-APPOINTMENT (OUTPATIENT)
Age: 70
End: 2025-07-02

## 2025-07-02 PROCEDURE — 99024 POSTOP FOLLOW-UP VISIT: CPT

## 2025-07-07 NOTE — ASU PATIENT PROFILE, ADULT - NSICDXPASTSURGICALHX_GEN_ALL_CORE_FT
PAST SURGICAL HISTORY:  Elective surgery left shoulder surgery    History of elbow surgery left    S/P bilateral oophorectomy     S/P hip replacement, right     S/P mastectomy, bilateral     S/P radiation therapy 2019    Status post chemotherapy 2018    Surgery, elective right chest port, removed     PAST SURGICAL HISTORY:  Elective surgery left shoulder surgery    History of elbow surgery left    S/P bilateral oophorectomy     S/P cataract surgery left    S/P hip replacement, right     S/P mastectomy, bilateral     S/P radiation therapy 2019    Status post chemotherapy 2018    Surgery, elective right chest port, removed

## 2025-07-07 NOTE — ASU PATIENT PROFILE, ADULT - VISION (WITH CORRECTIVE LENSES IF THE PATIENT USUALLY WEARS THEM):
cataract, reading glasses/Partially impaired: cannot see medication labels or newsprint, but can see obstacles in path, and the surrounding layout; can count fingers at arm's length
detailed exam

## 2025-07-08 ENCOUNTER — OUTPATIENT (OUTPATIENT)
Dept: OUTPATIENT SERVICES | Facility: HOSPITAL | Age: 70
LOS: 1 days | Discharge: HOME | End: 2025-07-08
Payer: MEDICARE

## 2025-07-08 ENCOUNTER — APPOINTMENT (OUTPATIENT)
Dept: OPHTHALMOLOGY | Facility: AMBULATORY SURGERY CENTER | Age: 70
End: 2025-07-08

## 2025-07-08 VITALS
HEIGHT: 64 IN | RESPIRATION RATE: 16 BRPM | SYSTOLIC BLOOD PRESSURE: 137 MMHG | WEIGHT: 181 LBS | DIASTOLIC BLOOD PRESSURE: 81 MMHG | TEMPERATURE: 98 F | OXYGEN SATURATION: 97 % | HEART RATE: 82 BPM

## 2025-07-08 VITALS
OXYGEN SATURATION: 96 % | RESPIRATION RATE: 18 BRPM | HEART RATE: 77 BPM | SYSTOLIC BLOOD PRESSURE: 125 MMHG | TEMPERATURE: 97 F | DIASTOLIC BLOOD PRESSURE: 78 MMHG

## 2025-07-08 DIAGNOSIS — Z92.3 PERSONAL HISTORY OF IRRADIATION: Chronic | ICD-10-CM

## 2025-07-08 DIAGNOSIS — Z96.641 PRESENCE OF RIGHT ARTIFICIAL HIP JOINT: Chronic | ICD-10-CM

## 2025-07-08 DIAGNOSIS — Z98.890 OTHER SPECIFIED POSTPROCEDURAL STATES: Chronic | ICD-10-CM

## 2025-07-08 DIAGNOSIS — Z41.9 ENCOUNTER FOR PROCEDURE FOR PURPOSES OTHER THAN REMEDYING HEALTH STATE, UNSPECIFIED: Chronic | ICD-10-CM

## 2025-07-08 DIAGNOSIS — Z90.13 ACQUIRED ABSENCE OF BILATERAL BREASTS AND NIPPLES: Chronic | ICD-10-CM

## 2025-07-08 DIAGNOSIS — Z92.21 PERSONAL HISTORY OF ANTINEOPLASTIC CHEMOTHERAPY: Chronic | ICD-10-CM

## 2025-07-08 DIAGNOSIS — Z98.49 CATARACT EXTRACTION STATUS, UNSPECIFIED EYE: Chronic | ICD-10-CM

## 2025-07-08 DIAGNOSIS — Z90.722 ACQUIRED ABSENCE OF OVARIES, BILATERAL: Chronic | ICD-10-CM

## 2025-07-08 PROCEDURE — 66984 XCAPSL CTRC RMVL W/O ECP: CPT | Mod: RT,79

## 2025-07-08 DEVICE — LENS IOL CLAREON CCA0T0 23D
Type: IMPLANTABLE DEVICE | Site: RIGHT | Status: NON-FUNCTIONAL
Removed: 2025-07-08

## 2025-07-08 RX ORDER — ONDANSETRON HCL/PF 4 MG/2 ML
4 VIAL (ML) INJECTION ONCE
Refills: 0 | Status: DISCONTINUED | OUTPATIENT
Start: 2025-07-08 | End: 2025-07-08

## 2025-07-08 RX ORDER — ACETAMINOPHEN 500 MG/5ML
650 LIQUID (ML) ORAL ONCE
Refills: 0 | Status: DISCONTINUED | OUTPATIENT
Start: 2025-07-08 | End: 2025-07-08

## 2025-07-08 RX ORDER — OFLOXACIN 3 MG/ML
1 SOLUTION OPHTHALMIC
Refills: 0 | Status: COMPLETED | OUTPATIENT
Start: 2025-07-08 | End: 2025-07-08

## 2025-07-08 RX ORDER — TETRACAINE HYDROCHLORIDE 5 MG/ML
1 SOLUTION OPHTHALMIC ONCE
Refills: 0 | Status: COMPLETED | OUTPATIENT
Start: 2025-07-08 | End: 2025-07-08

## 2025-07-08 RX ORDER — SODIUM CHLORIDE 9 G/1000ML
1000 INJECTION, SOLUTION INTRAVENOUS
Refills: 0 | Status: DISCONTINUED | OUTPATIENT
Start: 2025-07-08 | End: 2025-07-08

## 2025-07-08 RX ORDER — MELOXICAM 15 MG/1
1 TABLET ORAL
Refills: 0 | DISCHARGE

## 2025-07-08 RX ORDER — HYDROCODONE BITARTRATE AND ACETAMINOPHEN 5; 325 MG/1; MG/1
1 TABLET ORAL
Refills: 0 | DISCHARGE

## 2025-07-08 RX ORDER — PHENYLEPHRINE HYDROCHLORIDE 25 MG/ML
1 SOLUTION OPHTHALMIC
Refills: 0 | Status: COMPLETED | OUTPATIENT
Start: 2025-07-08 | End: 2025-07-08

## 2025-07-08 RX ORDER — TROPICAMIDE
1 POWDER (GRAM) MISCELLANEOUS
Refills: 0 | Status: COMPLETED | OUTPATIENT
Start: 2025-07-08 | End: 2025-07-08

## 2025-07-08 RX ADMIN — PHENYLEPHRINE HYDROCHLORIDE 1 DROP(S): 25 SOLUTION OPHTHALMIC at 10:35

## 2025-07-08 RX ADMIN — PHENYLEPHRINE HYDROCHLORIDE 1 DROP(S): 25 SOLUTION OPHTHALMIC at 10:25

## 2025-07-08 RX ADMIN — TETRACAINE HYDROCHLORIDE 1 DROP(S): 5 SOLUTION OPHTHALMIC at 10:25

## 2025-07-08 RX ADMIN — OFLOXACIN 1 DROP(S): 3 SOLUTION OPHTHALMIC at 10:30

## 2025-07-08 RX ADMIN — OFLOXACIN 1 DROP(S): 3 SOLUTION OPHTHALMIC at 10:35

## 2025-07-08 RX ADMIN — Medication 1 DROP(S): at 10:35

## 2025-07-08 RX ADMIN — Medication 1 DROP(S): at 10:30

## 2025-07-08 RX ADMIN — PHENYLEPHRINE HYDROCHLORIDE 1 DROP(S): 25 SOLUTION OPHTHALMIC at 10:30

## 2025-07-08 RX ADMIN — OFLOXACIN 1 DROP(S): 3 SOLUTION OPHTHALMIC at 10:25

## 2025-07-08 RX ADMIN — Medication 1 DROP(S): at 10:25

## 2025-07-08 NOTE — OPERATIVE REPORT - OPERATIVE RPOSRT DETAILS
ASSISTANT SURGEON:  Arvin Lopez MD    DATE OF SURGERY:  07-08-25 @ 11:49    ANESTHESIA:  MAC/TOPICAL	    ESTIMATED BLOOD LOSS: < 1mL	    COMPLICATIONS:  none		    PREOPERATIVE DIAGNOSIS: Nuclear Sclerotic  Cataract, Right eye    POSTOPERATIVE DIAGNOSIS: Nuclear Sclerotic  Cataract, Right eye    OPERATIVE PROCEDURE:  Phacoemulsification with insertion of posterior chamber intraocular lens Right eye    LENS IMPLANT CCA0T0 +23    PROCEDURE:	  The patient was brought into the operating room and placed supine on the operating room table. After uneventful induction of neuroleptic anesthesia, tetracaine was instilled into the operative eye achieving sufficient anesthesia.  The patient was then prepped and draped in the usual sterile fashion.  A wire lid speculum was then inserted into the operative eye giving a wide palpebral fissure.      A rolf knife was used to perform paracenteses through clear cornea at the 12 and 6 o’clock limbal positions.  The anterior chamber was irrigated with lidocaine 1% nonpreserved.  Viscoelastic was then used to maintain the anterior chamber.  A keratome was used to create a clear corneal incision just inside the temporal limbus.  A bent 25 gauge needle was then used to initiate an anterior capsulorhexis, which was completed with the use of capsulorhexis forceps.  Balanced salt solution was used to hydrodissect the nucleus.  The [employer] phacoemulsification unit was then used to completely phacoemulsify the nucleus, following which an aspirating handpiece was used to aspirate all cortical remnants from the capsular bag.  Viscoelastic was again used to reform the anterior chamber and capsular bag.      A new foldable posterior chamber intraocular lens was brought into the surgical field.  It was folded and directly injected into the capsular bag following which it was centered and secure.  All viscoelastic was then aspirated from the anterior segment using an aspirating handpiece.  All wounds were checked for leaks and there were none.  Tobramycin 0.3%/dexamethasone 0.1% solution was used to irrigate the surface of the eye. The lid speculum was removed from the eye and a shield placed over the eye.      The patient tolerated the procedure well and went to the recovery area in good condition.      Juan David WARD MD was present for all aspects of the case.	    Juan David Lovell M.D. ASSISTANT SURGEON:  Arvin Lopez MD    DATE OF SURGERY:  07-08-25 @ 11:49    ANESTHESIA:  MAC/TOPICAL	    ESTIMATED BLOOD LOSS: < 1mL	    COMPLICATIONS:  none		    PREOPERATIVE DIAGNOSIS: Nuclear Sclerotic  Cataract, Right eye    POSTOPERATIVE DIAGNOSIS: Nuclear Sclerotic  Cataract, Right eye    OPERATIVE PROCEDURE:  Phacoemulsification with insertion of posterior chamber intraocular lens Right eye    LENS IMPLANT CCA0T0 +23.0    PROCEDURE:	  The patient was brought into the operating room and placed supine on the operating room table. After uneventful induction of neuroleptic anesthesia, tetracaine was instilled into the operative eye achieving sufficient anesthesia.  The patient was then prepped and draped in the usual sterile fashion.  A wire lid speculum was then inserted into the operative eye giving a wide palpebral fissure.      A rolf knife was used to perform paracenteses through clear cornea at the 12 and 6 o’clock limbal positions.  The anterior chamber was irrigated with lidocaine 1% nonpreserved.  Viscoelastic was then used to maintain the anterior chamber.  A keratome was used to create a clear corneal incision just inside the temporal limbus.  A bent 25 gauge needle was then used to initiate an anterior capsulorhexis, which was completed with the use of capsulorhexis forceps.  Balanced salt solution was used to hydrodissect the nucleus.  The [employer] phacoemulsification unit was then used to completely phacoemulsify the nucleus, following which an aspirating handpiece was used to aspirate all cortical remnants from the capsular bag.  Viscoelastic was again used to reform the anterior chamber and capsular bag.      A new foldable posterior chamber intraocular lens was brought into the surgical field.  It was folded and directly injected into the capsular bag following which it was centered and secure.  All viscoelastic was then aspirated from the anterior segment using an aspirating handpiece.  All wounds were checked for leaks and there were none.  Tobramycin 0.3%/dexamethasone 0.1% solution was used to irrigate the surface of the eye. The lid speculum was removed from the eye and a shield placed over the eye.      The patient tolerated the procedure well and went to the recovery area in good condition.      Juan David WARD MD was present for all aspects of the case.	    Juan David Lovell M.D.

## 2025-07-08 NOTE — PRE-ANESTHESIA EVALUATION ADULT - NSANTHOSAYNRD_GEN_A_CORE
At [facility], one important tool we use to improve our patient services is our Patient Survey.  Following your visit you may receive our survey in the mail.    Please take the time to complete the survey.    If your visit with us was great, we want to hear about it.    If we can improve, please let us know how.       Plan for Preventive Care      An important way to stay healthy is to use preventive services provided by doctors and health care providers.  Preventive services can find health problems early when treatment works best and can keep you from getting certain diseases or illnesses.  To complete your personal preventive care plan, you are in need of the following Health Maintenance screening services. The date it is due is listed after the specific service.     Health Maintenance   Topic Date Due   • DTaP/Tdap/Td Vaccine (1 - Tdap) 07/23/1979   • Colorectal Cancer Screening-Colonoscopy  06/15/2020   • Influenza Vaccine  Completed          Preventive Care for Women and Men    Cardiovascular Screening:  · Blood tests for cholesterol, lipid and triglyceride levels. High levels increase your risk for heart disease and stroke. High levels can be treated with medications, diet and exercise. Lowering your levels can help keep your heart and blood vessels healthy.  Your provider will order these tests if necessary.     Colorectal Screening:  · There are several tests to check for colorectal cancer. You and your doctor will discuss what test is best for you and when to have it done.   · Fecal Occult Blood Test: a sample of your stool is studied to find any unseen blood  · Flexible Sigmoidoscopy: exam of the sigmoid portion (last third) of the colon and rectum, seen through a flexible lighted tube.  · Screening Colonoscopy: exam of the entire colon, seen through a flexible lighted tube     Flu Shot:  · An immunization that helps to prevent influenza. You should get this every year. The best time to get the  No. JUDY screening performed.  STOP BANG Legend: 0-2 = LOW Risk; 3-4 = INTERMEDIATE Risk; 5-8 = HIGH Risk shot is in the fall.    Pneumococcal Shot:  · An immunization that helps prevent several types of pneumonia. Most people only need this shot once in their lifetime.    Hepatitis B Shot:  · An immunization that helps to protect people from getting Hepatitis B. Hepatitis B is a virus that spreads through contact with infected blood or body fluids. Many people with the virus do not have symptoms.  The virus can lead to serious problems, such as liver disease. Some people are at higher risk than others. Your doctor will tell you if this shot is recommended for you.    Diabetes Screening:  · A test to measure glucose (sugar) in your blood called a fasting blood sugar. Fasting means you cannot have food or drink for at least 8 hours before the test. This test can detect diabetes long before you may notice symptoms.    Glaucoma Screening:  · Glaucoma screening is performed by your eye doctor. The test measures the fluid pressure inside your eyes to detect if you have glaucoma.     Smoking and Tobacco-Use Cessation Counseling:  · Tobacco is the single greatest cause of disease and premature death in our country today. Medication and counseling together can increase a person’s chance of quitting for good.   · Medicare covers 2 quitting attempts per year, with 4 sessions per attempt (8 sessions in a twelve month period).    Preventive Care for Women    Screening Mammograms and Breast Exams:  · An x-ray of your breasts to check for breast cancer before you or your doctor may be able to feel it.  Breast cancer found early can usually be treated with success.    Pelvic Exams and Pap Tests:  An exam to check for cervical and vaginal cancer. A Pap test is a lab test in which cells are taken from your cervix and sent to the lab to detect signs of cervical cancer. When cancer of the cervix is found early, chances for a cure are good. Testing can generally end at age 65, or if a woman has a hysterectomy for a benign condition. A  woman's primary care physician will advise more frequent testing if certain abnormalities are found.    Bone Mass Measurements:  · A painless x-ray measurement of your bone density to see if you are at risk for suffering a broken bone. Density refers to the amount of bone or how tightly the bone tissue is packed.    Preventive Care for Men    Prostate Screening:  · PSA - a prostate cancer blood test. The [place] Preventive Services Task Force recommends against routine screening of healthy men with no signs or symptoms of prostate disease. Men should not ignore urinary symptoms, and discuss their family history with their doctor/provider.      Insurance pays for many preventive services to keep you healthy. For some of these services, you might have to pay a deductible, coinsurance, and / or copayment.  The amounts vary depending on the type of services you need and the kind of health plan you have.

## 2025-07-09 ENCOUNTER — NON-APPOINTMENT (OUTPATIENT)
Age: 70
End: 2025-07-09

## 2025-07-09 ENCOUNTER — APPOINTMENT (OUTPATIENT)
Dept: OPHTHALMOLOGY | Facility: CLINIC | Age: 70
End: 2025-07-09
Payer: MEDICARE

## 2025-07-09 PROCEDURE — 99024 POSTOP FOLLOW-UP VISIT: CPT

## 2025-07-16 ENCOUNTER — APPOINTMENT (OUTPATIENT)
Dept: OPHTHALMOLOGY | Facility: CLINIC | Age: 70
End: 2025-07-16
Payer: MEDICARE

## 2025-07-16 ENCOUNTER — NON-APPOINTMENT (OUTPATIENT)
Age: 70
End: 2025-07-16

## 2025-07-16 PROCEDURE — 99024 POSTOP FOLLOW-UP VISIT: CPT

## 2025-08-05 ENCOUNTER — APPOINTMENT (OUTPATIENT)
Dept: BREAST CENTER | Facility: CLINIC | Age: 70
End: 2025-08-05

## 2025-08-05 VITALS
HEART RATE: 76 BPM | WEIGHT: 182 LBS | HEIGHT: 64 IN | SYSTOLIC BLOOD PRESSURE: 124 MMHG | DIASTOLIC BLOOD PRESSURE: 80 MMHG | BODY MASS INDEX: 31.07 KG/M2

## 2025-08-05 DIAGNOSIS — Z85.3 ENCOUNTER FOR FOLLOW-UP EXAMINATION AFTER COMPLETED TREATMENT FOR MALIGNANT NEOPLASM: ICD-10-CM

## 2025-08-05 DIAGNOSIS — Z08 ENCOUNTER FOR FOLLOW-UP EXAMINATION AFTER COMPLETED TREATMENT FOR MALIGNANT NEOPLASM: ICD-10-CM

## 2025-08-05 DIAGNOSIS — Z15.01 GENETIC SUSCEPTIBILITY TO MALIGNANT NEOPLASM OF BREAST: ICD-10-CM

## 2025-08-05 DIAGNOSIS — Z42.1 ENCOUNTER FOR BREAST RECONSTRUCTION FOLLOWING MASTECTOMY: ICD-10-CM

## 2025-08-05 DIAGNOSIS — Z15.09 GENETIC SUSCEPTIBILITY TO MALIGNANT NEOPLASM OF BREAST: ICD-10-CM

## 2025-08-05 PROCEDURE — 99213 OFFICE O/P EST LOW 20 MIN: CPT

## 2025-08-06 ENCOUNTER — NON-APPOINTMENT (OUTPATIENT)
Age: 70
End: 2025-08-06

## 2025-08-06 ENCOUNTER — APPOINTMENT (OUTPATIENT)
Dept: OPHTHALMOLOGY | Facility: CLINIC | Age: 70
End: 2025-08-06
Payer: MEDICARE

## 2025-08-06 PROCEDURE — 99024 POSTOP FOLLOW-UP VISIT: CPT

## 2025-08-11 ENCOUNTER — RX RENEWAL (OUTPATIENT)
Age: 70
End: 2025-08-11

## 2025-09-11 ENCOUNTER — APPOINTMENT (OUTPATIENT)
Dept: ORTHOPEDIC SURGERY | Facility: CLINIC | Age: 70
End: 2025-09-11
Payer: MEDICARE

## 2025-09-11 VITALS — SYSTOLIC BLOOD PRESSURE: 117 MMHG | OXYGEN SATURATION: 97 % | HEART RATE: 94 BPM | DIASTOLIC BLOOD PRESSURE: 80 MMHG

## 2025-09-11 DIAGNOSIS — M76.892 OTHER SPECIFIED ENTHESOPATHIES OF LEFT LOWER LIMB, EXCLUDING FOOT: ICD-10-CM

## 2025-09-11 DIAGNOSIS — M76.891 OTHER SPECIFIED ENTHESOPATHIES OF RIGHT LOWER LIMB, EXCLUDING FOOT: ICD-10-CM

## 2025-09-11 PROCEDURE — 99213 OFFICE O/P EST LOW 20 MIN: CPT | Mod: 25

## 2025-09-11 PROCEDURE — 20550 NJX 1 TENDON SHEATH/LIGAMENT: CPT | Mod: LT

## 2025-09-11 PROCEDURE — 76942 ECHO GUIDE FOR BIOPSY: CPT | Mod: LT

## 2025-09-14 PROBLEM — M76.891 HAMSTRING TENDINITIS OF RIGHT THIGH: Status: ACTIVE | Noted: 2025-09-14

## 2025-09-14 PROBLEM — M76.892 HAMSTRING TENDINITIS OF LEFT THIGH: Status: ACTIVE | Noted: 2025-09-14

## (undated) DEVICE — CANNULA IRR ANT CHAMBER 30G

## (undated) DEVICE — SUT NYLON 10-0 12" CU-5

## (undated) DEVICE — NDL RETROBULBAR VISITEC 25X1.5

## (undated) DEVICE — CANNULA ANT CHMBR 27GX22MM

## (undated) DEVICE — ENDOGLIDE ULTRATHIN

## (undated) DEVICE — PACK CENTURION 2.4MM

## (undated) DEVICE — CANNULA ALCON CONSTELLATION INFUSION 23G WITH PRIMING TRAY

## (undated) DEVICE — PACK ANTERIOR SEGMENT

## (undated) DEVICE — GLV 8 PROTEXIS (WHITE)

## (undated) DEVICE — VENODYNE/SCD SLEEVE CALF MEDIUM

## (undated) DEVICE — SOL IRR BAL SALT 500ML

## (undated) DEVICE — KIT CENTURION ANTERIOR

## (undated) DEVICE — SPECIMEN CONTAINER 100ML

## (undated) DEVICE — DRAPE MICROSCOPE KNOB COVER SMALL (2 PCS)

## (undated) DEVICE — WARMING BLANKET LOWER ADULT